# Patient Record
Sex: MALE | Race: WHITE | NOT HISPANIC OR LATINO | Employment: OTHER | URBAN - METROPOLITAN AREA
[De-identification: names, ages, dates, MRNs, and addresses within clinical notes are randomized per-mention and may not be internally consistent; named-entity substitution may affect disease eponyms.]

---

## 2018-05-07 ENCOUNTER — HOSPITAL ENCOUNTER (OUTPATIENT)
Facility: MEDICAL CENTER | Age: 73
End: 2018-05-09
Attending: EMERGENCY MEDICINE | Admitting: HOSPITALIST
Payer: MEDICARE

## 2018-05-07 ENCOUNTER — APPOINTMENT (OUTPATIENT)
Dept: RADIOLOGY | Facility: MEDICAL CENTER | Age: 73
End: 2018-05-07
Attending: HOSPITALIST
Payer: MEDICARE

## 2018-05-07 ENCOUNTER — APPOINTMENT (OUTPATIENT)
Dept: RADIOLOGY | Facility: MEDICAL CENTER | Age: 73
End: 2018-05-07
Attending: EMERGENCY MEDICINE
Payer: MEDICARE

## 2018-05-07 DIAGNOSIS — R27.0 ATAXIA: ICD-10-CM

## 2018-05-07 DIAGNOSIS — R09.02 HYPOXIA: ICD-10-CM

## 2018-05-07 PROBLEM — R42 VERTIGO: Status: ACTIVE | Noted: 2018-05-07

## 2018-05-07 PROBLEM — E03.9 HYPOTHYROIDISM: Status: ACTIVE | Noted: 2018-05-07

## 2018-05-07 PROBLEM — R11.2 NAUSEA & VOMITING: Status: ACTIVE | Noted: 2018-05-07

## 2018-05-07 LAB
ALBUMIN SERPL BCP-MCNC: 3.9 G/DL (ref 3.2–4.9)
ALBUMIN/GLOB SERPL: 1.3 G/DL
ALP SERPL-CCNC: 70 U/L (ref 30–99)
ALT SERPL-CCNC: 10 U/L (ref 2–50)
ANION GAP SERPL CALC-SCNC: 8 MMOL/L (ref 0–11.9)
AST SERPL-CCNC: 15 U/L (ref 12–45)
BASOPHILS # BLD AUTO: 1.1 % (ref 0–1.8)
BASOPHILS # BLD: 0.1 K/UL (ref 0–0.12)
BILIRUB SERPL-MCNC: 0.5 MG/DL (ref 0.1–1.5)
BUN SERPL-MCNC: 9 MG/DL (ref 8–22)
CALCIUM SERPL-MCNC: 8.8 MG/DL (ref 8.5–10.5)
CHLORIDE SERPL-SCNC: 105 MMOL/L (ref 96–112)
CO2 SERPL-SCNC: 27 MMOL/L (ref 20–33)
CREAT SERPL-MCNC: 1.08 MG/DL (ref 0.5–1.4)
EKG IMPRESSION: NORMAL
EOSINOPHIL # BLD AUTO: 0.37 K/UL (ref 0–0.51)
EOSINOPHIL NFR BLD: 3.9 % (ref 0–6.9)
ERYTHROCYTE [DISTWIDTH] IN BLOOD BY AUTOMATED COUNT: 53.1 FL (ref 35.9–50)
GLOBULIN SER CALC-MCNC: 3 G/DL (ref 1.9–3.5)
GLUCOSE SERPL-MCNC: 118 MG/DL (ref 65–99)
HCT VFR BLD AUTO: 52.4 % (ref 42–52)
HGB BLD-MCNC: 17.1 G/DL (ref 14–18)
IMM GRANULOCYTES # BLD AUTO: 0.06 K/UL (ref 0–0.11)
IMM GRANULOCYTES NFR BLD AUTO: 0.6 % (ref 0–0.9)
LYMPHOCYTES # BLD AUTO: 2.95 K/UL (ref 1–4.8)
LYMPHOCYTES NFR BLD: 31.3 % (ref 22–41)
MCH RBC QN AUTO: 31.5 PG (ref 27–33)
MCHC RBC AUTO-ENTMCNC: 32.6 G/DL (ref 33.7–35.3)
MCV RBC AUTO: 96.5 FL (ref 81.4–97.8)
MONOCYTES # BLD AUTO: 0.6 K/UL (ref 0–0.85)
MONOCYTES NFR BLD AUTO: 6.4 % (ref 0–13.4)
NEUTROPHILS # BLD AUTO: 5.36 K/UL (ref 1.82–7.42)
NEUTROPHILS NFR BLD: 56.7 % (ref 44–72)
NRBC # BLD AUTO: 0 K/UL
NRBC BLD-RTO: 0 /100 WBC
PLATELET # BLD AUTO: 236 K/UL (ref 164–446)
PMV BLD AUTO: 10.9 FL (ref 9–12.9)
POTASSIUM SERPL-SCNC: 4 MMOL/L (ref 3.6–5.5)
PROT SERPL-MCNC: 6.9 G/DL (ref 6–8.2)
RBC # BLD AUTO: 5.43 M/UL (ref 4.7–6.1)
SODIUM SERPL-SCNC: 140 MMOL/L (ref 135–145)
TROPONIN I SERPL-MCNC: <0.01 NG/ML (ref 0–0.04)
WBC # BLD AUTO: 9.4 K/UL (ref 4.8–10.8)

## 2018-05-07 PROCEDURE — 36415 COLL VENOUS BLD VENIPUNCTURE: CPT

## 2018-05-07 PROCEDURE — 99220 PR INITIAL OBSERVATION CARE,LEVL III: CPT | Performed by: HOSPITALIST

## 2018-05-07 PROCEDURE — 700102 HCHG RX REV CODE 250 W/ 637 OVERRIDE(OP): Performed by: HOSPITALIST

## 2018-05-07 PROCEDURE — G0378 HOSPITAL OBSERVATION PER HR: HCPCS

## 2018-05-07 PROCEDURE — 99285 EMERGENCY DEPT VISIT HI MDM: CPT

## 2018-05-07 PROCEDURE — 700111 HCHG RX REV CODE 636 W/ 250 OVERRIDE (IP): Performed by: HOSPITALIST

## 2018-05-07 PROCEDURE — 96372 THER/PROPH/DIAG INJ SC/IM: CPT | Mod: XU

## 2018-05-07 PROCEDURE — 80053 COMPREHEN METABOLIC PANEL: CPT

## 2018-05-07 PROCEDURE — 84484 ASSAY OF TROPONIN QUANT: CPT

## 2018-05-07 PROCEDURE — 70450 CT HEAD/BRAIN W/O DYE: CPT

## 2018-05-07 PROCEDURE — 70551 MRI BRAIN STEM W/O DYE: CPT

## 2018-05-07 PROCEDURE — 96374 THER/PROPH/DIAG INJ IV PUSH: CPT

## 2018-05-07 PROCEDURE — 700111 HCHG RX REV CODE 636 W/ 250 OVERRIDE (IP): Performed by: EMERGENCY MEDICINE

## 2018-05-07 PROCEDURE — 700102 HCHG RX REV CODE 250 W/ 637 OVERRIDE(OP): Performed by: EMERGENCY MEDICINE

## 2018-05-07 PROCEDURE — 700105 HCHG RX REV CODE 258: Performed by: EMERGENCY MEDICINE

## 2018-05-07 PROCEDURE — A9270 NON-COVERED ITEM OR SERVICE: HCPCS | Performed by: HOSPITALIST

## 2018-05-07 PROCEDURE — 85025 COMPLETE CBC W/AUTO DIFF WBC: CPT

## 2018-05-07 PROCEDURE — A9270 NON-COVERED ITEM OR SERVICE: HCPCS | Performed by: EMERGENCY MEDICINE

## 2018-05-07 PROCEDURE — 96375 TX/PRO/DX INJ NEW DRUG ADDON: CPT

## 2018-05-07 PROCEDURE — 93005 ELECTROCARDIOGRAM TRACING: CPT | Performed by: EMERGENCY MEDICINE

## 2018-05-07 RX ORDER — FLUTICASONE PROPIONATE 110 UG/1
2 AEROSOL, METERED RESPIRATORY (INHALATION) 2 TIMES DAILY
Status: DISCONTINUED | OUTPATIENT
Start: 2018-05-07 | End: 2018-05-09 | Stop reason: HOSPADM

## 2018-05-07 RX ORDER — CHLORAL HYDRATE 500 MG
1000 CAPSULE ORAL 2 TIMES DAILY
Status: ON HOLD | COMMUNITY
End: 2020-02-05

## 2018-05-07 RX ORDER — ONDANSETRON 2 MG/ML
4 INJECTION INTRAMUSCULAR; INTRAVENOUS EVERY 4 HOURS PRN
Status: DISCONTINUED | OUTPATIENT
Start: 2018-05-07 | End: 2018-05-09 | Stop reason: HOSPADM

## 2018-05-07 RX ORDER — BISACODYL 10 MG
10 SUPPOSITORY, RECTAL RECTAL
Status: DISCONTINUED | OUTPATIENT
Start: 2018-05-07 | End: 2018-05-09 | Stop reason: HOSPADM

## 2018-05-07 RX ORDER — ONDANSETRON 2 MG/ML
4 INJECTION INTRAMUSCULAR; INTRAVENOUS ONCE
Status: COMPLETED | OUTPATIENT
Start: 2018-05-07 | End: 2018-05-07

## 2018-05-07 RX ORDER — HEPARIN SODIUM 5000 [USP'U]/ML
5000 INJECTION, SOLUTION INTRAVENOUS; SUBCUTANEOUS EVERY 8 HOURS
Status: DISCONTINUED | OUTPATIENT
Start: 2018-05-07 | End: 2018-05-09 | Stop reason: HOSPADM

## 2018-05-07 RX ORDER — ALBUTEROL SULFATE 90 UG/1
2 AEROSOL, METERED RESPIRATORY (INHALATION)
Status: DISCONTINUED | OUTPATIENT
Start: 2018-05-07 | End: 2018-05-07

## 2018-05-07 RX ORDER — LEVOTHYROXINE SODIUM 88 UG/1
88 TABLET ORAL
Status: DISCONTINUED | OUTPATIENT
Start: 2018-05-07 | End: 2018-05-09 | Stop reason: HOSPADM

## 2018-05-07 RX ORDER — SIMVASTATIN 20 MG
40 TABLET ORAL NIGHTLY
Status: DISCONTINUED | OUTPATIENT
Start: 2018-05-07 | End: 2018-05-09 | Stop reason: HOSPADM

## 2018-05-07 RX ORDER — POLYETHYLENE GLYCOL 3350 17 G/17G
1 POWDER, FOR SOLUTION ORAL
Status: DISCONTINUED | OUTPATIENT
Start: 2018-05-07 | End: 2018-05-09 | Stop reason: HOSPADM

## 2018-05-07 RX ORDER — MECLIZINE HCL 12.5 MG/1
12.5 TABLET ORAL 3 TIMES DAILY PRN
Qty: 10 TAB | Refills: 0 | Status: SHIPPED | OUTPATIENT
Start: 2018-05-07 | End: 2018-05-09

## 2018-05-07 RX ORDER — MECLIZINE HYDROCHLORIDE 25 MG/1
25 TABLET ORAL ONCE
Status: COMPLETED | OUTPATIENT
Start: 2018-05-07 | End: 2018-05-07

## 2018-05-07 RX ORDER — AMOXICILLIN 250 MG
2 CAPSULE ORAL 2 TIMES DAILY
Status: DISCONTINUED | OUTPATIENT
Start: 2018-05-07 | End: 2018-05-09 | Stop reason: HOSPADM

## 2018-05-07 RX ORDER — MECLIZINE HYDROCHLORIDE 25 MG/1
25 TABLET ORAL 3 TIMES DAILY PRN
Status: DISCONTINUED | OUTPATIENT
Start: 2018-05-07 | End: 2018-05-09 | Stop reason: HOSPADM

## 2018-05-07 RX ORDER — ONDANSETRON 4 MG/1
4 TABLET, ORALLY DISINTEGRATING ORAL EVERY 6 HOURS PRN
Qty: 10 TAB | Refills: 0 | Status: ON HOLD | OUTPATIENT
Start: 2018-05-07 | End: 2020-02-05

## 2018-05-07 RX ORDER — METOPROLOL TARTRATE 50 MG/1
50 TABLET, FILM COATED ORAL 2 TIMES DAILY
Status: ON HOLD | COMMUNITY
End: 2020-02-05

## 2018-05-07 RX ORDER — ASPIRIN 325 MG
325 TABLET ORAL
Status: ON HOLD | COMMUNITY
End: 2018-05-08

## 2018-05-07 RX ORDER — TIOTROPIUM BROMIDE 18 UG/1
1 CAPSULE ORAL; RESPIRATORY (INHALATION) DAILY
Status: DISCONTINUED | OUTPATIENT
Start: 2018-05-07 | End: 2018-05-09 | Stop reason: HOSPADM

## 2018-05-07 RX ORDER — ONDANSETRON 4 MG/1
4 TABLET, ORALLY DISINTEGRATING ORAL EVERY 4 HOURS PRN
Status: DISCONTINUED | OUTPATIENT
Start: 2018-05-07 | End: 2018-05-09 | Stop reason: HOSPADM

## 2018-05-07 RX ORDER — SODIUM CHLORIDE 9 MG/ML
1000 INJECTION, SOLUTION INTRAVENOUS ONCE
Status: COMPLETED | OUTPATIENT
Start: 2018-05-07 | End: 2018-05-07

## 2018-05-07 RX ORDER — ALBUTEROL SULFATE 90 UG/1
2 AEROSOL, METERED RESPIRATORY (INHALATION) EVERY 4 HOURS PRN
Status: DISCONTINUED | OUTPATIENT
Start: 2018-05-07 | End: 2018-05-09 | Stop reason: HOSPADM

## 2018-05-07 RX ORDER — LORAZEPAM 2 MG/ML
1 INJECTION INTRAMUSCULAR ONCE
Status: COMPLETED | OUTPATIENT
Start: 2018-05-07 | End: 2018-05-07

## 2018-05-07 RX ADMIN — MECLIZINE HYDROCHLORIDE 25 MG: 25 TABLET ORAL at 21:18

## 2018-05-07 RX ADMIN — TIOTROPIUM BROMIDE 1 CAPSULE: 18 CAPSULE ORAL; RESPIRATORY (INHALATION) at 22:07

## 2018-05-07 RX ADMIN — HEPARIN SODIUM 5000 UNITS: 5000 INJECTION, SOLUTION INTRAVENOUS; SUBCUTANEOUS at 21:20

## 2018-05-07 RX ADMIN — LORAZEPAM 1 MG: 2 INJECTION INTRAMUSCULAR; INTRAVENOUS at 20:27

## 2018-05-07 RX ADMIN — MECLIZINE HYDROCHLORIDE 25 MG: 25 TABLET ORAL at 13:30

## 2018-05-07 RX ADMIN — ONDANSETRON 4 MG: 2 INJECTION INTRAMUSCULAR; INTRAVENOUS at 13:30

## 2018-05-07 RX ADMIN — ASPIRIN 81 MG: 81 TABLET, COATED ORAL at 21:18

## 2018-05-07 RX ADMIN — SODIUM CHLORIDE 1000 ML: 9 INJECTION, SOLUTION INTRAVENOUS at 13:30

## 2018-05-07 RX ADMIN — SIMVASTATIN 40 MG: 20 TABLET, FILM COATED ORAL at 21:18

## 2018-05-07 ASSESSMENT — COPD QUESTIONNAIRES
DO YOU EVER COUGH UP ANY MUCUS OR PHLEGM?: NO/ONLY WITH OCCASIONAL COLDS OR INFECTIONS
COPD SCREENING SCORE: 4
HAVE YOU SMOKED AT LEAST 100 CIGARETTES IN YOUR ENTIRE LIFE: YES
DURING THE PAST 4 WEEKS HOW MUCH DID YOU FEEL SHORT OF BREATH: NONE/LITTLE OF THE TIME

## 2018-05-07 ASSESSMENT — ENCOUNTER SYMPTOMS
DIZZINESS: 1
FEVER: 0
CHILLS: 0
CONSTIPATION: 0
NAUSEA: 1
VOMITING: 0
DIARRHEA: 0
HEADACHES: 0
COUGH: 0
SHORTNESS OF BREATH: 0
WHEEZING: 1

## 2018-05-07 ASSESSMENT — LIFESTYLE VARIABLES
HAVE YOU EVER FELT YOU SHOULD CUT DOWN ON YOUR DRINKING: NO
EVER_SMOKED: YES
TOTAL SCORE: 0
ALCOHOL_USE: YES
HOW MANY TIMES IN THE PAST YEAR HAVE YOU HAD 5 OR MORE DRINKS IN A DAY: 0
TOTAL SCORE: 0
CONSUMPTION TOTAL: NEGATIVE
EVER FELT BAD OR GUILTY ABOUT YOUR DRINKING: NO
EVER HAD A DRINK FIRST THING IN THE MORNING TO STEADY YOUR NERVES TO GET RID OF A HANGOVER: NO
AVERAGE NUMBER OF DAYS PER WEEK YOU HAVE A DRINK CONTAINING ALCOHOL: 1
TOTAL SCORE: 0
ON A TYPICAL DAY WHEN YOU DRINK ALCOHOL HOW MANY DRINKS DO YOU HAVE: 1
HAVE PEOPLE ANNOYED YOU BY CRITICIZING YOUR DRINKING: NO

## 2018-05-07 ASSESSMENT — PATIENT HEALTH QUESTIONNAIRE - PHQ9
SUM OF ALL RESPONSES TO PHQ9 QUESTIONS 1 AND 2: 0
1. LITTLE INTEREST OR PLEASURE IN DOING THINGS: NOT AT ALL
2. FEELING DOWN, DEPRESSED, IRRITABLE, OR HOPELESS: NOT AT ALL

## 2018-05-07 ASSESSMENT — PAIN SCALES - GENERAL
PAINLEVEL_OUTOF10: 0
PAINLEVEL_OUTOF10: 0

## 2018-05-07 NOTE — H&P
Hospital Medicine History and Physical      Date of Service  5/7/2018    Chief Complaint  Chief Complaint   Patient presents with   • Dizziness     dizziness n/v that started this morning.  denies pain.  able to ambulated. increased dizziness with position change       History of Presenting Illness  Kamron is a 72 y.o. male w/h/o asthma, childhood infections who presents with ataxia vertigo and nausea. The symptoms started this morning. Patient is not really sure what brought them on. He last had them a couple years ago but has not had them since. He denies being dehydrated recently. It does appear to be somewhat positional for the patient. Denies any tinnitus.     Primary Care Physician  Kvng Pickett M.D.    Code Status  Full code per patient    Review of Systems  Review of Systems   Constitutional: Negative for chills and fever.   HENT: Negative for tinnitus.    Respiratory: Positive for wheezing. Negative for cough and shortness of breath.    Cardiovascular: Negative for chest pain.   Gastrointestinal: Positive for nausea. Negative for constipation, diarrhea and vomiting.   Genitourinary: Negative for dysuria.   Neurological: Positive for dizziness. Negative for headaches.     Please see HPI, all other systems were reviewed and are negative (AMA/CMS criteria)   Past Medical History  Past Medical History:   Diagnosis Date   • Asthma    • Chickenpox    • [place] measles    • Influenza    • Mumps    • Tonsillitis        Surgical History  Past Surgical History:   Procedure Laterality Date   • HIP REPLACEMENT, TOTAL         Medications  No current facility-administered medications on file prior to encounter.      Current Outpatient Prescriptions on File Prior to Encounter   Medication Sig Dispense Refill   • levothyroxine (SYNTHROID) 88 MCG Tab Take 88 mcg by mouth Every morning on an empty stomach.     • simvastatin (ZOCOR) 40 MG Tab Take 40 mg by mouth every evening.     • albuterol (VENTOLIN OR PROVENTIL) 108 (90  "BASE) MCG/ACT Aero Soln inhalation aerosol Inhale 2 Puffs by mouth every 6 hours as needed for Shortness of Breath.       Family History  Family History   Problem Relation Age of Onset   • Cancer Mother    • Cancer Father      Social History  Social History   Substance Use Topics   • Smoking status: Former Smoker     Packs/day: 0.50     Years: 46.00     Types: Cigarettes     Quit date: 2015   • Smokeless tobacco: Never Used   • Alcohol use Yes      Comment: 2-3 drinks a week       Allergies  Allergies   Allergen Reactions   • Amoxicillin Hives     Pt states \"I get hives\".   • Ampicillin Hives     Pt states \"I get hives\".        Physical Exam  Laboratory   Hemodynamics  Temp (24hrs), Av.2 °C (97.2 °F), Min:36.2 °C (97.2 °F), Max:36.2 °C (97.2 °F)   Temperature: 36.2 °C (97.2 °F)  Pulse  Av.1  Min: 61  Max: 67 Heart Rate (Monitored): 67  Blood Pressure : 148/84, NIBP: 140/72      Respiratory      Respiration: (!) 23, Pulse Oximetry: 96 %             Physical Exam   Constitutional: He is oriented to person, place, and time. He appears well-developed and well-nourished.   HENT:   Head: Normocephalic.   Right Ear: External ear normal.   Left Ear: External ear normal.   Nose: Nose normal.   Eyes: Conjunctivae and EOM are normal. Pupils are equal, round, and reactive to light. Right eye exhibits no discharge. Left eye exhibits no discharge. No scleral icterus.   Neck: Neck supple. No tracheal deviation present.   Cardiovascular: Normal rate.  Exam reveals no gallop and no friction rub.    Pulmonary/Chest: No respiratory distress. He has wheezes. He has no rales.   Abdominal: Soft. He exhibits no distension. There is no tenderness. There is no rebound and no guarding.   Musculoskeletal: He exhibits no edema.   Neurological: He is alert and oriented to person, place, and time.   Negative head thrust test   Skin: Skin is warm and dry. No rash noted. No erythema.   Psychiatric: He has a normal mood and affect. "       Recent Labs      05/07/18   1220   WBC  9.4   RBC  5.43   HEMOGLOBIN  17.1   HEMATOCRIT  52.4*   MCV  96.5   MCH  31.5   MCHC  32.6*   RDW  53.1*   PLATELETCT  236   MPV  10.9     Recent Labs      05/07/18   1220   SODIUM  140   POTASSIUM  4.0   CHLORIDE  105   CO2  27   GLUCOSE  118*   BUN  9   CREATININE  1.08   CALCIUM  8.8     Recent Labs      05/07/18   1220   ALTSGPT  10   ASTSGOT  15   ALKPHOSPHAT  70   TBILIRUBIN  0.5   GLUCOSE  118*                 Lab Results   Component Value Date    TROPONINI <0.01 05/07/2018       Imaging  CT-HEAD W/O   Final Result      1.  Cerebral atrophy.      2.  White matter lucencies most consistent with small vessel ischemic change versus demyelination or gliosis.      3.  Otherwise, Head CT without contrast with no acute findings. No evidence of acute cerebral  hemorrhage or mass lesion.        EKG  per my independant read:  QTc: 431, HR: 61, Normal Sinus Rhythm, no ST/T changes     Assessment/Plan     I anticipate this patient is appropriate for observation status at this time.    Hypothyroidism- (present on admission)   Assessment & Plan    - Continuing levothyroxine  - a.m. cortisol as well as TSH and free T4 PENDING        Nausea & vomiting- (present on admission)   Assessment & Plan    -Meclizine and antiemetics as needed        Vertigo- (present on admission)   Assessment & Plan    Likely peripheral as patient had negative head thrust test  TIA / vertigo workup  Pt will be admitted for stroke workup. The pt had a negative brain CT. The pt will be monitored on telemetry with neuro checks.   Orthostatic vital signs are pending  MRI, carotid US and echocardiogram are PENDING. The pt will be seen by PT/OT.   Pt will be continued on aspirin and statin. A lipid panel and Hba1c will also be checked.               Prophylaxis:  sc heparin

## 2018-05-07 NOTE — ED NOTES
Med rec complete per pt and [facility] in [place]. Allergies verified and updated. No ABX taken within the last 30 days

## 2018-05-07 NOTE — ED PROVIDER NOTES
ED Provider Note    CHIEF COMPLAINT  Chief Complaint   Patient presents with   • Dizziness     dizziness n/v that started this morning.  denies pain.  able to ambulated. increased dizziness with position change       HPI  Familia Lundy is a 72 y.o. male who presents for evaluation of abrupt onset dizziness mild nausea and vomiting. The patient also has a sensation that the room is spinning. He does report that certain positions make it better or worse. He has had this before. No new or recent change in medications. He denies any focal speech abnormalities any numbness weakness or tingling to the arms or legs or face. Patient is otherwise quite healthy. He denies any minor head injury or any trauma in general. No pain radiating to the back. He denies any ringing in the ears or tinnitus. No aspirin use.    REVIEW OF SYSTEMS  See HPI for further details. No high fevers headache night sweats weight loss numbness weakness or tingling All other systems are negative.     PAST MEDICAL HISTORY  Past Medical History:   Diagnosis Date   • Asthma    • Chickenpox    • [place] measles    • Influenza    • Mumps    • Tonsillitis        FAMILY HISTORY  No history of bleeding disorder    SOCIAL HISTORY  Social History     Social History   • Marital status:      Spouse name: N/A   • Number of children: N/A   • Years of education: N/A     Social History Main Topics   • Smoking status: Former Smoker     Packs/day: 0.50     Years: 46.00     Types: Cigarettes     Quit date: 12/1/2015   • Smokeless tobacco: Never Used   • Alcohol use Yes      Comment: 2-3 drinks a week   • Drug use: No   • Sexual activity: Not on file     Other Topics Concern   • Not on file     Social History Narrative   • No narrative on file     Former smoker no IV drugs  SURGICAL HISTORY  Past Surgical History:   Procedure Laterality Date   • HIP REPLACEMENT, TOTAL         CURRENT MEDICATIONS  No current facility-administered medications for this encounter.  "    Current Outpatient Prescriptions:   •  ondansetron (ZOFRAN ODT) 4 MG TABLET DISPERSIBLE, Take 1 Tab by mouth every 6 hours as needed for Nausea., Disp: 10 Tab, Rfl: 0  •  meclizine (ANTIVERT) 12.5 MG Tab, Take 1 Tab by mouth 3 times a day as needed for Dizziness., Disp: 10 Tab, Rfl: 0  •  fluticasone (FLOVENT HFA) 110 MCG/ACT Aerosol, Inhale 2 Puffs by mouth 2 times a day., Disp: , Rfl:   •  albuterol (VENTOLIN OR PROVENTIL) 108 (90 BASE) MCG/ACT Aero Soln inhalation aerosol, Inhale 2 Puffs by mouth every 6 hours as needed for Shortness of Breath., Disp: , Rfl:   •  levothyroxine (SYNTHROID) 88 MCG Tab, Take 88 mcg by mouth Every morning on an empty stomach., Disp: , Rfl:   •  METOPROLOL TARTRATE PO, Take  by mouth., Disp: , Rfl:   •  naproxen (NAPROSYN) 500 MG Tab, Take 500 mg by mouth 2 times a day as needed., Disp: , Rfl:   •  simvastatin (ZOCOR) 40 MG Tab, Take 40 mg by mouth every evening., Disp: , Rfl:   •  albuterol (VENTOLIN OR PROVENTIL) 108 (90 BASE) MCG/ACT Aero Soln inhalation aerosol, Inhale 2 Puffs by mouth every 6 hours as needed for Shortness of Breath., Disp: , Rfl:   •  predniSONE (DELTASONE) 10 MG Tab, Take 40MG for 2 days Take 20MG for 4 days Take 10 MG for 4 days Take 5 MG for 2 days, Disp: 22 Tab, Rfl: 0  •  tiotropium (SPIRIVA) 18 MCG Cap, Inhale 1 Cap by mouth every day., Disp: 30 Cap, Rfl: 3  •  aspirin EC (ECOTRIN) 81 MG Tablet Delayed Response, Take 81 mg by mouth every bedtime., Disp: , Rfl:   •  Ascorbic Acid (VITAMIN C PO), Take 6 Tabs by mouth every day. Pt takes: 4 tablets AM 2 tablets PM, Disp: , Rfl:       ALLERGIES  Allergies   Allergen Reactions   • Amoxicillin Hives     Pt states \"I get hives\".   • Ampicillin Hives     Pt states \"I get hives\".       PHYSICAL EXAM  VITAL SIGNS: /84   Pulse 65   Temp 36.2 °C (97.2 °F)   Resp (!) 23   Ht 1.727 m (5' 8\")   Wt 108.9 kg (240 lb)   SpO2 96%   BMI 36.49 kg/m²  Room air O2: 90    Constitutional: Patient appears " uncomfortable  HENT: Normocephalic, Atraumatic, Bilateral external ears normal, Oropharynx moist, No oral exudates, Nose normal.   Eyes: PERRLA, EOMI, Conjunctiva normal, No discharge. Mild horizontal nystagmus no vertical nystagmus  Neck: Normal range of motion, No tenderness, Supple, No stridor.   Cardiovascular: Normal heart rate, Normal rhythm, No murmurs, No rubs, No gallops.   Thorax & Lungs: Normal breath sounds, No respiratory distress, No wheezing, No chest tenderness.   Abdomen: Bowel sounds normal, Soft, No tenderness, No masses, No pulsatile masses.   Skin: Warm, Dry, No erythema, No rash.   Back: No tenderness, No CVA tenderness.   Extremities: Intact distal pulses, No edema, No tenderness, No cyanosis, No clubbing.   Neurologic: Alert & oriented x 3, Normal motor function, Normal sensory function, No focal deficits noted. No seizure activity  Psychiatric: Anxious       EKG  Interpretation by me rate 60 sinus rhythm. Single PVC noted no acute ST segment elevation or depression or pathological T-wave inversion. Low voltage in frontal leads. No evidence of ischemia or arrhythmia    RADIOLOGY/PROCEDURES  Results for orders placed or performed during the hospital encounter of 05/07/18   CBC WITH DIFFERENTIAL   Result Value Ref Range    WBC 9.4 4.8 - 10.8 K/uL    RBC 5.43 4.70 - 6.10 M/uL    Hemoglobin 17.1 14.0 - 18.0 g/dL    Hematocrit 52.4 (H) 42.0 - 52.0 %    MCV 96.5 81.4 - 97.8 fL    MCH 31.5 27.0 - 33.0 pg    MCHC 32.6 (L) 33.7 - 35.3 g/dL    RDW 53.1 (H) 35.9 - 50.0 fL    Platelet Count 236 164 - 446 K/uL    MPV 10.9 9.0 - 12.9 fL    Neutrophils-Polys 56.70 44.00 - 72.00 %    Lymphocytes 31.30 22.00 - 41.00 %    Monocytes 6.40 0.00 - 13.40 %    Eosinophils 3.90 0.00 - 6.90 %    Basophils 1.10 0.00 - 1.80 %    Immature Granulocytes 0.60 0.00 - 0.90 %    Nucleated RBC 0.00 /100 WBC    Neutrophils (Absolute) 5.36 1.82 - 7.42 K/uL    Lymphs (Absolute) 2.95 1.00 - 4.80 K/uL    Monos (Absolute) 0.60 0.00 -  0.85 K/uL    Eos (Absolute) 0.37 0.00 - 0.51 K/uL    Baso (Absolute) 0.10 0.00 - 0.12 K/uL    Immature Granulocytes (abs) 0.06 0.00 - 0.11 K/uL    NRBC (Absolute) 0.00 K/uL   COMP METABOLIC PANEL   Result Value Ref Range    Sodium 140 135 - 145 mmol/L    Potassium 4.0 3.6 - 5.5 mmol/L    Chloride 105 96 - 112 mmol/L    Co2 27 20 - 33 mmol/L    Anion Gap 8.0 0.0 - 11.9    Glucose 118 (H) 65 - 99 mg/dL    Bun 9 8 - 22 mg/dL    Creatinine 1.08 0.50 - 1.40 mg/dL    Calcium 8.8 8.5 - 10.5 mg/dL    AST(SGOT) 15 12 - 45 U/L    ALT(SGPT) 10 2 - 50 U/L    Alkaline Phosphatase 70 30 - 99 U/L    Total Bilirubin 0.5 0.1 - 1.5 mg/dL    Albumin 3.9 3.2 - 4.9 g/dL    Total Protein 6.9 6.0 - 8.2 g/dL    Globulin 3.0 1.9 - 3.5 g/dL    A-G Ratio 1.3 g/dL   TROPONIN   Result Value Ref Range    Troponin I <0.01 0.00 - 0.04 ng/mL   ESTIMATED GFR   Result Value Ref Range    GFR If African American >60 >60 mL/min/1.73 m 2    GFR If Non African American >60 >60 mL/min/1.73 m 2   EKG (ER)   Result Value Ref Range    Report       [facility] Emergency Dept.    Test Date:  2018  Pt Name:    REBA RODRIGUEZ                  Department: ER  MRN:        [id number]                      Room:       RD 04  Gender:     Male                         Technician: [facility]  :        1945                   Requested By:NHAN PACHECO  Order #:    [id number]                    Reading MD:    Measurements  Intervals                                Axis  Rate:       60                           P:          10  WI:         144                          QRS:        -9  QRSD:       94                           T:          49  QT:         440  QTc:        440    Interpretive Statements  SINUS RHYTHM  VENTRICULAR PREMATURE COMPLEX  LOW VOLTAGE IN FRONTAL LEADS  BORDERLINE T WAVE ABNORMALITIES  Compared to ECG 2015 22:44:20  Ventricular premature complex(es) now present  T-wave abnormality still present        CT-HEAD W/O   Final Result       1.  Cerebral atrophy.      2.  White matter lucencies most consistent with small vessel ischemic change versus demyelination or gliosis.      3.  Otherwise, Head CT without contrast with no acute findings. No evidence of acute cerebral  hemorrhage or mass lesion.          COURSE & MEDICAL DECISION MAKING  Pertinent Labs & Imaging studies reviewed. (See chart for details)  Patient presented here with symptoms that were very suggestive of benign positional peripheral vertigo. CT scan of the head did not demonstrate any hemorrhage or space-occupying mass. All these blood tests are normal and reassuring EKG is normal. He was given Zofran and a small dose of meclizine and was still persistently ataxic in a folder and road test Fact that it was abrupt onset, positional with associated nausea and vomiting strongly suggests a peripheral vertiginous disorder rather than central. He did not have any horizontal nystagmus or consistent ataxia. With that being said the patient continues to have severe ataxia and vertiginous symptoms and will be admitted for observation    FINAL IMPRESSION  1. Ataxia, vertigo    Admission         Electronically signed by: Ludin Vera, 5/7/2018 12:38 PM

## 2018-05-07 NOTE — ASSESSMENT & PLAN NOTE
Likely peripheral as patient had negative head thrust test  CT head negative  Negative orthostatics  Echo WNL  Carotid duplex WNL  MRI shows small chronic right frontal infarcts  Cleared by PT/OT  ASA changed to plavix since the patient appears to have had strokes while on aspirin

## 2018-05-07 NOTE — ED NOTES
MD at bedside. Patient attempted to ambulate, became dizzy and unsteady on his feet and helped back into bed

## 2018-05-07 NOTE — ED NOTES
Patient becoming dizzy with change in positions, unable to get standing vitals with patient being unsteady.

## 2018-05-08 ENCOUNTER — APPOINTMENT (OUTPATIENT)
Dept: RADIOLOGY | Facility: MEDICAL CENTER | Age: 73
End: 2018-05-08
Attending: HOSPITALIST
Payer: MEDICARE

## 2018-05-08 ENCOUNTER — APPOINTMENT (OUTPATIENT)
Dept: RADIOLOGY | Facility: MEDICAL CENTER | Age: 73
End: 2018-05-08
Attending: NURSE PRACTITIONER
Payer: MEDICARE

## 2018-05-08 ENCOUNTER — PATIENT OUTREACH (OUTPATIENT)
Dept: HEALTH INFORMATION MANAGEMENT | Facility: OTHER | Age: 73
End: 2018-05-08

## 2018-05-08 LAB
ALBUMIN SERPL BCP-MCNC: 3.4 G/DL (ref 3.2–4.9)
BASOPHILS # BLD AUTO: 0.8 % (ref 0–1.8)
BASOPHILS # BLD: 0.07 K/UL (ref 0–0.12)
BUN SERPL-MCNC: 10 MG/DL (ref 8–22)
CALCIUM SERPL-MCNC: 8.4 MG/DL (ref 8.5–10.5)
CHLORIDE SERPL-SCNC: 108 MMOL/L (ref 96–112)
CHOLEST SERPL-MCNC: 146 MG/DL (ref 100–199)
CO2 SERPL-SCNC: 28 MMOL/L (ref 20–33)
CORTIS SERPL-MCNC: 6.3 UG/DL (ref 0–23)
CREAT SERPL-MCNC: 0.95 MG/DL (ref 0.5–1.4)
DEPRECATED D DIMER PPP IA-ACNC: 316 NG/ML(D-DU)
EOSINOPHIL # BLD AUTO: 0.3 K/UL (ref 0–0.51)
EOSINOPHIL NFR BLD: 3.5 % (ref 0–6.9)
ERYTHROCYTE [DISTWIDTH] IN BLOOD BY AUTOMATED COUNT: 53.5 FL (ref 35.9–50)
GLUCOSE SERPL-MCNC: 101 MG/DL (ref 65–99)
HCT VFR BLD AUTO: 48.3 % (ref 42–52)
HDLC SERPL-MCNC: 34 MG/DL
HGB BLD-MCNC: 16.1 G/DL (ref 14–18)
IMM GRANULOCYTES # BLD AUTO: 0.03 K/UL (ref 0–0.11)
IMM GRANULOCYTES NFR BLD AUTO: 0.4 % (ref 0–0.9)
LDLC SERPL CALC-MCNC: ABNORMAL MG/DL
LV EJECT FRACT  99904: 60
LV EJECT FRACT MOD 2C 99903: 43.96
LV EJECT FRACT MOD 4C 99902: 65.46
LV EJECT FRACT MOD BP 99901: 56.55
LYMPHOCYTES # BLD AUTO: 2.19 K/UL (ref 1–4.8)
LYMPHOCYTES NFR BLD: 25.8 % (ref 22–41)
MCH RBC QN AUTO: 32.3 PG (ref 27–33)
MCHC RBC AUTO-ENTMCNC: 33.3 G/DL (ref 33.7–35.3)
MCV RBC AUTO: 97 FL (ref 81.4–97.8)
MONOCYTES # BLD AUTO: 0.62 K/UL (ref 0–0.85)
MONOCYTES NFR BLD AUTO: 7.3 % (ref 0–13.4)
NEUTROPHILS # BLD AUTO: 5.27 K/UL (ref 1.82–7.42)
NEUTROPHILS NFR BLD: 62.2 % (ref 44–72)
NRBC # BLD AUTO: 0 K/UL
NRBC BLD-RTO: 0 /100 WBC
PHOSPHATE SERPL-MCNC: 3.2 MG/DL (ref 2.5–4.5)
PLATELET # BLD AUTO: 204 K/UL (ref 164–446)
PMV BLD AUTO: 11 FL (ref 9–12.9)
POTASSIUM SERPL-SCNC: 4.1 MMOL/L (ref 3.6–5.5)
RBC # BLD AUTO: 4.98 M/UL (ref 4.7–6.1)
SODIUM SERPL-SCNC: 142 MMOL/L (ref 135–145)
T4 FREE SERPL-MCNC: 0.92 NG/DL (ref 0.53–1.43)
TRIGL SERPL-MCNC: 448 MG/DL (ref 0–149)
TSH SERPL DL<=0.005 MIU/L-ACNC: 0.66 UIU/ML (ref 0.38–5.33)
WBC # BLD AUTO: 8.5 K/UL (ref 4.8–10.8)

## 2018-05-08 PROCEDURE — 700102 HCHG RX REV CODE 250 W/ 637 OVERRIDE(OP): Performed by: HOSPITALIST

## 2018-05-08 PROCEDURE — 700117 HCHG RX CONTRAST REV CODE 255: Performed by: HOSPITALIST

## 2018-05-08 PROCEDURE — G8989 SELF CARE D/C STATUS: HCPCS | Mod: CI

## 2018-05-08 PROCEDURE — G8978 MOBILITY CURRENT STATUS: HCPCS | Mod: CI

## 2018-05-08 PROCEDURE — G8988 SELF CARE GOAL STATUS: HCPCS | Mod: CI

## 2018-05-08 PROCEDURE — 85379 FIBRIN DEGRADATION QUANT: CPT

## 2018-05-08 PROCEDURE — 97165 OT EVAL LOW COMPLEX 30 MIN: CPT

## 2018-05-08 PROCEDURE — 82533 TOTAL CORTISOL: CPT

## 2018-05-08 PROCEDURE — A9270 NON-COVERED ITEM OR SERVICE: HCPCS | Performed by: HOSPITALIST

## 2018-05-08 PROCEDURE — G8979 MOBILITY GOAL STATUS: HCPCS | Mod: CI

## 2018-05-08 PROCEDURE — 84439 ASSAY OF FREE THYROXINE: CPT

## 2018-05-08 PROCEDURE — 85025 COMPLETE CBC W/AUTO DIFF WBC: CPT

## 2018-05-08 PROCEDURE — 83036 HEMOGLOBIN GLYCOSYLATED A1C: CPT

## 2018-05-08 PROCEDURE — 96372 THER/PROPH/DIAG INJ SC/IM: CPT

## 2018-05-08 PROCEDURE — 99226 PR SUBSEQUENT OBSERVATION CARE,LEVEL III: CPT | Performed by: HOSPITALIST

## 2018-05-08 PROCEDURE — 93880 EXTRACRANIAL BILAT STUDY: CPT

## 2018-05-08 PROCEDURE — 80069 RENAL FUNCTION PANEL: CPT

## 2018-05-08 PROCEDURE — 93304 ECHO TRANSTHORACIC: CPT

## 2018-05-08 PROCEDURE — 93308 TTE F-UP OR LMTD: CPT | Mod: 26 | Performed by: INTERNAL MEDICINE

## 2018-05-08 PROCEDURE — 93321 DOPPLER ECHO F-UP/LMTD STD: CPT

## 2018-05-08 PROCEDURE — 97161 PT EVAL LOW COMPLEX 20 MIN: CPT

## 2018-05-08 PROCEDURE — G0378 HOSPITAL OBSERVATION PER HR: HCPCS

## 2018-05-08 PROCEDURE — 700102 HCHG RX REV CODE 250 W/ 637 OVERRIDE(OP): Performed by: NURSE PRACTITIONER

## 2018-05-08 PROCEDURE — G8980 MOBILITY D/C STATUS: HCPCS | Mod: CI

## 2018-05-08 PROCEDURE — 71275 CT ANGIOGRAPHY CHEST: CPT

## 2018-05-08 PROCEDURE — 93325 DOPPLER ECHO COLOR FLOW MAPG: CPT

## 2018-05-08 PROCEDURE — 80061 LIPID PANEL: CPT

## 2018-05-08 PROCEDURE — 93321 DOPPLER ECHO F-UP/LMTD STD: CPT | Mod: 26 | Performed by: INTERNAL MEDICINE

## 2018-05-08 PROCEDURE — G8987 SELF CARE CURRENT STATUS: HCPCS | Mod: CI

## 2018-05-08 PROCEDURE — 700111 HCHG RX REV CODE 636 W/ 250 OVERRIDE (IP): Performed by: HOSPITALIST

## 2018-05-08 PROCEDURE — A9270 NON-COVERED ITEM OR SERVICE: HCPCS | Performed by: NURSE PRACTITIONER

## 2018-05-08 PROCEDURE — 84443 ASSAY THYROID STIM HORMONE: CPT

## 2018-05-08 PROCEDURE — 93325 DOPPLER ECHO COLOR FLOW MAPG: CPT | Mod: 26 | Performed by: INTERNAL MEDICINE

## 2018-05-08 RX ORDER — MECLIZINE HYDROCHLORIDE 25 MG/1
25 TABLET ORAL 3 TIMES DAILY PRN
Qty: 30 TAB | Refills: 0 | Status: ON HOLD
Start: 2018-05-08 | End: 2020-02-05

## 2018-05-08 RX ORDER — CLOPIDOGREL BISULFATE 75 MG/1
75 TABLET ORAL DAILY
Status: DISCONTINUED | OUTPATIENT
Start: 2018-05-08 | End: 2018-05-09 | Stop reason: HOSPADM

## 2018-05-08 RX ORDER — CLOPIDOGREL BISULFATE 75 MG/1
75 TABLET ORAL DAILY
Qty: 30 TAB | Refills: 0 | Status: SHIPPED | OUTPATIENT
Start: 2018-05-08 | End: 2018-06-07 | Stop reason: SDUPTHER

## 2018-05-08 RX ORDER — GEMFIBROZIL 600 MG/1
600 TABLET, FILM COATED ORAL 2 TIMES DAILY
Qty: 60 TAB | Refills: 0 | Status: ON HOLD
Start: 2018-05-08 | End: 2020-02-05

## 2018-05-08 RX ADMIN — FLUTICASONE PROPIONATE 220 MCG: 110 AEROSOL, METERED RESPIRATORY (INHALATION) at 09:56

## 2018-05-08 RX ADMIN — CLOPIDOGREL 75 MG: 75 TABLET, FILM COATED ORAL at 16:55

## 2018-05-08 RX ADMIN — TIOTROPIUM BROMIDE 1 CAPSULE: 18 CAPSULE ORAL; RESPIRATORY (INHALATION) at 09:56

## 2018-05-08 RX ADMIN — IOHEXOL 100 ML: 350 INJECTION, SOLUTION INTRAVENOUS at 19:22

## 2018-05-08 RX ADMIN — FLUTICASONE PROPIONATE 220 MCG: 110 AEROSOL, METERED RESPIRATORY (INHALATION) at 16:55

## 2018-05-08 RX ADMIN — HEPARIN SODIUM 5000 UNITS: 5000 INJECTION, SOLUTION INTRAVENOUS; SUBCUTANEOUS at 16:55

## 2018-05-08 RX ADMIN — SIMVASTATIN 40 MG: 20 TABLET, FILM COATED ORAL at 19:46

## 2018-05-08 RX ADMIN — LEVOTHYROXINE SODIUM 88 MCG: 88 TABLET ORAL at 06:24

## 2018-05-08 RX ADMIN — HEPARIN SODIUM 5000 UNITS: 5000 INJECTION, SOLUTION INTRAVENOUS; SUBCUTANEOUS at 23:05

## 2018-05-08 RX ADMIN — HEPARIN SODIUM 5000 UNITS: 5000 INJECTION, SOLUTION INTRAVENOUS; SUBCUTANEOUS at 06:25

## 2018-05-08 ASSESSMENT — COGNITIVE AND FUNCTIONAL STATUS - GENERAL
SUGGESTED CMS G CODE MODIFIER MOBILITY: CH
DAILY ACTIVITIY SCORE: 24
SUGGESTED CMS G CODE MODIFIER DAILY ACTIVITY: CH
MOBILITY SCORE: 24

## 2018-05-08 ASSESSMENT — GAIT ASSESSMENTS
DEVIATION: OTHER (COMMENT)
GAIT LEVEL OF ASSIST: SUPERVISED
DISTANCE (FEET): 150

## 2018-05-08 ASSESSMENT — PAIN SCALES - GENERAL
PAINLEVEL_OUTOF10: 0
PAINLEVEL_OUTOF10: 0

## 2018-05-08 NOTE — THERAPY
"Physical Therapy Evaluation completed.   Bed Mobility:  Supine to Sit: Supervised  Transfers: Sit to Stand: Supervised  Gait: Level Of Assist: Supervised with No Equipment Needed       Plan of Care: Patient with no further skilled PT needs in the acute care setting at this time  Discharge Recommendations: Equipment: No Equipment Needed. Post-acute therapy Currently anticipate no further skilled therapy needs once patient is discharged from the inpatient setting.    Pt is a 72 year old male admitted to the hospital for dizziness, nausea and vomiting. Pt reports that he is typically independent with all mobility and ADL's, started experiencing dizziness yesterday morning when eating breakfast. Pt denies positional change or head movement at onset of dizziness. At time of initial evaluation, completed preliminary screening for BPPV including positional changes from supine to sit, neck rotation in B direction, neck flexion/extension, visual tracking and Head thrust test. Pt had no subjective complaints of dizziness with head movements, positional changes or visual tracking. Head thrust test negative. Pt also had no complaints of dizziness with visual tracking and no nystagmus present. Pt performed all mobility at SPV level. He did have 2 minor LOB due to lateral trunk sway with ambulation but was able to self recover without assistance. Pt was able to rotate neck and visually scan environment while ambulating without LOB. Of note, pt was on 2L O2 while ambulating and desaturated to the mid 80's with activity. At this time, pt does not demonstrate need for skilled PT intervention while in the acute care setting. Pt equipment or post acute therapy needs identified at this time.     See \"Rehab Therapy-Acute\" Patient Summary Report for complete documentation.     "

## 2018-05-08 NOTE — PROGRESS NOTES
Seen pt, AOx 4. On cardiac monitor with SR, HR 65. Denies any pain, dizzy when getting up, but okay with rest. Plan of care discussed includes Safety, labs, MRI, Echo, car. Dup, PT/OT, and pt understands.

## 2018-05-08 NOTE — DISCHARGE PLANNING
Patient states he has no one to give him a ride home told him will have to call a cabCare Transition Team Assessment    Information Source  Orientation : Oriented x 4  Information Given By: Patient  Who is responsible for making decisions for patient? : Patient    Readmission Evaluation  Is this a readmission?: No    Elopement Risk  Legal Hold: No  Ambulatory or Self Mobile in Wheelchair: No-Not an Elopement Risk    Interdisciplinary Discharge Planning  Does Admitting Nurse Feel This Could be a Complex Discharge?: No  Primary Care Physician: vinod oglesby  Lives with - Patient's Self Care Capacity: Alone and Able to Care For Self  Patient or legal guardian wants to designate a caregiver (see row info): No  Support Systems: None  Housing / Facility: [address]  Do You Take your Prescribed Medications Regularly: Yes  Able to Return to Previous ADL's: Yes  Mobility Issues: No  Prior Services: None  Patient Expects to be Discharged to::  (home)  Durable Medical Equipment: Home Oxygen  DME Provider / Phone: doesnt know    Discharge Preparedness  What is your plan after discharge?: Uncertain - pending medical team collaboration  Difficulity with ADLs: None  Difficulity with IADLs: None              Vision / Hearing Impairment  Vision Impairment : No  Hearing Impairment : No    Values / Beliefs / Concerns  Values / Beliefs Concerns : No    Advance Directive  Advance Directive?: None  Advance Directive offered?: AD Booklet refused                   Anticipated Discharge Information  Anticipated discharge disposition: Home  Discharge Address: face sheet  Discharge Contact Phone Number: none

## 2018-05-08 NOTE — PROGRESS NOTES
Orthostatics completed. Slight dizzy but pt stated dizziness a lot better compared last night. Call light within reach. On Sinus rhythm, HR is 75. VS stable. No other needs at this time. Will continue to monitor

## 2018-05-08 NOTE — DISCHARGE SUMMARY
Hospital Medicine Discharge Note     Patient ID:  Familia Lundy  [id number]  72 y.o.male  1945    Admit Date:  5/7/2018       Discharge Date:  5/9/2018    Primary Care Provider: Kvng Pickett M.D.    Admitting Physician: Jossue Waterman M.D.  Discharging Physician: MAGDY Amin/Jasper Franklin M.D.    Chief Complaint: Positional vertigo; ataxia    Discharge Diagnoses:   Active Problems:    Hypoxia    Vertigo    Cerebral infarct (HCC)    Acute conjunctivitis of right eye    Hypothyroidism  hypertriglyceridemia    Chronic Medical Problems:  Past Medical History:   Diagnosis Date   • Asthma    • Chickenpox    • [place] measles    • Influenza    • Mumps    • Tonsillitis      Code Status: Full Code    Hospital Summary:       Please refer to H&P by Dr. Waterman on 5/7/18 for full details.      In brief, Familia Lundy is a 72 y.o. male from [place] who was admitted 5/7/2018 for vertigo & ataxia.  He had no focal deficits indicative of CVA, but horizontal nystagmus was noted on exam.  CT head was negative for mass or hemorrhage.  Thyroid studies were WNL.  Trops negative. Orthostatics negative.  Carotid duplex showed < 50% stenosis bilaterally.  Echocardiogram was unremarkable. MRI brain did show small chronic cortical infarcts in his right frontal lobe.  He denies any previous knowledge of this and doesn't recall ever having signs or symptoms of stroke. Of note, he has been on a full dose aspirin & simvastatin at home. Lipid panel showed elevated triglycerides & low HDL, so he will be sent home on gemfibrozil.  We recommend repeating his lipid panel in approximately 3-6 months but will refer to the judgment of his PCP. Given the likelihood that he had these strokes while taking aspirin, he was switched to plavix & advised to follow up with the stroke bridge clinic in the outpatient setting (appt made & discussed with the patient).  Prior to discharge he had no focal weakness or other evidence of CVA.  His dizziness  resolved with meclizine.  PT/OT did evaluate him & was cleared for discharge with no anticipated needs.  The patient states he feels good & is ready to go home.  He was made aware that he will need to return to [place] for his stroke clinic follow up, which he is willing to do.  He also agrees to follow up with his PCP in 1-2 weeks. The patient states he has 2-3 drinks per week, which could potentially account for some of his ataxia. We did discuss the benefits of complete alcohol cessation at length prior to his discharge.    Therefore, he is discharged in good and stable condition with close outpatient follow-up.    Consultants: None inpatient, but will follow up with outpatient neurology given his MRI findings    Studies:  Imaging/ Testing:      CT-CTA CHEST PULMONARY ARTERY W/ RECONS   Final Result      1.  There is no CT evidence of acute pulmonary embolism.   2.  There is no focal pneumonia.   3.  There is no evidence of edema or significant effusion.            ECHOCARDIOGRAM LTD W/ CONT   Final Result      MR-BRAIN-W/O   Final Result      1.  No acute abnormality.   2.  Small chronic cortical infarcts in the right frontal lobe.   3.  Mild age-appropriate cerebral volume loss.      Carotid Duplex ([facility] Only) - Do not order if CTA - Neck done in ER   Final Result      CT-HEAD W/O   Final Result      1.  Cerebral atrophy.      2.  White matter lucencies most consistent with small vessel ischemic change versus demyelination or gliosis.      3.  Otherwise, Head CT without contrast with no acute findings. No evidence of acute cerebral  hemorrhage or mass lesion.        Laboratory:   Recent Labs      05/07/18   1220  05/08/18   0337   WBC  9.4  8.5   RBC  5.43  4.98   HEMOGLOBIN  17.1  16.1   HEMATOCRIT  52.4*  48.3   MCV  96.5  97.0   MCH  31.5  32.3   MCHC  32.6*  33.3*   RDW  53.1*  53.5*   PLATELETCT  236  204   MPV  10.9  11.0     Recent Labs      05/07/18   1220  05/08/18   0337   SODIUM   140  142   POTASSIUM  4.0  4.1   CHLORIDE  105  108   CO2  27  28   GLUCOSE  118*  101*   BUN  9  10   CREATININE  1.08  0.95   CALCIUM  8.8  8.4*     Recent Labs      05/07/18   1220  05/08/18   0337   ALTSGPT  10   --    ASTSGOT  15   --    ALKPHOSPHAT  70   --    TBILIRUBIN  0.5   --    GLUCOSE  118*  101*      Recent Labs      05/08/18   0337   TRIGLYCERIDE  448*   HDL  34*   LDL  see below     Recent Labs      05/07/18   1220   TROPONINI  <0.01     Recent Labs      05/08/18   0337   TSHULTRASEN  0.660     Procedures/Surgeries:  None    Disposition:  Discharge home    Condition:  Stable    Instructions:   Activity: As tolerated. Exercise encouraged.  Diet: Regular  Followup: PCP in 1-2 weeks; Stroke bridge clinic at first available  Instructions:  -Given instructions to return to the ER if any new or worsening symptoms, worsening condition, or failure to improve  -Call PCP for followup  -No smoking, no alcohol, no caffeine  -Encourage risk factor reduction with tobacco and alcohol abstinence, diet changes, weight loss, and exercise.     Follow-Up  Kvng Pickett M.D.  [address] [facility] [address]  [phone]    Go on 5/22/2018  Please arrive at 2:30 pm for your appointment. IF you are unabel to make appointment please call to cancel or reschedule. Thank you     Future Appointments  Date Time Provider Department Center   6/7/2018 1:40 PM STROKE BRIDGE CLINIC [facility] None     Discharge Medications:        (Yes)  Medication Reconciliation Completed     Medication List      START taking these medications      Instructions   clopidogrel 75 MG Tabs  Commonly known as:  PLAVIX   Take 1 Tab by mouth every day.  Dose:  75 mg     gemfibrozil 600 MG Tabs  Commonly known as:  LOPID   Take 1 Tab by mouth 2 times a day.  Dose:  600 mg     meclizine 25 MG Tabs  Commonly known as:  ANTIVERT   Take 1 Tab by mouth 3 times a day as needed for Dizziness, Nausea/Vomiting or Vertigo.  Dose:  25 mg     moxifloxacin 0.5 %  Soln  Commonly known as:  VIGAMOX   Place 1 Drop in right eye 3 times a day.  Dose:  1 Drop     ondansetron 4 MG Tbdp  Commonly known as:  ZOFRAN ODT   Take 1 Tab by mouth every 6 hours as needed for Nausea.  Dose:  4 mg        CONTINUE taking these medications      Instructions   albuterol 108 (90 Base) MCG/ACT Aers inhalation aerosol   Inhale 2 Puffs by mouth every 6 hours as needed for Shortness of Breath.  Dose:  2 Puff     fish oil 1000 MG Caps capsule   Take 1,000 mg by mouth 2 Times a Day.  Dose:  1000 mg     levothyroxine 88 MCG Tabs  Commonly known as:  SYNTHROID   Take 88 mcg by mouth Every morning on an empty stomach.  Dose:  88 mcg     metoprolol 50 MG Tabs  Commonly known as:  LOPRESSOR   Take 50 mg by mouth 2 times a day.  Dose:  50 mg     simvastatin 40 MG Tabs  Commonly known as:  ZOCOR   Take 40 mg by mouth every evening.  Dose:  40 mg        STOP taking these medications    aspirin 325 MG Tabs  Commonly known as:  ASA          Opioid prescription history checked: N/A    Total time of the discharge process exceeds 45 minutes. This included face to face with the patient, medication reconciliation, care coordination with Dr. Franklin involved in patient care and discussion and coordination with case management.     Please CC the above physicians    REGINALD NxiRCorinaN.  5/8/2018  11:13 AM    Addendum to discharge summary (5/9/2018 oh 9:20 AM):  The patient was unable to wean off of oxygen and was kept overnight for monitoring. He remains stable on 3 L/m. No dyspnea subjectively nor objectively. A d-dimer was checked yesterday and was mildly elevated, so a CTA was subsequently completed and was negative for PE or pneumonia. Patient remains afebrile with a normal heart rate and blood pressure. No adventitious lung sounds were noted on exam. His follow-up appointments for his PCP in addition to the stroke bridge clinic are still in place and have been agreed upon by the patient. Education with the  patient was done regarding the importance of wearing his oxygen both during the day and at night. The risks of not doing so were reviewed. He verbalized understanding and all questions were answered. Recommend outpatient pulmonary follow-up, to be referred by PCP.    He is complaining of right eye redness/pain consistent with conjunctivitis. He was initiated on ophthalmic drops for this and was encouraged him to wash his hands frequently in order to contain any spread. Bedside nursing was updated.     I have discussed this case with Dr. Franklin while the patient's bedside. The patient is still on track to be discharged home today with supplemental oxygen. Case management is involved and will arrange a ride home for the patient.     MAGDY Nix  5/9/2018  09:32 AM        Pt seen and examined.agree with discharge plan

## 2018-05-08 NOTE — PROGRESS NOTES
Report received, assumed patient care.  Pt A&OX4.  Assessment completed.  Call light within reach, personal belongings available, bed in lowest position, pt calling for assistance.  Pt reports no pain.  Pt given IS and instructed of use.  Communication board updated, POC discussed.  Monitors reapplied, VSS.  No additional needs at this time.

## 2018-05-08 NOTE — THERAPY
"Occupational Therapy Evaluation completed.   Functional Status:  Supervised/Independent with ADLs and txfs  Plan of Care: Patient with no further skilled OT needs in the acute care setting at this time  Discharge Recommendations: Post-acute therapy Currently anticipate no further skilled therapy needs once patient is discharged from the inpatient setting.    See \"Rehab Therapy-Acute\" Patient Summary Report for complete documentation.    "

## 2018-05-08 NOTE — PROGRESS NOTES
Tech ambulated pt on RA.  Pt desatted to 80%.  Pt requires 3 L O2 while ambulating.  APRN updated and aware.

## 2018-05-08 NOTE — FACE TO FACE
Face to Face Note  -  Durable Medical Equipment    SUSAN Nix - NPI: [id number]  I certify that this patient is under my care and that they had a durable medical equipment(DME)face to face encounter by myself that meets the physician DME face-to-face encounter requirements with this patient on:    Date of encounter:   Patient:                    MRN:                       YOB: 2016  Familia Lundy  [id number]  1945     The encounter with the patient was in whole, or in part, for the following medical condition, which is the primary reason for durable medical equipment:  Asthma    I certify that, based on my findings, the following durable medical equipment is medically necessary:  Oxygen.    HOME O2 Saturation Measurements:   (Values must be present for Home Oxygen orders)  Room air sat at rest: 81  Room air sat with amb: 83  With liters of O2: 3, O2 sat at rest with O2: 91  With Liters of O2: 3, O2 sat with amb with O2 : 55  Is the patient mobile?: Yes    My Clinical findings support the need for the above equipment due to:  Hypoxia    Supporting Symptoms: Asthma combined with chronic obesity related hypoventilation syndrome

## 2018-05-08 NOTE — RESPIRATORY CARE
COPD EDUCATION by COPD CLINICAL EDUCATOR  5/8/2018 at 7:35 AM by Melisa Hummel     Patient reviewed by COPD education team. Patient does not qualify for COPD program.

## 2018-05-09 VITALS
OXYGEN SATURATION: 91 % | WEIGHT: 246.47 LBS | HEIGHT: 68 IN | TEMPERATURE: 97.7 F | HEART RATE: 72 BPM | RESPIRATION RATE: 16 BRPM | BODY MASS INDEX: 37.36 KG/M2 | DIASTOLIC BLOOD PRESSURE: 67 MMHG | SYSTOLIC BLOOD PRESSURE: 137 MMHG

## 2018-05-09 PROBLEM — H10.31 ACUTE CONJUNCTIVITIS OF RIGHT EYE: Status: ACTIVE | Noted: 2018-05-09

## 2018-05-09 PROBLEM — R11.2 NAUSEA & VOMITING: Status: RESOLVED | Noted: 2018-05-07 | Resolved: 2018-05-09

## 2018-05-09 PROBLEM — I63.9 CEREBRAL INFARCT (HCC): Status: ACTIVE | Noted: 2018-05-08

## 2018-05-09 LAB
EST. AVERAGE GLUCOSE BLD GHB EST-MCNC: 128 MG/DL
HBA1C MFR BLD: 6.1 % (ref 0–5.6)

## 2018-05-09 PROCEDURE — A9270 NON-COVERED ITEM OR SERVICE: HCPCS | Performed by: NURSE PRACTITIONER

## 2018-05-09 PROCEDURE — 700102 HCHG RX REV CODE 250 W/ 637 OVERRIDE(OP): Performed by: HOSPITALIST

## 2018-05-09 PROCEDURE — 700101 HCHG RX REV CODE 250: Performed by: NURSE PRACTITIONER

## 2018-05-09 PROCEDURE — 96372 THER/PROPH/DIAG INJ SC/IM: CPT

## 2018-05-09 PROCEDURE — G0378 HOSPITAL OBSERVATION PER HR: HCPCS

## 2018-05-09 PROCEDURE — 99217 PR OBSERVATION CARE DISCHARGE: CPT | Performed by: HOSPITALIST

## 2018-05-09 PROCEDURE — 700111 HCHG RX REV CODE 636 W/ 250 OVERRIDE (IP): Performed by: HOSPITALIST

## 2018-05-09 PROCEDURE — 700102 HCHG RX REV CODE 250 W/ 637 OVERRIDE(OP): Performed by: NURSE PRACTITIONER

## 2018-05-09 PROCEDURE — A9270 NON-COVERED ITEM OR SERVICE: HCPCS | Performed by: HOSPITALIST

## 2018-05-09 RX ORDER — MOXIFLOXACIN 5 MG/ML
1 SOLUTION/ DROPS OPHTHALMIC 3 TIMES DAILY
Qty: 1 BOTTLE | Refills: 0 | Status: ON HOLD
Start: 2018-05-09 | End: 2020-02-05

## 2018-05-09 RX ORDER — MOXIFLOXACIN 5 MG/ML
1 SOLUTION/ DROPS OPHTHALMIC 3 TIMES DAILY
Status: DISCONTINUED | OUTPATIENT
Start: 2018-05-09 | End: 2018-05-09 | Stop reason: HOSPADM

## 2018-05-09 RX ADMIN — LEVOTHYROXINE SODIUM 88 MCG: 88 TABLET ORAL at 06:36

## 2018-05-09 RX ADMIN — FLUTICASONE PROPIONATE 220 MCG: 110 AEROSOL, METERED RESPIRATORY (INHALATION) at 07:56

## 2018-05-09 RX ADMIN — MOXIFLOXACIN HYDROCHLORIDE 1 DROP: 5 SOLUTION/ DROPS OPHTHALMIC at 10:20

## 2018-05-09 RX ADMIN — TIOTROPIUM BROMIDE 1 CAPSULE: 18 CAPSULE ORAL; RESPIRATORY (INHALATION) at 07:57

## 2018-05-09 RX ADMIN — HEPARIN SODIUM 5000 UNITS: 5000 INJECTION, SOLUTION INTRAVENOUS; SUBCUTANEOUS at 06:36

## 2018-05-09 RX ADMIN — CLOPIDOGREL 75 MG: 75 TABLET, FILM COATED ORAL at 07:56

## 2018-05-09 ASSESSMENT — ENCOUNTER SYMPTOMS
SPUTUM PRODUCTION: 0
BLOOD IN STOOL: 0
SHORTNESS OF BREATH: 0
SINUS PAIN: 0
DIAPHORESIS: 0
NAUSEA: 0
PND: 0
CLAUDICATION: 0
TREMORS: 0
DIZZINESS: 0
INSOMNIA: 0
VOMITING: 0
SENSORY CHANGE: 0
HEMOPTYSIS: 0
WHEEZING: 0
WEAKNESS: 0
HEADACHES: 0
PALPITATIONS: 0
ABDOMINAL PAIN: 0
FOCAL WEAKNESS: 0
ORTHOPNEA: 0
SPEECH CHANGE: 0
CONSTIPATION: 0
FEVER: 0
TINGLING: 0
SORE THROAT: 0
CHILLS: 0
COUGH: 0
DEPRESSION: 0
NERVOUS/ANXIOUS: 0
BRUISES/BLEEDS EASILY: 0
DIARRHEA: 0

## 2018-05-09 ASSESSMENT — PAIN SCALES - GENERAL: PAINLEVEL_OUTOF10: 0

## 2018-05-09 NOTE — PROGRESS NOTES
Transport at bedside.   Pt states personal belongings are in possession.  Pt escorted off unit by transporter  without incident.

## 2018-05-09 NOTE — ASSESSMENT & PLAN NOTE
"Continue to try & wean O2. Baseline O2 level is 2 LPM. Currently on 3 LPM.  No dyspnea/SOB.  Home O2 eval was completed. Face to face & order placed for home O2.  The patient already has O2 at home but only wears it at night because it \"interferes with his gardening, lawn mowing, & bowling during the day\".  Case management involved.  D-dimer positive so CTA was done & was negative for PE & pneumonia.  Patient was long time smoker (1-1.5 packs q2-3 days x 45 years). Possible emphysematous changes noted on CTA.  Recommend seeing pulmonary MD after discharge.  Doing well on incentive spirometer.  "

## 2018-05-09 NOTE — PROGRESS NOTES
IV dc'd.  Discharge instructions given to patient; patient verbalizes understanding, all questions answered.  Copy of DC summary provided, signed copy in chart.  5 prescriptions electronically sent to pt's pharmacy.   Pt awaiting transport

## 2018-05-09 NOTE — PROGRESS NOTES
Seen pt, AOx 4. On cardiac monitor with SR w PVC. Denies any pain. Plan of care discussed includes Safety, labs, monitoring and pt understands.    Spoke to the patient regarding CTA chest results, and tests are negative. Explained if he want to get d/c tonight or okay if to stay overnight for monitoring and safe discharged in AM, pt agree and actually worried of transport back home.  Will discuss duran for SW/CM to assist for transport going home and O2?.

## 2018-05-09 NOTE — DISCHARGE PLANNING
[facility] to transport home at 1130.  Transport form completed and faxed.  Pt aware and agreeable.  Pt currently has [facility] 02.  No other needs verbalized/id'd by pt/staff/NP/MD.  Transport job number [id number]

## 2018-05-09 NOTE — PROGRESS NOTES
[facility] Hospitalist Progress Note    Date of Service: 2018    Chief Complaint  72 y.o. male admitted 2018 with vertigo, ataxia, & N/V.    Interval Problem Update  Vertigo resolved with meclizine. Evaluated & cleared by PT/OT.  Old right frontal infarcts noted on MRI. Denies ever having stroke symptoms.  Denies pain, SOB/dyspnea.  Unable to wean off O2. Currently on 3 LPM with sats 90%. Patient is noncompliant with daytime O2 at home.    Consultants/Specialty  None    Disposition  Discharge home once off oxygen.        Review of Systems   Constitutional: Negative for chills, diaphoresis, fever and malaise/fatigue.   HENT: Negative for congestion, nosebleeds, sinus pain and sore throat.    Respiratory: Negative for cough, hemoptysis, sputum production, shortness of breath and wheezing.    Cardiovascular: Negative for chest pain, palpitations, orthopnea, claudication, leg swelling and PND.   Gastrointestinal: Negative for abdominal pain, blood in stool, constipation, diarrhea, melena, nausea and vomiting.   Genitourinary: Negative for dysuria, frequency, hematuria and urgency.   Musculoskeletal:        Denies pain     Neurological: Negative for dizziness (resolved), tingling, tremors, sensory change, speech change, focal weakness, weakness and headaches.   Endo/Heme/Allergies: Does not bruise/bleed easily.   Psychiatric/Behavioral: Negative for depression. The patient is not nervous/anxious and does not have insomnia.    All other systems reviewed and are negative.     Physical Exam  Laboratory/Imaging   Hemodynamics  Temp (24hrs), Av.3 °C (97.3 °F), Min:35.8 °C (96.5 °F), Max:36.5 °C (97.7 °F)   Temperature: 36.5 °C (97.7 °F)  Pulse  Av.2  Min: 61  Max: 82    Blood Pressure : 137/67      Respiratory  Respiration: 16, Pulse Oximetry: 91 %  RUL Breath Sounds: Diminished, RML Breath Sounds: Diminished, RLL Breath Sounds: Diminished, HARMONY Breath Sounds: Diminished, LLL Breath Sounds:  Diminished    Fluids  No intake or output data in the 24 hours ending 05/09/18 0732    Nutrition  Orders Placed This Encounter   Procedures   • DIET ORDER     Standing Status:   Standing     Number of Occurrences:   1     Order Specific Question:   Diet:     Answer:   Regular [1]     Physical Exam   Constitutional: He is oriented to person, place, and time. He appears well-developed and well-nourished. No distress.   Obese   HENT:   Head: Normocephalic and atraumatic.   Eyes: Pupils are equal, round, and reactive to light. Right eye exhibits no discharge. Left eye exhibits no discharge. No scleral icterus. Right eye exhibits nystagmus. Left eye exhibits nystagmus.   Neck: Normal range of motion. Neck supple. No JVD present.   Cardiovascular: Normal rate, regular rhythm, normal heart sounds and intact distal pulses.  Exam reveals no gallop and no friction rub.    No murmur heard.  Pulmonary/Chest: Effort normal. No stridor. No respiratory distress. He has decreased breath sounds in the right lower field and the left lower field. He has no wheezes. He has no rhonchi. He has no rales.   Abdominal: Soft. Bowel sounds are normal. He exhibits no distension. There is no tenderness. There is no rebound and no guarding.   Musculoskeletal: Normal range of motion. He exhibits no edema.   Neurological: He is alert and oriented to person, place, and time.   Skin: Skin is warm and dry. No rash noted. He is not diaphoretic. No erythema. No pallor.   Psychiatric: He has a normal mood and affect. His behavior is normal. Judgment and thought content normal.   Nursing note and vitals reviewed.    Recent Labs      05/07/18   1220  05/08/18   0337   WBC  9.4  8.5   RBC  5.43  4.98   HEMOGLOBIN  17.1  16.1   HEMATOCRIT  52.4*  48.3   MCV  96.5  97.0   MCH  31.5  32.3   MCHC  32.6*  33.3*   RDW  53.1*  53.5*   PLATELETCT  236  204   MPV  10.9  11.0     Recent Labs      05/07/18   1220  05/08/18   0337   SODIUM  140  142   POTASSIUM  4.0   "4.1   CHLORIDE  105  108   CO2  27  28   GLUCOSE  118*  101*   BUN  9  10   CREATININE  1.08  0.95   CALCIUM  8.8  8.4*             Recent Labs      05/08/18   0337   TRIGLYCERIDE  448*   HDL  34*   LDL  see below          Assessment/Plan     Hypoxia- (present on admission)   Assessment & Plan    Continue to try & wean O2. Baseline O2 level is 2 LPM. Currently on 3 LPM.  No dyspnea/SOB.  Home O2 eval was completed. Face to face & order placed for home O2.  The patient already has O2 at home but only wears it at night because it \"interferes with his gardening, lawn mowing, & bowling during the day\".  Case management involved.  D-dimer positive so CTA was done & was negative for PE & pneumonia.  Patient was long time smoker (1-1.5 packs q2-3 days x 45 years). Possible emphysematous changes noted on CTA.  Recommend seeing pulmonary MD after discharge.  Doing well on incentive spirometer.        Vertigo- (present on admission)   Assessment & Plan    Likely peripheral as patient had negative head thrust test  CT head negative  Negative orthostatics  Echo WNL  Carotid duplex WNL  MRI shows small chronic right frontal infarcts  Cleared by PT/OT  ASA changed to plavix since the patient appears to have had strokes while on aspirin          Hypothyroidism- (present on admission)   Assessment & Plan    Continue levothyroxine  Thyroid panel WNL          Quality-Core Measures      "

## 2018-05-09 NOTE — PROGRESS NOTES
Initial assessment completed. Patient is A&Ox4, denies,nausea, dizziness, lightheadedness. Patient exhibits mild WOB on 3 L NC, O2 sat 92%, expiratory wheezes throughout. POC discussed with pt; no further questions at this time. Bed in the lowest position, call light instruction provided and within reach.

## 2018-05-09 NOTE — DISCHARGE INSTRUCTIONS
Bacterial Conjunctivitis  Introduction  Bacterial conjunctivitis is an infection of your conjunctiva. This is the clear membrane that covers the white part of your eye and the inner surface of your eyelid. This condition can make your eye:  · Red or pink.  · Itchy.  This condition is caused by bacteria. This condition spreads very easily from person to person (is contagious) and from one eye to the other eye.              Follow these instructions at home:  Medicines  · Take or apply your antibiotic medicine as told by your doctor. Do not stop taking or applying the antibiotic even if you start to feel better.  · Take or apply over-the-counter and prescription medicines only as told by your doctor.  · Do not touch your eyelid with the eye drop bottle or the ointment tube.  Managing discomfort  · Wipe any fluid from your eye with a warm, wet washcloth or a cotton ball.  · Place a cool, clean washcloth on your eye. Do this for 10-20 minutes, 3-4 times per day.  General instructions  · Do not wear contact lenses until the irritation is gone. Wear glasses until your doctor says it is okay to wear contacts.  · Do not wear eye makeup until your symptoms are gone. Throw away any old makeup.  · Change or wash your pillowcase every day.  · Do not share towels or washcloths with anyone.  · Wash your hands often with soap and water. Use paper towels to dry your hands.  · Do not touch or rub your eyes.  · Do not drive or use heavy machinery if your vision is blurry.  Contact a doctor if:  · You have a fever.  · Your symptoms do not get better after 10 days.  Get help right away if:  · You have a fever and your symptoms suddenly get worse.  · You have very bad pain when you move your eye.  · Your face:  ¨ Hurts.  ¨ Is red.  ¨ Is swollen.  · You have sudden loss of vision.  This information is not intended to replace advice given to you by your health care provider. Make sure you discuss any questions you have with your health  care provider.  Document Released: 09/26/2009 Document Revised: 05/25/2017 Document Reviewed: 09/29/2016  © 2017 Elsevier        Discharge Instructions    Discharged to home by [facility] with self. Discharged via wheelchair, hospital escort: Yes.  Special equipment needed: Not Applicable    Be sure to schedule a follow-up appointment with your primary care doctor or any specialists as instructed.     Discharge Plan:   Diet Plan: Discussed  Activity Level: Discussed  Smoking Cessation Offered: Patient Refused  Confirmed Follow up Appointment: Patient to Call and Schedule Appointment  Confirmed Symptoms Management: Discussed  Medication Reconciliation Updated: Yes  Pneumococcal Vaccine Administered/Refused: Not given - Patient refused pneumococcal vaccine  Influenza Vaccine Indication: Not indicated: Previously immunized this influenza season and > 8 years of age, Patient Refuses    I understand that a diet low in cholesterol, fat, and sodium is recommended for good health. Unless I have been given specific instructions below for another diet, I accept this instruction as my diet prescription.   Other diet: Heart healthy     Special Instructions: None    · Is patient discharged on Warfarin / Coumadin?   No     Depression / Suicide Risk    As you are discharged from this [facility] facility, it is important to learn how to keep safe from harming yourself.    Recognize the warning signs:  · Abrupt changes in personality, positive or negative- including increase in energy   · Giving away possessions  · Change in eating patterns- significant weight changes-  positive or negative  · Change in sleeping patterns- unable to sleep or sleeping all the time   · Unwillingness or inability to communicate  · Depression  · Unusual sadness, discouragement and loneliness  · Talk of wanting to die  · Neglect of personal appearance   · Rebelliousness- reckless behavior  · Withdrawal from people/activities they love  · Confusion-  inability to concentrate     If you or a loved one observes any of these behaviors or has concerns about self-harm, here's what you can do:  · Talk about it- your feelings and reasons for harming yourself  · Remove any means that you might use to hurt yourself (examples: pills, rope, extension cords, firearm)  · Get professional help from the community (Mental Health, Substance Abuse, psychological counseling)  · Do not be alone:Call your Safe Contact- someone whom you trust who will be there for you.  · Call your local CRISIS HOTLINE 644-2949 or 443-681-3601  · Call your local Children's Mobile Crisis Response Team [place] (345) 303-2238 or www.Vivorte  · Call the toll free National Suicide Prevention Hotlines   · National Suicide Prevention Lifeline 486-879-XENT (5911)  · National Hope Line Network 800-SUICIDE (830-9944)

## 2018-05-09 NOTE — PROGRESS NOTES
Pt is resting/sleeping. Call light within reach. On Sinus rhythm w PVC, HR is 70. VS stable. No other needs at this time. Will continue to monitor

## 2018-06-07 ENCOUNTER — OFFICE VISIT (OUTPATIENT)
Dept: NEUROLOGY | Facility: MEDICAL CENTER | Age: 73
End: 2018-06-07
Payer: MEDICARE

## 2018-06-07 VITALS
SYSTOLIC BLOOD PRESSURE: 130 MMHG | HEIGHT: 67 IN | DIASTOLIC BLOOD PRESSURE: 70 MMHG | WEIGHT: 260 LBS | BODY MASS INDEX: 40.81 KG/M2 | HEART RATE: 86 BPM

## 2018-06-07 DIAGNOSIS — I63.9 CEREBRAL INFARCTION, UNSPECIFIED MECHANISM (HCC): ICD-10-CM

## 2018-06-07 PROCEDURE — 99204 OFFICE O/P NEW MOD 45 MIN: CPT | Performed by: PHYSICIAN ASSISTANT

## 2018-06-07 RX ORDER — CLOPIDOGREL BISULFATE 75 MG/1
75 TABLET ORAL DAILY
Qty: 30 TAB | Refills: 11 | Status: ON HOLD | OUTPATIENT
Start: 2018-06-07 | End: 2020-02-05

## 2018-06-07 NOTE — PATIENT INSTRUCTIONS
"DX:  Stroke - sometime in past       Personal Risk factors associated with recurrent stroke:    Some risk factors for stroke are \"non-modifiable\" meaning we cannot change them.  Examples of these types of risk factors are:    *   Age - once we turn 55, stroke becomes more common.  It is associated with aging and in fact every decade over 55 our stroke risk doubles.  * Gender - Men have more strokes than women, although this gender difference is only slight  * Ethnicity and/or family history:  if your parents, grandparents, siblings or children have had stroke or if you have ancestors of , , or  descent, you may have inherited some genes pre-disposing your toward stroke    To reduce your risk of recurrent stroke, we want to focus on risk factors we can modify.  Any of the checked items below are your personal risk factors and you should work with your PCP (primary care provider) to get them to the goal (goals are in bold).   These risk factors are:     ___Diabetes - goal HA1c is <7.0%: Current:  6.1.  Begin to reduce your intake of desserts, sweets, sugary drinks including fruit juices with added sugar       _X__Hypertension - goal is <140  top number at all times .   Current: 130/70  Recommend taking bp once daily and taking log to PCP.    Continue current Blood pressure medication - avoid salt    _X__Hyperlipidemia - goal LDL is <70.  Current: triglycerides too high to calculate.  Have Dr. Pickett track.  Continue taking cholesterol medication as prescribed.  Modify diet to reduce cholesterol - less cheese, avoid egg yolks, reduce intake of pork, shrimp, and beef.    _X__Smoking history: Quit about 1.5 years ago.  Once you are 5 years out from when you quit smoking, the history of smoking does not appear to be a significant risk factor for recurrent stroke.  If you are quitting, implement stress reduction with exercise such as daily walking, yoga, or meditation.  If you are " unable to quit on your own, ask your PCP about chantix or referral for smoking cessation    _X__Overweight:  Current BMI - 40.  Try about 2 pounds weight loss a month.  Body Mass Index (BMI) is your height compared to your weight.  Goal BMI to reduce risk of recurrent stroke is <25   BMI 25-27 diet/exercise to get to goal BMI.    BMI >27 patient should begin with goal of 10% weight loss  Work on this risk factor aggressively with your PCP             _X__Physical Inactivity: Counseled on initiating 30 minutes of moderate exercise most days, but at least three times per week. Moderate exercise can be walking, swimming, cycling.  Work up to goal by setting realistic goal and then increasing it weekly or monthly.    ___Atrial Fibrillation: no History of afib     _X__Sleep Apnea: diagnosed with sleep apnea.  Use of your CPAP should reduce your risk of stroke from sleep apnea.    Untreated sleep apnea is associated with recurrent stroke    _X__Alcohol or Drug use: Current user of alcohol.  Counseled to avoid binge drinking and men can have a maximum of 2 drinks daily and women 1 Drink maximum per day - preferably red wine;  Recreational Drug use:  denies    ___Testosterone Use: Denies      _  Plan:    Ischemic stroke - Work with PCP on risk factors checked above to reduce risk of recurrent stroke.    Medication you are taking to thin your blood and reduce your risk of recurrent stroke is plavix    Counseled on when to call 911 and if desired additional information on stroke was provided    Return to our office: not required this is a one time visit

## 2018-06-07 NOTE — PROGRESS NOTES
"Subjective:      Familia Lundy is a 72 y.o. male who presents with No chief complaint on file.    Patient had a recent hospitalization for vertigo and ataxia in May.  His MRI brain also showed old stroke.        Patient was not seen in the hospital by neurology.     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  No acute abnormality.  2.  Small chronic cortical infarcts in the right frontal lobe.  3.  Mild age-appropriate cerebral volume loss.      Echo:  No prior study is available for comparison.   Technically difficult and incomplete study.   Normal left ventricular size and systolic function.  Left ventricular ejection fraction is visually estimated to be 60%.  Contrast was used to enhance visualization of the endocardial border.  Normal regional wall motion.  The valves were not well visualized.  Aortic sclerosis without stenosis.  Trace mitral regurgitation.  Normal pericardium without effusion.    CUS:  Mild bilateral internal carotid artery stenosis (<50%).    Right vertebral artery not visualized    TSH: WNL        DX:  Stroke - sometime in past       Personal Risk factors associated with recurrent stroke:    Some risk factors for stroke are \"non-modifiable\" meaning we cannot change them.  Examples of these types of risk factors are:    *   Age - once we turn 55, stroke becomes more common.  It is associated with aging and in fact every decade over 55 our stroke risk doubles.  * Gender - Men have more strokes than women, although this gender difference is only slight  * Ethnicity and/or family history:  if your parents, grandparents, siblings or children have had stroke or if you have " ancestors of , , or  descent, you may have inherited some genes pre-disposing your toward stroke    To reduce your risk of recurrent stroke, we want to focus on risk factors we can modify.  Any of the checked items below are your personal risk factors and you should work with your PCP (primary care provider) to get them to the goal (goals are in bold).   These risk factors are:     ___Diabetes - goal HA1c is <7.0%: Current:  6.1.  Begin to reduce your intake of desserts, sweets, sugary drinks including fruit juices with added sugar       _X__Hypertension - goal is <140  top number at all times .   Current: 130/70  Recommend taking bp once daily and taking log to PCP.    Continue current Blood pressure medication - avoid salt    _X__Hyperlipidemia - goal LDL is <70.  Current: triglycerides too high to calculate.  Have Dr. Pickett track.  Continue taking cholesterol medication as prescribed.  Modify diet to reduce cholesterol - less cheese, avoid egg yolks, reduce intake of pork, shrimp, and beef.    _X__Smoking history: Quit about 1.5 years ago.  Once you are 5 years out from when you quit smoking, the history of smoking does not appear to be a significant risk factor for recurrent stroke.  If you are quitting, implement stress reduction with exercise such as daily walking, yoga, or meditation.  If you are unable to quit on your own, ask your PCP about chantix or referral for smoking cessation    _X__Overweight:  Current BMI - 40.  Try about 2 pounds weight loss a month.  Body Mass Index (BMI) is your height compared to your weight.  Goal BMI to reduce risk of recurrent stroke is <25   BMI 25-27 diet/exercise to get to goal BMI.    BMI >27 patient should begin with goal of 10% weight loss  Work on this risk factor aggressively with your PCP             _X__Physical Inactivity: Counseled on initiating 30 minutes of moderate exercise most days, but at least three times per week.  Moderate exercise can be walking, swimming, cycling.  Work up to goal by setting realistic goal and then increasing it weekly or monthly.    ___Atrial Fibrillation: no History of afib     _X__Sleep Apnea: diagnosed with sleep apnea.  Use of your CPAP should reduce your risk of stroke from sleep apnea.    Untreated sleep apnea is associated with recurrent stroke    _X__Alcohol or Drug use: Current user of alcohol.  Counseled to avoid binge drinking and men can have a maximum of 2 drinks daily and women 1 Drink maximum per day - preferably red wine;  Recreational Drug use:  denies    ___Testosterone Use: Denies      _              HPI    ROS       Objective:     There were no vitals taken for this visit.     Physical Exam   Constitutional: He is oriented to person, place, and time. He appears well-developed and well-nourished.   obese   HENT:   Head: Normocephalic.   Eyes: Conjunctivae and EOM are normal.   Pulmonary/Chest: Effort normal.   Musculoskeletal: Normal range of motion. He exhibits no edema or deformity.   Neurological: He is alert and oriented to person, place, and time. No cranial nerve deficit or sensory deficit. Coordination normal.   Skin: Skin is warm and dry.   Psychiatric: He has a normal mood and affect. His behavior is normal. Judgment and thought content normal.   Vitals reviewed.                  Assessment/Plan:       Ischemic stroke - Work with PCP on risk factors checked above to reduce risk of recurrent stroke.    Medication you are taking to thin your blood and reduce your risk of recurrent stroke is plavix    Counseled on when to call 911 and if desired additional information on stroke was provided    Return to our office: not required this is a one time visit

## 2020-01-27 ENCOUNTER — APPOINTMENT (OUTPATIENT)
Dept: RADIOLOGY | Facility: MEDICAL CENTER | Age: 75
DRG: 061 | End: 2020-01-27
Attending: EMERGENCY MEDICINE
Payer: MEDICARE

## 2020-01-27 ENCOUNTER — HOSPITAL ENCOUNTER (INPATIENT)
Facility: MEDICAL CENTER | Age: 75
LOS: 8 days | DRG: 061 | End: 2020-02-05
Attending: EMERGENCY MEDICINE | Admitting: HOSPITALIST
Payer: MEDICARE

## 2020-01-27 DIAGNOSIS — I63.311 CEREBROVASCULAR ACCIDENT (CVA) DUE TO THROMBOSIS OF RIGHT MIDDLE CEREBRAL ARTERY (HCC): ICD-10-CM

## 2020-01-27 DIAGNOSIS — I63.9 ACUTE CVA (CEREBROVASCULAR ACCIDENT) (HCC): ICD-10-CM

## 2020-01-27 LAB
APTT PPP: 28.6 SEC (ref 24.7–36)
BASOPHILS # BLD AUTO: 0.9 % (ref 0–1.8)
BASOPHILS # BLD: 0.08 K/UL (ref 0–0.12)
EOSINOPHIL # BLD AUTO: 0.43 K/UL (ref 0–0.51)
EOSINOPHIL NFR BLD: 4.6 % (ref 0–6.9)
ERYTHROCYTE [DISTWIDTH] IN BLOOD BY AUTOMATED COUNT: 47.2 FL (ref 35.9–50)
HCT VFR BLD AUTO: 46.4 % (ref 42–52)
HGB BLD-MCNC: 15.9 G/DL (ref 14–18)
IMM GRANULOCYTES # BLD AUTO: 0.05 K/UL (ref 0–0.11)
IMM GRANULOCYTES NFR BLD AUTO: 0.5 % (ref 0–0.9)
INR PPP: 1.05 (ref 0.87–1.13)
LYMPHOCYTES # BLD AUTO: 2.68 K/UL (ref 1–4.8)
LYMPHOCYTES NFR BLD: 28.7 % (ref 22–41)
MCH RBC QN AUTO: 32.6 PG (ref 27–33)
MCHC RBC AUTO-ENTMCNC: 34.3 G/DL (ref 33.7–35.3)
MCV RBC AUTO: 95.1 FL (ref 81.4–97.8)
MONOCYTES # BLD AUTO: 0.76 K/UL (ref 0–0.85)
MONOCYTES NFR BLD AUTO: 8.1 % (ref 0–13.4)
NEUTROPHILS # BLD AUTO: 5.35 K/UL (ref 1.82–7.42)
NEUTROPHILS NFR BLD: 57.2 % (ref 44–72)
NRBC # BLD AUTO: 0 K/UL
NRBC BLD-RTO: 0 /100 WBC
PLATELET # BLD AUTO: 274 K/UL (ref 164–446)
PMV BLD AUTO: 11.5 FL (ref 9–12.9)
PROTHROMBIN TIME: 13.9 SEC (ref 12–14.6)
RBC # BLD AUTO: 4.88 M/UL (ref 4.7–6.1)
TROPONIN T SERPL-MCNC: <6 NG/L (ref 6–19)
WBC # BLD AUTO: 9.4 K/UL (ref 4.8–10.8)

## 2020-01-27 PROCEDURE — 85025 COMPLETE CBC W/AUTO DIFF WBC: CPT

## 2020-01-27 PROCEDURE — 700117 HCHG RX CONTRAST REV CODE 255: Performed by: EMERGENCY MEDICINE

## 2020-01-27 PROCEDURE — 3E03317 INTRODUCTION OF OTHER THROMBOLYTIC INTO PERIPHERAL VEIN, PERCUTANEOUS APPROACH: ICD-10-PCS | Performed by: PSYCHIATRY & NEUROLOGY

## 2020-01-27 PROCEDURE — 80053 COMPREHEN METABOLIC PANEL: CPT

## 2020-01-27 PROCEDURE — 99291 CRITICAL CARE FIRST HOUR: CPT | Performed by: PSYCHIATRY & NEUROLOGY

## 2020-01-27 PROCEDURE — 84484 ASSAY OF TROPONIN QUANT: CPT

## 2020-01-27 PROCEDURE — 86850 RBC ANTIBODY SCREEN: CPT

## 2020-01-27 PROCEDURE — 94760 N-INVAS EAR/PLS OXIMETRY 1: CPT

## 2020-01-27 PROCEDURE — 70496 CT ANGIOGRAPHY HEAD: CPT

## 2020-01-27 PROCEDURE — 70498 CT ANGIOGRAPHY NECK: CPT

## 2020-01-27 PROCEDURE — 86900 BLOOD TYPING SEROLOGIC ABO: CPT

## 2020-01-27 PROCEDURE — 85730 THROMBOPLASTIN TIME PARTIAL: CPT

## 2020-01-27 PROCEDURE — 70450 CT HEAD/BRAIN W/O DYE: CPT

## 2020-01-27 PROCEDURE — 36415 COLL VENOUS BLD VENIPUNCTURE: CPT

## 2020-01-27 PROCEDURE — 93005 ELECTROCARDIOGRAM TRACING: CPT | Performed by: EMERGENCY MEDICINE

## 2020-01-27 PROCEDURE — 99291 CRITICAL CARE FIRST HOUR: CPT

## 2020-01-27 PROCEDURE — 80061 LIPID PANEL: CPT

## 2020-01-27 PROCEDURE — 85610 PROTHROMBIN TIME: CPT

## 2020-01-27 PROCEDURE — 86901 BLOOD TYPING SEROLOGIC RH(D): CPT

## 2020-01-27 PROCEDURE — 71045 X-RAY EXAM CHEST 1 VIEW: CPT

## 2020-01-27 PROCEDURE — 700111 HCHG RX REV CODE 636 W/ 250 OVERRIDE (IP): Mod: JG | Performed by: PSYCHIATRY & NEUROLOGY

## 2020-01-27 PROCEDURE — 37195 THROMBOLYTIC THERAPY STROKE: CPT

## 2020-01-27 RX ORDER — SODIUM CHLORIDE 9 MG/ML
50 INJECTION, SOLUTION INTRAVENOUS ONCE
Status: COMPLETED | OUTPATIENT
Start: 2020-01-28 | End: 2020-01-28

## 2020-01-27 RX ADMIN — ALTEPLASE 81 MG: KIT at 23:17

## 2020-01-27 RX ADMIN — IOHEXOL 100 ML: 350 INJECTION, SOLUTION INTRAVENOUS at 23:16

## 2020-01-28 ENCOUNTER — APPOINTMENT (OUTPATIENT)
Dept: RADIOLOGY | Facility: MEDICAL CENTER | Age: 75
DRG: 061 | End: 2020-01-28
Attending: PSYCHIATRY & NEUROLOGY
Payer: MEDICARE

## 2020-01-28 ENCOUNTER — APPOINTMENT (OUTPATIENT)
Dept: RADIOLOGY | Facility: MEDICAL CENTER | Age: 75
DRG: 061 | End: 2020-01-28
Attending: HOSPITALIST
Payer: MEDICARE

## 2020-01-28 ENCOUNTER — APPOINTMENT (OUTPATIENT)
Dept: CARDIOLOGY | Facility: MEDICAL CENTER | Age: 75
DRG: 061 | End: 2020-01-28
Attending: PSYCHIATRY & NEUROLOGY
Payer: MEDICARE

## 2020-01-28 PROBLEM — J45.40 MODERATE PERSISTENT ASTHMA WITHOUT COMPLICATION: Status: ACTIVE | Noted: 2020-01-28

## 2020-01-28 PROBLEM — I10 ESSENTIAL HYPERTENSION: Status: ACTIVE | Noted: 2020-01-28

## 2020-01-28 PROBLEM — I63.311 CEREBROVASCULAR ACCIDENT (CVA) DUE TO THROMBOSIS OF RIGHT MIDDLE CEREBRAL ARTERY (HCC): Status: ACTIVE | Noted: 2020-01-28

## 2020-01-28 PROBLEM — E78.5 DYSLIPIDEMIA: Status: ACTIVE | Noted: 2020-01-28

## 2020-01-28 PROBLEM — E66.01 MORBID OBESITY WITH BMI OF 40.0-44.9, ADULT (HCC): Status: ACTIVE | Noted: 2020-01-28

## 2020-01-28 LAB
ABO + RH BLD: NORMAL
ABO GROUP BLD: NORMAL
ALBUMIN SERPL BCP-MCNC: 4.2 G/DL (ref 3.2–4.9)
ALBUMIN/GLOB SERPL: 1.2 G/DL
ALP SERPL-CCNC: 76 U/L (ref 30–99)
ALT SERPL-CCNC: 14 U/L (ref 2–50)
ANION GAP SERPL CALC-SCNC: 10 MMOL/L (ref 0–11.9)
AST SERPL-CCNC: 26 U/L (ref 12–45)
BARCODED ABORH UBTYP: 5100
BARCODED ABORH UBTYP: 5100
BARCODED ABORH UBTYP: 6200
BARCODED ABORH UBTYP: 6200
BARCODED PRD CODE UBPRD: NORMAL
BARCODED UNIT NUM UBUNT: NORMAL
BILIRUB SERPL-MCNC: 0.4 MG/DL (ref 0.1–1.5)
BLD GP AB SCN SERPL QL: NORMAL
BUN SERPL-MCNC: 10 MG/DL (ref 8–22)
CALCIUM SERPL-MCNC: 9.2 MG/DL (ref 8.5–10.5)
CFT BLD TEG: 10.1 MIN (ref 5–10)
CHLORIDE SERPL-SCNC: 104 MMOL/L (ref 96–112)
CHOLEST SERPL-MCNC: 142 MG/DL (ref 100–199)
CLOT ANGLE BLD TEG: 18.6 DEGREES (ref 53–72)
CLOT LYSIS 30M P MA LENFR BLD TEG: 0 % (ref 0–8)
CO2 SERPL-SCNC: 25 MMOL/L (ref 20–33)
COMPONENT CT 8504CT: NORMAL
CREAT SERPL-MCNC: 1.07 MG/DL (ref 0.5–1.4)
CT.EXTRINSIC BLD ROTEM: 13.6 MIN (ref 1–3)
EKG IMPRESSION: NORMAL
EST. AVERAGE GLUCOSE BLD GHB EST-MCNC: 160 MG/DL
FIBRINOGEN PPP-MCNC: 178 MG/DL (ref 215–460)
FIBRINOGEN PPP-MCNC: 97 MG/DL (ref 215–460)
FIBRINOGEN PPP-MCNC: <60 MG/DL (ref 215–460)
GLOBULIN SER CALC-MCNC: 3.4 G/DL (ref 1.9–3.5)
GLUCOSE SERPL-MCNC: 139 MG/DL (ref 65–99)
HBA1C MFR BLD: 7.2 % (ref 0–5.6)
HDLC SERPL-MCNC: 30 MG/DL
LDLC SERPL CALC-MCNC: 73 MG/DL
LV EJECT FRACT  99904: 55
LV EJECT FRACT MOD 2C 99903: 70.01
LV EJECT FRACT MOD 4C 99902: 49.89
LV EJECT FRACT MOD BP 99901: 61.77
MCF BLD TEG: 29.9 MM (ref 50–70)
POTASSIUM SERPL-SCNC: 4.4 MMOL/L (ref 3.6–5.5)
PRODUCT TYPE UPROD: NORMAL
PROT SERPL-MCNC: 7.6 G/DL (ref 6–8.2)
RH BLD: NORMAL
SODIUM SERPL-SCNC: 139 MMOL/L (ref 135–145)
TEG ALGORITHM TGALG: ABNORMAL
TRIGL SERPL-MCNC: 196 MG/DL (ref 0–149)
UNIT STATUS USTAT: NORMAL

## 2020-01-28 PROCEDURE — 70450 CT HEAD/BRAIN W/O DYE: CPT

## 2020-01-28 PROCEDURE — 99223 1ST HOSP IP/OBS HIGH 75: CPT | Performed by: HOSPITALIST

## 2020-01-28 PROCEDURE — 92610 EVALUATE SWALLOWING FUNCTION: CPT

## 2020-01-28 PROCEDURE — 99233 SBSQ HOSP IP/OBS HIGH 50: CPT | Performed by: NURSE PRACTITIONER

## 2020-01-28 PROCEDURE — 700105 HCHG RX REV CODE 258: Performed by: PSYCHIATRY & NEUROLOGY

## 2020-01-28 PROCEDURE — 94660 CPAP INITIATION&MGMT: CPT

## 2020-01-28 PROCEDURE — 93306 TTE W/DOPPLER COMPLETE: CPT | Mod: 26 | Performed by: INTERNAL MEDICINE

## 2020-01-28 PROCEDURE — 85384 FIBRINOGEN ACTIVITY: CPT | Mod: 91

## 2020-01-28 PROCEDURE — 94640 AIRWAY INHALATION TREATMENT: CPT

## 2020-01-28 PROCEDURE — 700111 HCHG RX REV CODE 636 W/ 250 OVERRIDE (IP): Performed by: PSYCHIATRY & NEUROLOGY

## 2020-01-28 PROCEDURE — 83036 HEMOGLOBIN GLYCOSYLATED A1C: CPT

## 2020-01-28 PROCEDURE — 99292 CRITICAL CARE ADDL 30 MIN: CPT | Performed by: PSYCHIATRY & NEUROLOGY

## 2020-01-28 PROCEDURE — P9012 CRYOPRECIPITATE EACH UNIT: HCPCS | Mod: 91

## 2020-01-28 PROCEDURE — 30233M1 TRANSFUSION OF NONAUTOLOGOUS PLASMA CRYOPRECIPITATE INTO PERIPHERAL VEIN, PERCUTANEOUS APPROACH: ICD-10-PCS | Performed by: FAMILY MEDICINE

## 2020-01-28 PROCEDURE — 700101 HCHG RX REV CODE 250: Performed by: PSYCHIATRY & NEUROLOGY

## 2020-01-28 PROCEDURE — 85347 COAGULATION TIME ACTIVATED: CPT

## 2020-01-28 PROCEDURE — 770022 HCHG ROOM/CARE - ICU (200)

## 2020-01-28 PROCEDURE — 700101 HCHG RX REV CODE 250: Performed by: HOSPITALIST

## 2020-01-28 PROCEDURE — 85576 BLOOD PLATELET AGGREGATION: CPT

## 2020-01-28 PROCEDURE — 99291 CRITICAL CARE FIRST HOUR: CPT | Performed by: INTERNAL MEDICINE

## 2020-01-28 PROCEDURE — 85384 FIBRINOGEN ACTIVITY: CPT

## 2020-01-28 PROCEDURE — 93306 TTE W/DOPPLER COMPLETE: CPT

## 2020-01-28 RX ORDER — LABETALOL HYDROCHLORIDE 5 MG/ML
10 INJECTION, SOLUTION INTRAVENOUS EVERY 4 HOURS PRN
Status: DISCONTINUED | OUTPATIENT
Start: 2020-01-28 | End: 2020-01-28

## 2020-01-28 RX ORDER — BISACODYL 10 MG
10 SUPPOSITORY, RECTAL RECTAL
Status: DISCONTINUED | OUTPATIENT
Start: 2020-01-28 | End: 2020-01-29

## 2020-01-28 RX ORDER — ASPIRIN 81 MG/1
324 TABLET, CHEWABLE ORAL DAILY
Status: DISCONTINUED | OUTPATIENT
Start: 2020-01-29 | End: 2020-01-28

## 2020-01-28 RX ORDER — ALBUTEROL SULFATE 90 UG/1
2 AEROSOL, METERED RESPIRATORY (INHALATION) EVERY 6 HOURS PRN
Status: DISCONTINUED | OUTPATIENT
Start: 2020-01-28 | End: 2020-02-05 | Stop reason: HOSPADM

## 2020-01-28 RX ORDER — ASPIRIN 325 MG
325 TABLET ORAL DAILY
Status: DISCONTINUED | OUTPATIENT
Start: 2020-01-29 | End: 2020-01-28

## 2020-01-28 RX ORDER — ACETAMINOPHEN 325 MG/1
650 TABLET ORAL EVERY 6 HOURS PRN
Status: DISCONTINUED | OUTPATIENT
Start: 2020-01-28 | End: 2020-01-29

## 2020-01-28 RX ORDER — ONDANSETRON 4 MG/1
4 TABLET, ORALLY DISINTEGRATING ORAL EVERY 4 HOURS PRN
Status: DISCONTINUED | OUTPATIENT
Start: 2020-01-28 | End: 2020-01-29

## 2020-01-28 RX ORDER — POLYETHYLENE GLYCOL 3350 17 G/17G
1 POWDER, FOR SOLUTION ORAL
Status: DISCONTINUED | OUTPATIENT
Start: 2020-01-28 | End: 2020-01-29

## 2020-01-28 RX ORDER — ONDANSETRON 2 MG/ML
4 INJECTION INTRAMUSCULAR; INTRAVENOUS EVERY 4 HOURS PRN
Status: DISCONTINUED | OUTPATIENT
Start: 2020-01-28 | End: 2020-02-05 | Stop reason: HOSPADM

## 2020-01-28 RX ORDER — SIMVASTATIN 40 MG
40 TABLET ORAL NIGHTLY
Status: DISCONTINUED | OUTPATIENT
Start: 2020-01-28 | End: 2020-01-28

## 2020-01-28 RX ORDER — LABETALOL HYDROCHLORIDE 5 MG/ML
10-20 INJECTION, SOLUTION INTRAVENOUS EVERY 4 HOURS PRN
Status: DISCONTINUED | OUTPATIENT
Start: 2020-01-28 | End: 2020-01-29

## 2020-01-28 RX ORDER — HYDRALAZINE HYDROCHLORIDE 20 MG/ML
10 INJECTION INTRAMUSCULAR; INTRAVENOUS
Status: DISCONTINUED | OUTPATIENT
Start: 2020-01-28 | End: 2020-01-28

## 2020-01-28 RX ORDER — GEMFIBROZIL 600 MG/1
600 TABLET, FILM COATED ORAL 2 TIMES DAILY
Status: DISCONTINUED | OUTPATIENT
Start: 2020-01-28 | End: 2020-01-29

## 2020-01-28 RX ORDER — HYDRALAZINE HYDROCHLORIDE 20 MG/ML
20 INJECTION INTRAMUSCULAR; INTRAVENOUS
Status: DISCONTINUED | OUTPATIENT
Start: 2020-01-28 | End: 2020-01-29

## 2020-01-28 RX ORDER — ASPIRIN 300 MG/1
300 SUPPOSITORY RECTAL DAILY
Status: DISCONTINUED | OUTPATIENT
Start: 2020-01-29 | End: 2020-01-28

## 2020-01-28 RX ORDER — ATORVASTATIN CALCIUM 40 MG/1
80 TABLET, FILM COATED ORAL EVERY EVENING
Status: DISCONTINUED | OUTPATIENT
Start: 2020-01-28 | End: 2020-01-29

## 2020-01-28 RX ORDER — CLOPIDOGREL BISULFATE 75 MG/1
75 TABLET ORAL DAILY
Status: DISCONTINUED | OUTPATIENT
Start: 2020-01-28 | End: 2020-01-28

## 2020-01-28 RX ORDER — LEVOTHYROXINE SODIUM 88 UG/1
88 TABLET ORAL
Status: DISCONTINUED | OUTPATIENT
Start: 2020-01-28 | End: 2020-01-29

## 2020-01-28 RX ORDER — AMOXICILLIN 250 MG
2 CAPSULE ORAL 2 TIMES DAILY
Status: DISCONTINUED | OUTPATIENT
Start: 2020-01-28 | End: 2020-01-29

## 2020-01-28 RX ORDER — IPRATROPIUM BROMIDE AND ALBUTEROL SULFATE 2.5; .5 MG/3ML; MG/3ML
3 SOLUTION RESPIRATORY (INHALATION)
Status: DISCONTINUED | OUTPATIENT
Start: 2020-01-28 | End: 2020-02-05 | Stop reason: HOSPADM

## 2020-01-28 RX ADMIN — SODIUM CHLORIDE 15 MG/HR: 9 INJECTION, SOLUTION INTRAVENOUS at 17:18

## 2020-01-28 RX ADMIN — SODIUM CHLORIDE 15 MG/HR: 9 INJECTION, SOLUTION INTRAVENOUS at 21:51

## 2020-01-28 RX ADMIN — HYDRALAZINE HYDROCHLORIDE 10 MG: 20 INJECTION INTRAMUSCULAR; INTRAVENOUS at 11:11

## 2020-01-28 RX ADMIN — SODIUM CHLORIDE 50 ML: 9 INJECTION, SOLUTION INTRAVENOUS at 00:15

## 2020-01-28 RX ADMIN — SODIUM CHLORIDE 5 MG/HR: 9 INJECTION, SOLUTION INTRAVENOUS at 14:00

## 2020-01-28 RX ADMIN — LABETALOL HYDROCHLORIDE 10 MG: 5 INJECTION, SOLUTION INTRAVENOUS at 12:50

## 2020-01-28 RX ADMIN — SODIUM CHLORIDE 15 MG/HR: 9 INJECTION, SOLUTION INTRAVENOUS at 20:39

## 2020-01-28 RX ADMIN — HYDRALAZINE HYDROCHLORIDE 10 MG: 20 INJECTION INTRAMUSCULAR; INTRAVENOUS at 20:06

## 2020-01-28 RX ADMIN — IPRATROPIUM BROMIDE AND ALBUTEROL SULFATE 3 ML: .5; 3 SOLUTION RESPIRATORY (INHALATION) at 02:58

## 2020-01-28 RX ADMIN — FENTANYL CITRATE 12.5 MCG: 0.05 INJECTION, SOLUTION INTRAMUSCULAR; INTRAVENOUS at 16:59

## 2020-01-28 RX ADMIN — HYDRALAZINE HYDROCHLORIDE 10 MG: 20 INJECTION INTRAMUSCULAR; INTRAVENOUS at 21:53

## 2020-01-28 ASSESSMENT — ENCOUNTER SYMPTOMS
PALPITATIONS: 0
FOCAL WEAKNESS: 0
NAUSEA: 0
SENSORY CHANGE: 1
CONSTITUTIONAL NEGATIVE: 1
HEADACHES: 0
GASTROINTESTINAL NEGATIVE: 1
DIZZINESS: 0
NECK PAIN: 0
EYES NEGATIVE: 1
WEAKNESS: 0
MUSCULOSKELETAL NEGATIVE: 1
STRIDOR: 0
CARDIOVASCULAR NEGATIVE: 1
SPUTUM PRODUCTION: 0
RESPIRATORY NEGATIVE: 1
PHOTOPHOBIA: 0
SHORTNESS OF BREATH: 0
CHILLS: 0
PSYCHIATRIC NEGATIVE: 1
DOUBLE VISION: 0
VOMITING: 0
DEPRESSION: 0
FEVER: 0
SHORTNESS OF BREATH: 1
SPEECH CHANGE: 0
FOCAL WEAKNESS: 1

## 2020-01-28 ASSESSMENT — LIFESTYLE VARIABLES
CONSUMPTION TOTAL: INCOMPLETE
TOTAL SCORE: 0
HAVE YOU EVER FELT YOU SHOULD CUT DOWN ON YOUR DRINKING: NO
TOTAL SCORE: 0
HAVE PEOPLE ANNOYED YOU BY CRITICIZING YOUR DRINKING: NO
ALCOHOL_USE: YES
EVER FELT BAD OR GUILTY ABOUT YOUR DRINKING: NO
EVER_SMOKED: YES
TOTAL SCORE: 0
DOES PATIENT WANT TO TALK TO SOMEONE ABOUT QUITTING: NO
DOES PATIENT WANT TO STOP DRINKING: NO
EVER HAD A DRINK FIRST THING IN THE MORNING TO STEADY YOUR NERVES TO GET RID OF A HANGOVER: NO

## 2020-01-28 ASSESSMENT — PATIENT HEALTH QUESTIONNAIRE - PHQ9
2. FEELING DOWN, DEPRESSED, IRRITABLE, OR HOPELESS: NOT AT ALL
1. LITTLE INTEREST OR PLEASURE IN DOING THINGS: NOT AT ALL
SUM OF ALL RESPONSES TO PHQ9 QUESTIONS 1 AND 2: 0

## 2020-01-28 ASSESSMENT — COGNITIVE AND FUNCTIONAL STATUS - GENERAL
WALKING IN HOSPITAL ROOM: A LOT
MOVING FROM LYING ON BACK TO SITTING ON SIDE OF FLAT BED: A LITTLE
STANDING UP FROM CHAIR USING ARMS: A LITTLE
CLIMB 3 TO 5 STEPS WITH RAILING: A LITTLE
PERSONAL GROOMING: A LITTLE
DRESSING REGULAR UPPER BODY CLOTHING: A LITTLE
MOBILITY SCORE: 18
MOVING TO AND FROM BED TO CHAIR: A LITTLE
TOILETING: A LITTLE
DRESSING REGULAR LOWER BODY CLOTHING: A LITTLE
SUGGESTED CMS G CODE MODIFIER MOBILITY: CK

## 2020-01-28 NOTE — THERAPY
PT consult received. Pt with active bedrest orders until 1/29 at 0036 s/p alteplase. Will complete eval when appropriate for EOB/OOB mobility.

## 2020-01-28 NOTE — CONSULTS
"Neurology STROKE CODE H&P  Neurohospitalist Service, [facility]    Referring Physician: Dr. Horn    STROKE CODE: L sided weakness    To obtain the most accurate data regarding the time called, and time patient seen, refer to the stroke run-sheet and chart.  For time of CT, refer to the radiology report. See A&P below for TPA Decision and door to needle time if and when applicable.    HPI: Familia Lundy is a 74 y.o. man with history of HTN and HLD presenting to the hospital for acute onset left sided weakness and consulted for stroke.  Patient was in usual state of health at home when he noticed that at 10pm 1/27/20 became acutely weak in L arm and unable to lift remote. 10pm is his LKN.  Called [facility] and brought to ED.  BP 140s systolic en route with fingerstick glucose 114.  Patient admits weakness and numbness of left face and arm, denies vertigo, diplopia, headache, or lightheadedness.  Has not had similar symptoms in the past.    Review of systems: In addition to what is detailed in the HPI above, (and scanned into the chart if and when applicable), all other systems reviewed and are negative.    Past Medical History:    has a past medical history of Asthma, Chickenpox, [place] measles, Influenza, Mumps, and Tonsillitis.    FHx:  family history includes Cancer in his father and mother.    SHx:   reports that he quit smoking about 4 years ago. His smoking use included cigarettes. He has a 23.00 pack-year smoking history. He has never used smokeless tobacco. He reports current alcohol use. He reports that he does not use drugs.    Allergies:  Allergies   Allergen Reactions   • Amoxicillin Hives     Pt states \"I get hives\".   • Ampicillin Hives     Pt states \"I get hives\".       Medications:  No current facility-administered medications for this encounter.     Current Outpatient Medications:   •  clopidogrel (PLAVIX) 75 MG Tab, Take 1 Tab by mouth every day., Disp: 30 Tab, Rfl: 11  •  " moxifloxacin (VIGAMOX) 0.5 % Solution, Place 1 Drop in right eye 3 times a day., Disp: 1 Bottle, Rfl: 0  •  meclizine (ANTIVERT) 25 MG Tab, Take 1 Tab by mouth 3 times a day as needed for Dizziness, Nausea/Vomiting or Vertigo., Disp: 30 Tab, Rfl: 0  •  gemfibrozil (LOPID) 600 MG Tab, Take 1 Tab by mouth 2 times a day., Disp: 60 Tab, Rfl: 0  •  ondansetron (ZOFRAN ODT) 4 MG TABLET DISPERSIBLE, Take 1 Tab by mouth every 6 hours as needed for Nausea., Disp: 10 Tab, Rfl: 0  •  Omega-3 Fatty Acids (FISH OIL) 1000 MG Cap capsule, Take 1,000 mg by mouth 2 Times a Day., Disp: , Rfl:   •  metoprolol (LOPRESSOR) 50 MG Tab, Take 50 mg by mouth 2 times a day., Disp: , Rfl:   •  levothyroxine (SYNTHROID) 88 MCG Tab, Take 88 mcg by mouth Every morning on an empty stomach., Disp: , Rfl:   •  simvastatin (ZOCOR) 40 MG Tab, Take 40 mg by mouth every evening., Disp: , Rfl:   •  albuterol (VENTOLIN OR PROVENTIL) 108 (90 BASE) MCG/ACT Aero Soln inhalation aerosol, Inhale 2 Puffs by mouth every 6 hours as needed for Shortness of Breath., Disp: , Rfl:     Physical Examination:    Vitals: /90    General: Patient is awake and in no acute distress  Eyes: examination of optic disks not indicated at this time  CV: RRR    NEUROLOGICAL EXAM:     Mental status: Awake, alert and fully oriented, follows commands  Speech and language: speech is moderately dysarthric. The patient is able to name and repeat; non aphasic  Cranial nerve exam: Pupils are equal, round and reactive to light bilaterally. Visual fields: left homonymous hemianopsia. Extraocular muscles are intact. Sensation in the face is diminished on left compared to right. Face is notable for severe L sided facial droop in UMN distribution. Hearing to finger rub equal. Palate elevates symmetrically. Shoulder shrug is full. Tongue is midline.  Motor exam: Strength is 5/5 in the right extremities both distally and proximally, with 4/5 left deltoid biceps and triceps; + pronator drift  and orbiting on the left. Tone is mildly decreased LUE compared to right. No abnormal movements were seen on exam.  Sensory exam: decreased sensation left compared to right  Deep tendon reflexes:  2+ and symmetric. Toes down-going bilaterally.  Coordination: no ataxia   Gait: deferred as pt. Being actively transported to CT scanner    NIH Stroke Scale:    1a. Level of Consciousness (Alert, drowsy, etc): 0= Alert    1b. LOC Questions (Month, age): 0= Answers both correctly    1c. LOC Commands (Open/close eyes make fist/let go): 0= Obeys both correctly    2.   Best Gaze (Eyes open - patient follows examiner's finger on face): 0= Normal    3.   Visual Fields (introduce visual stimulus/threat to patient's field quadrants): 2= Complete Hemianopia  4.   Facial Paresis (Show teeth, raise eyebrows and squeeze eyes shut): 3 = Complete     5a. Motor Arm - Left (Elevate arm to 90 degrees if patient is sitting, 45 degrees if  supine): 1= Drift    5b. Motor Arm - Right (Elevate arm to 90 degrees if patient is sitting, 45 degrees if supine): 0= No drift    6a. Motor Leg - Left (Elevate leg 30 degrees with patient supine): 1= Drift    6b. Motor Leg - Right  (Elevate leg 30 degrees with patient supine): 0= No drift    7.   Limb Ataxia (Finger-nose, heel down shin): 0= No ataxia    8.   Sensory (Pin prick to face, arm, trunk and leg - compare side to side): 1= Partial loss    9.  Best Language (Name item, describe a picture and read sentences): 0= No aphasia    10. Dysarthria (Evaluate speech clarity by patient repeating listed words): 1= Mild to moderate slurring    11. Extinction and Inattention (Use information from prior testing to identify neglect or  double simultaneous stimuli testing): 1= Partial neglect    Total NIH Score: 10    Objective Data:    Labs:  No results found for: PROTHROMBTM, INR   Lab Results   Component Value Date/Time    WBC 8.5 05/08/2018 03:37 AM    RBC 4.98 05/08/2018 03:37 AM    HEMOGLOBIN 16.1  05/08/2018 03:37 AM    HEMATOCRIT 48.3 05/08/2018 03:37 AM    MCV 97.0 05/08/2018 03:37 AM    MCH 32.3 05/08/2018 03:37 AM    MCHC 33.3 (L) 05/08/2018 03:37 AM    MPV 11.0 05/08/2018 03:37 AM    NEUTSPOLYS 62.20 05/08/2018 03:37 AM    LYMPHOCYTES 25.80 05/08/2018 03:37 AM    MONOCYTES 7.30 05/08/2018 03:37 AM    EOSINOPHILS 3.50 05/08/2018 03:37 AM    BASOPHILS 0.80 05/08/2018 03:37 AM      Lab Results   Component Value Date/Time    SODIUM 142 05/08/2018 03:37 AM    POTASSIUM 4.1 05/08/2018 03:37 AM    CHLORIDE 108 05/08/2018 03:37 AM    CO2 28 05/08/2018 03:37 AM    GLUCOSE 101 (H) 05/08/2018 03:37 AM    BUN 10 05/08/2018 03:37 AM    CREATININE 0.95 05/08/2018 03:37 AM      Lab Results   Component Value Date/Time    CHOLSTRLTOT 146 05/08/2018 03:37 AM    LDL see below 05/08/2018 03:37 AM    HDL 34 (A) 05/08/2018 03:37 AM    TRIGLYCERIDE 448 (H) 05/08/2018 03:37 AM       Lab Results   Component Value Date/Time    ALKPHOSPHAT 70 05/07/2018 12:20 PM    ASTSGOT 15 05/07/2018 12:20 PM    ALTSGPT 10 05/07/2018 12:20 PM    TBILIRUBIN 0.5 05/07/2018 12:20 PM        Imaging/Testing:    I interpreted and/or reviewed the patient's neuroimaging:    CT head showing chronic right frontal infarct, with no acute hemorrhage.  CTA showing diminutive R vert; no proximal intracranial LVO    DX-CHEST-PORTABLE (1 VIEW)    (Results Pending)   CT-HEAD W/O    (Results Pending)   CT-CTA HEAD WITH & W/O-POST PROCESS    (Results Pending)   CT-CTA NECK WITH & W/O-POST PROCESSING    (Results Pending)       Assessment and Plan:    Familia Lundy is a 74 y.o. man with relevant history of HTN and HLD on Plavix presenting for whom neurology was consulted to address acute onset left sided hemiparesis and hemianesthesia localized to the right MCA territory with cortical findings of a visual field cut. Patient is a candidate for thrombolytic therapy given within the window, NIHSS 10, and BP < 180. Decision to mix tPA 23:07 - pharmacy contacted via  telephone to confirm. Possibly underlying etiologies include cardioembolic vs. Large vessel disease (thromboembolic). Not a candidate for IA given no LVO on CTA.    Plan:    - ACUTE TREATMENT WITH TPA 23:15 as managed with pharmacy and coordinated care  - Admit to the intensive care unit  - Neurology checks and vital signs per protocol; perform swallow screen  - Maintain blood pressure <180/105 per thrombolytic therapy guidelines  - obtain normoglycemia and avoid hypo- or hyper -natremia; aim for normothermia  - Hold antiplatelets and any blood thinners for 24 hours status post tPA administration  - high intensity statin per SPARCL Trial if and when safe to swallow  - Obtain STAT head CT for acute neurologic deterioration, otherwise repeat non-contrast head CT 24 hours s/p tPA  - serum studies for stroke risk factors: lipid panel & hemoglobin A1C  - To identify stroke territory, obtain MRI brain  --> (If the MRI brain study is completed before the 24hr post head CT, then the head CT study can be discontinued)  - Obtain a 2D echocardiogram w/ bubble study  - evaluate and treat with PT/OT/ST  - stroke education  - smoking cessation    The evaluation of the patient, and recommended management, was discussed with Dr. Horn (ED), nursing, and pharmacy.  Discussed indication, risk and benefit of tPA with patient prior to bolus.    1737 addendum: the patient's line infiltrated.  Examination reveals a saline flush amount of fluid in the forearm.  Updated recs: we will NOT re-bolus as it is felt the bolus was delivered IV and the majority of infiltrate is saline; the gtt was stopped and the infusion will be administered via other access    Jeff Valencia MD  Director, Comprehensive Stroke Center, [facility]  Neurohospitalist, [facility]  Clinical  of Neurology, [employer]  t) [phone] (f) [phone]    CRITICAL CARE    Upon my evaluation, this patient had  a high probability of imminent or life-threatening deterioration due to cerebrovascular accident which required my direct attention, intervention, and personal management.  I personally provided 45 minutes of total critical care time outside of time spent on separately billable/documented procedures. Time includes: review of laboratory data, review of radiology studies, discussion with consultants, discussion with family/patient, monitoring for potential decompensation.  Interventions were performed as documented in the chart.

## 2020-01-28 NOTE — DIETARY
NUTRITION SERVICES: BMI - Pt with BMI >40 (=Body mass index is 41.4 kg/m².), morbid obesity. Weight loss counseling not appropriate in acute care setting. RECOMMEND - Referral to outpatient nutrition services for weight management after D/C.

## 2020-01-28 NOTE — PROGRESS NOTES
Pt unable to lift LLE against gravity.  Pt c/o L hip replacement pain that he has had for months before this hospitalization.  Pt able to move foot against resistance.  MD notified and at bedside.  STAT CT ordered.  No other neuro changes present.  Neuro NP notified also

## 2020-01-28 NOTE — ASSESSMENT & PLAN NOTE
MRI - R MCA infarct stable hemorrhagic conversion  Etiological workup ongoing  Neurochecks q4h  Small Hemorrhagic conversion small R frontal ICH reversed with 4 units cryo  BP parameters to maintain less than 150/105 given ICH  No antiplatelet or anticoagulant therapies at this time  High intensity statin  Continuous cardiac monitoring  PT/OT/speech evaluations  Failed swallow  Placed cortrak - start po antihypertensives    Can likely start ASA after PEG placed as hemorrhage is stable

## 2020-01-28 NOTE — PROGRESS NOTES
Chief Complaint   Patient presents with   • Possible Stroke       Problem List Items Addressed This Visit     None      Visit Diagnoses     Acute CVA (cerebrovascular accident) (HCC)        Relevant Medications    gemfibrozil (LOPID) tablet 600 mg    atorvastatin (LIPITOR) tablet 80 mg (Start on 1/28/2020  6:00 PM)    metoprolol (LOPRESSOR) tablet 50 mg (Start on 1/29/2020  6:00 AM)    labetalol (NORMODYNE/TRANDATE) injection 10 mg    hydrALAZINE (APRESOLINE) injection 10 mg    Other Relevant Orders    REFERRAL TO PHYSIATRY (PMR)      Neurology Stroke Progress Note    Brief History of present illness:  74-year old male with PMHx significant for hypertension, dyslipidemia, and hypothyroid who presented to [facility] on 1/27/210 for a chief complaint of Left sided weakness; onset 2200 on evening of presentation. SBP 160s at time of presentation; CT head unremarkable; CTA head/neck with Right MCA severe stenosis vs vasospasm; no thrombectomy. Patient determined to be a candidate for IV tPA, received IV tPA on 1/27/20 at 2315.     Interval, 1/28/20:   Patient sitting up in bed; awake and alert. Nursing reports worsening LLE weakness; otherwise no change in exam. Repeat CT head pending at this time. SBP 150s.     No changes to HPI as was previously documented.     Past medical history:   Past Medical History:   Diagnosis Date   • Asthma    • Chickenpox    • [place] measles    • Influenza    • Mumps    • Tonsillitis        Past surgical history:   Past Surgical History:   Procedure Laterality Date   • HIP REPLACEMENT, TOTAL         Family history:   Family History   Problem Relation Age of Onset   • Cancer Mother    • Cancer Father        Social history:   Social History     Socioeconomic History   • Marital status:      Spouse name: Not on file   • Number of children: Not on file   • Years of education: Not on file   • Highest education level: Not on file   Occupational History   • Not on file   Social  Needs   • Financial resource strain: Not on file   • Food insecurity:     Worry: Not on file     Inability: Not on file   • Transportation needs:     Medical: Not on file     Non-medical: Not on file   Tobacco Use   • Smoking status: Former Smoker     Packs/day: 0.50     Years: 46.00     Pack years: 23.00     Types: Cigarettes     Last attempt to quit: 2015     Years since quittin.1   • Smokeless tobacco: Never Used   Substance and Sexual Activity   • Alcohol use: Yes     Comment: 2-3 drinks a week   • Drug use: No   • Sexual activity: Not on file   Lifestyle   • Physical activity:     Days per week: Not on file     Minutes per session: Not on file   • Stress: Not on file   Relationships   • Social connections:     Talks on phone: Not on file     Gets together: Not on file     Attends [employer] service: Not on file     Active member of club or organization: Not on file     Attends meetings of clubs or organizations: Not on file     Relationship status: Not on file   • Intimate partner violence:     Fear of current or ex partner: Not on file     Emotionally abused: Not on file     Physically abused: Not on file     Forced sexual activity: Not on file   Other Topics Concern   • Not on file   Social History Narrative   • Not on file       Current medications:   Current Facility-Administered Medications   Medication Dose   • albuterol inhaler 2 Puff  2 Puff   • gemfibrozil (LOPID) tablet 600 mg  600 mg   • levothyroxine (SYNTHROID) tablet 88 mcg  88 mcg   • acetaminophen (TYLENOL) tablet 650 mg  650 mg   • ondansetron (ZOFRAN) syringe/vial injection 4 mg  4 mg   • ondansetron (ZOFRAN ODT) dispertab 4 mg  4 mg   • senna-docusate (PERICOLACE or SENOKOT S) 8.6-50 MG per tablet 2 Tab  2 Tab    And   • polyethylene glycol/lytes (MIRALAX) PACKET 1 Packet  1 Packet    And   • magnesium hydroxide (MILK OF MAGNESIA) suspension 30 mL  30 mL    And   • bisacodyl (DULCOLAX) suppository 10 mg  10 mg   • atorvastatin  "(LIPITOR) tablet 80 mg  80 mg   • ipratropium-albuterol (DUONEB) nebulizer solution  3 mL   • [START ON 1/29/2020] metoprolol (LOPRESSOR) tablet 50 mg  50 mg   • labetalol (NORMODYNE/TRANDATE) injection 10 mg  10 mg    Or   • hydrALAZINE (APRESOLINE) injection 10 mg  10 mg       Medication Allergy:  Allergies   Allergen Reactions   • Amoxicillin Hives     Pt states \"I get hives\".   • Ampicillin Hives     Pt states \"I get hives\".       Review of systems:   Constitutional: denies fever, night sweats, weight loss.   Eyes: denies acute vision change, eye pain or secretion.   Ears, Nose, Mouth, Throat: denies nasal secretion, nasal bleeding, difficulty swallowing, hearing loss, tinnitus, vertigo, ear pain, acute dental problems, oral ulcers or lesions.   Endocrine: denies recent weight changes, heat or cold intolerance, polyuria, polydypsia, polyphagia,abnormal hair growth.  Cardiovascular: denies new onset of chest pain, palpitations, syncope, or dyspnea of exertion.  Pulmonary: denies shortness of breath, new onset of cough, hemoptysis, wheezing, chest pain or flu-like symptoms.   GI: denies nausea, vomiting, diarrhea, GI bleeding, change in appetite, abdominal pain, and change in bowel habits.  : denies dysuria, urinary incontinence, hematuria.  Heme/oncology: denies history of easy bruising or bleeding. No history of cancer, DVTor PE.  Allergy/immunology: denies hives/urticaria, or itching.   Dermatologic: denies new rash, or new skin lesions.  Musculoskeletal:denies joint swelling or pain, muscle pain, neck and back pain. Neurologic: As noted above.   Psychiatric: denies symptoms of depression, anxiety, hallucinations, mood swings or changes, suicidal or homicidal thoughts.     Physical examination:   Vitals:    01/28/20 1030 01/28/20 1100 01/28/20 1130 01/28/20 1200   BP: 145/70 153/103 149/68 139/64   Pulse: 76 80 85 83   Resp: (!) 39 (!) 32 (!) 26 (!) 36   Temp:    37.1 °C (98.8 °F)   TempSrc:    Temporal "   SpO2: 92% 93% 95% 96%   Weight:       Height:         General: Patient in no acute distress, pleasant and cooperative.  HEENT: Normocephalic, no signs of acute trauma.   Neck: supple, no meningeal signs or carotid bruits. There is normal range of motion. No tenderness on exam.   Chest: clear to auscultation. No cough.   CV: RRR, no murmurs.   Skin: no signs of acute rashes or trauma.   Musculoskeletal: joints exhibit full range of motion, without any pain to palpation. There are no signs of joint or muscle swelling. There is no tenderness to deep palpation of muscles.   Psychiatric: No hallucinatory behavior. Denies symptoms of depression or suicidal ideation. Mood and affect appear normal on exam.     NEUROLOGICAL EXAM:   Mental status, orientation: Awake, alert and fully oriented.   Speech and language: speech is clear and fluent. The patient is able to name, repeat and comprehend.   Memory: There is intact recollection of recent and remote events.   Cranial nerve exam: Pupils are 3-4 mm bilaterally and equally reactive to light. Visual fields are intact by confrontation. There is no nystagmus on primary or secondary gaze. Intact full EOM in all directions of gaze. Left upper and lower facial droop. Sensation in the face is intact to light touch. Tongue is midline and without any signs of tongue biting or fasciculations. Sternocleidomastoid muscles exhibit is normal strength bilaterally. Shoulder shrug is intact bilaterally.   Motor exam: Strength is 5/5 in RUE/RLE. 3+/5 to LUE with drift; 2/5 to LLE not antigravity.Tone is normal. No abnormal movements were seen on exam.   Sensory exam Decreased sensation to light touch on the Left; Right reveals normal sense of light touch and pinprick in all extremities.   Deep tendon reflexes:  2+ throughout. Plantar responses are flexor on the Right, extensor on the left. There is no clonus.   Coordination: RUE shows a normal finger-nose-finger; LUE with dysmetria secondary  to weakness. Normal rapidly alternating movements.   Gait: Not assessed at this time as patient is a fall risk.     NIH Stroke Scale    1a Level of Consciousness   1b Orientation Questions   1c Response to Commands   2 Gaze   3 Visual Fields   4 Facial Movement 2  5 Motor Function (arm)   a Left 1  b Right   6 Motor Function (leg)   a Left 2  b Right    7 Limb Ataxia 1  8 Sensory 1  9 Language   10 Articulation   11 Extinction/Inattention 1    Score: 8      ANCILLARY DATA REVIEWED:     Lab Data Review:  Recent Results (from the past 24 hour(s))   CBC WITH DIFFERENTIAL    Collection Time: 01/27/20 11:00 PM   Result Value Ref Range    WBC 9.4 4.8 - 10.8 K/uL    RBC 4.88 4.70 - 6.10 M/uL    Hemoglobin 15.9 14.0 - 18.0 g/dL    Hematocrit 46.4 42.0 - 52.0 %    MCV 95.1 81.4 - 97.8 fL    MCH 32.6 27.0 - 33.0 pg    MCHC 34.3 33.7 - 35.3 g/dL    RDW 47.2 35.9 - 50.0 fL    Platelet Count 274 164 - 446 K/uL    MPV 11.5 9.0 - 12.9 fL    Neutrophils-Polys 57.20 44.00 - 72.00 %    Lymphocytes 28.70 22.00 - 41.00 %    Monocytes 8.10 0.00 - 13.40 %    Eosinophils 4.60 0.00 - 6.90 %    Basophils 0.90 0.00 - 1.80 %    Immature Granulocytes 0.50 0.00 - 0.90 %    Nucleated RBC 0.00 /100 WBC    Neutrophils (Absolute) 5.35 1.82 - 7.42 K/uL    Lymphs (Absolute) 2.68 1.00 - 4.80 K/uL    Monos (Absolute) 0.76 0.00 - 0.85 K/uL    Eos (Absolute) 0.43 0.00 - 0.51 K/uL    Baso (Absolute) 0.08 0.00 - 0.12 K/uL    Immature Granulocytes (abs) 0.05 0.00 - 0.11 K/uL    NRBC (Absolute) 0.00 K/uL   COMP METABOLIC PANEL    Collection Time: 01/27/20 11:00 PM   Result Value Ref Range    Sodium 139 135 - 145 mmol/L    Potassium 4.4 3.6 - 5.5 mmol/L    Chloride 104 96 - 112 mmol/L    Co2 25 20 - 33 mmol/L    Anion Gap 10.0 0.0 - 11.9    Glucose 139 (H) 65 - 99 mg/dL    Bun 10 8 - 22 mg/dL    Creatinine 1.07 0.50 - 1.40 mg/dL    Calcium 9.2 8.5 - 10.5 mg/dL    AST(SGOT) 26 12 - 45 U/L    ALT(SGPT) 14 2 - 50 U/L    Alkaline Phosphatase 76 30 - 99 U/L     Total Bilirubin 0.4 0.1 - 1.5 mg/dL    Albumin 4.2 3.2 - 4.9 g/dL    Total Protein 7.6 6.0 - 8.2 g/dL    Globulin 3.4 1.9 - 3.5 g/dL    A-G Ratio 1.2 g/dL   PROTHROMBIN TIME    Collection Time: 20 11:00 PM   Result Value Ref Range    PT 13.9 12.0 - 14.6 sec    INR 1.05 0.87 - 1.13   APTT    Collection Time: 20 11:00 PM   Result Value Ref Range    APTT 28.6 24.7 - 36.0 sec   COD (ADULT)    Collection Time: 20 11:00 PM   Result Value Ref Range    ABO Grouping Only A     Rh Grouping Only NEG     Antibody Screen-Cod NEG    TROPONIN    Collection Time: 20 11:00 PM   Result Value Ref Range    Troponin T <6 6 - 19 ng/L   ESTIMATED GFR    Collection Time: 20 11:00 PM   Result Value Ref Range    GFR If African American >60 >60 mL/min/1.73 m 2    GFR If Non African American >60 >60 mL/min/1.73 m 2   Lipid Panel    Collection Time: 20 11:00 PM   Result Value Ref Range    Cholesterol,Tot 142 100 - 199 mg/dL    Triglycerides 196 (H) 0 - 149 mg/dL    HDL 30 (A) >=40 mg/dL    LDL 73 <100 mg/dL   ABO Rh Confirm    Collection Time: 20 11:38 PM   Result Value Ref Range    ABO Rh Confirm A NEG    EKG (NOW)    Collection Time: 20 12:03 AM   Result Value Ref Range    Report       [facility]    Test Date:  2020  Pt Name:    REBA RODRIGUEZ                  Department: ER  MRN:        [id number]                      Room:       [facility]  Gender:     Male                         Technician: [id number]  :        [id number]                   Requested By:LONNIE CALIX  Order #:    [id number]                    Reading MD: LONNIE CALIX MD    Measurements  Intervals                                Axis  Rate:       76                           P:          0  CA:         160                          QRS:        -13  QRSD:       98                           T:  QT:         404  QTc:        455    Interpretive Statements  SINUS RHYTHM  LOW VOLTAGE IN FRONTAL  LEADS  BORDERLINE T WAVE ABNORMALITIES  Compared to ECG 05/07/2018 12:45:42  Ventricular premature complex(es) no longer present  T-wave abnormality still present  Electronically Signed On 1- 4:56:49 PST by LONNIE CALIX MD     Hemoglobin A1C    Collection Time: 01/28/20  1:28 AM   Result Value Ref Range    Glycohemoglobin 7.2 (H) 0.0 - 5.6 %    Est Avg Glucose 160 mg/dL   FIBRINOGEN    Collection Time: 01/28/20 11:00 AM   Result Value Ref Range    Fibrinogen <60 (L) 215 - 460 mg/dL   CRYOPRECIPITATE    Collection Time: 01/28/20 12:16 PM   Result Value Ref Range    Component Ct       CTP                 Cryoprecipitate     [id number]   issued       01/28/20   12:25      Product Type CTP     Dispense Status issued     Unit Number (Barcoded) [id number]     Product Code (Barcoded) D0318T64     Blood Type (Barcoded) 5100     Component Ct       CTP                 Cryoprecipitate     [id number]   issued       01/28/20   12:25      Product Type CTP     Dispense Status issued     Unit Number (Barcoded) [id number]     Product Code (Barcoded) Y5329L57     Blood Type (Barcoded) 5100        Labs reviewed by me.       Imaging reviewed by me:     CT-HEAD W/O   Final Result   Addendum 1 of 1   These findings were discussed with REMI SHAFFER on 1/28/2020 10:57 AM.      Final      DX-CHEST-PORTABLE (1 VIEW)   Final Result      Cardiomegaly with interstitial prominence.      CT-CTA NECK WITH & W/O-POST PROCESSING   Final Result      1.  Atherosclerotic plaque at the carotid bifurcations bilaterally which results in less than 50% diameter narrowing.      2.  Diminutive right vertebral artery.      3.  Tortuous left internal carotid artery which is retropharyngeal in location.      CT-CTA HEAD WITH & W/O-POST PROCESS   Final Result      1.  Some asymmetric attenuation of the right middle cerebral arterial peripheral vessels without evidence of acute occlusion possibly representing vasospasm or atherosclerotic  attenuation.      2.  Diminutive right vertebral artery.      CT-HEAD W/O   Final Result      1.  No evidence of acute intracranial process.      2.  Cerebral atrophy as well as periventricular chronic small vessel ischemic change.      MR-BRAIN-W/O    (Results Pending)   EC-ECHOCARDIOGRAM COMPLETE W/O CONT    (Results Pending)       Presumed mechanism by TOAST:  __Large Artery Atherosclerosis  __Small Vessel (Lacunar)  __Cardioembolic  __Other (Sickle Cell, Vasculitis, Hypercoagulable)  _X_Unknown      ASSESSMENT:  74-year old male with PMHx significant for hypertension, dyslipidemia, and hypothyroid who presented to [facility] on 1/27/210 for a chief complaint of Left sided weakness; onset 2200 on evening of presentation. CTA head/neck with Right MCA severe stenosis vs vasospasm; no thrombectomy. Patient determined to be a candidate for IV tPA, received IV tPA on 1/27/20 at 2315. Today, NIHSS 8; patient reportedly had acute worsening to LLE function (was previously antigravity, now is not); stat repeat CT head revealed small acute intracranial hemorrhage to Right parietal/temporal region.     Impression:   Evolving Right MCA territory ischemia with Right parietal/temporal hemorrhagic conversion.   S/p IV tPA on 1/27/20.   Hx of HTN.   Hx of Dyslipidemia.     Recommendations/Plan:     -Neuro assessment and VS per post tPA protocol. SBP < 140. Antihypertensives per ICU team.   -Obtain MRI Brain wo contrast- ordered and pending.   -No anticoagulation/anti thrombotics at this time.  -Telemetry; currently SR. Screen for Afib/arrhythmia. Obtain TTE with bubble study. If cardiac work up here is unrevealing, patient may require outpatient holter/loop/zio patch to formally rule out cardiac arrhythmia as source of stroke.   -Atorvastatin 80 mg PO q HS. Check lipid panel.   -Recommend aggressive BG management per primary team. Avoid IVF with Dextrose. BG goal 140-180. Check hemoglobin A1c.   -PT/OT/SLP eval and treat  when appropriate.   -Will follow up with results of the above and make additional recommendations accordingly. All other medical management per primary/ICU team.   -DVT PPX: SCDs.      The plan of care above has been discussed with Dr. Juan.     Anahi Luna MSN, BSN, AGNP-C  [facility]

## 2020-01-28 NOTE — PROGRESS NOTES
Critical Care Progress Note    Date of admission  1/27/2020    Chief Complaint  74 y.o. male admitted 1/27/2020 with probable R MCA AIS    Hospital Course    1/28 - admitted ICU s/p tpA      Interval Problem Update  Reviewed last 24 hour events:  This morning left leg pain but no longer antigravity sent for stat CT which revealed posterior right frontal ICH. GCS remains 15. Fibrinogen and TEG ordered. Will give cryo if fibrinogen less than 150  Changed SBP parameters to less than 140  Afebrile  SBPs 140-160s  HR 70-80s  CXR w/o effusion or consolidation  MRI pending  Echo pending    Review of Systems  Review of Systems   Constitutional: Negative for chills and fever.   Eyes: Negative for double vision.   Respiratory: Positive for shortness of breath. Negative for stridor.    Cardiovascular: Negative for chest pain.   Gastrointestinal: Negative for nausea and vomiting.   Genitourinary: Negative for urgency.   Musculoskeletal: Negative for neck pain.   Neurological: Positive for sensory change and focal weakness. Negative for headaches.   Psychiatric/Behavioral: Negative for depression.        Vital Signs for last 24 hours   Temp:  [36.6 °C (97.9 °F)-37.1 °C (98.8 °F)] 37.1 °C (98.8 °F)  Pulse:  [71-85] 85  Resp:  [18-76] 26  BP: (110-166)/() 149/68  SpO2:  [89 %-97 %] 95 %    Hemodynamic parameters for last 24 hours       Respiratory Information for the last 24 hours       Physical Exam   Physical Exam  Constitutional:       General: He is not in acute distress.     Appearance: He is obese. He is not toxic-appearing.   HENT:      Head: Normocephalic and atraumatic.      Right Ear: External ear normal.      Left Ear: External ear normal.      Nose: Nose normal.      Mouth/Throat:      Mouth: Mucous membranes are moist.   Eyes:      Extraocular Movements: Extraocular movements intact.      Pupils: Pupils are equal, round, and reactive to light.   Neck:      Musculoskeletal: Normal range of motion.    Cardiovascular:      Rate and Rhythm: Normal rate and regular rhythm.      Pulses: Normal pulses.   Pulmonary:      Effort: Pulmonary effort is normal.   Abdominal:      General: Abdomen is flat.   Musculoskeletal:      Right lower leg: Edema present.      Left lower leg: Edema present.   Skin:     General: Skin is warm and dry.      Capillary Refill: Capillary refill takes less than 2 seconds.   Neurological:      Mental Status: He is alert.      Comments: GCS 15. Left facial droop. Mild Left pronator drift. UMN pattern of weakness in the LLE, no longer antigravity   Psychiatric:         Mood and Affect: Mood normal.         Behavior: Behavior normal.         Medications  Current Facility-Administered Medications   Medication Dose Route Frequency Provider Last Rate Last Dose   • albuterol inhaler 2 Puff  2 Puff Inhalation Q6HRS PRN Nikhil Garcia M.D.       • gemfibrozil (LOPID) tablet 600 mg  600 mg Oral BID Nikhil Garcia M.D.   Stopped at 01/28/20 0600   • levothyroxine (SYNTHROID) tablet 88 mcg  88 mcg Oral AM ES Nikhil Garcia M.D.   Stopped at 01/28/20 0600   • acetaminophen (TYLENOL) tablet 650 mg  650 mg Oral Q6HRS PRN Nikhil Garcia M.D.       • ondansetron (ZOFRAN) syringe/vial injection 4 mg  4 mg Intravenous Q4HRS PRN Nikhil Garcia M.D.       • ondansetron (ZOFRAN ODT) dispertab 4 mg  4 mg Oral Q4HRS PRN Nikhil Garcia M.D.       • senna-docusate (PERICOLACE or SENOKOT S) 8.6-50 MG per tablet 2 Tab  2 Tab Oral BID Nikhil Garcia M.D.   Stopped at 01/28/20 0600    And   • polyethylene glycol/lytes (MIRALAX) PACKET 1 Packet  1 Packet Oral QDAY PRN Nikhil Garcia M.D.        And   • magnesium hydroxide (MILK OF MAGNESIA) suspension 30 mL  30 mL Oral QDAY PRN Nikhil Garcia M.D.        And   • bisacodyl (DULCOLAX) suppository 10 mg  10 mg Rectal QDAY PRN Nikhil Garcia M.D.       • atorvastatin (LIPITOR) tablet 80 mg  80 mg Oral Q EVENING Nikhil Garcia M.D.       • ipratropium-albuterol  (DUONEB) nebulizer solution  3 mL Nebulization Q4H PRN (RT) Nikhil Garcia M.D.   3 mL at 01/28/20 0258   • [START ON 1/29/2020] metoprolol (LOPRESSOR) tablet 50 mg  50 mg Oral BID Aquilino Thayer M.D.       • labetalol (NORMODYNE/TRANDATE) injection 10 mg  10 mg Intravenous Q4HRS PRN Aquilino Thayer M.D.        Or   • hydrALAZINE (APRESOLINE) injection 10 mg  10 mg Intravenous Q2HRS PRN Aquilino Thayer M.D.   10 mg at 01/28/20 1111       Fluids    Intake/Output Summary (Last 24 hours) at 1/28/2020 1148  Last data filed at 1/28/2020 0200  Gross per 24 hour   Intake 50 ml   Output 400 ml   Net -350 ml       Laboratory          Recent Labs     01/27/20  2300   SODIUM 139   POTASSIUM 4.4   CHLORIDE 104   CO2 25   BUN 10   CREATININE 1.07   CALCIUM 9.2     Recent Labs     01/27/20  2300   ALTSGPT 14   ASTSGOT 26   ALKPHOSPHAT 76   TBILIRUBIN 0.4   GLUCOSE 139*     Recent Labs     01/27/20  2300   WBC 9.4   NEUTSPOLYS 57.20   LYMPHOCYTES 28.70   MONOCYTES 8.10   EOSINOPHILS 4.60   BASOPHILS 0.90   ASTSGOT 26   ALTSGPT 14   ALKPHOSPHAT 76   TBILIRUBIN 0.4     Recent Labs     01/27/20  2300   RBC 4.88   HEMOGLOBIN 15.9   HEMATOCRIT 46.4   PLATELETCT 274   PROTHROMBTM 13.9   APTT 28.6   INR 1.05       Imaging  X-Ray:  I have personally reviewed the images and compared with prior images.  CT:    Reviewed CT head with small posterior right frontal ICH with minimal surrounding edema    Assessment/Plan  * Cerebrovascular accident (CVA) due to thrombosis of right middle cerebral artery (HCC)  Assessment & Plan  Status post TPA completing at 00: 17 on 1/28  Every hour neuro checks  Stat head CT with any deterioration in neurologic status  Strict BP parameters to maintain less than 180/105  No antiplatelet or anticoagulant therapies at this time  High intensity statin  Repeat head CT 24 hours post TPA if no evidence of bleed start antiplatelet therapy  Continuous cardiac monitoring  Echocardiogram  MRI brain  Check A1c, lipid  panel, and TSH  PT/OT/speech evaluations    Morbid obesity with BMI of 40.0-44.9, adult (HCC)  Assessment & Plan  Recommend weight loss when clinically appropriate    Moderate persistent asthma without complication  Assessment & Plan  RT/O2 protocols  PRN nebulizers    Dyslipidemia  Assessment & Plan  High intensity statin    Essential hypertension  Assessment & Plan  Strict BP parameters to maintain less than 180/105    Hypothyroidism- (present on admission)  Assessment & Plan  Check TSH and free T4  Resume replacement therapy    VERENA (obstructive sleep apnea)- (present on admission)  Assessment & Plan  Resume cpap with home settings       Gave 2 unit of cryo and fibrinogen level still less than 150 ordered 2 more units  Updated and discussed case with Neuro service.    VTE:  Contraindicated  Ulcer: Not Indicated  Lines: None    I have performed a physical exam and reviewed and updated ROS and Plan today (1/28/2020). In review of yesterday's note (1/27/2020), there are no changes except as documented above.     Discussed patient condition and risk of morbidity and/or mortality with RN, RT, Pharmacy, Code status disscussed, Charge nurse / hot rounds, Patient and neurology  The patient remains critically ill.  Critical care time = 58 minutes in directly providing and coordinating critical care and extensive data review.  No time overlap and excludes procedures.

## 2020-01-28 NOTE — THERAPY
"Speech Language Therapy Clinical Swallow Evaluation completed.  Functional Status: Pt was alert, cooperative. Oral mech exam was notable for L facial droop, impaired sensation on L labial/facial surface, lingual deviation to L/reduced lingual ROM to L. Pt is edentulous and he reports he does NOT typically wear his dentures at home but still eats foods like steak and sliced apples. Vocal quality was rough but clear. Pt was presented with ice chips (5), NTL via tsp/cup (1 oz), thins via tsp/cup (1 oz), purees (3 oz), soft solids (1 bite of peach). Trace-min L anterior bolus loss noted with thins to which pt was not sensate. Increased oral prep time and mastication noted with purees and peach. Pharyngeal swallow response was timely and hyolaryngeal excursion was palpated as weak. Delayed weak throat clearing, delayed weak cough, and increased wet audible breath sounds occurred intermittently across intake. Overt prolonged coughing occurred following intake of single diced peach. Pt will benefit from a FEES to objectively assess swallow function prior to initiating a PO diet. Recommend pt remain NPO with single ice chips only w/ RN pending FEES.     Recommendations - Diet:  NPO pending FEES                          Strategies: TBD                          Medication Administration: IV if possible  Plan of Care: Will benefit from Speech Therapy 5 times per week  Post-Acute Therapy: Recommend inpatient transitional care services for continued speech therapy services.      See \"Rehab Therapy-Acute\" Patient Summary Report for complete documentation.   "

## 2020-01-28 NOTE — ED NOTES
At 2322 the left AC IV were noted to be infiltrated, this was the IV that alteplase was running through. The line has been flushed by Dr. Valencia before and after alteplase bolus was administered. Dr. Valencia was notified at that time in person. IV was re-sited and alteplase infusion moved to that new IV site.

## 2020-01-28 NOTE — ED TRIAGE NOTES
75 y/o male presents to ED via EMS with complaints of left side deficits, left side vision changes, slurred speech. Last known well 2200. FSBS by . Denies use of blood thinners.Dr. Valencia at bedside when pt arrived to ED.

## 2020-01-28 NOTE — THERAPY
OT referral received. Pt on bedrest due to Alteplase protocol. Will complete when pt is cleared for OOB activity

## 2020-01-28 NOTE — CONSULTS
Critical Care Consultation    Date of consult: 1/28/2020    Referring Physician  Varinder Horn M.D.    Reason for Consultation  Acute CVA    History of Presenting Illness  74 y.o. male w hx of htn, hld, asthma, and hypothyroidism who presented 1/27/2020 with left sided weakness.  Acute onset around 10pm, pt brought in as Code stroke, decision made for TPA which completed at 00:17. He is s/p CTA head without obvious lesion amenable to thrombectomy.     Code Status  DNAR/DNI    Review of Systems  Review of Systems   Constitutional: Negative for chills and fever.   HENT: Negative for congestion.    Eyes: Negative for photophobia.   Respiratory: Negative for sputum production and shortness of breath.    Cardiovascular: Negative for chest pain and palpitations.   Gastrointestinal: Negative for nausea and vomiting.   Genitourinary: Negative for dysuria.   Neurological: Positive for sensory change and focal weakness.   Psychiatric/Behavioral: Negative for depression.   All other systems reviewed and are negative.      Past Medical History   has a past medical history of Asthma, Chickenpox, Turkish measles, Influenza, Mumps, and Tonsillitis.    Surgical History   has a past surgical history that includes hip replacement, total.    Family History  family history includes Cancer in his father and mother.    Social History   reports that he quit smoking about 4 years ago. His smoking use included cigarettes. He has a 23.00 pack-year smoking history. He has never used smokeless tobacco. He reports current alcohol use. He reports that he does not use drugs.    Medications  Home Medications     Reviewed by Carolann Rodriguez R.N. (Registered Nurse) on 01/27/20 at 2308  Med List Status: Partial   Medication Last Dose Status   albuterol (VENTOLIN OR PROVENTIL) 108 (90 BASE) MCG/ACT Aero Soln inhalation aerosol  Active   clopidogrel (PLAVIX) 75 MG Tab  Active   gemfibrozil (LOPID) 600 MG Tab  Active   levothyroxine (SYNTHROID) 88  MCG Tab  Active   meclizine (ANTIVERT) 25 MG Tab  Active   metoprolol (LOPRESSOR) 50 MG Tab  Active   moxifloxacin (VIGAMOX) 0.5 % Solution  Active   Omega-3 Fatty Acids (FISH OIL) 1000 MG Cap capsule  Active   ondansetron (ZOFRAN ODT) 4 MG TABLET DISPERSIBLE  Active   simvastatin (ZOCOR) 40 MG Tab  Active              Current Facility-Administered Medications   Medication Dose Route Frequency Provider Last Rate Last Dose   • albuterol inhaler 2 Puff  2 Puff Inhalation Q6HRS PRN Nikhil Garcia M.D.       • gemfibrozil (LOPID) tablet 600 mg  600 mg Oral BID Nikhil Garcia M.D.       • levothyroxine (SYNTHROID) tablet 88 mcg  88 mcg Oral AM ES Nikhil Garcia M.D.       • metoprolol (LOPRESSOR) tablet 50 mg  50 mg Oral BID Nikhil Garcia M.D.       • acetaminophen (TYLENOL) tablet 650 mg  650 mg Oral Q6HRS PRN Nikhil Garcia M.D.       • ondansetron (ZOFRAN) syringe/vial injection 4 mg  4 mg Intravenous Q4HRS PRN Nikhil Garcia M.D.       • ondansetron (ZOFRAN ODT) dispertab 4 mg  4 mg Oral Q4HRS PRN Nikhil Garcia M.D.       • senna-docusate (PERICOLACE or SENOKOT S) 8.6-50 MG per tablet 2 Tab  2 Tab Oral BID Nikhil Garcia M.D.        And   • polyethylene glycol/lytes (MIRALAX) PACKET 1 Packet  1 Packet Oral QDAY PRN Nikhil Garcia M.D.        And   • magnesium hydroxide (MILK OF MAGNESIA) suspension 30 mL  30 mL Oral QDAY PRN Nikhil Garcia M.D.        And   • bisacodyl (DULCOLAX) suppository 10 mg  10 mg Rectal QDAY PRN Nikhil Garcia M.D.       • labetalol (NORMODYNE/TRANDATE) injection 10 mg  10 mg Intravenous Q4HRS PRN Nikhil Garcia M.D.        Or   • hydrALAZINE (APRESOLINE) injection 10 mg  10 mg Intravenous Q2HRS PRN Nikhil Garcia M.D.       • [START ON 1/29/2020] aspirin (ASA) tablet 325 mg  325 mg Oral DAILY Nikhil Garcia M.D.        Or   • [START ON 1/29/2020] aspirin (ASA) chewable tab 324 mg  324 mg Oral DAILY Nikhil Garcia M.D.        Or   • [START ON 1/29/2020] aspirin (ASA)  "suppository 300 mg  300 mg Rectal DAILY Nikhil Garcia M.D.       • atorvastatin (LIPITOR) tablet 80 mg  80 mg Oral Q EVENING Nikhil Garcia M.D.       • ipratropium-albuterol (DUONEB) nebulizer solution  3 mL Nebulization Q4H PRN (RT) Nikhil Garcia M.D.         Current Outpatient Medications   Medication Sig Dispense Refill   • clopidogrel (PLAVIX) 75 MG Tab Take 1 Tab by mouth every day. 30 Tab 11   • moxifloxacin (VIGAMOX) 0.5 % Solution Place 1 Drop in right eye 3 times a day. 1 Bottle 0   • meclizine (ANTIVERT) 25 MG Tab Take 1 Tab by mouth 3 times a day as needed for Dizziness, Nausea/Vomiting or Vertigo. 30 Tab 0   • gemfibrozil (LOPID) 600 MG Tab Take 1 Tab by mouth 2 times a day. 60 Tab 0   • ondansetron (ZOFRAN ODT) 4 MG TABLET DISPERSIBLE Take 1 Tab by mouth every 6 hours as needed for Nausea. 10 Tab 0   • Omega-3 Fatty Acids (FISH OIL) 1000 MG Cap capsule Take 1,000 mg by mouth 2 Times a Day.     • metoprolol (LOPRESSOR) 50 MG Tab Take 50 mg by mouth 2 times a day.     • levothyroxine (SYNTHROID) 88 MCG Tab Take 88 mcg by mouth Every morning on an empty stomach.     • simvastatin (ZOCOR) 40 MG Tab Take 40 mg by mouth every evening.     • albuterol (VENTOLIN OR PROVENTIL) 108 (90 BASE) MCG/ACT Aero Soln inhalation aerosol Inhale 2 Puffs by mouth every 6 hours as needed for Shortness of Breath.         Allergies  Allergies   Allergen Reactions   • Amoxicillin Hives     Pt states \"I get hives\".   • Ampicillin Hives     Pt states \"I get hives\".       Vital Signs last 24 hours  Pulse:  [72-81] 81  Resp:  [18-33] 22  BP: (110-163)/(69-95) 154/74  SpO2:  [94 %-96 %] 95 %    Physical Exam  Physical Exam  Vitals signs and nursing note reviewed.   HENT:      Head: Normocephalic and atraumatic.      Mouth/Throat:      Pharynx: Oropharynx is clear.   Eyes:      Conjunctiva/sclera: Conjunctivae normal.      Pupils: Pupils are equal, round, and reactive to light.   Neck:      Musculoskeletal: Neck supple. "   Cardiovascular:      Rate and Rhythm: Normal rate.      Pulses: Normal pulses.   Pulmonary:      Breath sounds: No wheezing or rales.   Abdominal:      General: There is no distension.      Palpations: Abdomen is soft.      Tenderness: There is no tenderness.   Musculoskeletal:         General: No swelling.   Skin:     General: Skin is warm and dry.      Comments: Wound to dorsum of left foot   Neurological:      Mental Status: He is alert.      Cranial Nerves: Cranial nerve deficit present.      Sensory: Sensory deficit present.      Motor: Weakness present.   Psychiatric:         Mood and Affect: Mood normal.         Fluids  No intake or output data in the 24 hours ending 01/28/20 0049    Laboratory  Recent Results (from the past 48 hour(s))   CBC WITH DIFFERENTIAL    Collection Time: 01/27/20 11:00 PM   Result Value Ref Range    WBC 9.4 4.8 - 10.8 K/uL    RBC 4.88 4.70 - 6.10 M/uL    Hemoglobin 15.9 14.0 - 18.0 g/dL    Hematocrit 46.4 42.0 - 52.0 %    MCV 95.1 81.4 - 97.8 fL    MCH 32.6 27.0 - 33.0 pg    MCHC 34.3 33.7 - 35.3 g/dL    RDW 47.2 35.9 - 50.0 fL    Platelet Count 274 164 - 446 K/uL    MPV 11.5 9.0 - 12.9 fL    Neutrophils-Polys 57.20 44.00 - 72.00 %    Lymphocytes 28.70 22.00 - 41.00 %    Monocytes 8.10 0.00 - 13.40 %    Eosinophils 4.60 0.00 - 6.90 %    Basophils 0.90 0.00 - 1.80 %    Immature Granulocytes 0.50 0.00 - 0.90 %    Nucleated RBC 0.00 /100 WBC    Neutrophils (Absolute) 5.35 1.82 - 7.42 K/uL    Lymphs (Absolute) 2.68 1.00 - 4.80 K/uL    Monos (Absolute) 0.76 0.00 - 0.85 K/uL    Eos (Absolute) 0.43 0.00 - 0.51 K/uL    Baso (Absolute) 0.08 0.00 - 0.12 K/uL    Immature Granulocytes (abs) 0.05 0.00 - 0.11 K/uL    NRBC (Absolute) 0.00 K/uL   COMP METABOLIC PANEL    Collection Time: 01/27/20 11:00 PM   Result Value Ref Range    Sodium 139 135 - 145 mmol/L    Potassium 4.4 3.6 - 5.5 mmol/L    Chloride 104 96 - 112 mmol/L    Co2 25 20 - 33 mmol/L    Anion Gap 10.0 0.0 - 11.9    Glucose 139 (H)  65 - 99 mg/dL    Bun 10 8 - 22 mg/dL    Creatinine 1.07 0.50 - 1.40 mg/dL    Calcium 9.2 8.5 - 10.5 mg/dL    AST(SGOT) 26 12 - 45 U/L    ALT(SGPT) 14 2 - 50 U/L    Alkaline Phosphatase 76 30 - 99 U/L    Total Bilirubin 0.4 0.1 - 1.5 mg/dL    Albumin 4.2 3.2 - 4.9 g/dL    Total Protein 7.6 6.0 - 8.2 g/dL    Globulin 3.4 1.9 - 3.5 g/dL    A-G Ratio 1.2 g/dL   PROTHROMBIN TIME    Collection Time: 20 11:00 PM   Result Value Ref Range    PT 13.9 12.0 - 14.6 sec    INR 1.05 0.87 - 1.13   APTT    Collection Time: 20 11:00 PM   Result Value Ref Range    APTT 28.6 24.7 - 36.0 sec   COD (ADULT)    Collection Time: 20 11:00 PM   Result Value Ref Range    ABO Grouping Only A     Rh Grouping Only NEG     Antibody Screen-Cod NEG    TROPONIN    Collection Time: 20 11:00 PM   Result Value Ref Range    Troponin T <6 6 - 19 ng/L   ESTIMATED GFR    Collection Time: 20 11:00 PM   Result Value Ref Range    GFR If African American >60 >60 mL/min/1.73 m 2    GFR If Non African American >60 >60 mL/min/1.73 m 2   ABO Rh Confirm    Collection Time: 20 11:38 PM   Result Value Ref Range    ABO Rh Confirm A NEG    EKG (NOW)    Collection Time: 20 12:03 AM   Result Value Ref Range    Report       [facility] Emergency Dept.    Test Date:  2020  Pt Name:    REBA RODRIGUEZ                  Department: ER  MRN:        [id number]                      Room:       RD 04  Gender:     Male                         Technician: [id number]  :        [id number]                   Requested By:LONNIE CALIX  Order #:    [id number]                    Reading MD:    Measurements  Intervals                                Axis  Rate:       76                           P:          0  OK:         160                          QRS:        -13  QRSD:       98                           T:  QT:         404  QTc:        455    Interpretive Statements  SINUS RHYTHM  LOW VOLTAGE IN FRONTAL LEADS  BORDERLINE T WAVE  ABNORMALITIES  Compared to ECG 05/07/2018 12:45:42  Ventricular premature complex(es) no longer present  T-wave abnormality still present         Imaging  DX-CHEST-PORTABLE (1 VIEW)   Final Result      Cardiomegaly with interstitial prominence.      CT-CTA NECK WITH & W/O-POST PROCESSING   Final Result      1.  Atherosclerotic plaque at the carotid bifurcations bilaterally which results in less than 50% diameter narrowing.      2.  Diminutive right vertebral artery.      3.  Tortuous left internal carotid artery which is retropharyngeal in location.      CT-CTA HEAD WITH & W/O-POST PROCESS   Final Result      1.  Some asymmetric attenuation of the right middle cerebral arterial peripheral vessels without evidence of acute occlusion possibly representing vasospasm or atherosclerotic attenuation.      2.  Diminutive right vertebral artery.      CT-HEAD W/O   Final Result      1.  No evidence of acute intracranial process.      2.  Cerebral atrophy as well as periventricular chronic small vessel ischemic change.      MR-BRAIN-W/O    (Results Pending)       Assessment/Plan  * Cerebrovascular accident (CVA) due to thrombosis of right middle cerebral artery (HCC)  Assessment & Plan  Status post TPA completing at 00: 17 on 1/28  Every hour neuro checks  Stat head CT with any deterioration in neurologic status  Strict BP parameters to maintain less than 180/105  No antiplatelet or anticoagulant therapies at this time  High intensity statin  Repeat head CT 24 hours post TPA if no evidence of bleed start antiplatelet therapy  Continuous cardiac monitoring  Echocardiogram  MRI brain  Check A1c, lipid panel, and TSH  PT/OT/speech evaluations    Morbid obesity with BMI of 40.0-44.9, adult ([facility])  Assessment & Plan  Recommend weight loss when clinically appropriate    Moderate persistent asthma without complication  Assessment & Plan  RT/O2 protocols  PRN nebulizers    Dyslipidemia  Assessment & Plan  High intensity  statin    Essential hypertension  Assessment & Plan  Strict BP parameters to maintain less than 180/105    Hypothyroidism- (present on admission)  Assessment & Plan  Check TSH and free T4  Resume replacement therapy    VERENA (obstructive sleep apnea)- (present on admission)  Assessment & Plan  Resume cpap with home settings      Discussed patient condition and risk of morbidity and/or mortality with Hospitalist, RN, RT and Patient.    The patient remains critically ill.  Critical care time = 32 minutes in directly providing and coordinating critical care and extensive data review.  No time overlap and excludes procedures.

## 2020-01-28 NOTE — PROGRESS NOTES
2 RN skin check complete with Elsie PAYTON.   Devices in place ekg leads, NIBP, PIV x 2, SCDs, silicone O2 tubing with gray ear foam pads, pulse ox clips.  Skin assessed under devices.  Confirmed pressure ulcers found on N/A. Prior to arrival wound to Left top foot/ankle. Redness to panus/mid abdomen.  Pictures taken.    The following interventions in place: pt self-turns and turns encouraged, devices off the skin, pillows for floating heels/elbows

## 2020-01-28 NOTE — H&P
Hospital Medicine History & Physical Note    Date of Service  1/28/2020    Primary Care Physician  Kvng Pickett M.D.    Consultants  None    Code Status  DNAR/DNI    Chief Complaint  Left hand numbness    History of Presenting Illness  74 y.o. male with prior history of dyslipidemia on statin therapy, asthma which is not well controlled, with rescue inhaler being used 2-3 times a day, and essential hypertension with variable control today, was in his usual state of health until approximately 9:30 PM on the day prior to this admission.  He reports walking out of the shower, and sitting down to watch TV.  He picked up with a remote with no issues, however he felt that he could not feel the remote, and noted lack of feeling in his entire left hand and arm.  There seem to be no exacerbating or alleviating factors and this did not improve, prompting his visit to the emergency department.  After prompt evaluation, it was decided that he should receive tissue plasminogen activator which was given.  He currently denies headache or vision changes he has no chest pain or shortness of breath, no abdominal pain, nausea vomiting, diarrhea or constipation.  He feels no weakness in his upper extremity or face.  No other complaints.    Review of Systems  Review of Systems   Constitutional: Negative.    HENT: Negative.    Eyes: Negative.    Respiratory: Negative.    Cardiovascular: Negative.    Gastrointestinal: Negative.    Genitourinary: Negative.    Musculoskeletal: Negative.    Skin: Negative.    Neurological: Positive for sensory change. Negative for dizziness, speech change, focal weakness and weakness.   Endo/Heme/Allergies: Negative.    Psychiatric/Behavioral: Negative.        Past Medical History   has a past medical history of Asthma, Chickenpox, [place] measles, Influenza, Mumps, and Tonsillitis.    Surgical History   has a past surgical history that includes hip replacement, total.     Family History  family history  "includes Cancer in his father and mother.     Social History   reports that he quit smoking about 4 years ago. His smoking use included cigarettes. He has a 23.00 pack-year smoking history. He has never used smokeless tobacco. He reports current alcohol use. He reports that he does not use drugs.    Allergies  Allergies   Allergen Reactions   • Amoxicillin Hives     Pt states \"I get hives\".   • Ampicillin Hives     Pt states \"I get hives\".       Medications  Prior to Admission Medications   Prescriptions Last Dose Informant Patient Reported? Taking?   Omega-3 Fatty Acids (FISH OIL) 1000 MG Cap capsule   Yes No   Sig: Take 1,000 mg by mouth 2 Times a Day.   albuterol (VENTOLIN OR PROVENTIL) 108 (90 BASE) MCG/ACT Aero Soln inhalation aerosol  Patient's Home Pharmacy Yes No   Sig: Inhale 2 Puffs by mouth every 6 hours as needed for Shortness of Breath.   clopidogrel (PLAVIX) 75 MG Tab   No No   Sig: Take 1 Tab by mouth every day.   gemfibrozil (LOPID) 600 MG Tab   No No   Sig: Take 1 Tab by mouth 2 times a day.   levothyroxine (SYNTHROID) 88 MCG Tab   Yes No   Sig: Take 88 mcg by mouth Every morning on an empty stomach.   meclizine (ANTIVERT) 25 MG Tab   No No   Sig: Take 1 Tab by mouth 3 times a day as needed for Dizziness, Nausea/Vomiting or Vertigo.   metoprolol (LOPRESSOR) 50 MG Tab   Yes No   Sig: Take 50 mg by mouth 2 times a day.   moxifloxacin (VIGAMOX) 0.5 % Solution   No No   Sig: Place 1 Drop in right eye 3 times a day.   ondansetron (ZOFRAN ODT) 4 MG TABLET DISPERSIBLE   No No   Sig: Take 1 Tab by mouth every 6 hours as needed for Nausea.   simvastatin (ZOCOR) 40 MG Tab  Patient's Home Pharmacy Yes No   Sig: Take 40 mg by mouth every evening.      Facility-Administered Medications: None       Physical Exam  Pulse:  [72-81] 81  Resp:  [18-33] 19  BP: (110-163)/(69-95) 161/94  SpO2:  [94 %-96 %] 96 %    Physical Exam  Vitals signs and nursing note reviewed.   Constitutional:       General: He is not in acute " distress.     Appearance: Normal appearance. He is not ill-appearing.   HENT:      Head: Normocephalic and atraumatic.      Nose: Nose normal.      Mouth/Throat:      Mouth: Mucous membranes are moist.      Pharynx: Oropharynx is clear. No oropharyngeal exudate or posterior oropharyngeal erythema.   Eyes:      Extraocular Movements: Extraocular movements intact.      Conjunctiva/sclera: Conjunctivae normal.      Pupils: Pupils are equal, round, and reactive to light.   Neck:      Musculoskeletal: Normal range of motion and neck supple. No muscular tenderness.   Cardiovascular:      Rate and Rhythm: Normal rate and regular rhythm.      Pulses: Normal pulses.      Heart sounds: Normal heart sounds. No murmur. No friction rub. No gallop.    Pulmonary:      Effort: Pulmonary effort is normal. No respiratory distress.      Breath sounds: Normal breath sounds. No wheezing, rhonchi or rales.   Abdominal:      General: Abdomen is flat. Bowel sounds are normal. There is no distension.      Palpations: Abdomen is soft. There is no mass.      Tenderness: There is no tenderness.   Musculoskeletal: Normal range of motion.         General: No swelling or deformity.   Lymphadenopathy:      Cervical: No cervical adenopathy.   Skin:     General: Skin is warm and dry.   Neurological:      General: No focal deficit present.      Mental Status: He is alert and oriented to person, place, and time.      Cranial Nerves: No cranial nerve deficit.      Motor: No weakness.      Gait: Gait normal.      Comments: Appears to have a slight facial droop on the left side, however does not have any muscular weakness of the same.  Subjective numbness of the left hand noted   Psychiatric:         Mood and Affect: Mood normal.         Behavior: Behavior normal.         Laboratory:  Recent Labs     01/27/20  2300   WBC 9.4   RBC 4.88   HEMOGLOBIN 15.9   HEMATOCRIT 46.4   MCV 95.1   MCH 32.6   MCHC 34.3   RDW 47.2   PLATELETCT 274   MPV 11.5     Recent  Labs     01/27/20  2300   SODIUM 139   POTASSIUM 4.4   CHLORIDE 104   CO2 25   GLUCOSE 139*   BUN 10   CREATININE 1.07   CALCIUM 9.2     Recent Labs     01/27/20  2300   ALTSGPT 14   ASTSGOT 26   ALKPHOSPHAT 76   TBILIRUBIN 0.4   GLUCOSE 139*     Recent Labs     01/27/20 2300   APTT 28.6   INR 1.05     No results for input(s): NTPROBNP in the last 72 hours.      Recent Labs     01/27/20  2300   TROPONINT <6       Urinalysis:    No results found     Imaging:  DX-CHEST-PORTABLE (1 VIEW)   Final Result      Cardiomegaly with interstitial prominence.      CT-CTA NECK WITH & W/O-POST PROCESSING   Final Result      1.  Atherosclerotic plaque at the carotid bifurcations bilaterally which results in less than 50% diameter narrowing.      2.  Diminutive right vertebral artery.      3.  Tortuous left internal carotid artery which is retropharyngeal in location.      CT-CTA HEAD WITH & W/O-POST PROCESS   Final Result      1.  Some asymmetric attenuation of the right middle cerebral arterial peripheral vessels without evidence of acute occlusion possibly representing vasospasm or atherosclerotic attenuation.      2.  Diminutive right vertebral artery.      CT-HEAD W/O   Final Result      1.  No evidence of acute intracranial process.      2.  Cerebral atrophy as well as periventricular chronic small vessel ischemic change.      MR-BRAIN-W/O    (Results Pending)         Assessment/Plan:  I anticipate this patient will require at least two midnights for appropriate medical management, necessitating inpatient admission.    * Cerebrovascular accident (CVA) due to thrombosis of right middle cerebral artery (HCC)  Assessment & Plan  Suspected, now status post TPA therapy.  Plan for ICU admission for every hour neuro checks post TPA, imaging as needed and ordered, aspirin to start 24 hours after TPA.  Continue with statin therapy, changing to Lipitor 80 mg.  Monitor.    Morbid obesity with BMI of 40.0-44.9, adult (HCC)  Assessment &  Plan  Body mass index is 40.72 kg/m².      Moderate persistent asthma without complication  Assessment & Plan  Reports taking albuterol at least 2-3 times a day.  Likely will need a controller medication or inhaled corticosteroid.  This can be further fleshed out after TPA therapy.  Duo nebs have been ordered as needed.    Dyslipidemia  Assessment & Plan  On statin therapy and Lopid, both of which will be continued, but simvastatin will be switched to high-dose atorvastatin the setting of acute CVA.    Essential hypertension  Assessment & Plan  Currently variably controlled on current medication regimen.  As needed medications placed in the setting of post TPA.    Hypothyroidism- (present on admission)  Assessment & Plan  On replacement therapy which will be continued.    VERENA (obstructive sleep apnea)- (present on admission)  Assessment & Plan  On CPAP nightly with supplemental oxygen.  Respiratory therapy has been consulted for the same.        VTE prophylaxis: SCD

## 2020-01-28 NOTE — CARE PLAN
Problem: Communication  Goal: The ability to communicate needs accurately and effectively will improve  Outcome: PROGRESSING AS EXPECTED   Pt oriented to room/call light, mobility policy and need to call for help. Pt expressed understanding and uses call light appropriately.     Problem: Safety  Goal: Will remain free from injury  Outcome: PROGRESSING AS EXPECTED

## 2020-01-28 NOTE — DISCHARGE PLANNING
Medical Social Work    Referral: Stroke    Intervention: Pt is a 74 year old male brought in by [facility] for possible stroke.  Pt is Familia Lundy (: 1945).  Pt requested that his brother, Chato ([phone]) be updated and his daughter, Elizabeth ([phone]) be listed as emergency contact.  MSW contacted pt's brother, Chato to advise him of pt's hospitalization.  All questions answered at this time.    Plan: SW will continue to follow as needed.

## 2020-01-28 NOTE — DISCHARGE PLANNING
[facility] Transitional Care Coordination     Referral from:  Dr. Nikhil Garcia    Facesheet indicates: Medicare Insurance    Potential Rehab Diagnosis: CVA    Chart review indicates patient does have on going medical management.  It is too soon to determine needs to possibly meet inpatient rehab facility criteria with the goal of returning to community.    D/C support: Needs clarification     Evolving Right MCA territory ischemia with Right parietal/temporal hemorrhagic conversion. S/p IV tPA on 1/27/20. Will monitor for therapy evals - as clinically appropriate - to assist with determination for post acute care needs.     Physiatry consultation pended per protocol while waiting for additional information to determine appropriateness for inpatient rehab. TCC following.      Thank you for the referral.

## 2020-01-28 NOTE — PROGRESS NOTES
0145:  Neuro assessment completed with Toya at handoff, agreed LUE/LLE just slightly weaker than right-sided extremities. Pt retrieved from ED with ACLS RN x 2.

## 2020-01-29 ENCOUNTER — APPOINTMENT (OUTPATIENT)
Dept: RADIOLOGY | Facility: MEDICAL CENTER | Age: 75
DRG: 061 | End: 2020-01-29
Attending: HOSPITALIST
Payer: MEDICARE

## 2020-01-29 ENCOUNTER — APPOINTMENT (OUTPATIENT)
Dept: RADIOLOGY | Facility: MEDICAL CENTER | Age: 75
DRG: 061 | End: 2020-01-29
Attending: PSYCHIATRY & NEUROLOGY
Payer: MEDICARE

## 2020-01-29 LAB
ANION GAP SERPL CALC-SCNC: 8 MMOL/L (ref 0–11.9)
BASOPHILS # BLD AUTO: 0.5 % (ref 0–1.8)
BASOPHILS # BLD: 0.05 K/UL (ref 0–0.12)
BUN SERPL-MCNC: 15 MG/DL (ref 8–22)
CALCIUM SERPL-MCNC: 8.8 MG/DL (ref 8.5–10.5)
CHLORIDE SERPL-SCNC: 108 MMOL/L (ref 96–112)
CO2 SERPL-SCNC: 27 MMOL/L (ref 20–33)
CREAT SERPL-MCNC: 1.06 MG/DL (ref 0.5–1.4)
EOSINOPHIL # BLD AUTO: 0.09 K/UL (ref 0–0.51)
EOSINOPHIL NFR BLD: 0.8 % (ref 0–6.9)
ERYTHROCYTE [DISTWIDTH] IN BLOOD BY AUTOMATED COUNT: 49.8 FL (ref 35.9–50)
GLUCOSE SERPL-MCNC: 185 MG/DL (ref 65–99)
HCT VFR BLD AUTO: 43.1 % (ref 42–52)
HGB BLD-MCNC: 14.2 G/DL (ref 14–18)
IMM GRANULOCYTES # BLD AUTO: 0.04 K/UL (ref 0–0.11)
IMM GRANULOCYTES NFR BLD AUTO: 0.4 % (ref 0–0.9)
LYMPHOCYTES # BLD AUTO: 1.58 K/UL (ref 1–4.8)
LYMPHOCYTES NFR BLD: 14.4 % (ref 22–41)
MCH RBC QN AUTO: 32 PG (ref 27–33)
MCHC RBC AUTO-ENTMCNC: 32.9 G/DL (ref 33.7–35.3)
MCV RBC AUTO: 97.1 FL (ref 81.4–97.8)
MONOCYTES # BLD AUTO: 0.73 K/UL (ref 0–0.85)
MONOCYTES NFR BLD AUTO: 6.7 % (ref 0–13.4)
NEUTROPHILS # BLD AUTO: 8.48 K/UL (ref 1.82–7.42)
NEUTROPHILS NFR BLD: 77.2 % (ref 44–72)
NRBC # BLD AUTO: 0 K/UL
NRBC BLD-RTO: 0 /100 WBC
PLATELET # BLD AUTO: 242 K/UL (ref 164–446)
PMV BLD AUTO: 11.5 FL (ref 9–12.9)
POTASSIUM SERPL-SCNC: 3.5 MMOL/L (ref 3.6–5.5)
RBC # BLD AUTO: 4.44 M/UL (ref 4.7–6.1)
SODIUM SERPL-SCNC: 143 MMOL/L (ref 135–145)
WBC # BLD AUTO: 11 K/UL (ref 4.8–10.8)

## 2020-01-29 PROCEDURE — A9270 NON-COVERED ITEM OR SERVICE: HCPCS | Performed by: PSYCHIATRY & NEUROLOGY

## 2020-01-29 PROCEDURE — 92612 ENDOSCOPY SWALLOW (FEES) VID: CPT

## 2020-01-29 PROCEDURE — 700105 HCHG RX REV CODE 258: Performed by: PSYCHIATRY & NEUROLOGY

## 2020-01-29 PROCEDURE — 51798 US URINE CAPACITY MEASURE: CPT

## 2020-01-29 PROCEDURE — 94640 AIRWAY INHALATION TREATMENT: CPT

## 2020-01-29 PROCEDURE — 99291 CRITICAL CARE FIRST HOUR: CPT | Performed by: PSYCHIATRY & NEUROLOGY

## 2020-01-29 PROCEDURE — 700101 HCHG RX REV CODE 250: Performed by: PSYCHIATRY & NEUROLOGY

## 2020-01-29 PROCEDURE — 94660 CPAP INITIATION&MGMT: CPT

## 2020-01-29 PROCEDURE — 700102 HCHG RX REV CODE 250 W/ 637 OVERRIDE(OP): Performed by: PSYCHIATRY & NEUROLOGY

## 2020-01-29 PROCEDURE — 80048 BASIC METABOLIC PNL TOTAL CA: CPT

## 2020-01-29 PROCEDURE — 97166 OT EVAL MOD COMPLEX 45 MIN: CPT

## 2020-01-29 PROCEDURE — 85025 COMPLETE CBC W/AUTO DIFF WBC: CPT

## 2020-01-29 PROCEDURE — 770022 HCHG ROOM/CARE - ICU (200)

## 2020-01-29 PROCEDURE — 99222 1ST HOSP IP/OBS MODERATE 55: CPT | Performed by: PHYSICAL MEDICINE & REHABILITATION

## 2020-01-29 PROCEDURE — 97161 PT EVAL LOW COMPLEX 20 MIN: CPT

## 2020-01-29 PROCEDURE — 700111 HCHG RX REV CODE 636 W/ 250 OVERRIDE (IP): Performed by: PSYCHIATRY & NEUROLOGY

## 2020-01-29 PROCEDURE — 99232 SBSQ HOSP IP/OBS MODERATE 35: CPT | Performed by: NURSE PRACTITIONER

## 2020-01-29 PROCEDURE — 70551 MRI BRAIN STEM W/O DYE: CPT

## 2020-01-29 RX ORDER — LABETALOL HYDROCHLORIDE 5 MG/ML
10-20 INJECTION, SOLUTION INTRAVENOUS EVERY 4 HOURS PRN
Status: DISCONTINUED | OUTPATIENT
Start: 2020-01-29 | End: 2020-02-05 | Stop reason: HOSPADM

## 2020-01-29 RX ORDER — IPRATROPIUM BROMIDE AND ALBUTEROL SULFATE 2.5; .5 MG/3ML; MG/3ML
3 SOLUTION RESPIRATORY (INHALATION)
Status: DISCONTINUED | OUTPATIENT
Start: 2020-01-29 | End: 2020-01-31

## 2020-01-29 RX ORDER — BISACODYL 10 MG
10 SUPPOSITORY, RECTAL RECTAL
Status: DISCONTINUED | OUTPATIENT
Start: 2020-01-29 | End: 2020-02-05 | Stop reason: HOSPADM

## 2020-01-29 RX ORDER — HYDRALAZINE HYDROCHLORIDE 20 MG/ML
20 INJECTION INTRAMUSCULAR; INTRAVENOUS
Status: DISCONTINUED | OUTPATIENT
Start: 2020-01-29 | End: 2020-02-05 | Stop reason: HOSPADM

## 2020-01-29 RX ORDER — ONDANSETRON 4 MG/1
4 TABLET, ORALLY DISINTEGRATING ORAL EVERY 4 HOURS PRN
Status: DISCONTINUED | OUTPATIENT
Start: 2020-01-29 | End: 2020-02-05 | Stop reason: HOSPADM

## 2020-01-29 RX ORDER — POLYETHYLENE GLYCOL 3350 17 G/17G
1 POWDER, FOR SOLUTION ORAL
Status: DISCONTINUED | OUTPATIENT
Start: 2020-01-29 | End: 2020-02-05 | Stop reason: HOSPADM

## 2020-01-29 RX ORDER — LEVOTHYROXINE SODIUM 88 UG/1
88 TABLET ORAL
Status: DISCONTINUED | OUTPATIENT
Start: 2020-01-30 | End: 2020-02-05 | Stop reason: HOSPADM

## 2020-01-29 RX ORDER — AMOXICILLIN 250 MG
2 CAPSULE ORAL 2 TIMES DAILY
Status: DISCONTINUED | OUTPATIENT
Start: 2020-01-29 | End: 2020-02-05 | Stop reason: HOSPADM

## 2020-01-29 RX ORDER — ACETAMINOPHEN 325 MG/1
650 TABLET ORAL EVERY 6 HOURS PRN
Status: DISCONTINUED | OUTPATIENT
Start: 2020-01-29 | End: 2020-02-05 | Stop reason: HOSPADM

## 2020-01-29 RX ORDER — ATORVASTATIN CALCIUM 80 MG/1
80 TABLET, FILM COATED ORAL EVERY EVENING
Status: DISCONTINUED | OUTPATIENT
Start: 2020-01-29 | End: 2020-02-05 | Stop reason: HOSPADM

## 2020-01-29 RX ORDER — LORAZEPAM 2 MG/ML
1 INJECTION INTRAMUSCULAR ONCE
Status: COMPLETED | OUTPATIENT
Start: 2020-01-29 | End: 2020-01-29

## 2020-01-29 RX ORDER — POTASSIUM CHLORIDE 7.45 MG/ML
10 INJECTION INTRAVENOUS
Status: DISCONTINUED | OUTPATIENT
Start: 2020-01-29 | End: 2020-01-29

## 2020-01-29 RX ADMIN — IPRATROPIUM BROMIDE AND ALBUTEROL SULFATE 3 ML: .5; 3 SOLUTION RESPIRATORY (INHALATION) at 09:35

## 2020-01-29 RX ADMIN — SENNOSIDES AND DOCUSATE SODIUM 2 TABLET: 8.6; 5 TABLET ORAL at 17:12

## 2020-01-29 RX ADMIN — ATORVASTATIN CALCIUM 80 MG: 80 TABLET, FILM COATED ORAL at 17:12

## 2020-01-29 RX ADMIN — SODIUM CHLORIDE 15 MG/HR: 9 INJECTION, SOLUTION INTRAVENOUS at 05:58

## 2020-01-29 RX ADMIN — METOPROLOL TARTRATE 50 MG: 25 TABLET, FILM COATED ORAL at 17:12

## 2020-01-29 RX ADMIN — ACETAMINOPHEN 650 MG: 325 TABLET, FILM COATED ORAL at 14:38

## 2020-01-29 RX ADMIN — IPRATROPIUM BROMIDE AND ALBUTEROL SULFATE 3 ML: .5; 3 SOLUTION RESPIRATORY (INHALATION) at 21:52

## 2020-01-29 RX ADMIN — IPRATROPIUM BROMIDE AND ALBUTEROL SULFATE 3 ML: .5; 3 SOLUTION RESPIRATORY (INHALATION) at 18:31

## 2020-01-29 RX ADMIN — SODIUM CHLORIDE 15 MG/HR: 9 INJECTION, SOLUTION INTRAVENOUS at 02:10

## 2020-01-29 RX ADMIN — POTASSIUM BICARBONATE 50 MEQ: 978 TABLET, EFFERVESCENT ORAL at 14:38

## 2020-01-29 RX ADMIN — IPRATROPIUM BROMIDE AND ALBUTEROL SULFATE 3 ML: .5; 3 SOLUTION RESPIRATORY (INHALATION) at 14:08

## 2020-01-29 RX ADMIN — LORAZEPAM 1 MG: 2 INJECTION INTRAMUSCULAR; INTRAVENOUS at 11:00

## 2020-01-29 RX ADMIN — LABETALOL HYDROCHLORIDE 20 MG: 5 INJECTION, SOLUTION INTRAVENOUS at 00:18

## 2020-01-29 ASSESSMENT — COGNITIVE AND FUNCTIONAL STATUS - GENERAL
DAILY ACTIVITIY SCORE: 11
SUGGESTED CMS G CODE MODIFIER DAILY ACTIVITY: CL
CLIMB 3 TO 5 STEPS WITH RAILING: A LOT
STANDING UP FROM CHAIR USING ARMS: A LOT
MOVING TO AND FROM BED TO CHAIR: UNABLE
WALKING IN HOSPITAL ROOM: A LOT
TURNING FROM BACK TO SIDE WHILE IN FLAT BAD: A LOT
EATING MEALS: A LOT
PERSONAL GROOMING: A LOT
SUGGESTED CMS G CODE MODIFIER MOBILITY: CL
HELP NEEDED FOR BATHING: A LOT
DRESSING REGULAR LOWER BODY CLOTHING: A LOT
DRESSING REGULAR UPPER BODY CLOTHING: A LOT
TOILETING: TOTAL
MOBILITY SCORE: 10
MOVING FROM LYING ON BACK TO SITTING ON SIDE OF FLAT BED: UNABLE

## 2020-01-29 ASSESSMENT — ENCOUNTER SYMPTOMS
CHILLS: 0
SENSORY CHANGE: 1
NAUSEA: 0
VOMITING: 0
STRIDOR: 0
NECK PAIN: 0
WHEEZING: 1
FOCAL WEAKNESS: 1
FEVER: 0
DOUBLE VISION: 0
HEADACHES: 0
SHORTNESS OF BREATH: 0
DEPRESSION: 0

## 2020-01-29 ASSESSMENT — ACTIVITIES OF DAILY LIVING (ADL): TOILETING: INDEPENDENT

## 2020-01-29 ASSESSMENT — GAIT ASSESSMENTS: GAIT LEVEL OF ASSIST: UNABLE TO PARTICIPATE

## 2020-01-29 NOTE — THERAPY
"Speech Language Therapy FEES completed.  Functional Status: Pt seen for FEES and presents with yellow, thick secretions throughout the pharynx with sub-optimal full viewing of the pharynx due to secretions .  Large wad of secretion -appearing material superior to the UES; pt unable to clear with multiple swallow attempts.  Severe oral/pharyngeal and suspected esophageal stage dysphagia. Silent and lonnie aspiration of single ice chip post 16 second delay. Cannot swallow safely nor eat to meet nutritional needs.  Expiratory wheezing also present during evaluation while on 5L NC O2. Requested RT to room post FEES to suction patient.  RN aware of status.  Recommendations - 1) NPO, 2) non-oral method of nutrition needed, 3) Pt will need a repeat diagnostic in the future.                      Strategies: Head of Bed at 90 Degrees for secretion management, oral suction needed                          Medication Administration: Non-oral  Plan of Care: Will benefit from Speech Therapy 5 times per week  Post-Acute Therapy: Recommend inpatient transitional care services for continued speech therapy services.        See \"Rehab Therapy-Acute\" Patient Summary Report for complete documentation.   "

## 2020-01-29 NOTE — CARE PLAN
Problem: Communication  Goal: The ability to communicate needs accurately and effectively will improve  Outcome: PROGRESSING AS EXPECTED  Intervention: Educate patient and significant other/support system about the plan of care, procedures, treatments, medications and allow for questions  Note:   Pt educated regarding POC. Pt verbalized understanding of POC     Problem: Venous Thromboembolism (VTW)/Deep Vein Thrombosis (DVT) Prevention:  Goal: Patient will participate in Venous Thrombosis (VTE)/Deep Vein Thrombosis (DVT)Prevention Measures  Outcome: PROGRESSING AS EXPECTED  Intervention: Ensure patient wears graduated elastic stockings (REENA hose) and/or SCDs, if ordered, when in bed or chair (Remove at least once per shift for skin check)  Flowsheets (Taken 1/29/2020 0020)  SCDs, Sequential Compression Device: On  Note:   Pt educated regarding wearing SCDs to prevent DVT. Pt compliant with wearing SCDs

## 2020-01-29 NOTE — DIETARY
Nutrition Services: Nutrition Support Assessment  Day 1 of admit.  Familia Lundy is a 74 y.o. male with admitting DX of CVA (cerebral vascular accident)      Current problem list:  1. 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 Essential HTN  3. Dyslipidemia  4. Moderate persistent asthma without complication  5. VERENA  6. Hypothyroidism     Assessment:  Estimated Nutritional Needs: based on: Ht: 170.2 cm, Wt : 119.9 kg via bed scale, IBW: 67.1 kg, BMI: 41.4 - Morbid Obesity     Calculation/Equation: REE per MSJ x 1 = 1910 kcal/day  Total Calories / day: 1900 - 2200  (Calories / k - 18)  Total Grams Protein / day: 120 - 144  (Grams Protein / k - 1.2)  Total Fluids ml / day: 1921.8 ml    Evaluation:   1. Consult received for TF assessment. Pt failed FEES today  2. Enteral access: Cortrak placed - waiting to be verified  3. Pt appears adequately nourished.    4. Labs: K 3.5, Glucose 185  5. Meds: synthroid, klyte, pericolace  6. Replete with Fiber appropriate to meet pt's estimated protein needs.       Malnutrition Risk: No criteria noted at this time.      Recommendations/Plan:  Start Replete with Fiber @ 25 ml/hr and advance per protocol to 80 ml/hr (goal rate) to provide 1920 kcal (16 kcal/kg), 123 grams protein (1 gm/kg), and 1594 ml free water per day.   Fluids per MD.   Diet upgrades per SLP.        RD following

## 2020-01-29 NOTE — PROGRESS NOTES
Critical Care Progress Note    Date of admission  1/27/2020    Chief Complaint  74 y.o. male admitted 1/27/2020 with probable R MCA AIS    Hospital Course    1/28 - admitted ICU s/p tpA      Interval Problem Update  Reviewed last 24 hour events:  This morning left leg pain but no longer antigravity sent for stat CT which revealed posterior right frontal ICH. GCS remains 15. Fibrinogen and TEG ordered. Will give cryo if fibrinogen less than 150  Changed SBP parameters to less than 140  Tm 37.4  HR 90-100s  -140s  GCS 15 LLE UMN pattern of weakness  Current titration of cardene gtt  LDL-C 73  Finbrinogen 178  CXR w/o effusion or consolidation  MRI R MCA infarct stable hemorrhagic transformation  Echo 55%  EKG NSR    Review of Systems  Review of Systems   Constitutional: Negative for chills and fever.   Eyes: Negative for double vision.   Respiratory: Positive for wheezing. Negative for shortness of breath and stridor.    Cardiovascular: Negative for chest pain.   Gastrointestinal: Negative for nausea and vomiting.   Genitourinary: Negative for urgency.   Musculoskeletal: Negative for neck pain.   Neurological: Positive for sensory change and focal weakness. Negative for headaches.   Psychiatric/Behavioral: Negative for depression.        Vital Signs for last 24 hours   Temp:  [37 °C (98.6 °F)-37.4 °C (99.4 °F)] 37.4 °C (99.3 °F)  Pulse:  [] 93  Resp:  [16-63] 22  BP: (124-177)/() 130/61  SpO2:  [90 %-96 %] 93 %    Hemodynamic parameters for last 24 hours       Respiratory Information for the last 24 hours       Physical Exam   Physical Exam  Constitutional:       General: He is not in acute distress.     Appearance: He is obese. He is not toxic-appearing.   HENT:      Head: Normocephalic and atraumatic.      Right Ear: External ear normal.      Left Ear: External ear normal.      Nose: Nose normal.      Mouth/Throat:      Mouth: Mucous membranes are moist.   Eyes:      Extraocular Movements:  Extraocular movements intact.      Pupils: Pupils are equal, round, and reactive to light.   Neck:      Musculoskeletal: Normal range of motion.   Cardiovascular:      Rate and Rhythm: Normal rate and regular rhythm.      Pulses: Normal pulses.   Pulmonary:      Effort: Pulmonary effort is normal.   Abdominal:      General: Abdomen is flat.   Musculoskeletal:      Right lower leg: Edema present.      Left lower leg: Edema present.   Skin:     General: Skin is warm and dry.      Capillary Refill: Capillary refill takes less than 2 seconds.   Neurological:      Mental Status: He is alert.      Comments: GCS 15. Left facial droop. Mild Left pronator drift. UMN pattern of weakness in the the LUE and LLE. LLE with drift   Psychiatric:         Mood and Affect: Mood normal.         Behavior: Behavior normal.         Medications  Current Facility-Administered Medications   Medication Dose Route Frequency Provider Last Rate Last Dose   • albuterol inhaler 2 Puff  2 Puff Inhalation Q6HRS PRN Nikhil Garcia M.D.       • gemfibrozil (LOPID) tablet 600 mg  600 mg Oral BID Nikhil Garcia M.D.   Stopped at 01/28/20 0600   • levothyroxine (SYNTHROID) tablet 88 mcg  88 mcg Oral AM ES Nikhil Garcia M.D.   Stopped at 01/28/20 0600   • acetaminophen (TYLENOL) tablet 650 mg  650 mg Oral Q6HRS PRN Nikhil Garcia M.D.       • ondansetron (ZOFRAN) syringe/vial injection 4 mg  4 mg Intravenous Q4HRS PRN Nikhil Garcia M.D.       • ondansetron (ZOFRAN ODT) dispertab 4 mg  4 mg Oral Q4HRS PRN Nikhil Garcia M.D.       • senna-docusate (PERICOLACE or SENOKOT S) 8.6-50 MG per tablet 2 Tab  2 Tab Oral BID Nikhil Garcia M.D.   Stopped at 01/28/20 0600    And   • polyethylene glycol/lytes (MIRALAX) PACKET 1 Packet  1 Packet Oral QDAY PRN Nikhil Garcia M.D.        And   • magnesium hydroxide (MILK OF MAGNESIA) suspension 30 mL  30 mL Oral QDAY PRN Nikhil A. [place], M.D.        And   • bisacodyl (DULCOLAX) suppository 10 mg  10 mg Rectal  QDAY PRN Nikhil Garcia M.D.       • atorvastatin (LIPITOR) tablet 80 mg  80 mg Oral Q EVENING Nikhil Garcia M.D.   Stopped at 01/28/20 1800   • ipratropium-albuterol (DUONEB) nebulizer solution  3 mL Nebulization Q4H PRN (RT) Nikhil Garcia M.D.   3 mL at 01/28/20 0258   • metoprolol (LOPRESSOR) tablet 50 mg  50 mg Oral BID Aquilino Thayer M.D.       • fentaNYL (SUBLIMAZE) injection 12.5 mcg  12.5 mcg Intravenous Q2HRS PRN Aquilino Thayer M.D.   12.5 mcg at 01/28/20 1659   • niCARdipine (CARDENE) 50 mg in  mL Infusion  0-15 mg/hr Intravenous Continuous Aquilino Thayer M.D. 150 mL/hr at 01/29/20 0558 15 mg/hr at 01/29/20 0558   • labetalol (NORMODYNE/TRANDATE) injection 10-20 mg  10-20 mg Intravenous Q4HRS PRN Travis Avalos M.D.   20 mg at 01/29/20 0018    Or   • hydrALAZINE (APRESOLINE) injection 20 mg  20 mg Intravenous Q2HRS PRN Travis Avalos M.D.           Fluids    Intake/Output Summary (Last 24 hours) at 1/29/2020 0613  Last data filed at 1/29/2020 0400  Gross per 24 hour   Intake 2199.17 ml   Output 1075 ml   Net 1124.17 ml       Laboratory          Recent Labs     01/27/20  2300   SODIUM 139   POTASSIUM 4.4   CHLORIDE 104   CO2 25   BUN 10   CREATININE 1.07   CALCIUM 9.2     Recent Labs     01/27/20  2300   ALTSGPT 14   ASTSGOT 26   ALKPHOSPHAT 76   TBILIRUBIN 0.4   GLUCOSE 139*     Recent Labs     01/27/20  2300   WBC 9.4   NEUTSPOLYS 57.20   LYMPHOCYTES 28.70   MONOCYTES 8.10   EOSINOPHILS 4.60   BASOPHILS 0.90   ASTSGOT 26   ALTSGPT 14   ALKPHOSPHAT 76   TBILIRUBIN 0.4     Recent Labs     01/27/20  2300   RBC 4.88   HEMOGLOBIN 15.9   HEMATOCRIT 46.4   PLATELETCT 274   PROTHROMBTM 13.9   APTT 28.6   INR 1.05       Imaging  X-Ray:  I have personally reviewed the images and compared with prior images.  CT:    Reviewed CT head with small posterior right frontal ICH with minimal surrounding edema    Assessment/Plan  * Cerebrovascular accident (CVA) due to thrombosis of right middle cerebral  artery ([facility])  Assessment & Plan  MRI - R MCA infarct stable hemorrhagic conversion  Etiological workup ongoing  Neurochecks and VS per protocol  Small Hemorrhagic conversion small R frontal ICH reversed with 4 units cryo  BP parameters to maintain less than 150/105 given ICH  No antiplatelet or anticoagulant therapies at this time  High intensity statin  Continuous cardiac monitoring  PT/OT/speech evaluations  Failed swallow  Place cortrak - start po antihypertensives    Morbid obesity with BMI of 40.0-44.9, adult ([facility])  Assessment & Plan  Recommend weight loss when clinically appropriate    Moderate persistent asthma without complication  Assessment & Plan  RT/O2 protocols  PRN nebulizers    Dyslipidemia  Assessment & Plan  High intensity statin    Essential hypertension  Assessment & Plan  SBP goal < 150  I am currently titrating a nicardipine gtt  Cortrak and start enteral antihypertensives to help decrease iv meds    Hypothyroidism- (present on admission)  Assessment & Plan  Cortrak - Resume replacement therapy    VERENA (obstructive sleep apnea)- (present on admission)  Assessment & Plan  Resume cpap with home settings       VTE:  Contraindicated  Ulcer: Not Indicated  Lines: None    I have performed a physical exam and reviewed and updated ROS and Plan today (1/29/2020). In review of yesterday's note (1/28/2020), there are no changes except as documented above.     Discussed patient condition and risk of morbidity and/or mortality with RN, RT, Pharmacy, Code status disscussed, Charge nurse / hot rounds, Patient and neurology     The patient remains critically ill.  Critical care time = 35 minutes in directly providing and coordinating critical care and extensive data review.  No time overlap and excludes procedures.

## 2020-01-29 NOTE — THERAPY
"Physical Therapy Evaluation completed.   Bed Mobility:  Supine to Sit: Maximal Assist  Transfers: Sit to Stand: Moderate Assist  Gait: Level Of Assist: Side steps along EOB, mod A for balance and VCs.   Plan of Care: Will benefit from Physical Therapy 5 times per week  Discharge Recommendations: Equipment: Will Continue to Assess for Equipment Needs. Recommend post-acute placement for continued physical therapy services prior to discharge home. Patient can tolerate post-acute therapies at a 5x/week frequency.       See \"Rehab Therapy-Acute\" Patient Summary Report for complete documentation.     Pt is a 75yo male admitted with R posterior frontal ICH. Pt seen for PT evaluation. Presents with decreased strength and absent light touch sensation LLE, and poor motor planning. Required max A for supine > sit, improves with direct cues for initiation and sequencing. Performed STS with mod A. Had difficulty weight shifting and side stepping but with VCs was able to take a few lateral steps with HHA and mod A for balance. Pt will benefit from continued acute PT and postacute intensive therapy prior to return home. Will follow.   "

## 2020-01-29 NOTE — DISCHARGE PLANNING
Care Transition Team Assessment  In the case of an emergency, pt's legal NOK is daughter Elizabeth Lundy     RNCM met with pt at bedside and obtained the information used in this assessment. Pt verified accuracy of facesheet. Pt lives in a singlw story home alone.  Pt uses [facility] pharmacy. Prior to current hospitalization, pt was completely independent in ADLS/IADLS. Pt drives and is able to attend necessary MD appointments.  Pt has a good support system. Pt denies any hx of substance use and denies any dx of mh.Pt has been evaluated for Acute Rehab. Physiatrist recommends SNF as pt lives alone and will probably need assist at home. Discussed SNFs, pt has no preference. Referred to all local SNFs and facility in [place].    Information Source:pt  Orientation : Oriented x 4  Information Given By: Patient  Informant's Name: (Familia)  Who is responsible for making decisions for patient? : Patient         Elopement Risk  Legal Hold: No  Ambulatory or Self Mobile in Wheelchair: No-Not an Elopement Risk  Disoriented: No  Psychiatric Symptoms: None  History of Wandering: No  Elopement this Admit: No  Vocalizing Wanting to Leave: No  Displays Behaviors, Body Language Wanting to Leave: No-Not at Risk for Elopement  Elopement Risk: Not at Risk for Elopement    Interdisciplinary Discharge Planning  Does Admitting Nurse Feel This Could be a Complex Discharge?: No  Primary Care Physician: (Dr. Pickett)  Lives with - Patient's Self Care Capacity: Alone and Able to Care For Self  Patient or legal guardian wants to designate a caregiver (see row info): No  Support Systems: Family Member(s)  Housing / Facility: 1 Story House  Do You Take your Prescribed Medications Regularly: Yes  Mobility Issues: No  Prior Services: None  Durable Medical Equipment: Home Oxygen  DME Provider / Phone: ([facility])    Discharge Preparedness  What are your discharge supports?: Child  Prior Functional Level: Ambulatory, Drives Self, Independent with  Activities of Daily Living, Independent with Medication Management  Difficulity with ADLs: None  Difficulity with IADLs: None    Functional Assesment  Prior Functional Level: Ambulatory, Drives Self, Independent with Activities of Daily Living, Independent with Medication Management    Finances  Financial Barriers to Discharge: No  Prescription Coverage: Yes    Vision / Hearing Impairment  Vision Impairment : Yes  Right Eye Vision: Wears Glasses  Left Eye Vision: Wears Glasses  Hearing Impairment : No              Domestic Abuse  Have you ever been the victim of abuse or violence?: No  Physical Abuse or Sexual Abuse: No  Verbal Abuse or Emotional Abuse: No  Possible Abuse Reported to:: Not Applicable    Psychological Assessment  History of Substance Abuse: None  History of Psychiatric Problems: No         Anticipated Discharge Information  Anticipated discharge disposition: SNF

## 2020-01-29 NOTE — PROGRESS NOTES
Chief Complaint   Patient presents with   • Possible Stroke       Problem List Items Addressed This Visit     None      Visit Diagnoses     Acute CVA (cerebrovascular accident) (HCC)        Relevant Medications    gemfibrozil (LOPID) tablet 600 mg    atorvastatin (LIPITOR) tablet 80 mg    metoprolol (LOPRESSOR) tablet 50 mg    niCARdipine (CARDENE) 50 mg in  mL Infusion    labetalol (NORMODYNE/TRANDATE) injection 10-20 mg    hydrALAZINE (APRESOLINE) injection 20 mg    Other Relevant Orders    REFERRAL TO PHYSIATRY (PMR)      Neurology Stroke Progress Note    Brief History of present illness:  74-year old male with PMHx significant for hypertension, dyslipidemia, and hypothyroid who presented to [facility] on 1/27/210 for a chief complaint of Left sided weakness; onset 2200 on evening of presentation. SBP 160s at time of presentation; CT head unremarkable; CTA head/neck with Right MCA severe stenosis vs vasospasm; no thrombectomy. Patient determined to be a candidate for IV tPA, received IV tPA on 1/27/20 at 2315.     Interval, 1/29/20:   Patient sitting up in bed; arousable to voice; has just returned from MRI, received Ativan. Still with LUE/LLE weakness, somewhat improved. No events overnight. SBP 120s-140s overnight.    No changes to HPI as was previously documented.     Past medical history:   Past Medical History:   Diagnosis Date   • Asthma    • Chickenpox    • Irish measles    • Influenza    • Mumps    • Tonsillitis        Past surgical history:   Past Surgical History:   Procedure Laterality Date   • HIP REPLACEMENT, TOTAL         Family history:   Family History   Problem Relation Age of Onset   • Cancer Mother    • Cancer Father        Social history:   Social History     Socioeconomic History   • Marital status:      Spouse name: Not on file   • Number of children: Not on file   • Years of education: Not on file   • Highest education level: Not on file   Occupational History   • Not  on file   Social Needs   • Financial resource strain: Not on file   • Food insecurity:     Worry: Not on file     Inability: Not on file   • Transportation needs:     Medical: Not on file     Non-medical: Not on file   Tobacco Use   • Smoking status: Former Smoker     Packs/day: 0.50     Years: 46.00     Pack years: 23.00     Types: Cigarettes     Last attempt to quit: 2015     Years since quittin.1   • Smokeless tobacco: Never Used   Substance and Sexual Activity   • Alcohol use: Yes     Comment: 2-3 drinks a week   • Drug use: No   • Sexual activity: Not on file   Lifestyle   • Physical activity:     Days per week: Not on file     Minutes per session: Not on file   • Stress: Not on file   Relationships   • Social connections:     Talks on phone: Not on file     Gets together: Not on file     Attends Worship service: Not on file     Active member of club or organization: Not on file     Attends meetings of clubs or organizations: Not on file     Relationship status: Not on file   • Intimate partner violence:     Fear of current or ex partner: Not on file     Emotionally abused: Not on file     Physically abused: Not on file     Forced sexual activity: Not on file   Other Topics Concern   • Not on file   Social History Narrative   • Not on file       Current medications:   Current Facility-Administered Medications   Medication Dose   • labetalol (NORMODYNE/TRANDATE) injection 10-20 mg  10-20 mg    Or   • hydrALAZINE (APRESOLINE) injection 20 mg  20 mg   • LORazepam (ATIVAN) injection 1 mg  1 mg   • ipratropium-albuterol (DUONEB) nebulizer solution  3 mL   • albuterol inhaler 2 Puff  2 Puff   • gemfibrozil (LOPID) tablet 600 mg  600 mg   • levothyroxine (SYNTHROID) tablet 88 mcg  88 mcg   • acetaminophen (TYLENOL) tablet 650 mg  650 mg   • ondansetron (ZOFRAN) syringe/vial injection 4 mg  4 mg   • ondansetron (ZOFRAN ODT) dispertab 4 mg  4 mg   • senna-docusate (PERICOLACE or SENOKOT S) 8.6-50 MG per  "tablet 2 Tab  2 Tab    And   • polyethylene glycol/lytes (MIRALAX) PACKET 1 Packet  1 Packet    And   • magnesium hydroxide (MILK OF MAGNESIA) suspension 30 mL  30 mL    And   • bisacodyl (DULCOLAX) suppository 10 mg  10 mg   • atorvastatin (LIPITOR) tablet 80 mg  80 mg   • ipratropium-albuterol (DUONEB) nebulizer solution  3 mL   • metoprolol (LOPRESSOR) tablet 50 mg  50 mg   • fentaNYL (SUBLIMAZE) injection 12.5 mcg  12.5 mcg   • niCARdipine (CARDENE) 50 mg in  mL Infusion  0-15 mg/hr       Medication Allergy:  Allergies   Allergen Reactions   • Amoxicillin Hives     Pt states \"I get hives\".   • Ampicillin Hives     Pt states \"I get hives\".       Review of systems:   Constitutional: denies fever, night sweats, weight loss.   Eyes: denies acute vision change, eye pain or secretion.   Ears, Nose, Mouth, Throat: denies nasal secretion, nasal bleeding, difficulty swallowing, hearing loss, tinnitus, vertigo, ear pain, acute dental problems, oral ulcers or lesions.   Endocrine: denies recent weight changes, heat or cold intolerance, polyuria, polydypsia, polyphagia,abnormal hair growth.  Cardiovascular: denies new onset of chest pain, palpitations, syncope, or dyspnea of exertion.  Pulmonary: denies shortness of breath, new onset of cough, hemoptysis, wheezing, chest pain or flu-like symptoms.   GI: denies nausea, vomiting, diarrhea, GI bleeding, change in appetite, abdominal pain, and change in bowel habits.  : denies dysuria, urinary incontinence, hematuria.  Heme/oncology: denies history of easy bruising or bleeding. No history of cancer, DVTor PE.  Allergy/immunology: denies hives/urticaria, or itching.   Dermatologic: denies new rash, or new skin lesions.  Musculoskeletal:denies joint swelling or pain, muscle pain, neck and back pain. Neurologic: As noted above.   Psychiatric: denies symptoms of depression, anxiety, hallucinations, mood swings or changes, suicidal or homicidal thoughts.     Physical " examination:   Vitals:    01/29/20 0730 01/29/20 0745 01/29/20 0800 01/29/20 0936   BP: 120/58 126/58 129/59    Pulse: 91 94 95 94   Resp: (!) 29 (!) 22 (!) 24 (!) 26   Temp:       TempSrc:       SpO2: 94% 95% 95% 95%   Weight:       Height:         General: Patient in no acute distress, pleasant and cooperative.  HEENT: Normocephalic, no signs of acute trauma.   Neck: supple, no meningeal signs or carotid bruits. There is normal range of motion. No tenderness on exam.   Chest: clear to auscultation. No cough.   CV: RRR, no murmurs.   Skin: no signs of acute rashes or trauma.   Musculoskeletal: joints exhibit full range of motion, without any pain to palpation. There are no signs of joint or muscle swelling. There is no tenderness to deep palpation of muscles.   Psychiatric: No hallucinatory behavior. Denies symptoms of depression or suicidal ideation. Mood and affect appear normal on exam.     NEUROLOGICAL EXAM:   Mental status, orientation: Drowsy; oriented.   Speech and language: speech is fluent, mildly dysarthric The patient is able to name, repeat and comprehend.   Memory: There is intact recollection of recent and remote events.   Cranial nerve exam: Pupils are 3-4 mm bilaterally and equally reactive to light. Visual fields are intact by confrontation. There is no nystagmus on primary or secondary gaze. Intact full EOM in all directions of gaze. Left upper and lower facial droop. Sensation in the face is intact to light touch. Tongue is midline and without any signs of tongue biting or fasciculations. Shoulder shrug is intact bilaterally.   Motor exam: Strength is 5/5 in RUE/RLE. 4/5 to LUE with drift; 3+/5 to LLE with antigravity, some drift.Tone is normal. No abnormal movements were seen on exam.   Sensory exam Decreased sensation to light touch on the Left; Right reveals normal sense of light touch and pinprick in all extremities.   Deep tendon reflexes:  2+ throughout. Plantar responses are flexor on the  Right, extensor on the left. There is no clonus.   Coordination: RUE shows a normal finger-nose-finger; LUE with dysmetria secondary to weakness. Normal rapidly alternating movements.   Gait: Not assessed at this time as patient is a fall risk.       Except as noted above, no significant changes to exam as was documented on 1/28/20.       NIH Stroke Scale    1a Level of Consciousness   1b Orientation Questions   1c Response to Commands   2 Gaze   3 Visual Fields   4 Facial Movement 2  5 Motor Function (arm)   a Left 1  b Right   6 Motor Function (leg)   a Left 1  b Right    7 Limb Ataxia 1  8 Sensory 1  9 Language   10 Articulation 1  11 Extinction/Inattention 1    Score: 8      ANCILLARY DATA REVIEWED:     Lab Data Review:  Recent Results (from the past 24 hour(s))   FIBRINOGEN    Collection Time: 01/28/20 11:00 AM   Result Value Ref Range    Fibrinogen <60 (L) 215 - 460 mg/dL   BASIC TEG    Collection Time: 01/28/20 11:00 AM   Result Value Ref Range    Reaction Time Initial-R 10.1 (H) 5.0 - 10.0 min    Clot Kinetics-K 13.6 (H) 1.0 - 3.0 min    Clot Angle-Angle 18.6 (L) 53.0 - 72.0 degrees    Maximum Clot Strength-MA 29.9 (L) 50.0 - 70.0 mm    Lysis 30 minutes-LY30 0.0 0.0 - 8.0 %    TEG Algorithm Link Algorithm    CRYOPRECIPITATE    Collection Time: 01/28/20 12:16 PM   Result Value Ref Range    Component Ct       CTP                 Cryoprecipitate     [id number]   transfused   01/28/20   12:25      Product Type CTP     Dispense Status transfused     Unit Number (Barcoded) [id number]     Product Code (Barcoded) B8971K97     Blood Type (Barcoded) 5100     Component Ct       CTP                 Cryoprecipitate     [id number]   transfused   01/28/20   12:25      Product Type CTP     Dispense Status transfused     Unit Number (Barcoded) [id number]     Product Code (Barcoded) Z4802R51     Blood Type (Barcoded) 5100     Component Ct       CTP                 Cryoprecipitate     [id number]   transfused    01/28/20   16:31      Product Type CTP     Dispense Status transfused     Unit Number (Barcoded) [id number]     Product Code (Barcoded) S9450A93     Blood Type (Barcoded) 6200     Component Ct       CTP                 Cryoprecipitate     [id number]   transfused   01/28/20   16:31      Product Type CTP     Dispense Status transfused     Unit Number (Barcoded) [id number]     Product Code (Barcoded) L7448I12     Blood Type (Barcoded) 6200    EC-ECHOCARDIOGRAM COMPLETE W/O CONT    Collection Time: 01/28/20  1:28 PM   Result Value Ref Range    Eject.Frac. MOD BP 61.77     Eject.Frac. MOD 4C 49.89     Eject.Frac. MOD 2C 70.01     Left Ventrical Ejection Fraction 55    FIBRINOGEN    Collection Time: 01/28/20  3:00 PM   Result Value Ref Range    Fibrinogen 97 (L) 215 - 460 mg/dL   FIBRINOGEN    Collection Time: 01/28/20  6:45 PM   Result Value Ref Range    Fibrinogen 178 (L) 215 - 460 mg/dL   Basic Metabolic Panel (BMP)    Collection Time: 01/29/20  6:00 AM   Result Value Ref Range    Sodium 143 135 - 145 mmol/L    Potassium 3.5 (L) 3.6 - 5.5 mmol/L    Chloride 108 96 - 112 mmol/L    Co2 27 20 - 33 mmol/L    Glucose 185 (H) 65 - 99 mg/dL    Bun 15 8 - 22 mg/dL    Creatinine 1.06 0.50 - 1.40 mg/dL    Calcium 8.8 8.5 - 10.5 mg/dL    Anion Gap 8.0 0.0 - 11.9   ESTIMATED GFR    Collection Time: 01/29/20  6:00 AM   Result Value Ref Range    GFR If African American >60 >60 mL/min/1.73 m 2    GFR If Non African American >60 >60 mL/min/1.73 m 2       Labs reviewed by me.       Imaging reviewed by me:     EC-ECHOCARDIOGRAM COMPLETE W/O CONT   Final Result      CT-HEAD W/O   Final Result   Addendum 1 of 1   These findings were discussed with REMI SHAFFER on 1/28/2020 10:57 AM.      Final      DX-CHEST-PORTABLE (1 VIEW)   Final Result      Cardiomegaly with interstitial prominence.      CT-CTA NECK WITH & W/O-POST PROCESSING   Final Result      1.  Atherosclerotic plaque at the carotid bifurcations bilaterally which  results in less than 50% diameter narrowing.      2.  Diminutive right vertebral artery.      3.  Tortuous left internal carotid artery which is retropharyngeal in location.      CT-CTA HEAD WITH & W/O-POST PROCESS   Final Result      1.  Some asymmetric attenuation of the right middle cerebral arterial peripheral vessels without evidence of acute occlusion possibly representing vasospasm or atherosclerotic attenuation.      2.  Diminutive right vertebral artery.      CT-HEAD W/O   Final Result      1.  No evidence of acute intracranial process.      2.  Cerebral atrophy as well as periventricular chronic small vessel ischemic change.      MR-BRAIN-W/O    (Results Pending)       Presumed mechanism by TOAST:  __Large Artery Atherosclerosis  __Small Vessel (Lacunar)  __Cardioembolic  __Other (Sickle Cell, Vasculitis, Hypercoagulable)  _X_Unknown      ASSESSMENT:  74-year old male with PMHx significant for hypertension, dyslipidemia, and hypothyroid who presented to [facility] on 1/27/210 for a chief complaint of Left sided weakness; onset 2200 on evening of presentation. CTA head/neck with Right MCA severe stenosis vs vasospasm; no thrombectomy. Patient determined to be a candidate for IV tPA, received IV tPA on 1/27/20 at 2315. Patient had acute worsening to LLE function (was previously antigravity, now is not); stat repeat CT head on 1/28 revealed small acute intracranial hemorrhage to Right parietal/temporal region. Patient now s/p MRI Brain wo contrast; revealed evolving Right MCA territory ischemia with associated hemorrhagic conversion. NIHSS remains at 8 today.     Impression:   Evolving Right MCA territory ischemia with Right parietal/temporal hemorrhagic conversion.   S/p IV tPA on 1/27/20.   Hx of HTN.   Hx of Dyslipidemia.   Newly discovered Diabetes mellitus with A1c 7.2.     Recommendations/Plan:     -Neuro assessment and VS per post tPA protocol. SBP < 140. Antihypertensives per ICU team.   -No  anticoagulation/anti thrombotics at this time; may tentatively plan to initiate ASA one week from ictus, on or after 2/3/20.   -Telemetry; currently SR. Screen for Afib/arrhythmia. Obtain TTE with bubble study. If cardiac work up here is unrevealing, patient may require outpatient holter/loop/zio patch to formally rule out cardiac arrhythmia as source of stroke.   -Atorvastatin 80 mg PO q HS. Note LDL is 73, goal < 70.  -Recommend aggressive BG management per primary team. Avoid IVF with Dextrose. BG goal 140-180. Hemoglobin A1c is 7.2; further diabetes management per primary team.   -PT/OT/SLP eval and treat. Patient reportedly failed FEES study today per SLP; Cortrak to be placed; Physiatry consult is pending.    -All other medical management per primary/ICU team.   -DVT PPX: SCDs.      The plan of care above has been discussed with Dr. Juan.     Anahi Luna MSN, BSN, AGNP-C  [facility]

## 2020-01-29 NOTE — DISCHARGE PLANNING
[facility]itional [facility] Coordination     Physiatry Dr. Ingram to consult per protocol. TCC following.

## 2020-01-30 LAB
ANION GAP SERPL CALC-SCNC: 9 MMOL/L (ref 0–11.9)
BUN SERPL-MCNC: 16 MG/DL (ref 8–22)
CALCIUM SERPL-MCNC: 9.1 MG/DL (ref 8.5–10.5)
CHLORIDE SERPL-SCNC: 107 MMOL/L (ref 96–112)
CO2 SERPL-SCNC: 27 MMOL/L (ref 20–33)
CREAT SERPL-MCNC: 0.94 MG/DL (ref 0.5–1.4)
CRP SERPL HS-MCNC: 5.62 MG/DL (ref 0–0.75)
ERYTHROCYTE [DISTWIDTH] IN BLOOD BY AUTOMATED COUNT: 51.5 FL (ref 35.9–50)
GLUCOSE SERPL-MCNC: 148 MG/DL (ref 65–99)
HCT VFR BLD AUTO: 43.1 % (ref 42–52)
HGB BLD-MCNC: 13.8 G/DL (ref 14–18)
MCH RBC QN AUTO: 31.4 PG (ref 27–33)
MCHC RBC AUTO-ENTMCNC: 32 G/DL (ref 33.7–35.3)
MCV RBC AUTO: 98.2 FL (ref 81.4–97.8)
PLATELET # BLD AUTO: 219 K/UL (ref 164–446)
PMV BLD AUTO: 11 FL (ref 9–12.9)
POTASSIUM SERPL-SCNC: 3.7 MMOL/L (ref 3.6–5.5)
PREALB SERPL-MCNC: 11 MG/DL (ref 18–38)
RBC # BLD AUTO: 4.39 M/UL (ref 4.7–6.1)
SODIUM SERPL-SCNC: 143 MMOL/L (ref 135–145)
WBC # BLD AUTO: 10.2 K/UL (ref 4.8–10.8)

## 2020-01-30 PROCEDURE — 94640 AIRWAY INHALATION TREATMENT: CPT

## 2020-01-30 PROCEDURE — 700111 HCHG RX REV CODE 636 W/ 250 OVERRIDE (IP): Performed by: PSYCHIATRY & NEUROLOGY

## 2020-01-30 PROCEDURE — 700101 HCHG RX REV CODE 250: Performed by: PSYCHIATRY & NEUROLOGY

## 2020-01-30 PROCEDURE — 99232 SBSQ HOSP IP/OBS MODERATE 35: CPT | Performed by: NURSE PRACTITIONER

## 2020-01-30 PROCEDURE — 700102 HCHG RX REV CODE 250 W/ 637 OVERRIDE(OP): Performed by: PSYCHIATRY & NEUROLOGY

## 2020-01-30 PROCEDURE — A9270 NON-COVERED ITEM OR SERVICE: HCPCS | Performed by: PSYCHIATRY & NEUROLOGY

## 2020-01-30 PROCEDURE — 86140 C-REACTIVE PROTEIN: CPT

## 2020-01-30 PROCEDURE — 99233 SBSQ HOSP IP/OBS HIGH 50: CPT | Performed by: PSYCHIATRY & NEUROLOGY

## 2020-01-30 PROCEDURE — 90471 IMMUNIZATION ADMIN: CPT

## 2020-01-30 PROCEDURE — 90662 IIV NO PRSV INCREASED AG IM: CPT | Performed by: PSYCHIATRY & NEUROLOGY

## 2020-01-30 PROCEDURE — 770001 HCHG ROOM/CARE - MED/SURG/GYN PRIV*

## 2020-01-30 PROCEDURE — 92526 ORAL FUNCTION THERAPY: CPT

## 2020-01-30 PROCEDURE — 94660 CPAP INITIATION&MGMT: CPT

## 2020-01-30 PROCEDURE — 99233 SBSQ HOSP IP/OBS HIGH 50: CPT | Performed by: HOSPITALIST

## 2020-01-30 PROCEDURE — 80048 BASIC METABOLIC PNL TOTAL CA: CPT

## 2020-01-30 PROCEDURE — 84134 ASSAY OF PREALBUMIN: CPT

## 2020-01-30 PROCEDURE — 85027 COMPLETE CBC AUTOMATED: CPT

## 2020-01-30 RX ADMIN — METOPROLOL TARTRATE 50 MG: 25 TABLET, FILM COATED ORAL at 05:18

## 2020-01-30 RX ADMIN — LABETALOL HYDROCHLORIDE 10 MG: 5 INJECTION, SOLUTION INTRAVENOUS at 11:41

## 2020-01-30 RX ADMIN — IPRATROPIUM BROMIDE AND ALBUTEROL SULFATE 3 ML: .5; 3 SOLUTION RESPIRATORY (INHALATION) at 21:52

## 2020-01-30 RX ADMIN — POTASSIUM BICARBONATE 25 MEQ: 978 TABLET, EFFERVESCENT ORAL at 11:38

## 2020-01-30 RX ADMIN — IPRATROPIUM BROMIDE AND ALBUTEROL SULFATE 3 ML: .5; 3 SOLUTION RESPIRATORY (INHALATION) at 12:15

## 2020-01-30 RX ADMIN — SENNOSIDES AND DOCUSATE SODIUM 2 TABLET: 8.6; 5 TABLET ORAL at 05:18

## 2020-01-30 RX ADMIN — ACETAMINOPHEN 650 MG: 325 TABLET, FILM COATED ORAL at 17:57

## 2020-01-30 RX ADMIN — METOPROLOL TARTRATE 50 MG: 25 TABLET, FILM COATED ORAL at 17:57

## 2020-01-30 RX ADMIN — LEVOTHYROXINE SODIUM 88 MCG: 88 TABLET ORAL at 05:19

## 2020-01-30 RX ADMIN — IPRATROPIUM BROMIDE AND ALBUTEROL SULFATE 3 ML: .5; 3 SOLUTION RESPIRATORY (INHALATION) at 15:50

## 2020-01-30 RX ADMIN — IPRATROPIUM BROMIDE AND ALBUTEROL SULFATE 3 ML: .5; 3 SOLUTION RESPIRATORY (INHALATION) at 07:28

## 2020-01-30 RX ADMIN — ATORVASTATIN CALCIUM 80 MG: 80 TABLET, FILM COATED ORAL at 17:58

## 2020-01-30 RX ADMIN — INFLUENZA A VIRUS A/MICHIGAN/45/2015 X-275 (H1N1) ANTIGEN (FORMALDEHYDE INACTIVATED), INFLUENZA A VIRUS A/SINGAPORE/INFIMH-16-0019/2016 IVR-186 (H3N2) ANTIGEN (FORMALDEHYDE INACTIVATED), AND INFLUENZA B VIRUS B/MARYLAND/15/2016 BX-69A (A B/COLORADO/6/2017-LIKE VIRUS) ANTIGEN (FORMALDEHYDE INACTIVATED) 0.5 ML: 60; 60; 60 INJECTION, SUSPENSION INTRAMUSCULAR at 18:20

## 2020-01-30 ASSESSMENT — LIFESTYLE VARIABLES
CONSUMPTION TOTAL: POSITIVE
EVER HAD A DRINK FIRST THING IN THE MORNING TO STEADY YOUR NERVES TO GET RID OF A HANGOVER: NO
EVER FELT BAD OR GUILTY ABOUT YOUR DRINKING: NO
DOES PATIENT WANT TO STOP DRINKING: NO
ALCOHOL_USE: YES
TOTAL SCORE: 0
HAVE PEOPLE ANNOYED YOU BY CRITICIZING YOUR DRINKING: NO
TOTAL SCORE: 0
HAVE YOU EVER FELT YOU SHOULD CUT DOWN ON YOUR DRINKING: NO
AVERAGE NUMBER OF DAYS PER WEEK YOU HAVE A DRINK CONTAINING ALCOHOL: 4
TOTAL SCORE: 0
DOES PATIENT WANT TO TALK TO SOMEONE ABOUT QUITTING: NO
HOW MANY TIMES IN THE PAST YEAR HAVE YOU HAD 5 OR MORE DRINKS IN A DAY: 2
ON A TYPICAL DAY WHEN YOU DRINK ALCOHOL HOW MANY DRINKS DO YOU HAVE: 2

## 2020-01-30 ASSESSMENT — ENCOUNTER SYMPTOMS
STRIDOR: 0
DEPRESSION: 0
DOUBLE VISION: 0
NECK PAIN: 0
SENSORY CHANGE: 1
VOMITING: 0
SHORTNESS OF BREATH: 1
CHILLS: 0
WHEEZING: 1
SHORTNESS OF BREATH: 0
FEVER: 0
FOCAL WEAKNESS: 1
NAUSEA: 0
HEADACHES: 0

## 2020-01-30 NOTE — PROGRESS NOTES
Multidisciplinary rounds completed, notified re BP, HR, U/O, Temp, pain, neuro and resp status. Reviewed responses to medications and treatments.    Plan to tx to neurology floor, start anticoags if stable scan tomorrow.

## 2020-01-30 NOTE — PROGRESS NOTES
Chief Complaint   Patient presents with   • Possible Stroke       Problem List Items Addressed This Visit     None      Visit Diagnoses     Acute CVA (cerebrovascular accident) (HCC)        Relevant Medications    labetalol (NORMODYNE/TRANDATE) injection 10-20 mg    hydrALAZINE (APRESOLINE) injection 20 mg    atorvastatin (LIPITOR) tablet 80 mg    metoprolol (LOPRESSOR) tablet 50 mg    Other Relevant Orders    REFERRAL TO PHYSIATRY (PMR)      Neurology Stroke Progress Note    Brief History of present illness:  74-year old male with PMHx significant for hypertension, dyslipidemia, and hypothyroid who presented to [facility] on 1/27/210 for a chief complaint of Left sided weakness; onset 2200 on evening of presentation. SBP 160s at time of presentation; CT head unremarkable; CTA head/neck with Right MCA severe stenosis vs vasospasm; no thrombectomy. Patient determined to be a candidate for IV tPA, received IV tPA on 1/27/20 at 2315.     Interval, 1/30/20:   Patient sitting up chair at bedside; appears well. Still with LUE/LLE weakness, somewhat improved. No events overnight. SBP 120s-140s overnight.    No changes to HPI as was previously documented.     Past medical history:   Past Medical History:   Diagnosis Date   • Asthma    • Chickenpox    • Vietnamese measles    • Influenza    • Mumps    • Tonsillitis        Past surgical history:   Past Surgical History:   Procedure Laterality Date   • HIP REPLACEMENT, TOTAL         Family history:   Family History   Problem Relation Age of Onset   • Cancer Mother    • Cancer Father        Social history:   Social History     Socioeconomic History   • Marital status:      Spouse name: Not on file   • Number of children: Not on file   • Years of education: Not on file   • Highest education level: Not on file   Occupational History   • Not on file   Social Needs   • Financial resource strain: Not on file   • Food insecurity:     Worry: Not on file     Inability: Not on  file   • Transportation needs:     Medical: Not on file     Non-medical: Not on file   Tobacco Use   • Smoking status: Former Smoker     Packs/day: 0.50     Years: 46.00     Pack years: 23.00     Types: Cigarettes     Last attempt to quit: 2015     Years since quittin.1   • Smokeless tobacco: Never Used   Substance and Sexual Activity   • Alcohol use: Yes     Comment: 2-3 drinks a week   • Drug use: No   • Sexual activity: Not on file   Lifestyle   • Physical activity:     Days per week: Not on file     Minutes per session: Not on file   • Stress: Not on file   Relationships   • Social connections:     Talks on phone: Not on file     Gets together: Not on file     Attends Bahai service: Not on file     Active member of club or organization: Not on file     Attends meetings of clubs or organizations: Not on file     Relationship status: Not on file   • Intimate partner violence:     Fear of current or ex partner: Not on file     Emotionally abused: Not on file     Physically abused: Not on file     Forced sexual activity: Not on file   Other Topics Concern   • Not on file   Social History Narrative   • Not on file       Current medications:   Current Facility-Administered Medications   Medication Dose   • Influenza Vaccine High-Dose pf injection 0.5 mL  0.5 mL   • labetalol (NORMODYNE/TRANDATE) injection 10-20 mg  10-20 mg    Or   • hydrALAZINE (APRESOLINE) injection 20 mg  20 mg   • ipratropium-albuterol (DUONEB) nebulizer solution  3 mL   • acetaminophen (TYLENOL) tablet 650 mg  650 mg   • senna-docusate (PERICOLACE or SENOKOT S) 8.6-50 MG per tablet 2 Tab  2 Tab    And   • polyethylene glycol/lytes (MIRALAX) PACKET 1 Packet  1 Packet    And   • magnesium hydroxide (MILK OF MAGNESIA) suspension 30 mL  30 mL    And   • bisacodyl (DULCOLAX) suppository 10 mg  10 mg   • levothyroxine (SYNTHROID) tablet 88 mcg  88 mcg   • atorvastatin (LIPITOR) tablet 80 mg  80 mg   • metoprolol (LOPRESSOR) tablet 50 mg   "50 mg   • ondansetron (ZOFRAN ODT) dispertab 4 mg  4 mg   • Pharmacy Consult: Enteral tube insertion - review meds/change route/product selection  1 Each   • albuterol inhaler 2 Puff  2 Puff   • ondansetron (ZOFRAN) syringe/vial injection 4 mg  4 mg   • ipratropium-albuterol (DUONEB) nebulizer solution  3 mL   • fentaNYL (SUBLIMAZE) injection 12.5 mcg  12.5 mcg       Medication Allergy:  Allergies   Allergen Reactions   • Amoxicillin Hives     Pt states \"I get hives\".   • Ampicillin Hives     Pt states \"I get hives\".       Review of systems:   Constitutional: denies fever, night sweats, weight loss.   Eyes: denies acute vision change, eye pain or secretion.   Ears, Nose, Mouth, Throat: denies nasal secretion, nasal bleeding, difficulty swallowing, hearing loss, tinnitus, vertigo, ear pain, acute dental problems, oral ulcers or lesions.   Endocrine: denies recent weight changes, heat or cold intolerance, polyuria, polydypsia, polyphagia,abnormal hair growth.  Cardiovascular: denies new onset of chest pain, palpitations, syncope, or dyspnea of exertion.  Pulmonary: denies shortness of breath, new onset of cough, hemoptysis, wheezing, chest pain or flu-like symptoms.   GI: denies nausea, vomiting, diarrhea, GI bleeding, change in appetite, abdominal pain, and change in bowel habits.  : denies dysuria, urinary incontinence, hematuria.  Heme/oncology: denies history of easy bruising or bleeding. No history of cancer, DVTor PE.  Allergy/immunology: denies hives/urticaria, or itching.   Dermatologic: denies new rash, or new skin lesions.  Musculoskeletal:denies joint swelling or pain, muscle pain, neck and back pain. Neurologic: As noted above.   Psychiatric: denies symptoms of depression, anxiety, hallucinations, mood swings or changes, suicidal or homicidal thoughts.     Physical examination:   Vitals:    01/30/20 1100 01/30/20 1200 01/30/20 1217 01/30/20 1300   BP: 154/76 137/70  140/67   Pulse: 89 75 80 81   Resp: " (!) 31 (!) 27 (!) 21 (!) 21   Temp:  37.1 °C (98.7 °F)     TempSrc:  Temporal     SpO2: 91% 92% 95% 92%   Weight:       Height:         General: Patient in no acute distress, pleasant and cooperative.  HEENT: Normocephalic, no signs of acute trauma.   Neck: supple, no meningeal signs or carotid bruits. There is normal range of motion. No tenderness on exam.   Chest: clear to auscultation. No cough.   CV: RRR, no murmurs.   Skin: no signs of acute rashes or trauma.   Musculoskeletal: joints exhibit full range of motion, without any pain to palpation. There are no signs of joint or muscle swelling. There is no tenderness to deep palpation of muscles.   Psychiatric: No hallucinatory behavior. Denies symptoms of depression or suicidal ideation. Mood and affect appear normal on exam.     NEUROLOGICAL EXAM:   Mental status, orientation: Drowsy; oriented.   Speech and language: speech is fluent, mildly dysarthric The patient is able to name, repeat and comprehend.   Memory: There is intact recollection of recent and remote events.   Cranial nerve exam: Pupils are 3-4 mm bilaterally and equally reactive to light. Visual fields are intact by confrontation. There is no nystagmus on primary or secondary gaze. Intact full EOM in all directions of gaze. Left upper and lower facial droop. Sensation in the face is intact to light touch. Tongue is midline and without any signs of tongue biting or fasciculations. Shoulder shrug is intact bilaterally.   Motor exam: Strength is 5/5 in RUE/RLE. 4/5 to LUE with drift; 3+/5 to LLE with antigravity, some drift.Tone is normal. No abnormal movements were seen on exam.   Sensory exam Decreased sensation to light touch on the Left; Right reveals normal sense of light touch and pinprick in all extremities.   Deep tendon reflexes:  2+ throughout. Plantar responses are flexor on the Right, extensor on the left. There is no clonus.   Coordination: RUE shows a normal finger-nose-finger; LUE with  dysmetria secondary to weakness. Normal rapidly alternating movements.   Gait: Not assessed at this time as patient is a fall risk.       No significant changes to exam as was documented on 1/29/20.       NIH Stroke Scale    1a Level of Consciousness   1b Orientation Questions   1c Response to Commands   2 Gaze   3 Visual Fields   4 Facial Movement 2  5 Motor Function (arm)   a Left 1  b Right   6 Motor Function (leg)   a Left 1  b Right    7 Limb Ataxia   8 Sensory 1  9 Language   10 Articulation 1  11 Extinction/Inattention 1    Score: 7        ANCILLARY DATA REVIEWED:     Lab Data Review:  Recent Results (from the past 24 hour(s))   CRP Quantitive (Non-Cardiac)    Collection Time: 01/30/20  3:08 AM   Result Value Ref Range    Stat C-Reactive Protein 5.62 (H) 0.00 - 0.75 mg/dL   Prealbumin    Collection Time: 01/30/20  3:08 AM   Result Value Ref Range    Pre-Albumin 11.0 (L) 18.0 - 38.0 mg/dL   Basic Metabolic Panel    Collection Time: 01/30/20  3:08 AM   Result Value Ref Range    Sodium 143 135 - 145 mmol/L    Potassium 3.7 3.6 - 5.5 mmol/L    Chloride 107 96 - 112 mmol/L    Co2 27 20 - 33 mmol/L    Glucose 148 (H) 65 - 99 mg/dL    Bun 16 8 - 22 mg/dL    Creatinine 0.94 0.50 - 1.40 mg/dL    Calcium 9.1 8.5 - 10.5 mg/dL    Anion Gap 9.0 0.0 - 11.9   CBC WITHOUT DIFFERENTIAL    Collection Time: 01/30/20  3:08 AM   Result Value Ref Range    WBC 10.2 4.8 - 10.8 K/uL    RBC 4.39 (L) 4.70 - 6.10 M/uL    Hemoglobin 13.8 (L) 14.0 - 18.0 g/dL    Hematocrit 43.1 42.0 - 52.0 %    MCV 98.2 (H) 81.4 - 97.8 fL    MCH 31.4 27.0 - 33.0 pg    MCHC 32.0 (L) 33.7 - 35.3 g/dL    RDW 51.5 (H) 35.9 - 50.0 fL    Platelet Count 219 164 - 446 K/uL    MPV 11.0 9.0 - 12.9 fL   ESTIMATED GFR    Collection Time: 01/30/20  3:08 AM   Result Value Ref Range    GFR If African American >60 >60 mL/min/1.73 m 2    GFR If Non African American >60 >60 mL/min/1.73 m 2       Labs reviewed by me.       Imaging reviewed by me:     DX-ABDOMEN FOR TUBE  PLACEMENT   Final Result      Cortrak nasogastric tube position consistent with intragastric placement, probably near the pylorus.      MR-BRAIN-W/O   Final Result      1.  Moderate acute/subacute right MCA infarct with hemorrhagic transformation.   2.  Right frontal lobe encephalomalacia.   3.  Mild to moderate cerebral atrophy.      EC-ECHOCARDIOGRAM COMPLETE W/O CONT   Final Result      CT-HEAD W/O   Final Result   Addendum 1 of 1   These findings were discussed with REMI SHAFFER on 1/28/2020 10:57 AM.      Final      DX-CHEST-PORTABLE (1 VIEW)   Final Result      Cardiomegaly with interstitial prominence.      CT-CTA NECK WITH & W/O-POST PROCESSING   Final Result      1.  Atherosclerotic plaque at the carotid bifurcations bilaterally which results in less than 50% diameter narrowing.      2.  Diminutive right vertebral artery.      3.  Tortuous left internal carotid artery which is retropharyngeal in location.      CT-CTA HEAD WITH & W/O-POST PROCESS   Final Result      1.  Some asymmetric attenuation of the right middle cerebral arterial peripheral vessels without evidence of acute occlusion possibly representing vasospasm or atherosclerotic attenuation.      2.  Diminutive right vertebral artery.      CT-HEAD W/O   Final Result      1.  No evidence of acute intracranial process.      2.  Cerebral atrophy as well as periventricular chronic small vessel ischemic change.          Presumed mechanism by TOAST:  __Large Artery Atherosclerosis  __Small Vessel (Lacunar)  __Cardioembolic  __Other (Sickle Cell, Vasculitis, Hypercoagulable)  _X_Unknown      ASSESSMENT:  74-year old male with PMHx significant for hypertension, dyslipidemia, and hypothyroid who presented to [facility] on 1/27/210 for a chief complaint of Left sided weakness; onset 2200 on evening of presentation. CTA head/neck with Right MCA severe stenosis vs vasospasm; no thrombectomy. Patient determined to be a candidate for IV tPA, received  IV tPA on 1/27/20 at 2315. Patient had acute worsening to LLE function (was previously antigravity, now is not); stat repeat CT head on 1/28 revealed small acute intracranial hemorrhage to Right parietal/temporal region. Patient now s/p MRI Brain wo contrast; revealed evolving Right MCA territory ischemia with associated hemorrhagic conversion. NIHSS remains at 7-8 today; patient doing well; to be transferred to [facility] for further care.     Impression:   Evolving Right MCA territory ischemia with Right parietal/temporal hemorrhagic conversion.   S/p IV tPA on 1/27/20.   Hx of HTN.   Hx of Dyslipidemia.   Newly discovered Diabetes mellitus with A1c 7.2.     Recommendations/Plan:     -Neuro assessment and VS per post tPA protocol. SBP < 140. Antihypertensives per ICU team.   -No anticoagulation/anti thrombotics at this time; may tentatively plan to initiate ASA on or after 2/4/20 if repeat CT head is stable.   -Telemetry; currently SR. Screen for Afib/arrhythmia. Obtain TTE with bubble study. If cardiac work up here is unrevealing, patient may require outpatient holter/loop/zio patch to formally rule out cardiac arrhythmia as source of stroke.   -Atorvastatin 80 mg PO q HS. Note LDL is 73, goal < 70.  -Recommend aggressive BG management per primary team. Avoid IVF with Dextrose. BG goal 140-180. Hemoglobin A1c is 7.2; further diabetes management per primary team.   -PT/OT/SLP eval and treat. Patient reportedly failed FEES study today per SLP; Cortra;  placed; Physiatry consult is pending.    -All other medical management per primary/ICU team.   -DVT PPX: SCDs.      The plan of care above has been discussed with Dr. Juan. Other than the above, no further recommendations from a neurological perspective. Please call with questions.     Anahi Luna MSN, BSN, AGNP-C  [employer] [facility]

## 2020-01-30 NOTE — DISCHARGE PLANNING
[facility] Transitional [facility] Coordination     Physiatry consult complete. Dr. Varghese recommending skilled nursing for post acute care due to limited discharge support.

## 2020-01-30 NOTE — PROGRESS NOTES
Pt is scheduled for procedure in IR in the morning. Pt does not have an NPO after midnight order or any specifications regarding medications while NPO. Dr. Avalos notified, order received for NPO after midnight and ok for sips with meds.

## 2020-01-30 NOTE — PROGRESS NOTES
Critical Care Progress Note    Date of admission  1/27/2020    Chief Complaint  74 y.o. male admitted 1/27/2020 with probable R MCA AIS    Hospital Course    1/28 - admitted ICU s/p tpA  1/29 - MRI w/ RMCA infarct with small hemorrhagic transformation. Clinically stable. Failed swallow. Cortrak placed.      Interval Problem Update  Reviewed last 24 hour events:  Tm 37.3  HR 60-90s  -140s  GCS 15 LLE UMN pattern of weakness  LDL-C 73  Finbrinogen 178  CXR w/o effusion or consolidation  MRI R MCA infarct stable hemorrhagic transformation  WBC 10.2  Glucose 148  Repleted Potassium  NSR on tele    Review of Systems  Review of Systems   Constitutional: Negative for chills and fever.   Eyes: Negative for double vision.   Respiratory: Positive for wheezing. Negative for shortness of breath and stridor.    Cardiovascular: Negative for chest pain.   Gastrointestinal: Negative for nausea and vomiting.   Genitourinary: Negative for urgency.   Musculoskeletal: Negative for neck pain.   Neurological: Positive for sensory change and focal weakness. Negative for headaches.   Psychiatric/Behavioral: Negative for depression.        Vital Signs for last 24 hours   Temp:  [36.7 °C (98 °F)-37.3 °C (99.2 °F)] 37 °C (98.6 °F)  Pulse:  [] 69  Resp:  [16-36] 20  BP: (106-149)/(56-67) 142/65  SpO2:  [89 %-95 %] 93 %    Hemodynamic parameters for last 24 hours       Respiratory Information for the last 24 hours       Physical Exam   Physical Exam  Constitutional:       General: He is not in acute distress.     Appearance: He is obese. He is not toxic-appearing.   HENT:      Head: Normocephalic and atraumatic.      Right Ear: External ear normal.      Left Ear: External ear normal.      Nose: Nose normal.      Mouth/Throat:      Mouth: Mucous membranes are moist.   Eyes:      Extraocular Movements: Extraocular movements intact.      Pupils: Pupils are equal, round, and reactive to light.   Neck:      Musculoskeletal: Normal range  of motion.   Cardiovascular:      Rate and Rhythm: Normal rate and regular rhythm.      Pulses: Normal pulses.   Pulmonary:      Effort: Pulmonary effort is normal.   Abdominal:      General: Abdomen is flat.   Musculoskeletal:      Right lower leg: Edema present.      Left lower leg: Edema present.   Skin:     General: Skin is warm and dry.      Capillary Refill: Capillary refill takes less than 2 seconds.   Neurological:      Mental Status: He is alert.      Comments: GCS 15. Left facial droop. No aphasia.  Mild Left pronator drift. UMN pattern of weakness in the the LUE and LLE. LLE with drift   Psychiatric:         Mood and Affect: Mood normal.         Behavior: Behavior normal.         Medications  Current Facility-Administered Medications   Medication Dose Route Frequency Provider Last Rate Last Dose   • labetalol (NORMODYNE/TRANDATE) injection 10-20 mg  10-20 mg Intravenous Q4HRS PRN Aquilino Thayer M.D.        Or   • hydrALAZINE (APRESOLINE) injection 20 mg  20 mg Intravenous Q2HRS PRN Aquilino Thayer M.D.       • ipratropium-albuterol (DUONEB) nebulizer solution  3 mL Nebulization Q4HRS (RT) Aquilino Thayer M.D.   3 mL at 01/29/20 2152   • acetaminophen (TYLENOL) tablet 650 mg  650 mg Enteral Tube Q6HRS PRN Aquilino Thayer M.D.   650 mg at 01/29/20 1438   • senna-docusate (PERICOLACE or SENOKOT S) 8.6-50 MG per tablet 2 Tab  2 Tab Enteral Tube BID Aquilino Thayer M.D.   2 Tab at 01/30/20 0518    And   • polyethylene glycol/lytes (MIRALAX) PACKET 1 Packet  1 Packet Enteral Tube QDAY PRN Aquilino Thayer M.D.        And   • magnesium hydroxide (MILK OF MAGNESIA) suspension 30 mL  30 mL Enteral Tube QDAY PRN Aquilino Thayer M.D.        And   • bisacodyl (DULCOLAX) suppository 10 mg  10 mg Rectal QDAY PRN Aquilino Thayer M.D.       • levothyroxine (SYNTHROID) tablet 88 mcg  88 mcg Enteral Tube AM ES Aquilino Thayer M.D.   88 mcg at 01/30/20 0519   • atorvastatin (LIPITOR) tablet 80 mg  80 mg Enteral Tube Q  EVENING Aquilino Thayer M.D.   80 mg at 01/29/20 1712   • metoprolol (LOPRESSOR) tablet 50 mg  50 mg Enteral Tube BID Aquilino Thayer M.D.   50 mg at 01/30/20 0518   • ondansetron (ZOFRAN ODT) dispertab 4 mg  4 mg Enteral Tube Q4HRS PRN Aquilino Thayer M.D.       • Pharmacy Consult: Enteral tube insertion - review meds/change route/product selection  1 Each Other PHARMACY TO DOSE Aquilino Thayer M.D.       • albuterol inhaler 2 Puff  2 Puff Inhalation Q6HRS PRN Nikhil Garcia M.D.       • ondansetron (ZOFRAN) syringe/vial injection 4 mg  4 mg Intravenous Q4HRS PRN Nikhil Garcia M.D.       • ipratropium-albuterol (DUONEB) nebulizer solution  3 mL Nebulization Q4H PRN (RT) Nikhil Garcia M.D.   3 mL at 01/28/20 0258   • fentaNYL (SUBLIMAZE) injection 12.5 mcg  12.5 mcg Intravenous Q2HRS PRN Aquilino Thayer M.D.   12.5 mcg at 01/28/20 1659   • niCARdipine (CARDENE) 50 mg in  mL Infusion  0-15 mg/hr Intravenous Continuous Aquilino Thayer M.D.   Stopped at 01/29/20 1310       Fluids    Intake/Output Summary (Last 24 hours) at 1/30/2020 0609  Last data filed at 1/30/2020 0600  Gross per 24 hour   Intake 1525 ml   Output 750 ml   Net 775 ml       Laboratory          Recent Labs     01/27/20  2300 01/29/20  0600 01/30/20  0308   SODIUM 139 143 143   POTASSIUM 4.4 3.5* 3.7   CHLORIDE 104 108 107   CO2 25 27 27   BUN 10 15 16   CREATININE 1.07 1.06 0.94   CALCIUM 9.2 8.8 9.1     Recent Labs     01/27/20  2300 01/29/20  0600 01/30/20  0308   ALTSGPT 14  --   --    ASTSGOT 26  --   --    ALKPHOSPHAT 76  --   --    TBILIRUBIN 0.4  --   --    PREALBUMIN  --   --  11.0*   GLUCOSE 139* 185* 148*     Recent Labs     01/27/20  2300 01/29/20  0600 01/30/20  0308   WBC 9.4 11.0* 10.2   NEUTSPOLYS 57.20 77.20*  --    LYMPHOCYTES 28.70 14.40*  --    MONOCYTES 8.10 6.70  --    EOSINOPHILS 4.60 0.80  --    BASOPHILS 0.90 0.50  --    ASTSGOT 26  --   --    ALTSGPT 14  --   --    ALKPHOSPHAT 76  --   --    TBILIRUBIN 0.4  --   --       Recent Labs     01/27/20  2300 01/29/20  0600 01/30/20  0308   RBC 4.88 4.44* 4.39*   HEMOGLOBIN 15.9 14.2 13.8*   HEMATOCRIT 46.4 43.1 43.1   PLATELETCT 274 242 219   PROTHROMBTM 13.9  --   --    APTT 28.6  --   --    INR 1.05  --   --        Imaging  X-Ray:  I have personally reviewed the images and compared with prior images.  CT:    Reviewed CT head with small posterior right frontal ICH with minimal surrounding edema  MRI my interpretation is  R MCA infarct involving the posterior frontal lobe with small hemorrhagic conversion.    Assessment/Plan  * Cerebrovascular accident (CVA) due to thrombosis of right middle cerebral artery (HCC)  Assessment & Plan  MRI - R MCA infarct stable hemorrhagic conversion  Etiological workup ongoing  Neurochecks q4h  Small Hemorrhagic conversion small R frontal ICH reversed with 4 units cryo  BP parameters to maintain less than 150/105 given ICH  No antiplatelet or anticoagulant therapies at this time  High intensity statin  Continuous cardiac monitoring  PT/OT/speech evaluations  Failed swallow  Placed cortrak - start po antihypertensives    Morbid obesity with BMI of 40.0-44.9, adult ([facility])  Assessment & Plan  Recommend weight loss     Moderate persistent asthma without complication  Assessment & Plan  RT/O2 protocols  PRN nebulizers    Dyslipidemia  Assessment & Plan  High intensity statin    Essential hypertension  Assessment & Plan  SBP goal < 150  Off nicardipine gtt  Cortrak and started enteral antihypertensives     Hypothyroidism- (present on admission)  Assessment & Plan  Cortrak - Resume replacement therapy    VERENA (obstructive sleep apnea)- (present on admission)  Assessment & Plan  Resume cpap with home settings       VTE:  Contraindicated  Ulcer: Not Indicated  Lines: None    I have performed a physical exam and reviewed and updated ROS and Plan today (1/30/2020). In review of yesterday's note (1/29/2020), there are no changes except as documented above.      Discussed patient condition and risk of morbidity and/or mortality with RN, RT, Pharmacy, Code status disscussed, Charge nurse / hot rounds, Patient and neurology

## 2020-01-30 NOTE — CONSULTS
Physical Medicine and Rehabilitation Consultation         Initial Consult      Date of Consultation: 1/29/2020  Consulting provider: Dr. Nikhil Garcia  Reason for consultation: assess for acute inpatient rehab appropriateness  LOS: 1 Day(s)      Chief complaint: Left-sided weakness    HPI: The patient is a 74 y.o. right hand dominant male with a past medical history of asthma, hypertension, dyslipidemia, newly discovered diabetes;  who presented on 1/27/2020 10:57 PM with new left-sided weakness, including both the left arm and left leg.    The patient currently reports weakness of his left upper extremity and lower extremity.  Reports difficulty walking. He reports that prior to this, he was fully independent and able to take care of himself.  He is currently retired.  Used to enjoy fishing.     Pain: Some discomfort in the right gluteus region, able to use his right leg to push himself from the edge of the bed and readjust  Bowel: None documented in the last 2 days, to start a senna S, he states that he has not eaten much  Bladder: Denies any issues  Breathing: He reports chronic history of asthma, reports frequent phlegm/sputum      ROS:  Pertinent positives are listed in HPI, all other systems reviewed and are negative      Social Hx:  Pre-morbidly, this patient lived in:   1 story home, 2 steps to enter, living independently, does not have anyone that can help as a caregiver at this time  Employment: Retired        Current level of function: The patient was evaluated by:  Acute care physical therapy. Currently requiring maximum assist for mobility/transfers.  Acute care occupational therapy. Currently requiring maximum assist assistance for ADLs.  Acute care speech therapy for ongoing swallowing deficits, requiring n.p.o.        PMH: asthma, hypertension, dyslipidemia, newly discovered diabetes  Okay Dayami it is okay    PSH:  Past Surgical History:   Procedure Laterality Date   • HIP REPLACEMENT, TOTAL    "      FHX:  Non-pertinent to today's issues  Family History   Problem Relation Age of Onset   • Cancer Mother    • Cancer Father        Medications:  Current Facility-Administered Medications   Medication Dose   • labetalol (NORMODYNE/TRANDATE) injection 10-20 mg  10-20 mg    Or   • hydrALAZINE (APRESOLINE) injection 20 mg  20 mg   • ipratropium-albuterol (DUONEB) nebulizer solution  3 mL   • acetaminophen (TYLENOL) tablet 650 mg  650 mg   • senna-docusate (PERICOLACE or SENOKOT S) 8.6-50 MG per tablet 2 Tab  2 Tab    And   • polyethylene glycol/lytes (MIRALAX) PACKET 1 Packet  1 Packet    And   • magnesium hydroxide (MILK OF MAGNESIA) suspension 30 mL  30 mL    And   • bisacodyl (DULCOLAX) suppository 10 mg  10 mg   • [START ON 1/30/2020] levothyroxine (SYNTHROID) tablet 88 mcg  88 mcg   • atorvastatin (LIPITOR) tablet 80 mg  80 mg   • metoprolol (LOPRESSOR) tablet 50 mg  50 mg   • ondansetron (ZOFRAN ODT) dispertab 4 mg  4 mg   • Pharmacy Consult: Enteral tube insertion - review meds/change route/product selection  1 Each   • albuterol inhaler 2 Puff  2 Puff   • ondansetron (ZOFRAN) syringe/vial injection 4 mg  4 mg   • ipratropium-albuterol (DUONEB) nebulizer solution  3 mL   • fentaNYL (SUBLIMAZE) injection 12.5 mcg  12.5 mcg   • niCARdipine (CARDENE) 50 mg in  mL Infusion  0-15 mg/hr       Allergies:  Allergies   Allergen Reactions   • Amoxicillin Hives     Pt states \"I get hives\".   • Ampicillin Hives     Pt states \"I get hives\".       Physical Exam:  Vitals: /65   Pulse 91   Temp 37.2 °C (98.9 °F) (Temporal)   Resp (!) 25   Ht 1.702 m (5' 7\")   Wt 119.9 kg (264 lb 5.3 oz)   SpO2 93%   Gen: Very pleasant, calm, large body habitus  Head: Facial asymmetry/left facial droop, NCAT  Eyes/ Nose/ Mouth: moist mucous membranes, NGT in place  Cardio: RRR  Pulm: Upper airway coarse breath sounds   Abd: Soft NTND  Ext: No peripheral edema, peripheral IV    Mental status: answers questions appropriately " follows commands  Speech: fluent, no aphasia or dysarthria    CRANIAL NERVES:  2,3: visual acuity grossly intact  3,4,6: EOMI bilaterally, no nystagmus or diplopia  7: Left facial droop  8: hearing grossly intact  11: SCM/Trapezius strength 5/5 bilaterally      Motor:  -Right upper extremity and right lower extremity with 5/5, normal strength and coordination  -Left upper extremity: 4/5 with arm abduction, elbow flexion, elbow extension, wrist extension, wrist flexion  -Left lower extremity: 3/5 with hip flexion and knee extension, 4/5 with ankle dorsiflexion, ankle plantarflexion  -No increased tone in left upper extremity or left lower extremity      Neglect screen:  -Grossly functional midline test    Sensory:   -Denies numbness to light touch over left upper extremity and left lower extremity    Speech:  -Mild dysarthria, complicated by NG tube    Reflexes:  -Negative clonus bilateral ankles    Coordination:  -Normal finger-to-nose      Labs: Reviewed and significant for borderline hypo-kalemia and elevated glucose levels  Recent Labs     01/27/20 2300 01/29/20  0600   RBC 4.88 4.44*   HEMOGLOBIN 15.9 14.2   HEMATOCRIT 46.4 43.1   PLATELETCT 274 242   PROTHROMBTM 13.9  --    APTT 28.6  --    INR 1.05  --      Recent Labs     01/27/20 2300 01/29/20  0600   SODIUM 139 143   POTASSIUM 4.4 3.5*   CHLORIDE 104 108   CO2 25 27   GLUCOSE 139* 185*   BUN 10 15   CREATININE 1.07 1.06   CALCIUM 9.2 8.8     Recent Results (from the past 24 hour(s))   FIBRINOGEN    Collection Time: 01/28/20  6:45 PM   Result Value Ref Range    Fibrinogen 178 (L) 215 - 460 mg/dL   CBC with Differential    Collection Time: 01/29/20  6:00 AM   Result Value Ref Range    WBC 11.0 (H) 4.8 - 10.8 K/uL    RBC 4.44 (L) 4.70 - 6.10 M/uL    Hemoglobin 14.2 14.0 - 18.0 g/dL    Hematocrit 43.1 42.0 - 52.0 %    MCV 97.1 81.4 - 97.8 fL    MCH 32.0 27.0 - 33.0 pg    MCHC 32.9 (L) 33.7 - 35.3 g/dL    RDW 49.8 35.9 - 50.0 fL    Platelet Count 242 164 - 446  K/uL    MPV 11.5 9.0 - 12.9 fL    Neutrophils-Polys 77.20 (H) 44.00 - 72.00 %    Lymphocytes 14.40 (L) 22.00 - 41.00 %    Monocytes 6.70 0.00 - 13.40 %    Eosinophils 0.80 0.00 - 6.90 %    Basophils 0.50 0.00 - 1.80 %    Immature Granulocytes 0.40 0.00 - 0.90 %    Nucleated RBC 0.00 /100 WBC    Neutrophils (Absolute) 8.48 (H) 1.82 - 7.42 K/uL    Lymphs (Absolute) 1.58 1.00 - 4.80 K/uL    Monos (Absolute) 0.73 0.00 - 0.85 K/uL    Eos (Absolute) 0.09 0.00 - 0.51 K/uL    Baso (Absolute) 0.05 0.00 - 0.12 K/uL    Immature Granulocytes (abs) 0.04 0.00 - 0.11 K/uL    NRBC (Absolute) 0.00 K/uL   Basic Metabolic Panel (BMP)    Collection Time: 01/29/20  6:00 AM   Result Value Ref Range    Sodium 143 135 - 145 mmol/L    Potassium 3.5 (L) 3.6 - 5.5 mmol/L    Chloride 108 96 - 112 mmol/L    Co2 27 20 - 33 mmol/L    Glucose 185 (H) 65 - 99 mg/dL    Bun 15 8 - 22 mg/dL    Creatinine 1.06 0.50 - 1.40 mg/dL    Calcium 8.8 8.5 - 10.5 mg/dL    Anion Gap 8.0 0.0 - 11.9   ESTIMATED GFR    Collection Time: 01/29/20  6:00 AM   Result Value Ref Range    GFR If African American >60 >60 mL/min/1.73 m 2    GFR If Non African American >60 >60 mL/min/1.73 m 2       Imaging:  Ct-cta Head With & W/o-post Process, Result Date: 1/27/2020 1/27/2020 11:04 PM HISTORY/REASON FOR EXAM:  Left-sided weakness and facial droop. TECHNIQUE/EXAM DESCRIPTION: CT angiogram of the [place] without and with contrast.  Initial precontrast images were obtained of the head from the skull base through the vertex.  Postcontrast images were obtained of the [place] following the power injection of nonionic contrast at 5.0 mL/sec. Thin-section helical images were obtained with overlapping reconstruction interval. Coronal and sagittal multiplanar volume reformats were generated.  3D angiographic images were reviewed on PACS.  Maximum intensity projection (MIP) images were generated and reviewed. 100 mL of Omnipaque 350 nonionic contrast was injected  intravenously. Low dose optimization technique was utilized for this CT exam including automated exposure control and adjustment of the mA and/or kV according to patient size. COMPARISON:  None. FINDINGS: The right vertebral artery is diminutive but patent. The basilar artery is patent. The left vertebral artery is patent. There is some attenuation of the peripheral M2 and beyond middle cerebral vessels on the right compared to the left however there is no evidence of acute cut off of the vessel to suggest presence of acute occlusion. Anterior cerebral arteries are patent. No evidence of intracranial hemorrhage. 3D angiographic/MIP images of the vasculature confirm the vascular findings as described above.     1.  Some asymmetric attenuation of the right middle cerebral arterial peripheral vessels without evidence of acute occlusion possibly representing vasospasm or atherosclerotic attenuation. 2.  Diminutive right vertebral artery.      Ct-cta Neck With & W/o-post Processing, Result Date: 1/27/2020 1/27/2020 11:04 PM HISTORY/REASON FOR EXAM:  Left-sided weakness and facial droop with slurred speech. TECHNIQUE/EXAM DESCRIPTION: CT angiogram of the neck with contrast. Postcontrast images were obtained of the neck from the great vessels through the skull base following the power injection of nonionic contrast at 5.0 mL/sec. Thin-section helical images were obtained with overlapping reconstruction interval. Coronal and oblique multiplanar volume reformats were generated. Cervical internal carotid artery percent stenosis is calculated using the standard method according to the NASCET criteria wherein a segment of uniform caliber mid or distal cervical internal carotid is used as the reference denominator. 3D angiographic images were reviewed on PACS.  Maximum intensity projection (MIP) images were generated and reviewed 100 mL of Omnipaque 350 nonionic contrast was injected intravenously. Low dose optimization  technique was utilized for this CT exam including automated exposure control and adjustment of the mA and/or kV according to patient size. COMPARISON:  None. FINDINGS: There is emphysematous and bullous change of the lungs. There is atherosclerotic change of the aortic arch and originating vessels. The common carotid arteries are patent bilaterally. There is atherosclerotic plaque at the bifurcations bilaterally which results in less than 50% diameter narrowing. The left internal carotid artery is very tortuous and retropharyngeal in location. The right vertebral arteries to beginning of the lung its course. No evidence of occlusion. Left vertebral artery is patent. There is kyphotic deformity. 3D angiographic/MIP images of the vasculature confirm the vascular findings as described above.     1.  Atherosclerotic plaque at the carotid bifurcations bilaterally which results in less than 50% diameter narrowing. 2.  Diminutive right vertebral artery. 3.  Tortuous left internal carotid artery which is retropharyngeal in location.    Ct-head W/o,   Result Date: 1/28/2020 1/28/2020 10:23 AM HISTORY/REASON FOR EXAM:  Stroke, follow up. LEFT lower extremity weakness. TECHNIQUE/EXAM DESCRIPTION AND NUMBER OF VIEWS: CT of the head without contrast. The study was performed on a helical multidetector CT scanner. Contiguous 2.5 mm axial sections were obtained from the skull base through the vertex. Up to date radiation dose reduction adjustments have been utilized to meet ALARA standards for radiation dose reduction. COMPARISON:  CT head 1/27/2020 FINDINGS: Lateral ventricles are moderately enlarged but symmetric Cortical sulci are enlarged. Focal RIGHT frontal encephalomalacia. No significant mass effect or midline shift. Basal cisterns are patent. Hyperdense focus in the RIGHT posterior frontal region measuring 10 x 16 x 11 mm, new from prior exam.  Surrounding low attenuation noted consistent with associated edema. Calvaria  are intact. Visualized orbits are unremarkable. Visualized mastoid air cells are clear. Mild inferior frontal sinus mucosal thickening.     1.  New RIGHT posterior frontal intraparenchymal hemorrhage with mild surrounding edema. 2.  Chronic RIGHT frontal infarct. 3.  Diffuse atrophy.      Ct-head W/o,   Result Date: 1/27/2020 1/27/2020 11:04 PM HISTORY/REASON FOR EXAM: Left-sided weakness. TECHNIQUE/EXAM DESCRIPTION AND NUMBER OF VIEWS: CT of the head without contrast. Up to date radiation dose reduction adjustments have been utilized to meet ALARA standards for radiation dose reduction. COMPARISON: 5/7/2018 FINDINGS: There is no evidence of mass or mass effect. CSF spaces are prominent. There is mild periventricular chronic small vessel ischemic change present. There is no intracranial hemorrhage seen. The calvarium is intact. There is no scalp injury. There is no evidence of sinus disease.     1.  No evidence of acute intracranial process. 2.  Cerebral atrophy as well as periventricular chronic small vessel ischemic change.      Mr-brain-w/o, Result Date: 1/29/2020 1/29/2020 10:39 AM HISTORY/REASON FOR EXAM:  Stroke, follow up. TECHNIQUE/EXAM DESCRIPTION: MRI of the brain without contrast. T1 sagittal, T2 fast spin-echo axial, T1 coronal, FLAIR coronal, diffusion-weighted and apparent diffusion coefficient (ADC map) axial images were obtained of the whole brain. The study was performed on a [employer] 1.5 Delia MRI scanner. COMPARISON:  Brain MRI 5/7/2018. Head CT 1/28/2020 FINDINGS: Study is degraded by patient motion. Moderate acute right MCA infarct involving the right frontoparietal lobes, posterior insular ribbon and parietal operculum . There is susceptibility artifact on GRE within this region measuring about 2.1 cm consistent with hemorrhagic transformation. Mild to moderate generalized cerebral loss. There is encephalomalacia in the right frontal lobe which could be related to old infarct and/or prior  trauma. No midline shift or hydrocephalus. Proximal vascular flow voids are patent. Bone marrow signal is normal. There is possible paranasal sinus mucosal thickening. Mastoids are clear. Orbital contents are symmetric.     1.  Moderate acute/subacute right MCA infarct with hemorrhagic transformation. 2.  Right frontal lobe encephalomalacia. 3.  Mild to moderate cerebral atrophy.      Ec-echocardiogram Complete W/o Cont, Result Date: 1/28/2020  Transthoracic Echo Report Echocardiography Laboratory CONCLUSIONS Compared to the images of the prior study done on 5/08/2018 there has been no significant change. Grossly normal left ventricular systolic function. Unable to estimate pulmonary artery pressure due to an inadequate tricuspid regurgitant jet. JENNIFER RE[facility]     FINDINGS Left Ventricle Normal left ventricular chamber size. Mild concentric left ventricular hypertrophy. Grossly normal left ventricular systolic function. Left ventricular ejection fraction is visually estimated to be 55%. Wall motion is difficult to assess with the limitations of image quality. Normal diastolic function. Right Ventricle Normal right ventricular size. Normal right ventricular systolic function. Right Atrium Normal right atrial size. Normal inferior vena cava size and inspiratory collapse. Left Atrium Normal left atrial size. Left atrial volume index is  30  mL/sq m. Mitral Valve Mitral annular calcification. No mitral stenosis. Trace mitral regurgitation. Aortic Valve Calcified aortic valve leaflets. No aortic stenosis. No aortic insufficiency. Tricuspid Valve The tricuspid valve is not well visualized. No tricuspid stenosis. Trace tricuspid regurgitation. Unable to estimate pulmonary artery pressure due to an inadequate tricuspid regurgitant jet. Pulmonic Valve The pulmonic valve is not well visualized. No pulmonic stenosis. No pulmonic insufficiency. Pericardium Normal pericardium without effusion. Aorta Ascending aorta diameter is  3.7  cm. Jordan Garcia MD (Electronically Signed) Final Date:     28 January 2020                 16:47          ASSESSMENT:  Patient is a 74 y.o. right-hand-dominant male admitted with left-sided weakness, diagnosed with moderate acute/subacute right MCA infarct with a small focal hemorrhagic transformation.  Received TPA.  Prior medical history of asthma, hypertension, dyslipidemia, newly discovered diabetes.  Also with new development of sleep apnea.      # Rehabilitation: Patient clearly has significant amount of rehabilitation needs, given PT and OT with report maximum assist for transfers/mobility and ADLs.  Patient also has swallowing difficulties and currently is n.p.o. with NG tube in place.   - Unfortunately, admission to acute rehab facility requires a safe discharge plan back home.  At this time, patient does not have any caregivers that can help him when he gets home.  He will not be able to return to his previous residence without assistance, even if he were to go through acute rehabilitation.  - At this time, recommending discharge to SNF.    - Will continue to follow  - Avoid SSRI, given hemorrhagic transformation       # Bowel: None documented over 2 days, just added senna S, monitor    # Bladder: Denies issues    # DVT prophylaxis: Given hemorrhagic transformation, no blood thinners  - Continue SCDs      Discussed with pt, summarized hospitalization and care, options for next step of care  Labs reviewed  Imaging personally reviewed. Repeat CT showing small right focal hemorrhagic transformation.    Discussed with rehab team about recommendations.      Thank you for allowing us to participate in the care of this patient.         Kit Varghese MD  Physical Medicine and Rehabilitation   1/29/2020

## 2020-01-30 NOTE — PROGRESS NOTES
[facility] Medicine Daily Progress Note    Date of Service  1/30/2020    Chief Complaint  74 y.o. male admitted 1/27/2020 with left hand numbness.    Hospital Course    Mr. Lundy has a past medical history of dyslipidemia and asthma that developed acute onset of left arm numbness and weakness.  He presented to the emergency room and was administered alteplase with subsequent ICU admission.  MRI confirmed a hemorrhagic transformation therefore he was given cryoprecipitate.      Interval Problem Update  1/30/2020: Patient seen and evaluated in the intensive care unit. He remains requiring cortrak feeding. We discussed the possibility of a PEG as he will be moving to a SNF otherwise with a cortrak. Dr. Thayer agrees that he is unlikely to swallow safely for quite some time. MR. Lundy is amenable to the procedure. His brother is coming in tomorrow.    Consultants/Specialty  Critical care, I discussed with Dr. Thayer  Neurology, I discussed with Dr. Liset Pearson  Rehab   GI consulted for possible PEG  Code Status  DNR/DNI    Disposition  neuro    Review of Systems  Review of Systems   Constitutional: Negative for chills and fever.   Respiratory: Positive for shortness of breath and wheezing.    Neurological:        Left sided weakness   All other systems reviewed and are negative.       Physical Exam  Temp:  [36.7 °C (98 °F)-37.3 °C (99.2 °F)] 37 °C (98.6 °F)  Pulse:  [] 69  Resp:  [16-36] 20  BP: (106-149)/(57-67) 142/65  SpO2:  [89 %-95 %] 93 %    Physical Exam  Vitals signs and nursing note reviewed.   Constitutional:       Appearance: Normal appearance. He is obese.   HENT:      Head:      Comments: Left facial droop     Nose:      Comments: cortrak nares     Mouth/Throat:      Mouth: Mucous membranes are dry.      Pharynx: Oropharynx is clear.   Cardiovascular:      Pulses: Normal pulses.      Heart sounds: Normal heart sounds.   Pulmonary:      Breath sounds: Wheezing and rales present.   Abdominal:      General:  There is distension.      Tenderness: There is no tenderness.   Neurological:      Mental Status: He is alert.      Comments: 4/5 strength left arm with decreased dexterity left hand  Left leg strength limited by pain in the right groin   Psychiatric:         Mood and Affect: Mood normal.         Behavior: Behavior normal.      Comments: Speech is reasonable          Fluids    Intake/Output Summary (Last 24 hours) at 1/30/2020 0659  Last data filed at 1/30/2020 0600  Gross per 24 hour   Intake 1555 ml   Output 750 ml   Net 805 ml       Laboratory  Recent Labs     01/27/20  2300 01/29/20  0600 01/30/20  0308   WBC 9.4 11.0* 10.2   RBC 4.88 4.44* 4.39*   HEMOGLOBIN 15.9 14.2 13.8*   HEMATOCRIT 46.4 43.1 43.1   MCV 95.1 97.1 98.2*   MCH 32.6 32.0 31.4   MCHC 34.3 32.9* 32.0*   RDW 47.2 49.8 51.5*   PLATELETCT 274 242 219   MPV 11.5 11.5 11.0     Recent Labs     01/27/20  2300 01/29/20  0600 01/30/20  0308   SODIUM 139 143 143   POTASSIUM 4.4 3.5* 3.7   CHLORIDE 104 108 107   CO2 25 27 27   GLUCOSE 139* 185* 148*   BUN 10 15 16   CREATININE 1.07 1.06 0.94   CALCIUM 9.2 8.8 9.1     Recent Labs     01/27/20  2300   APTT 28.6   INR 1.05         Recent Labs     01/27/20  2300   TRIGLYCERIDE 196*   HDL 30*   LDL 73       Imaging  DX-ABDOMEN FOR TUBE PLACEMENT   Final Result      Cortrak nasogastric tube position consistent with intragastric placement, probably near the pylorus.      MR-BRAIN-W/O   Final Result      1.  Moderate acute/subacute right MCA infarct with hemorrhagic transformation.   2.  Right frontal lobe encephalomalacia.   3.  Mild to moderate cerebral atrophy.      EC-ECHOCARDIOGRAM COMPLETE W/O CONT   Final Result      CT-HEAD W/O   Final Result   Addendum 1 of 1   These findings were discussed with REMI SHAFFER on 1/28/2020 10:57 AM.      Final      DX-CHEST-PORTABLE (1 VIEW)   Final Result      Cardiomegaly with interstitial prominence.      CT-CTA NECK WITH & W/O-POST PROCESSING   Final Result      1.   Atherosclerotic plaque at the carotid bifurcations bilaterally which results in less than 50% diameter narrowing.      2.  Diminutive right vertebral artery.      3.  Tortuous left internal carotid artery which is retropharyngeal in location.      CT-CTA HEAD WITH & W/O-POST PROCESS   Final Result      1.  Some asymmetric attenuation of the right middle cerebral arterial peripheral vessels without evidence of acute occlusion possibly representing vasospasm or atherosclerotic attenuation.      2.  Diminutive right vertebral artery.      CT-HEAD W/O   Final Result      1.  No evidence of acute intracranial process.      2.  Cerebral atrophy as well as periventricular chronic small vessel ischemic change.           Assessment/Plan  * Cerebrovascular accident (CVA) due to thrombosis of right middle cerebral artery (HCC)  Assessment & Plan  Acute ischemic stroke status post alteplase and ICU admission  He developed a hemorrhagic transformation and he was given cryoprecipitate   Neurology consulted  High intensity statin  PT/OT/ST  cortrak feeding due to dysphagia: GI Consultation for a possible PEG  Rehab consult    Morbid obesity with BMI of 40.0-44.9, adult ([facility])  Assessment & Plan  Body mass index is 40.72 kg/m².      Moderate persistent asthma without complication  Assessment & Plan  Reports taking albuterol at least 2-3 times a day.  Likely will need a controller medication or inhaled corticosteroid.  This can be further fleshed out after TPA therapy.  Duo nebs have been ordered as needed.    Dyslipidemia  Assessment & Plan  High intensity statin    Essential hypertension  Assessment & Plan  Is post permissive hypertension  He was on a nicardipine drip has now been turned off  He has been started back on his home metoprolol dose has PRN IV hydralazine and labetalol systolic blood pressure over 150    Hypothyroidism- (present on admission)  Assessment & Plan  On replacement therapy which will be continued.    VERENA  (obstructive sleep apnea)- (present on admission)  Assessment & Plan  On CPAP nightly with supplemental oxygen.  Respiratory therapy has been consulted for the same.       VTE prophylaxis: SCDs

## 2020-01-30 NOTE — THERAPY
"Speech Language Therapy dysphagia treatment completed.   Functional Status:  Pt seen for dysphagia tx focused on swallow exercise training, PO trials of ice chips (6) and NTL water via 1/2 tsp (5), and compensatory strategy training. Three second prep and double swallow swallow trained with PO trials to reduce premature spillage and risk of aspiration before the swallow (see FEES results from 1/29). Pt did have overt reflexive coughing before triggering a swallow with NTL x1, concerning for aspiration before the swallow. Increased wet/audible breath sounds noted with tx trials of ice chips. Pt did trigger a delayed productive cough with ice chips x2 and was able to suction yellowish thin secretions from oral cavity via [place]. Taught pt oral motor exercises targeting lingual/labiofacial strength/ROM/coorindation x10 each, falsetto x10, CTAR x3 (30s each) and x30 (1s each), lingual presses x8, effortful swallow x5 with mod verbal/visual/written cues. Continue NPO/TF; pt will need repeat dx swallow assessment after trial of swallow exercises before initiating a PO diet, as deemed appropriate by primary SLP.    Recommendations: Continue NPO/TF; pt will need repeat dx swallow assessment after trial of swallow exercises before initiating a PO diet, as deemed appropriate by primary SLP.  Plan of Care: Will benefit from Speech Therapy 5 times per week  Post-Acute Therapy: Recommend inpatient transitional care services for continued speech therapy services.        See \"Rehab Therapy-Acute\" Patient Summary Report for complete documentation.     "

## 2020-01-30 NOTE — CARE PLAN
Problem: Nutritional:  Goal: Nutrition support tolerated and meeting greater than 85% of estimated needs  Outcome: MET   Replete with Fiber @ 80 mL/hr (final goal rate).

## 2020-01-31 ENCOUNTER — APPOINTMENT (OUTPATIENT)
Dept: RADIOLOGY | Facility: MEDICAL CENTER | Age: 75
DRG: 061 | End: 2020-01-31
Attending: PSYCHIATRY & NEUROLOGY
Payer: MEDICARE

## 2020-01-31 ENCOUNTER — APPOINTMENT (OUTPATIENT)
Dept: RADIOLOGY | Facility: MEDICAL CENTER | Age: 75
DRG: 061 | End: 2020-01-31
Attending: HOSPITALIST
Payer: MEDICARE

## 2020-01-31 LAB
ANION GAP SERPL CALC-SCNC: 8 MMOL/L (ref 0–11.9)
BUN SERPL-MCNC: 19 MG/DL (ref 8–22)
CALCIUM SERPL-MCNC: 9.2 MG/DL (ref 8.5–10.5)
CHLORIDE SERPL-SCNC: 104 MMOL/L (ref 96–112)
CO2 SERPL-SCNC: 28 MMOL/L (ref 20–33)
CREAT SERPL-MCNC: 0.87 MG/DL (ref 0.5–1.4)
ERYTHROCYTE [DISTWIDTH] IN BLOOD BY AUTOMATED COUNT: 48.5 FL (ref 35.9–50)
GLUCOSE SERPL-MCNC: 128 MG/DL (ref 65–99)
HCT VFR BLD AUTO: 42.6 % (ref 42–52)
HGB BLD-MCNC: 13.9 G/DL (ref 14–18)
MCH RBC QN AUTO: 31.6 PG (ref 27–33)
MCHC RBC AUTO-ENTMCNC: 32.6 G/DL (ref 33.7–35.3)
MCV RBC AUTO: 96.8 FL (ref 81.4–97.8)
PLATELET # BLD AUTO: 214 K/UL (ref 164–446)
PMV BLD AUTO: 10.9 FL (ref 9–12.9)
POTASSIUM SERPL-SCNC: 3.7 MMOL/L (ref 3.6–5.5)
RBC # BLD AUTO: 4.4 M/UL (ref 4.7–6.1)
SODIUM SERPL-SCNC: 140 MMOL/L (ref 135–145)
WBC # BLD AUTO: 9.1 K/UL (ref 4.8–10.8)

## 2020-01-31 PROCEDURE — 770020 HCHG ROOM/CARE - TELE (206)

## 2020-01-31 PROCEDURE — 700111 HCHG RX REV CODE 636 W/ 250 OVERRIDE (IP): Performed by: PSYCHIATRY & NEUROLOGY

## 2020-01-31 PROCEDURE — 71045 X-RAY EXAM CHEST 1 VIEW: CPT

## 2020-01-31 PROCEDURE — 97110 THERAPEUTIC EXERCISES: CPT

## 2020-01-31 PROCEDURE — 700101 HCHG RX REV CODE 250: Performed by: PSYCHIATRY & NEUROLOGY

## 2020-01-31 PROCEDURE — 99232 SBSQ HOSP IP/OBS MODERATE 35: CPT | Performed by: HOSPITALIST

## 2020-01-31 PROCEDURE — 700102 HCHG RX REV CODE 250 W/ 637 OVERRIDE(OP): Performed by: PSYCHIATRY & NEUROLOGY

## 2020-01-31 PROCEDURE — 94660 CPAP INITIATION&MGMT: CPT

## 2020-01-31 PROCEDURE — 94640 AIRWAY INHALATION TREATMENT: CPT

## 2020-01-31 PROCEDURE — 99233 SBSQ HOSP IP/OBS HIGH 50: CPT | Performed by: PSYCHIATRY & NEUROLOGY

## 2020-01-31 PROCEDURE — A9270 NON-COVERED ITEM OR SERVICE: HCPCS | Performed by: PSYCHIATRY & NEUROLOGY

## 2020-01-31 PROCEDURE — 85027 COMPLETE CBC AUTOMATED: CPT

## 2020-01-31 PROCEDURE — 700111 HCHG RX REV CODE 636 W/ 250 OVERRIDE (IP): Performed by: INTERNAL MEDICINE

## 2020-01-31 PROCEDURE — 80048 BASIC METABOLIC PNL TOTAL CA: CPT

## 2020-01-31 PROCEDURE — 70450 CT HEAD/BRAIN W/O DYE: CPT

## 2020-01-31 PROCEDURE — 97116 GAIT TRAINING THERAPY: CPT

## 2020-01-31 RX ORDER — CEFAZOLIN SODIUM 2 G/100ML
2 INJECTION, SOLUTION INTRAVENOUS
Status: COMPLETED | OUTPATIENT
Start: 2020-02-01 | End: 2020-02-01

## 2020-01-31 RX ORDER — FUROSEMIDE 10 MG/ML
20 INJECTION INTRAMUSCULAR; INTRAVENOUS ONCE
Status: COMPLETED | OUTPATIENT
Start: 2020-01-31 | End: 2020-01-31

## 2020-01-31 RX ORDER — AMLODIPINE BESYLATE 5 MG/1
5 TABLET ORAL
Status: DISCONTINUED | OUTPATIENT
Start: 2020-02-01 | End: 2020-02-05 | Stop reason: HOSPADM

## 2020-01-31 RX ADMIN — LABETALOL HYDROCHLORIDE 10 MG: 5 INJECTION, SOLUTION INTRAVENOUS at 14:04

## 2020-01-31 RX ADMIN — IPRATROPIUM BROMIDE AND ALBUTEROL SULFATE 3 ML: .5; 3 SOLUTION RESPIRATORY (INHALATION) at 07:33

## 2020-01-31 RX ADMIN — METOPROLOL TARTRATE 50 MG: 25 TABLET, FILM COATED ORAL at 18:07

## 2020-01-31 RX ADMIN — LEVOTHYROXINE SODIUM 88 MCG: 88 TABLET ORAL at 05:14

## 2020-01-31 RX ADMIN — ATORVASTATIN CALCIUM 80 MG: 80 TABLET, FILM COATED ORAL at 18:07

## 2020-01-31 RX ADMIN — FUROSEMIDE 20 MG: 10 INJECTION, SOLUTION INTRAVENOUS at 10:23

## 2020-01-31 RX ADMIN — METOPROLOL TARTRATE 50 MG: 25 TABLET, FILM COATED ORAL at 05:14

## 2020-01-31 RX ADMIN — POTASSIUM BICARBONATE 50 MEQ: 978 TABLET, EFFERVESCENT ORAL at 10:35

## 2020-01-31 ASSESSMENT — ENCOUNTER SYMPTOMS
CHILLS: 0
BLOOD IN STOOL: 0
FOCAL WEAKNESS: 1
DIARRHEA: 0
CONSTIPATION: 0
WHEEZING: 1
SHORTNESS OF BREATH: 1
VOMITING: 0
ABDOMINAL PAIN: 0
FEVER: 0
SHORTNESS OF BREATH: 0
NAUSEA: 0
STRIDOR: 0
PALPITATIONS: 0
NECK PAIN: 0
HEARTBURN: 0
SENSORY CHANGE: 1
HEADACHES: 0
DOUBLE VISION: 0
WHEEZING: 0
DEPRESSION: 0

## 2020-01-31 ASSESSMENT — COGNITIVE AND FUNCTIONAL STATUS - GENERAL
MOVING TO AND FROM BED TO CHAIR: UNABLE
WALKING IN HOSPITAL ROOM: A LITTLE
TURNING FROM BACK TO SIDE WHILE IN FLAT BAD: UNABLE
MOBILITY SCORE: 11
CLIMB 3 TO 5 STEPS WITH RAILING: A LOT
STANDING UP FROM CHAIR USING ARMS: A LITTLE
SUGGESTED CMS G CODE MODIFIER MOBILITY: CL
MOVING FROM LYING ON BACK TO SITTING ON SIDE OF FLAT BED: UNABLE

## 2020-01-31 ASSESSMENT — GAIT ASSESSMENTS
DEVIATION: BRADYKINETIC;INCREASED BASE OF SUPPORT
GAIT LEVEL OF ASSIST: MINIMAL ASSIST
ASSISTIVE DEVICE: FRONT WHEEL WALKER
DISTANCE (FEET): 40

## 2020-01-31 NOTE — PROGRESS NOTES
[facility] Medicine Daily Progress Note    Date of Service  1/31/2020    Chief Complaint  74 y.o. male admitted 1/27/2020 with left hand numbness.    Hospital Course    Mr. Lundy has a past medical history of dyslipidemia and asthma that developed acute onset of left arm numbness and weakness.  He presented to the emergency room and was administered alteplase with subsequent ICU admission.  MRI confirmed a hemorrhagic transformation therefore he was given cryoprecipitate.      Interval Problem Update  1/30/2020: Patient seen and evaluated in the intensive care unit. He remains requiring cortrak feeding. We discussed the possibility of a PEG as he will be moving to a SNF otherwise with a cortrak. Dr. Thayer agrees that he is unlikely to swallow safely for quite some time. MR. Lundy is amenable to the procedure. His brother is coming in tomorrow.  1/31: patient seen and evaluated in the ICU. I met with his two daughters at bedside. We discussed post-acute options such as SNF vs rehab. We also discussed that he will likely need to PEG. His oxygen requirements went up to 10 liters from 4 liters thus PEG was put off until tomorrow. 32 minutes spent that of which over 50% of the time was spent in counseling at bedside about his stroke and post-acute options and needs.   Consultants/Specialty  Critical care, I discussed with Dr. Thayer  Neurology, I discussed with Dr. Liset Pearson  Rehab   GI consulted for possible PEG  Code Status  DNR/DNI    Disposition  neuro    Review of Systems  Review of Systems   Constitutional: Negative for chills and fever.   Respiratory: Negative for shortness of breath and wheezing.    Cardiovascular: Negative for chest pain and palpitations.   Gastrointestinal:        Tolerating cortrak feeding   Neurological:        Left sided weakness   All other systems reviewed and are negative.       Physical Exam  Temp:  [36.5 °C (97.7 °F)-37.2 °C (98.9 °F)] 36.5 °C (97.7 °F)  Pulse:  [41-93] 75  Resp:  [16-45]  20  BP: (131-154)/(63-95) 146/80  SpO2:  [89 %-98 %] 94 %    Physical Exam  Vitals signs and nursing note reviewed.   Constitutional:       Appearance: Normal appearance. He is obese.   HENT:      Head:      Comments: Left facial droop     Nose:      Comments: cortrak nares     Mouth/Throat:      Mouth: Mucous membranes are dry.      Pharynx: Oropharynx is clear.   Neck:      Musculoskeletal: Normal range of motion and neck supple.   Cardiovascular:      Rate and Rhythm: Normal rate and regular rhythm.      Pulses: Normal pulses.      Heart sounds: Normal heart sounds.   Pulmonary:      Breath sounds: Rales present. No wheezing.   Abdominal:      General: There is distension.      Tenderness: There is no tenderness.   Musculoskeletal:      Right lower leg: No edema.      Left lower leg: No edema.      Comments: scds bilat   Skin:     General: Skin is warm and dry.   Neurological:      Mental Status: He is alert.      Comments: 4+/5 strength left arm with decreased dexterity left hand  Left leg strength limited by pain in the right groin pain   Psychiatric:         Mood and Affect: Mood normal.         Behavior: Behavior normal.      Comments: Speech is reasonable          Fluids    Intake/Output Summary (Last 24 hours) at 1/31/2020 0651  Last data filed at 1/31/2020 0600  Gross per 24 hour   Intake 1650 ml   Output 1620 ml   Net 30 ml       Laboratory  Recent Labs     01/29/20  0600 01/30/20  0308 01/31/20  0420   WBC 11.0* 10.2 9.1   RBC 4.44* 4.39* 4.40*   HEMOGLOBIN 14.2 13.8* 13.9*   HEMATOCRIT 43.1 43.1 42.6   MCV 97.1 98.2* 96.8   MCH 32.0 31.4 31.6   MCHC 32.9* 32.0* 32.6*   RDW 49.8 51.5* 48.5   PLATELETCT 242 219 214   MPV 11.5 11.0 10.9     Recent Labs     01/29/20  0600 01/30/20  0308 01/31/20  0420   SODIUM 143 143 140   POTASSIUM 3.5* 3.7 3.7   CHLORIDE 108 107 104   CO2 27 27 28   GLUCOSE 185* 148* 128*   BUN 15 16 19   CREATININE 1.06 0.94 0.87   CALCIUM 8.8 9.1 9.2                    Imaging  DX-ABDOMEN FOR TUBE PLACEMENT   Final Result      Cortrak nasogastric tube position consistent with intragastric placement, probably near the pylorus.      MR-BRAIN-W/O   Final Result      1.  Moderate acute/subacute right MCA infarct with hemorrhagic transformation.   2.  Right frontal lobe encephalomalacia.   3.  Mild to moderate cerebral atrophy.      EC-ECHOCARDIOGRAM COMPLETE W/O CONT   Final Result      CT-HEAD W/O   Final Result   Addendum 1 of 1   These findings were discussed with REMI SHAFFER on 1/28/2020 10:57 AM.      Final      DX-CHEST-PORTABLE (1 VIEW)   Final Result      Cardiomegaly with interstitial prominence.      CT-CTA NECK WITH & W/O-POST PROCESSING   Final Result      1.  Atherosclerotic plaque at the carotid bifurcations bilaterally which results in less than 50% diameter narrowing.      2.  Diminutive right vertebral artery.      3.  Tortuous left internal carotid artery which is retropharyngeal in location.      CT-CTA HEAD WITH & W/O-POST PROCESS   Final Result      1.  Some asymmetric attenuation of the right middle cerebral arterial peripheral vessels without evidence of acute occlusion possibly representing vasospasm or atherosclerotic attenuation.      2.  Diminutive right vertebral artery.      CT-HEAD W/O   Final Result      1.  No evidence of acute intracranial process.      2.  Cerebral atrophy as well as periventricular chronic small vessel ischemic change.           Assessment/Plan  * Cerebrovascular accident (CVA) due to thrombosis of right middle cerebral artery (HCC)  Assessment & Plan  Acute ischemic stroke status post alteplase and ICU admission  He developed a hemorrhagic transformation and he was given cryoprecipitate   Neurology consulted  High intensity statin  PT/OT/ST  cortrak feeding due to dysphagia: GI Consultation for a possible PEG  Rehab consult rehab vs SNF    Morbid obesity with BMI of 40.0-44.9, adult ([facility])  Assessment & Plan  Body mass index  is 40.72 kg/m².      Moderate persistent asthma without complication  Assessment & Plan  Reports taking albuterol at least 2-3 times a day.  Likely will need a controller medication or inhaled corticosteroid.  This can be further fleshed out after TPA therapy.  Duo nebs have been ordered as needed.    Dyslipidemia  Assessment & Plan  High intensity statin    Essential hypertension  Assessment & Plan  Is post permissive hypertension  He was on a nicardipine drip has now been turned off  He has been started back on his home metoprolol dose   Add Norvasc 5 mg daily  has PRN IV hydralazine and labetalol systolic blood pressure over 150    Hypothyroidism- (present on admission)  Assessment & Plan  On replacement therapy which will be continued.    VERENA (obstructive sleep apnea)- (present on admission)  Assessment & Plan  On CPAP nightly with supplemental oxygen.  Respiratory therapy has been consulted for the same.       VTE prophylaxis: SCDs

## 2020-01-31 NOTE — PROGRESS NOTES
Critical Care Progress Note    Date of admission  1/27/2020    Chief Complaint  74 y.o. male admitted 1/27/2020 with probable R MCA AIS    Hospital Course    1/28 - admitted ICU s/p tpA  1/29 - MRI w/ RMCA infarct with small hemorrhagic transformation. Clinically stable. Failed swallow. Cortrak placed.  1/30 - some increased O2 overnight. GI consulted for PEG      Interval Problem Update  Reviewed last 24 hour events:  Upper airway rhonchi with and crackles this am w/ mild increase O2need - gave 20 lasix, pulm toilet  Tm 37.2  HR 70-80s  -140s  GCS 15 LLE UMN pattern of weakness  MRI R MCA infarct stable hemorrhagic transformation  WBC 9.1  Glucose 128  Repleted Potassium  NSR on tele  1.6L urine output over 24 hours  GI to hold on PEG until more stable    Review of Systems  Review of Systems   Constitutional: Negative for chills and fever.   Eyes: Negative for double vision.   Respiratory: Positive for wheezing. Negative for shortness of breath and stridor.    Cardiovascular: Negative for chest pain.   Gastrointestinal: Negative for nausea and vomiting.   Genitourinary: Negative for urgency.   Musculoskeletal: Negative for neck pain.   Neurological: Positive for sensory change and focal weakness. Negative for headaches.   Psychiatric/Behavioral: Negative for depression.        Vital Signs for last 24 hours   Temp:  [36.5 °C (97.7 °F)-37.2 °C (98.9 °F)] 36.5 °C (97.7 °F)  Pulse:  [41-93] 82  Resp:  [15-45] 15  BP: (131-154)/(63-95) 146/80  SpO2:  [88 %-98 %] 88 %    Hemodynamic parameters for last 24 hours       Respiratory Information for the last 24 hours       Physical Exam   Physical Exam  Constitutional:       General: He is not in acute distress.     Appearance: He is obese. He is not toxic-appearing.   HENT:      Head: Normocephalic and atraumatic.      Right Ear: External ear normal.      Left Ear: External ear normal.      Nose: Nose normal.      Mouth/Throat:      Mouth: Mucous membranes are moist.       Pharynx: No oropharyngeal exudate or posterior oropharyngeal erythema.   Eyes:      Extraocular Movements: Extraocular movements intact.      Pupils: Pupils are equal, round, and reactive to light.   Neck:      Musculoskeletal: Normal range of motion.   Cardiovascular:      Rate and Rhythm: Normal rate and regular rhythm.      Pulses: Normal pulses.   Pulmonary:      Effort: Pulmonary effort is normal.   Abdominal:      General: Abdomen is flat. Bowel sounds are normal.   Skin:     General: Skin is warm and dry.      Capillary Refill: Capillary refill takes less than 2 seconds.   Neurological:      Mental Status: He is alert.      Comments: GCS 15. Left facial droop. No aphasia.  Mild Left pronator drift. UMN pattern of weakness in the the LUE and LLE. LLE with drift   Psychiatric:         Mood and Affect: Mood normal.         Behavior: Behavior normal.         Medications  Current Facility-Administered Medications   Medication Dose Route Frequency Provider Last Rate Last Dose   • labetalol (NORMODYNE/TRANDATE) injection 10-20 mg  10-20 mg Intravenous Q4HRS PRN Aquilino Thayer M.D.   10 mg at 01/30/20 1141    Or   • hydrALAZINE (APRESOLINE) injection 20 mg  20 mg Intravenous Q2HRS PRN Aquilino Thayer M.D.       • ipratropium-albuterol (DUONEB) nebulizer solution  3 mL Nebulization Q4HRS (RT) Aquilino Thayer M.D.   3 mL at 01/31/20 0733   • acetaminophen (TYLENOL) tablet 650 mg  650 mg Enteral Tube Q6HRS PRN Aquilino Thayer M.D.   650 mg at 01/30/20 1757   • senna-docusate (PERICOLACE or SENOKOT S) 8.6-50 MG per tablet 2 Tab  2 Tab Enteral Tube BID Aquilino Thayer M.D.   2 Tab at 01/30/20 0518    And   • polyethylene glycol/lytes (MIRALAX) PACKET 1 Packet  1 Packet Enteral Tube QDAY PRN Aquilino Thayer M.D.        And   • magnesium hydroxide (MILK OF MAGNESIA) suspension 30 mL  30 mL Enteral Tube QDAY PRN Aquilino Thayer M.D.        And   • bisacodyl (DULCOLAX) suppository 10 mg  10 mg Rectal QDAY PRMJ GARCIA  HENRIQUE Thayer       • levothyroxine (SYNTHROID) tablet 88 mcg  88 mcg Enteral Tube AM ES Aquilino Thayer M.D.   88 mcg at 01/31/20 0514   • atorvastatin (LIPITOR) tablet 80 mg  80 mg Enteral Tube Q EVENING Aquilino Thayer M.D.   80 mg at 01/30/20 1758   • metoprolol (LOPRESSOR) tablet 50 mg  50 mg Enteral Tube BID Aquilino Thayer M.D.   50 mg at 01/31/20 0514   • ondansetron (ZOFRAN ODT) dispertab 4 mg  4 mg Enteral Tube Q4HRS PRN Aquilino Thayer M.D.       • Pharmacy Consult: Enteral tube insertion - review meds/change route/product selection  1 Each Other PHARMACY TO DOSE Aquilino Thayer M.D.       • albuterol inhaler 2 Puff  2 Puff Inhalation Q6HRS PRN Nikhil Garcia M.D.       • ondansetron (ZOFRAN) syringe/vial injection 4 mg  4 mg Intravenous Q4HRS PRN Nikhil Garcia M.D.       • ipratropium-albuterol (DUONEB) nebulizer solution  3 mL Nebulization Q4H PRN (RT) Nikhil Garcia M.D.   Stopped at 01/31/20 0728   • fentaNYL (SUBLIMAZE) injection 12.5 mcg  12.5 mcg Intravenous Q2HRS PRN Aquilino Thayer M.D.   12.5 mcg at 01/28/20 1659       Fluids    Intake/Output Summary (Last 24 hours) at 1/31/2020 0750  Last data filed at 1/31/2020 0600  Gross per 24 hour   Intake 1650 ml   Output 1620 ml   Net 30 ml       Laboratory          Recent Labs     01/29/20  0600 01/30/20  0308 01/31/20  0420   SODIUM 143 143 140   POTASSIUM 3.5* 3.7 3.7   CHLORIDE 108 107 104   CO2 27 27 28   BUN 15 16 19   CREATININE 1.06 0.94 0.87   CALCIUM 8.8 9.1 9.2     Recent Labs     01/29/20  0600 01/30/20  0308 01/31/20  0420   PREALBUMIN  --  11.0*  --    GLUCOSE 185* 148* 128*     Recent Labs     01/29/20  0600 01/30/20 0308 01/31/20 0420   WBC 11.0* 10.2 9.1   NEUTSPOLYS 77.20*  --   --    LYMPHOCYTES 14.40*  --   --    MONOCYTES 6.70  --   --    EOSINOPHILS 0.80  --   --    BASOPHILS 0.50  --   --      Recent Labs     01/29/20  0600 01/30/20 0308 01/31/20  0420   RBC 4.44* 4.39* 4.40*   HEMOGLOBIN 14.2 13.8* 13.9*   HEMATOCRIT 43.1 43.1  42.6   PLATELETCT 242 219 214       Imaging  X-Ray:  I have personally reviewed the images and compared with prior images. and My impression is: Increased interstitial prominence suggestive of mild edema  CT:    Reviewed CT head with small posterior right frontal ICH with minimal surrounding edema  MRI my interpretation is  R MCA infarct involving the posterior frontal lobe with small hemorrhagic conversion.    Assessment/Plan  * Cerebrovascular accident (CVA) due to thrombosis of right middle cerebral artery (HCC)  Assessment & Plan  MRI - R MCA infarct stable hemorrhagic conversion  Etiological workup ongoing  Neurochecks q4h  Small Hemorrhagic conversion small R frontal ICH reversed with 4 units cryo  BP parameters to maintain less than 150/105 given ICH  No antiplatelet or anticoagulant therapies at this time  High intensity statin  Continuous cardiac monitoring  PT/OT/speech evaluations  Failed swallow  Placed cortrak - start po antihypertensives    Can likely start ASA after PEG placed as hemorrhage is stable    Morbid obesity with BMI of 40.0-44.9, adult ([facility])  Assessment & Plan  Recommend weight loss     Moderate persistent asthma without complication  Assessment & Plan  RT/O2 protocols  PRN nebulizers    Dyslipidemia  Assessment & Plan  High intensity statin    Essential hypertension  Assessment & Plan  SBP goal < 150  Cortrak and started enteral antihypertensives     Hypothyroidism- (present on admission)  Assessment & Plan  Cortrak - Resume replacement therapy    VERENA (obstructive sleep apnea)- (present on admission)  Assessment & Plan  Resume cpap with home settings     CXR suggestive of mild volume overload this am, lasix given      VTE:  Contraindicated Will check repeat head CT if stable can start lovenox  Ulcer: Not Indicated  Lines: None    I have performed a physical exam and reviewed and updated ROS and Plan today (1/31/2020). In review of yesterday's note (1/30/2020), there are no changes except  as documented above.     Discussed patient condition and risk of morbidity and/or mortality with Hospitalist, RN, RT, Pharmacy, Code status disscussed, Charge nurse / hot rounds, Patient and neurology

## 2020-01-31 NOTE — THERAPY
"Physical Therapy Treatment completed.   Bed Mobility:  Supine to Sit: (NT, sitting in chair pre/post)  Transfers: Sit to Stand: Minimal Assist(with FWW from recliner; RN reports more difficult from bed)  Gait: Level Of Assist: Minimal Assist with FWW x 40ft        Plan of Care: Will benefit from Physical Therapy 5 times per week  Discharge Recommendations: Equipment: Will Continue to Assess for Equipment Needs.     Pt with improving upright mobility, initiating distance gait; left LE sensation improving, now able to localize 3/4 trials, deficit remains in 5th digit and lateral foot; greatest continued deficit continues to be impaired left UE awareness during mobility; continue to recommend placement at this time, anticipate quick steady progress.     See \"Rehab Therapy-Acute\" Patient Summary Report for complete documentation.       "

## 2020-01-31 NOTE — DISCHARGE PLANNING
Agency/Facility Name: [facility]  Plan or Request: patient accepted    Agency/Facility Name: [facility]  Outcome: patient accepted    Agency/Facility Name: [facility]  Spoke To: Ame  Outcome: patient pending d/c plan    Agency/Facility Name: [facility]  Outcome: patient declined / d/c plan

## 2020-01-31 NOTE — CARE PLAN
Problem: Safety  Goal: Will remain free from injury  Intervention: Provide assistance with mobility  Note:   Bed alarm in use, call light within reach. Tread socks in use.      Problem: Skin Integrity  Goal: Risk for impaired skin integrity will decrease  Intervention: Assess risk factors for impaired skin integrity and/or pressure ulcers  Note:   Encourage every 2 hour turns, up to chair as tolerates.

## 2020-01-31 NOTE — CONSULTS
DATE OF SERVICE:  01/30/2020    REASON FOR CONSULTATION:  PEG tube.    HISTORY OF PRESENT ILLNESS:  The patient is a pleasant 74-year-old male who   unfortunately had a CVA several days ago.  Patient was brought in for   evaluation and underwent TPA.  Patient converted to hemorrhagic CVA and has   been in the ICU for monitoring closely afterwards.  Patient now has orders to   return to the floor.  Patient has had persistent dysphagia and has failed a   swallow evaluation, and per consulting team, they believe patient will have   difficulty swallowing for at least several more weeks.  Patient denies any   nausea, vomiting, diarrhea, fever, chills, chest pain, shortness of breath   currently.  Patient denies being on any blood thinners.  Patient would like to   talk to his daughter to discuss the EGD with possible PEG tube prior to   scheduling.    PAST MEDICAL HISTORY:  CVA, asthma, chickenpox.    PAST SURGICAL HISTORY:  Hip replacement.    FAMILY HISTORY:  Noncontributory.    SOCIAL HISTORY:  The patient quit tobacco use 4 years ago.  Patient does use   alcohol and denies illicit drug use.    ALLERGIES:  AMOXICILLIN AND AMPICILLIN.    MEDICATIONS:  See med rec list.    REVIEW OF SYSTEMS:  A 14-point review of systems was obtained and found to be   negative except for those above in the HPI.    PHYSICAL EXAMINATION:  GENERAL:  No acute distress.  Alert, awake, oriented x3.  VITAL SIGNS:  Stable.  HEENT:  PERRLA.  Extraocular movements intact.  Sclerae are anicteric.  HEART:  Regular rate and rhythm.  LUNGS:  Mild coarse breath sounds bilaterally.  ABDOMEN:  Soft, nontender.  No guarding or rebound.  EXTREMITIES:  No edema noted, bilateral lower extremities.  NEUROLOGIC:  Grossly intact.  PSYCHIATRIC:  Affect is normal.  SKIN:  No jaundice or pallor.  No surgical wounds on the abdomen.    LABORATORY RESULTS:  White count 10.2, hemoglobin 13.8, hematocrit 43.1,   platelets 219, BUN 16, creatinine 0.94.    ASSESSMENT  AND PLAN:  1.  Dysphagia, likely secondary to cerebrovascular accident and neurologic   insult.  Discussed the pros and cons of an EGD with PEG tube placement with   patient.  Discussed all the potential complications as well as the potential   problems of keeping ____ for several more weeks.  Patient would like to   discuss this with his daughter in the next day and decide whether he would   like to proceed with PEG tube placement.  Patient is not on any blood   thinners.  If patient is going to decide potentially for tomorrow, recommend   keeping tube feeds stopped after midnight in anticipation.  Hold any blood   thinners.  Call with any questions or concerns.  2.  Cerebrovascular accident, being managed by primary team.  Patient being   transferred to the floor.  3.  BMI greater than 40.  Encourage weight loss.  4.  We will follow along for now.  Please call with any questions or concerns.       ____________________________________     DO PARKER Burciaga / FABIOLA    DD:  01/30/2020 16:59:39  DT:  01/30/2020 20:02:06    D#:  [id number]  Job#:  [id number]

## 2020-01-31 NOTE — RESPIRATORY CARE
COPD EDUCATION by COPD CLINICAL EDUCATOR  1/31/2020 at 7:59 AM by Leonora Cortez, RRT     Patient reviewed by COPD education team. Patient does not have a history or diagnosis of COPD and quit smoking 2015, therefore does not qualify for the COPD program.

## 2020-01-31 NOTE — PROGRESS NOTES
Gastroenterology Progress Note     Author: SUSAN Aragon   Date & Time Created: 1/31/2020 10:30 AM    Chief Complaint:  Dysphagia, CVA    Interval History:  Patient had increased oxygen requirements overnight, up to 8L.  Now down to 6.  Some increased work of breathing still.  Denies any abdominal pain, nausea, vomiting.  Tube feeds off in anticipation of possible PEG.     Review of Systems:  Review of Systems   Respiratory: Positive for shortness of breath.    Gastrointestinal: Negative for abdominal pain, blood in stool, constipation, diarrhea, heartburn, melena, nausea and vomiting.       Physical Exam:  Physical Exam  Constitutional:       Appearance: He is obese. He is not ill-appearing.   Eyes:      Pupils: Pupils are equal, round, and reactive to light.   Cardiovascular:      Rate and Rhythm: Normal rate and regular rhythm.   Pulmonary:      Breath sounds: Wheezing present.      Comments: Increased WOB, mild.  Diminished breath sounds  Abdominal:      General: Abdomen is flat. Bowel sounds are normal.      Palpations: Abdomen is soft.   Skin:     General: Skin is warm and dry.   Neurological:      Mental Status: He is alert.   Psychiatric:         Mood and Affect: Mood normal.         Behavior: Behavior normal.         Thought Content: Thought content normal.         Judgment: Judgment normal.         Labs:          Recent Labs     01/29/20 0600 01/30/20  0308 01/31/20  0420   SODIUM 143 143 140   POTASSIUM 3.5* 3.7 3.7   CHLORIDE 108 107 104   CO2 27 27 28   BUN 15 16 19   CREATININE 1.06 0.94 0.87   CALCIUM 8.8 9.1 9.2     Recent Labs     01/29/20 0600 01/30/20  0308 01/31/20  0420   PREALBUMIN  --  11.0*  --    GLUCOSE 185* 148* 128*     Recent Labs     01/29/20 0600 01/30/20  0308 01/31/20  0420   RBC 4.44* 4.39* 4.40*   HEMOGLOBIN 14.2 13.8* 13.9*   HEMATOCRIT 43.1 43.1 42.6   PLATELETCT 242 219 214     Recent Labs     01/29/20 0600 01/30/20  0308 01/31/20  0420   WBC 11.0*  10.2 9.1   NEUTSPOLYS 77.20*  --   --    LYMPHOCYTES 14.40*  --   --    MONOCYTES 6.70  --   --    EOSINOPHILS 0.80  --   --    BASOPHILS 0.50  --   --      Hemodynamics:  Temp (24hrs), Av.8 °C (98.2 °F), Min:36.5 °C (97.7 °F), Max:37.1 °C (98.7 °F)  Temperature: 36.9 °C (98.4 °F)  Pulse  Av.4  Min: 41  Max: 117   Blood Pressure : 147/72     Respiratory:    Respiration: (!) 23, Pulse Oximetry: 97 %, O2 Daily Delivery Respiratory : Silicone Nasal Cannula     Given By:: Mouthpiece, Work Of Breathing / Effort: Shallow;Mild  RUL Breath Sounds: Crackles, RML Breath Sounds: Crackles, RLL Breath Sounds: Crackles, HARMONY Breath Sounds: Diminished, LLL Breath Sounds: Diminished  Fluids:    Intake/Output Summary (Last 24 hours) at 2020 1030  Last data filed at 2020 0800  Gross per 24 hour   Intake 1300 ml   Output 1620 ml   Net -320 ml     Weight: 120.6 kg (265 lb 14 oz)  GI/Nutrition:  Orders Placed This Encounter   Procedures   • Diet NPO     Standing Status:   Standing     Number of Occurrences:   1     Order Specific Question:   Restrict to:     Answer:   Strict [1]     Medical Decision Making, by Problem:  Active Hospital Problems    Diagnosis   • *Cerebrovascular accident (CVA) due to thrombosis of right middle cerebral artery (HCC) [I63.311]   • Hypothyroidism [E03.9]   • VERENA (obstructive sleep apnea) [G47.33]   • Essential hypertension [I10]   • Dyslipidemia [E78.5]   • Moderate persistent asthma without complication [J45.40]   • Morbid obesity with BMI of 40.0-44.9, adult (HCC) [E66.01, Z68.41]       Plan:  RN reports daughter would like to discuss PEG placement.  Will call daughter.  We will not be placing PEG until respiratory status stabilized, can resume tube feeds.      Quality-Core Measures

## 2020-02-01 ENCOUNTER — ANESTHESIA EVENT (OUTPATIENT)
Dept: SURGERY | Facility: MEDICAL CENTER | Age: 75
DRG: 061 | End: 2020-02-01
Payer: MEDICARE

## 2020-02-01 ENCOUNTER — ANESTHESIA (OUTPATIENT)
Dept: SURGERY | Facility: MEDICAL CENTER | Age: 75
DRG: 061 | End: 2020-02-01
Payer: MEDICARE

## 2020-02-01 LAB
BASOPHILS # BLD AUTO: 0.6 % (ref 0–1.8)
BASOPHILS # BLD: 0.07 K/UL (ref 0–0.12)
EOSINOPHIL # BLD AUTO: 0.26 K/UL (ref 0–0.51)
EOSINOPHIL NFR BLD: 2.2 % (ref 0–6.9)
ERYTHROCYTE [DISTWIDTH] IN BLOOD BY AUTOMATED COUNT: 46.5 FL (ref 35.9–50)
HCT VFR BLD AUTO: 39.4 % (ref 42–52)
HGB BLD-MCNC: 12.9 G/DL (ref 14–18)
IMM GRANULOCYTES # BLD AUTO: 0.06 K/UL (ref 0–0.11)
IMM GRANULOCYTES NFR BLD AUTO: 0.5 % (ref 0–0.9)
LYMPHOCYTES # BLD AUTO: 2.08 K/UL (ref 1–4.8)
LYMPHOCYTES NFR BLD: 17.4 % (ref 22–41)
MCH RBC QN AUTO: 31.5 PG (ref 27–33)
MCHC RBC AUTO-ENTMCNC: 32.7 G/DL (ref 33.7–35.3)
MCV RBC AUTO: 96.1 FL (ref 81.4–97.8)
MONOCYTES # BLD AUTO: 0.93 K/UL (ref 0–0.85)
MONOCYTES NFR BLD AUTO: 7.8 % (ref 0–13.4)
NEUTROPHILS # BLD AUTO: 8.52 K/UL (ref 1.82–7.42)
NEUTROPHILS NFR BLD: 71.5 % (ref 44–72)
NRBC # BLD AUTO: 0 K/UL
NRBC BLD-RTO: 0 /100 WBC
PLATELET # BLD AUTO: 260 K/UL (ref 164–446)
PMV BLD AUTO: 10.9 FL (ref 9–12.9)
RBC # BLD AUTO: 4.1 M/UL (ref 4.7–6.1)
WBC # BLD AUTO: 11.9 K/UL (ref 4.8–10.8)

## 2020-02-01 PROCEDURE — 700101 HCHG RX REV CODE 250: Performed by: PSYCHIATRY & NEUROLOGY

## 2020-02-01 PROCEDURE — 110372 HCHG SHELL REV 278: Performed by: INTERNAL MEDICINE

## 2020-02-01 PROCEDURE — 700105 HCHG RX REV CODE 258: Performed by: ANESTHESIOLOGY

## 2020-02-01 PROCEDURE — 160202 HCHG ENDO MINUTES - 1ST 30 MINS LEVEL 3: Performed by: INTERNAL MEDICINE

## 2020-02-01 PROCEDURE — 160002 HCHG RECOVERY MINUTES (STAT): Performed by: INTERNAL MEDICINE

## 2020-02-01 PROCEDURE — 36415 COLL VENOUS BLD VENIPUNCTURE: CPT

## 2020-02-01 PROCEDURE — 500066 HCHG BITE BLOCK, ECT: Performed by: INTERNAL MEDICINE

## 2020-02-01 PROCEDURE — 700111 HCHG RX REV CODE 636 W/ 250 OVERRIDE (IP): Performed by: ANESTHESIOLOGY

## 2020-02-01 PROCEDURE — 700101 HCHG RX REV CODE 250

## 2020-02-01 PROCEDURE — 700101 HCHG RX REV CODE 250: Performed by: ANESTHESIOLOGY

## 2020-02-01 PROCEDURE — 700102 HCHG RX REV CODE 250 W/ 637 OVERRIDE(OP): Performed by: HOSPITALIST

## 2020-02-01 PROCEDURE — 160048 HCHG OR STATISTICAL LEVEL 1-5: Performed by: INTERNAL MEDICINE

## 2020-02-01 PROCEDURE — A9270 NON-COVERED ITEM OR SERVICE: HCPCS | Performed by: PSYCHIATRY & NEUROLOGY

## 2020-02-01 PROCEDURE — 160009 HCHG ANES TIME/MIN: Performed by: INTERNAL MEDICINE

## 2020-02-01 PROCEDURE — A9270 NON-COVERED ITEM OR SERVICE: HCPCS | Performed by: HOSPITALIST

## 2020-02-01 PROCEDURE — 770020 HCHG ROOM/CARE - TELE (206)

## 2020-02-01 PROCEDURE — 700102 HCHG RX REV CODE 250 W/ 637 OVERRIDE(OP): Performed by: PSYCHIATRY & NEUROLOGY

## 2020-02-01 PROCEDURE — 99232 SBSQ HOSP IP/OBS MODERATE 35: CPT | Performed by: HOSPITALIST

## 2020-02-01 PROCEDURE — 0DH63UZ INSERTION OF FEEDING DEVICE INTO STOMACH, PERCUTANEOUS APPROACH: ICD-10-PCS | Performed by: INTERNAL MEDICINE

## 2020-02-01 PROCEDURE — 160035 HCHG PACU - 1ST 60 MINS PHASE I: Performed by: INTERNAL MEDICINE

## 2020-02-01 PROCEDURE — 85025 COMPLETE CBC W/AUTO DIFF WBC: CPT

## 2020-02-01 PROCEDURE — 94660 CPAP INITIATION&MGMT: CPT

## 2020-02-01 PROCEDURE — 160207 HCHG ENDO MINUTES - EA ADDL 1 MIN LEVEL 3: Performed by: INTERNAL MEDICINE

## 2020-02-01 DEVICE — KIT 20 FRENCH PULL SAFETY PEG: Type: IMPLANTABLE DEVICE | Status: FUNCTIONAL

## 2020-02-01 RX ORDER — ONDANSETRON 2 MG/ML
4 INJECTION INTRAMUSCULAR; INTRAVENOUS
Status: DISCONTINUED | OUTPATIENT
Start: 2020-02-01 | End: 2020-02-01 | Stop reason: HOSPADM

## 2020-02-01 RX ORDER — MEPERIDINE HYDROCHLORIDE 25 MG/ML
6.25 INJECTION INTRAMUSCULAR; INTRAVENOUS; SUBCUTANEOUS
Status: DISCONTINUED | OUTPATIENT
Start: 2020-02-01 | End: 2020-02-01 | Stop reason: HOSPADM

## 2020-02-01 RX ORDER — DIPHENHYDRAMINE HYDROCHLORIDE 50 MG/ML
12.5 INJECTION INTRAMUSCULAR; INTRAVENOUS
Status: DISCONTINUED | OUTPATIENT
Start: 2020-02-01 | End: 2020-02-01 | Stop reason: HOSPADM

## 2020-02-01 RX ORDER — HALOPERIDOL 5 MG/ML
1 INJECTION INTRAMUSCULAR
Status: DISCONTINUED | OUTPATIENT
Start: 2020-02-01 | End: 2020-02-01 | Stop reason: HOSPADM

## 2020-02-01 RX ORDER — TRAZODONE HYDROCHLORIDE 100 MG/1
50 TABLET ORAL
Status: DISCONTINUED | OUTPATIENT
Start: 2020-02-01 | End: 2020-02-02

## 2020-02-01 RX ORDER — SODIUM CHLORIDE, SODIUM LACTATE, POTASSIUM CHLORIDE, CALCIUM CHLORIDE 600; 310; 30; 20 MG/100ML; MG/100ML; MG/100ML; MG/100ML
INJECTION, SOLUTION INTRAVENOUS CONTINUOUS
Status: DISCONTINUED | OUTPATIENT
Start: 2020-02-01 | End: 2020-02-01 | Stop reason: HOSPADM

## 2020-02-01 RX ADMIN — ATORVASTATIN CALCIUM 80 MG: 80 TABLET, FILM COATED ORAL at 18:08

## 2020-02-01 RX ADMIN — TRAZODONE HYDROCHLORIDE 50 MG: 100 TABLET ORAL at 20:48

## 2020-02-01 RX ADMIN — SODIUM CHLORIDE, POTASSIUM CHLORIDE, SODIUM LACTATE AND CALCIUM CHLORIDE: 600; 310; 30; 20 INJECTION, SOLUTION INTRAVENOUS at 09:26

## 2020-02-01 RX ADMIN — Medication 100 MG: at 08:49

## 2020-02-01 RX ADMIN — ACETAMINOPHEN 650 MG: 325 TABLET, FILM COATED ORAL at 13:03

## 2020-02-01 RX ADMIN — Medication 2.5 MG: at 09:41

## 2020-02-01 RX ADMIN — PROPOFOL 200 MG: 10 INJECTION, EMULSION INTRAVENOUS at 08:49

## 2020-02-01 RX ADMIN — LABETALOL HYDROCHLORIDE 10 MG: 5 INJECTION, SOLUTION INTRAVENOUS at 00:11

## 2020-02-01 RX ADMIN — METOPROLOL TARTRATE 50 MG: 25 TABLET, FILM COATED ORAL at 18:08

## 2020-02-01 RX ADMIN — LABETALOL HYDROCHLORIDE 10 MG: 5 INJECTION, SOLUTION INTRAVENOUS at 12:02

## 2020-02-01 RX ADMIN — LIDOCAINE HYDROCHLORIDE 100 MG: 20 INJECTION, SOLUTION INTRAVENOUS at 08:49

## 2020-02-01 RX ADMIN — ALBUTEROL SULFATE 2.5 MG: 2.5 SOLUTION RESPIRATORY (INHALATION) at 09:41

## 2020-02-01 RX ADMIN — CEFAZOLIN SODIUM 2 G: 2 INJECTION, SOLUTION INTRAVENOUS at 08:45

## 2020-02-01 ASSESSMENT — ENCOUNTER SYMPTOMS
PALPITATIONS: 0
CHILLS: 0
WHEEZING: 1
SPUTUM PRODUCTION: 0
SHORTNESS OF BREATH: 1
COUGH: 1
FEVER: 0

## 2020-02-01 ASSESSMENT — PATIENT HEALTH QUESTIONNAIRE - PHQ9
SUM OF ALL RESPONSES TO PHQ9 QUESTIONS 1 AND 2: 0
2. FEELING DOWN, DEPRESSED, IRRITABLE, OR HOPELESS: NOT AT ALL
1. LITTLE INTEREST OR PLEASURE IN DOING THINGS: NOT AT ALL

## 2020-02-01 NOTE — PROGRESS NOTES
Pt leaves floor for procedure at 0730.  Chart sent with patient, report given to preop nurse.  Ok to come off tele for transport reviewed with MD.

## 2020-02-01 NOTE — PROGRESS NOTES
Pt arrived on unit with CCRN.  Pt has had no neuro changes according to her and NIH done at bedside.  Tele monitor switched to floor monitor, VSS.

## 2020-02-01 NOTE — PROGRESS NOTES
History of Present Illness





- Reason for Consult


Consult date: 19


leukocytosis, recent C section


Requesting physician: DEZ CASILLAS





- History of Present Illness


38 y/o female with history of preeclampsia admitted on 2019 with 38 weeks 

gestation with uterine contractions. Patient underwent  on 19 due 

to failure to dilate, pulmonary edema and gestational hypertension;  after C-

section she developed acute respiratory failure/ARDS and hypotension, currently 

intubated, unable to provide a history. History taken from review of medical 

records. Of note, she had a Group B Strep test positive and history of herpes 

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 





On admission, vitals signs normal except for /85. WBC 12.3. Hg 13.2. Plat 

215. Creat 0.5. UA neg and repeat on  shows 37 wbc and trace LE. Blood 

cultures 2019 no growth. Urine culture 2019 no growth.  Chest x-ray 

showed mild cardiomegaly and pulmonary edema. ID consulted due to worsening 

leukocytosis.


 


Review of Systems: unable to obtain 


























Past History


Past Medical History: other (herpes simplex 2, gestational diabetes, depression,

general anxiety).  denies: CAD, COPD, hypertension


Past Surgical History:  (x1 )


Social history: lives with family


Family history: no significant family history





Medications and Allergies


                                    Allergies











Allergy/AdvReac Type Severity Reaction Status Date / Time


 


No Known Allergies Allergy   Verified 19 19:31











                                Home Medications











 Medication  Instructions  Recorded  Confirmed  Last Taken  Type


 


No Known Home Medications [No  19 Unknown History





Reported Home Medications]     











Active Meds: 


Active Medications





Al Hydrox/Mg Hydrox/Simethicone (Alum-Mag Hydrox-Simeth [id number]/5ml)  30 ml

PO Q4H PRN


   PRN Reason: Indigestion


Albuterol (Proventil)  2.5 mg IH Q8HRT [facility]


   Last Admin: 19 12:22 Dose:  2.5 mg


   Documented by: 


Bisacodyl (Dulcolax)  10 mg MD QDAY PRN


   PRN Reason: constipation unrelieved by MOM


Famotidine (Pepcid)  20 mg IV BID [facility]


   Last Admin: 19 09:54 Dose:  20 mg


   Documented by: 


Fentanyl (Sublimaze)  50 mcg IV Q10MIN PRN


   PRN Reason: ANALGESIA


Hydrophilic Ointment (Vaseline Lip Therapy)  1 applic TP Q2HR PRN


   PRN Reason: Dry Lips


Oxytocin/Sodium Chloride (Pitocin/Ns 20 Unit/1000ml Drip)  20 units in 1,000 mls

@ 250 mls/hr IV AS DIRECT AYAKA


Fentanyl Citrate (Fentanyl Drip Premix)  2,000 mcg in 100 mls @ 3.856 mls/hr IV 

TITR AYAKA; Protocol


   Last Admin: 19 12:44 Dose:  4 mcg/kg/hr, 15.422 mls/hr


   Documented by: 


Midazolam HCl 100 mg/ Sodium (Chloride)  100 mls @ 2 mls/hr IV TITR AYAKA; 

Protocol


   Last Admin: 19 11:23 Dose:  2 mg/hr, 2 mls/hr


   Documented by: 


Piperacillin Sod/Tazobactam Sod (Zosyn/Ns 2.25 Gm/50ml)  2.25 gm in 50 mls @ 100

mls/hr IV Q6H AYAKA


   Last Admin: 19 09:53 Dose:  100 mls/hr


   Documented by: 


Magnesium Hydroxide (Milk Of Magnesia)  30 ml PO Q4H PRN


   PRN Reason: Constipation


Methylprednisolone Sodium Succinate (Solu-Medrol)  80 mg IV Q8H [facility]


   Stop: 19 10:00


   Last Admin: 19 09:53 Dose:  80 mg


   Documented by: 


Metoclopramide HCl (Reglan)  10 mg IV Q6H PRN


   PRN Reason: Nausea And Vomiting


Midazolam HCl (Versed)  2 mg IV Q10MIN PRN


   PRN Reason: Sedation


Morphine Sulfate (Morphine)  2 mg IV Q4H PRN


   PRN Reason: Pain, Moderate (4-6)


Multi-Ingred Cream/Lotion/Oil/Oint (Artificial Tears Ophth Oint)  1 applic OU 

Q4HR PRN


   PRN Reason: Dry Eye(s)


Multi-Ingredient Ointment (Lansinoh)  1 applic TP PRN PRN


   PRN Reason: dryness/cracking


Naloxone HCl (Narcan 0.4 Mg/1 Ml)  0.1 mg IV Q2MIN PRN


   PRN Reason: Res Rate </= 8 or 02 SAT < 92%


Sodium Chloride (Sodium Chloride Flush Syringe 10 Ml)  10 ml IV BID [facility]


   Last Admin: 19 09:54 Dose:  10 ml


   Documented by: 


Sodium Chloride (Sodium Chloride Flush Syringe 10 Ml)  10 ml IV PRN PRN


   PRN Reason: LINE FLUSH


Witch Hazel/Glycerin (Tucks Pad)  1 each TP PRN PRN


   PRN Reason: Hemorrhoids/cleansing/soothing











Physical Examination





- Physical Exam


Narrative exam: 





General appearance: sedated intubated in NAD fiO2 40%, p10 


Eyes: anicteric sclerae, moist conjunctivae; no lid-lag; PERRLA 


HENT: Atraumatic; oropharynx +ETT


Neck: Trachea midline; supple, no thyromegaly or lymphadenopathy 


Lungs: magda coarse BS 


CV: RRR  


Abdomen: Soft, non-tender;  wound covered with surgical dressings clean


Extremities: no edema, cyanosis


Skin: Normal temperature, turgor and texture; no rash, ulcers or subcutaneous n

odules 


Psych: sedated. Neuro: sedated








- Constitutional


Vitals: 


                                   Vital Signs











Temp Pulse Resp BP Pulse Ox


 


 98.4 F   98 H  25 H  117/78   98 


 


 19 03:30  19 12:23  19 12:23  19 12:19  19 12:19








                           Temperature -Last 24 Hours











Temperature                    98.4 F


 


Temperature                    97.9 F


 


Temperature                    98.4 F


 


Temperature                    98.4 F


 


Temperature                    98.3 F

















Results





- Labs


CBC & Chem 7: 


                                 19 12:43





                                19 Unknown


Labs: 


                              Abnormal lab results











  19 Range/Units





  22:35 22:35 22:35 


 


WBC  20.8 H    (4.5-11.0)  K/mm3


 


RBC     (3.65-5.03)  M/mm3


 


MCHC     (30-34)  %


 


RDW  17.2 H    (13.2-15.2)  %


 


Plt Count     (140-440)  K/mm3


 


Seg Neuts % (Manual)  79.0 H    (40.0-70.0)  %


 


Lymphocytes % (Manual)  12.0 L    (13.4-35.0)  %


 


Seg Neutrophils # Man  16.4 H    (1.8-7.7)  K/mm3


 


APTT   20.7 L   (24.2-36.6)  Sec.


 


POC ABG pH     (7.35-7.45)  


 


POC ABG pCO2     (35-45)  


 


POC ABG pO2     ()  


 


Sodium     (137-145)  mmol/L


 


Chloride     ()  mmol/L


 


Carbon Dioxide     (22-30)  mmol/L


 


Creatinine     (0.7-1.2)  mg/dL


 


Glucose     ()  mg/dL


 


POC Glucose     ()  


 


Calcium     (8.4-10.2)  mg/dL


 


Total Creatine Kinase    267 H  ()  units/L


 


CK-MB (CK-2)    9.7 H  (0.0-4.0)  ng/mL


 


Troponin T    0.313 H* D  (0.00-0.029)  ng/mL














  19 Range/Units





  22:44 00:03 01:24 


 


WBC     (4.5-11.0)  K/mm3


 


RBC     (3.65-5.03)  M/mm3


 


MCHC     (30-34)  %


 


RDW     (13.2-15.2)  %


 


Plt Count     (140-440)  K/mm3


 


Seg Neuts % (Manual)     (40.0-70.0)  %


 


Lymphocytes % (Manual)     (13.4-35.0)  %


 


Seg Neutrophils # Man     (1.8-7.7)  K/mm3


 


APTT     (24.2-36.6)  Sec.


 


POC ABG pH  7.046 L   7.246 L  (7.35-7.45)  


 


POC ABG pCO2    47.0 H  (35-45)  


 


POC ABG pO2  62 L   72 L  ()  


 


Sodium     (137-145)  mmol/L


 


Chloride     ()  mmol/L


 


Carbon Dioxide     (22-30)  mmol/L


 


Creatinine     (0.7-1.2)  mg/dL


 


Glucose     ()  mg/dL


 


POC Glucose   152 H   ()  


 


Calcium     (8.4-10.2)  mg/dL


 


Total Creatine Kinase     ()  units/L


 


CK-MB (CK-2)     (0.0-4.0)  ng/mL


 


Troponin T     (0.00-0.029)  ng/mL














  19 Range/Units





  04:28 05:33 05:53 


 


WBC     (4.5-11.0)  K/mm3


 


RBC     (3.65-5.03)  M/mm3


 


MCHC     (30-34)  %


 


RDW     (13.2-15.2)  %


 


Plt Count     (140-440)  K/mm3


 


Seg Neuts % (Manual)     (40.0-70.0)  %


 


Lymphocytes % (Manual)     (13.4-35.0)  %


 


Seg Neutrophils # Man     (1.8-7.7)  K/mm3


 


APTT     (24.2-36.6)  Sec.


 


POC ABG pH    7.328 L  (7.35-7.45)  


 


POC ABG pCO2     (35-45)  


 


POC ABG pO2    256 H  ()  


 


Sodium     (137-145)  mmol/L


 


Chloride     ()  mmol/L


 


Carbon Dioxide     (22-30)  mmol/L


 


Creatinine     (0.7-1.2)  mg/dL


 


Glucose     ()  mg/dL


 


POC Glucose   153 H   ()  


 


Calcium     (8.4-10.2)  mg/dL


 


Total Creatine Kinase     ()  units/L


 


CK-MB (CK-2)     (0.0-4.0)  ng/mL


 


Troponin T  0.740 H* D    (0.00-0.029)  ng/mL














  19 Range/Units





  10:38 10:38 12:43 


 


WBC    16.1 H  (4.5-11.0)  K/mm3


 


RBC    3.47 L  (3.65-5.03)  M/mm3


 


MCHC    35 H  (30-34)  %


 


RDW    17.1 H  (13.2-15.2)  %


 


Plt Count    129 L  (140-440)  K/mm3


 


Seg Neuts % (Manual)     (40.0-70.0)  %


 


Lymphocytes % (Manual)     (13.4-35.0)  %


 


Seg Neutrophils # Man     (1.8-7.7)  K/mm3


 


APTT     (24.2-36.6)  Sec.


 


POC ABG pH     (7.35-7.45)  


 


POC ABG pCO2     (35-45)  


 


POC ABG pO2     ()  


 


Sodium     (137-145)  mmol/L


 


Chloride     ()  mmol/L


 


Carbon Dioxide     (22-30)  mmol/L


 


Creatinine     (0.7-1.2)  mg/dL


 


Glucose     ()  mg/dL


 


POC Glucose     ()  


 


Calcium     (8.4-10.2)  mg/dL


 


Total Creatine Kinase   431 H   ()  units/L


 


CK-MB (CK-2)     (0.0-4.0)  ng/mL


 


Troponin T  0.473 H* D    (0.00-0.029)  ng/mL














  19 Range/Units





  Unknown 


 


WBC   (4.5-11.0)  K/mm3


 


RBC   (3.65-5.03)  M/mm3


 


MCHC   (30-34)  %


 


RDW   (13.2-15.2)  %


 


Plt Count   (140-440)  K/mm3


 


Seg Neuts % (Manual)   (40.0-70.0)  %


 


Lymphocytes % (Manual)   (13.4-35.0)  %


 


Seg Neutrophils # Man   (1.8-7.7)  K/mm3


 


APTT   (24.2-36.6)  Sec.


 


POC ABG pH   (7.35-7.45)  


 


POC ABG pCO2   (35-45)  


 


POC ABG pO2   ()  


 


Sodium  134 L D  (137-145)  mmol/L


 


Chloride  97.9 L  ()  mmol/L


 


Carbon Dioxide  18 L  (22-30)  mmol/L


 


Creatinine  2.3 H D  (0.7-1.2)  mg/dL


 


Glucose  225 H  ()  mg/dL


 


POC Glucose   ()  


 


Calcium  7.4 L  (8.4-10.2)  mg/dL


 


Total Creatine Kinase   ()  units/L


 


CK-MB (CK-2)   (0.0-4.0)  ng/mL


 


Troponin T   (0.00-0.029)  ng/mL














Assessment and Plan


Cultures:


Blood cultures 2019 no growth. 


Urine culture 2019 no growth.  





Assessment: 


38 y/o female with history of preeclampsia admitted on 2019 with 38 weeks 

gestation with uterine contractions. Patient underwent  on 19 due 

to failure to dilate, pulmonary edema and gestational hypertension;  after C-

section she developed acute respiratory failure/ARDS and hypotension, now 

intubated: 





1) Leukocytosis: Present on admission without fever, unclear etiology, likely 

reactive from labor/ and neutrophils demargination from IV steroids. I 

doubt bacterial infection at this time. Initial UA was negative for UTI, repeat 

with mild pyuria. I doubt this is the reason. CXR Chest x-ray showed mild 

cardiomegaly and pulmonary edema. No consolidations. 





2) Acute respiratory failure: CXR with pulmonary edema likely from pre-

emclapsia. Doubt pneumonia. No recent vomiting, doubt aspiration. Should r/o 

heart failure. HIV status is unknown however doubt PJP. 





3) NAZARIO: renal on board, renally adjust all antibiotics.








Recommendations:


- follow-up blood cultures


- check rapid HIV


- stop zosyn 


- start cefepime renally adjusted for now, will probably stop soon


- check procal/CRP 


- Transthoracic echo


 


Will follow. Dr Burr covering tomorrow





Wendi Manzo MD


Infectious Diseases Consultant 


[facility] ([facility])


M [phone]


O [phone] Monitor summary: SR 75-80, WV 0.18, QRS 0.08, QT 0.48, with rare PVCs and rare PACs per strip from monitor room.'

## 2020-02-01 NOTE — PROGRESS NOTES
Patient transfer to CT and S193- bed 2 on monitor. Report given to Carlene and bedside [facility] was performed.

## 2020-02-01 NOTE — CARE PLAN
Problem: Communication  Goal: The ability to communicate needs accurately and effectively will improve  Outcome: PROGRESSING AS EXPECTED     Problem: Safety  Goal: Will remain free from injury  Outcome: PROGRESSING AS EXPECTED  Goal: Will remain free from falls  Outcome: PROGRESSING AS EXPECTED     Problem: Infection  Goal: Will remain free from infection  Outcome: PROGRESSING AS EXPECTED     Problem: Venous Thromboembolism (VTW)/Deep Vein Thrombosis (DVT) Prevention:  Goal: Patient will participate in Venous Thrombosis (VTE)/Deep Vein Thrombosis (DVT)Prevention Measures  Outcome: PROGRESSING AS EXPECTED     Problem: Bowel/Gastric:  Goal: Normal bowel function is maintained or improved  Outcome: PROGRESSING AS EXPECTED  Goal: Will not experience complications related to bowel motility  Outcome: PROGRESSING AS EXPECTED     Problem: Safety:  Goal: Will remain free from injury  Outcome: PROGRESSING AS EXPECTED     Problem: Infection:  Goal: Will remain free from infection  Outcome: PROGRESSING AS EXPECTED

## 2020-02-01 NOTE — OR SURGEON
Immediate Post OP Note    PreOp Diagnosis: feeding difficulty    PostOp Diagnosis: 20F traction removable PEG placed.  cortrak removed.  O/w normal EGD.    Procedure(s):  GASTROSCOPY - Wound Class: Clean Contaminated  INSERTION, PEG TUBE - Wound Class: Clean    Surgeon(s):  Compa Justice M.D.    Anesthesiologist/Type of Anesthesia:  Anesthesiologist: Favian Martinez M.D./General    Surgical Staff:  Circulator: Shayla Mike R.N.  Endoscopy Technician: Ramona Archer    Specimens removed if any:  * No specimens in log *    Estimated Blood Loss: none    Findings: as above    Complications: none    Plan: meds and tube feeds as per orders.        2/1/2020 9:22 AM Compa Justice M.D.

## 2020-02-01 NOTE — ANESTHESIA POSTPROCEDURE EVALUATION
Patient: Familia Lundy    Procedure Summary     Date:  02/01/20 Room / Location:  [facility] 09 / SURGERY [facility]    Anesthesia Start:  0845 Anesthesia Stop:  0918    Procedures:       GASTROSCOPY (N/A Mouth)      INSERTION, PEG TUBE (Left Abdomen) Diagnosis:  (PEG tube insertion)    Surgeon:  Compa Justice M.D. Responsible Provider:  Favian Martinez M.D.    Anesthesia Type:  general ASA Status:  3 - Emergent          Final Anesthesia Type: general  Last vitals  BP   Blood Pressure : (!) 163/78    Temp   36.1 °C (96.9 °F)    Pulse   Pulse: 84   Resp   (!) 29    SpO2   91 %      Anesthesia Post Evaluation    Patient location during evaluation: PACU  Patient participation: complete - patient participated  Level of consciousness: awake and alert    Airway patency: patent  Anesthetic complications: no  Cardiovascular status: hemodynamically stable  Respiratory status: acceptable  Hydration status: euvolemic    PONV: none           Nurse Pain Score: 0 (NPRS)

## 2020-02-01 NOTE — ANESTHESIA PREPROCEDURE EVALUATION
Relevant Problems   ANESTHESIA   (+) VERENA (obstructive sleep apnea)      PULMONARY   (+) Moderate persistent asthma without complication      NEURO   (+) Cerebrovascular accident (CVA) due to thrombosis of right middle cerebral artery (HCC)      CARDIAC   (+) Cerebrovascular accident (CVA) due to thrombosis of right middle cerebral artery (HCC)   (+) Essential hypertension      ENDO   (+) Hypothyroidism       Physical Exam    Airway   Mallampati: II  TM distance: >3 FB  Neck ROM: full       Cardiovascular - normal exam  Rhythm: regular  Rate: normal  (-) murmur     Dental - normal exam         Pulmonary - normal exam  Breath sounds clear to auscultation     Abdominal    Neurological - normal exam                 Anesthesia Plan    ASA 3- EMERGENT   ASA physical status 3 criteria: morbid obesity - BMI greater than or equal to 40ASA physical status emergent criteria: compromised vital organ, limb or tissue    Plan - general       Airway plan will be natural airway        Induction: intravenous    Postoperative Plan: Postoperative administration of opioids is intended.    Pertinent diagnostic labs and testing reviewed    Informed Consent:    Anesthetic plan and risks discussed with patient.    Use of blood products discussed with: patient whom consented to blood products.

## 2020-02-01 NOTE — OR NURSING
Report called to Carlene PAYTON. All pertinent events, medical information and care plan details reported to receiving RN. Reviewed lines and drains including newly placed PEG tube to RUQ. Reviewed hemodynamics, allergies, code status, applicable medications including breathing trmt given, and pertinent assessment findings including focused abdominal, respiratory & surgical site assessment. [facility] reviewed with receiving RN. Surgical site reviewed with receiving RN at bedside. Surgical site CDI. All questions and concerns addressed. Patient remains HDS on 4L Oxymask, AOx4 at this time. Patient educated on next phase of care.

## 2020-02-01 NOTE — PROCEDURES
DATE OF SERVICE:  02/01/2020    INDICATION FOR PROCEDURE:  Feeding difficulty, with anticipated need for   long-term alternate feeding.    CONSENT:  Informed consent was obtained from the patient after benefits, risks   and possible alternatives were discussed.    MEDICATIONS:  The patient received general anesthetic from Dr. Martinez from   anesthesia, please see his notes for full details.    PROCEDURE DESCRIPTION:  The patient was placed in the supine position and was   given oxygen via endotracheal tube.  After the patient had been endotracheally   intubated and sedated, the upper endoscope was placed in the patient's mouth   and advanced easily and carefully to esophagus and subsequently to the stomach   and duodenum.    FINDINGS:  The esophagus appeared normal including views of the GE junction.    The stomach was empty, and otherwise normal, including retroflexed views of   the cardia and angulus.  The duodenum appeared normal to the second portion.    The patient's Cortrak was removed.  The overlying skin was blotted, and an   area of excellent 1:1 impression was found in the mid to distal stomach.  The   site was prepped and draped in the usual manner.  The site was then   anesthetized using 1% Xylocaine solution.  A small vertical incision was made,   and through this incision, a trocar was placed with 1 pass into the gastric   lumen easily.  The trocar was then removed leaving the plastic sheath in   place.  A wire was then placed through the plastic sheath and grasped by the   scope using standard snare technique.  The wire was then pulled through the   patient's mouth in standard fashion.  The PEG tube was then affixed to the   wire and the entire apparatus was pulled into good position across the   abdominal and gastric walls.  The final resting position at the level of the   skin was 7.5 cm.  The scope was then reinserted into the patient's mouth and   the internal bumper was noted to be in good position  adjacent to the anterior   gastric wall.  The scope was then withdrawn, and the PEG tube was dressed and   prepared in the usual manner.    COMPLICATIONS:  No complications during or in the immediate postoperative   period.    IMPRESSION:  1.  Successful placement of 20-Indonesian tractional removable feeding gastrostomy   tube.  2.  Removal of Cortrak tube.  3.  Otherwise, normal upper endoscopy.    RECOMMENDATION:  As per orders regarding medications and tube feeds.       ____________________________________     NABEEL CLARK MD    WP / NTS    DD:  02/01/2020 09:27:27  DT:  02/01/2020 09:56:39    D#:  [id number]  Job#:  [id number]

## 2020-02-01 NOTE — ANESTHESIA PROCEDURE NOTES
Airway  Performed by: Favian Martinez M.D.  Authorized by: Favian Martinez M.D.     Location:  OR  Urgency:  Elective  Indications for Airway Management:  Anesthesia  Spontaneous Ventilation: absent    Sedation Level:  Deep  Preoxygenated: Yes    Patient Position:  Sniffing  Final Airway Type:  Endotracheal airway  Final Endotracheal Airway:  ETT  Cuffed: Yes    Technique Used for Successful ETT Placement:  Direct laryngoscopy  Insertion Site:  Oral  Blade Type:  Saida  Laryngoscope Blade/Videolaryngoscope Blade Size:  4  ETT Size (mm):  8.0  Measured from:  Teeth  ETT to Teeth (cm):  24  Placement Verified by: auscultation and capnometry    Cormack-Lehane Classification:  Grade I - full view of glottis  Number of Attempts at Approach:  1

## 2020-02-01 NOTE — ANESTHESIA TIME REPORT
Anesthesia Start and Stop Event Times     Date Time Event    2/1/2020 0845 Anesthesia Start     0935 Anesthesia Stop        Responsible Staff  02/01/20    Name Role Begin End    Favian Martinez M.D. Anesth 0845 0935        Preop Diagnosis (Free Text):  Pre-op Diagnosis     Dysphagia, CVA        Preop Diagnosis (Codes):    Post op Diagnosis  Dysphagia      Premium Reason  E. Weekend    Comments:

## 2020-02-01 NOTE — PROGRESS NOTES
[facility] Medicine Daily Progress Note    Date of Service  2/1/2020    Chief Complaint  74 y.o. male admitted 1/27/2020 with left hand numbness.    Hospital Course    Mr. Lundy has a past medical history of dyslipidemia and asthma that developed acute onset of left arm numbness and weakness.  He presented to the emergency room and was administered alteplase with subsequent ICU admission.  MRI confirmed a hemorrhagic transformation therefore he was given cryoprecipitate.      Interval Problem Update  1/30/2020: Patient seen and evaluated in the intensive care unit. He remains requiring cortrak feeding. We discussed the possibility of a PEG as he will be moving to a SNF otherwise with a cortrak. Dr. Thayer agrees that he is unlikely to swallow safely for quite some time. MR. Lundy is amenable to the procedure. His brother is coming in tomorrow.  1/31: patient seen and evaluated in the ICU. I met with his two daughters at bedside. We discussed post-acute options such as SNF vs rehab. We also discussed that he will likely need to PEG. His oxygen requirements went up to 10 liters from 4 liters thus PEG was put off until tomorrow.   2/1: Mr. Lundy was evaluated and examined on the neuro floor.  He tolerated his PEG tube today well daughters at bedside we discussed his condition.  When I came in the room he was laying relatively flat and was wheezing with increased shortness of breath though this improved once we sat him up at a 45 degree angle.  We discussed that he should not be laying flat and nothing less than 30 degrees.  His brother is coming in tomorrow we discussed this may change if he is a candidate for rehab or skilled nursing facility if his brother can stay with him for a while after rehab stay.  Consultants/Specialty  Critical care  Neurology, I discussed with Dr. Liset Pearson  Rehab   GI consulted for PEG  Code Status  DNR/DNI    Disposition  neuro    Review of Systems  Review of Systems   Constitutional: Negative  for chills and fever.   Respiratory: Positive for cough, shortness of breath and wheezing. Negative for sputum production.    Cardiovascular: Negative for chest pain and palpitations.   Neurological:        Left sided weakness   All other systems reviewed and are negative.       Physical Exam  Temp:  [36.6 °C (97.9 °F)-37.3 °C (99.2 °F)] 36.9 °C (98.5 °F)  Pulse:  [71-84] 79  Resp:  [15-29] 19  BP: (141-159)/(60-83) 144/76  SpO2:  [88 %-97 %] 97 %    Physical Exam  Vitals signs and nursing note reviewed.   Constitutional:       Appearance: Normal appearance. He is obese.   HENT:      Head:      Comments: Left facial droop     Mouth/Throat:      Mouth: Mucous membranes are dry.      Pharynx: Oropharynx is clear.   Neck:      Musculoskeletal: Normal range of motion and neck supple.   Cardiovascular:      Rate and Rhythm: Normal rate and regular rhythm.      Pulses: Normal pulses.      Heart sounds: Normal heart sounds.   Pulmonary:      Breath sounds: Wheezing and rales present.   Abdominal:      General: There is distension.      Tenderness: There is no tenderness.      Comments: PEG tube   Musculoskeletal:      Right lower leg: Edema present.      Left lower leg: Edema present.      Comments: scds bilat   Skin:     General: Skin is warm and dry.   Neurological:      Mental Status: He is alert.      Comments: 4+/5 strength left arm with decreased dexterity left hand     Psychiatric:         Mood and Affect: Mood normal.         Behavior: Behavior normal.      Comments: Speech is coherent          Fluids    Intake/Output Summary (Last 24 hours) at 2/1/2020 0728  Last data filed at 1/31/2020 1300  Gross per 24 hour   Intake 440 ml   Output 700 ml   Net -260 ml       Laboratory  Recent Labs     01/30/20  0308 01/31/20  0420   WBC 10.2 9.1   RBC 4.39* 4.40*   HEMOGLOBIN 13.8* 13.9*   HEMATOCRIT 43.1 42.6   MCV 98.2* 96.8   MCH 31.4 31.6   MCHC 32.0* 32.6*   RDW 51.5* 48.5   PLATELETCT 219 214   MPV 11.0 10.9     Recent  Labs     01/30/20  0308 01/31/20  0420   SODIUM 143 140   POTASSIUM 3.7 3.7   CHLORIDE 107 104   CO2 27 28   GLUCOSE 148* 128*   BUN 16 19   CREATININE 0.94 0.87   CALCIUM 9.1 9.2                   Imaging  CT-HEAD W/O   Final Result      1.  Stable RIGHT frontoparietal intraparenchymal hemorrhage with surrounding edema compared to MRI brain dated 1/29/2020.  Hemorrhage and edema both increased from prior CT dated 1/28/2020.   2.  Acute RIGHT frontal infarct as seen on prior MRI.   3.  No midline shift.         DX-CHEST-LIMITED (1 VIEW)   Final Result         Enlarged cardiac silhouette with interstitial prominence suggesting mild edema.      DX-ABDOMEN FOR TUBE PLACEMENT   Final Result      Cortrak nasogastric tube position consistent with intragastric placement, probably near the pylorus.      MR-BRAIN-W/O   Final Result      1.  Moderate acute/subacute right MCA infarct with hemorrhagic transformation.   2.  Right frontal lobe encephalomalacia.   3.  Mild to moderate cerebral atrophy.      EC-ECHOCARDIOGRAM COMPLETE W/O CONT   Final Result      CT-HEAD W/O   Final Result   Addendum 1 of 1   These findings were discussed with REMI SHAFFER on 1/28/2020 10:57 AM.      Final      DX-CHEST-PORTABLE (1 VIEW)   Final Result      Cardiomegaly with interstitial prominence.      CT-CTA NECK WITH & W/O-POST PROCESSING   Final Result      1.  Atherosclerotic plaque at the carotid bifurcations bilaterally which results in less than 50% diameter narrowing.      2.  Diminutive right vertebral artery.      3.  Tortuous left internal carotid artery which is retropharyngeal in location.      CT-CTA HEAD WITH & W/O-POST PROCESS   Final Result      1.  Some asymmetric attenuation of the right middle cerebral arterial peripheral vessels without evidence of acute occlusion possibly representing vasospasm or atherosclerotic attenuation.      2.  Diminutive right vertebral artery.      CT-HEAD W/O   Final Result      1.  No evidence  of acute intracranial process.      2.  Cerebral atrophy as well as periventricular chronic small vessel ischemic change.           Assessment/Plan  * Cerebrovascular accident (CVA) due to thrombosis of right middle cerebral artery (HCC)  Assessment & Plan  Acute ischemic stroke status post alteplase and ICU admission  He developed a hemorrhagic transformation and he was given cryoprecipitate   Neurology consulted  High intensity statin  PT/OT/ST  GI consulted for a PEG which was placed on 2/1/2020  Rehab consulted rehab vs SNF: He is an excellent candidate for rehab but does not have a disposition afterwards therefore the recommendation from physiatry was to go to skilled nursing facility.  His brother is coming down and may be able to stay with him and therefore this may change    Hypoxia- (present on admission)  Assessment & Plan  Continues to require supplemental oxygen  He has ongoing coarse tracheal breath sounds concerning for ongoing aspiration though he has been n.p.o.    Morbid obesity with BMI of 40.0-44.9, adult ([facility])  Assessment & Plan  Body mass index is 40.72 kg/m².      Moderate persistent asthma without complication  Assessment & Plan  History of    Dyslipidemia  Assessment & Plan  High intensity statin    Essential hypertension  Assessment & Plan  Is post permissive hypertension  He was on a nicardipine drip has now been turned off  He has been started back on his home metoprolol dose   Added Norvasc 5 mg daily  has PRN IV hydralazine and labetalol systolic blood pressure over 150    Hypothyroidism- (present on admission)  Assessment & Plan  On replacement therapy which will be continued.    VERENA (obstructive sleep apnea)- (present on admission)  Assessment & Plan  On CPAP nightly with supplemental oxygen.  Respiratory therapy has been consulted for the same.       VTE prophylaxis: SCDs

## 2020-02-01 NOTE — PROGRESS NOTES
Monitor Summary: Pt is in NSR with no observed ectopy.  HR is 80, WI 0.12, QRS 0.08, QT 0.38 per strip from monitor room.

## 2020-02-01 NOTE — CARE PLAN
Problem: Respiratory:  Goal: Respiratory status will improve  Outcome: PROGRESSING AS EXPECTED   Pt frequently self sectioning, spitting out excess secretions, and keeping the HOB/ sitting up in the chair appropriately.     Problem: Urinary:  Goal: Ability to maintain continence will improve  Outcome: PROGRESSING AS EXPECTED  Goal: Ability to reestablish a normal urinary elimination pattern will improve  Outcome: PROGRESSING AS EXPECTED   Pt mobilizing up to bathroom to toilet.     Problem: Mobility:  Goal: Mobility will improve  Outcome: PROGRESSING AS EXPECTED  Pt ambulating as a 1 person assist up to the bathroom. Tires quickly but self-motivated to mobilize frequently. Pt expressing excitement to go to rehab.

## 2020-02-02 LAB
BASOPHILS # BLD AUTO: 0.7 % (ref 0–1.8)
BASOPHILS # BLD AUTO: 0.8 % (ref 0–1.8)
BASOPHILS # BLD AUTO: 0.8 % (ref 0–1.8)
BASOPHILS # BLD: 0.08 K/UL (ref 0–0.12)
BASOPHILS # BLD: 0.08 K/UL (ref 0–0.12)
BASOPHILS # BLD: 0.09 K/UL (ref 0–0.12)
COMMENT 1642: NORMAL
EOSINOPHIL # BLD AUTO: 0.27 K/UL (ref 0–0.51)
EOSINOPHIL # BLD AUTO: 0.32 K/UL (ref 0–0.51)
EOSINOPHIL # BLD AUTO: 0.33 K/UL (ref 0–0.51)
EOSINOPHIL NFR BLD: 2.6 % (ref 0–6.9)
EOSINOPHIL NFR BLD: 2.8 % (ref 0–6.9)
EOSINOPHIL NFR BLD: 3.1 % (ref 0–6.9)
ERYTHROCYTE [DISTWIDTH] IN BLOOD BY AUTOMATED COUNT: 46.2 FL (ref 35.9–50)
ERYTHROCYTE [DISTWIDTH] IN BLOOD BY AUTOMATED COUNT: 47.3 FL (ref 35.9–50)
ERYTHROCYTE [DISTWIDTH] IN BLOOD BY AUTOMATED COUNT: 48.1 FL (ref 35.9–50)
HCT VFR BLD AUTO: 39.1 % (ref 42–52)
HCT VFR BLD AUTO: 40 % (ref 42–52)
HCT VFR BLD AUTO: 40.1 % (ref 42–52)
HGB BLD-MCNC: 12.7 G/DL (ref 14–18)
HGB BLD-MCNC: 12.8 G/DL (ref 14–18)
HGB BLD-MCNC: 13.3 G/DL (ref 14–18)
IMM GRANULOCYTES # BLD AUTO: 0.05 K/UL (ref 0–0.11)
IMM GRANULOCYTES # BLD AUTO: 0.06 K/UL (ref 0–0.11)
IMM GRANULOCYTES # BLD AUTO: 0.06 K/UL (ref 0–0.11)
IMM GRANULOCYTES NFR BLD AUTO: 0.5 % (ref 0–0.9)
IMM GRANULOCYTES NFR BLD AUTO: 0.5 % (ref 0–0.9)
IMM GRANULOCYTES NFR BLD AUTO: 0.6 % (ref 0–0.9)
LYMPHOCYTES # BLD AUTO: 2.12 K/UL (ref 1–4.8)
LYMPHOCYTES # BLD AUTO: 2.34 K/UL (ref 1–4.8)
LYMPHOCYTES # BLD AUTO: 2.41 K/UL (ref 1–4.8)
LYMPHOCYTES NFR BLD: 19.7 % (ref 22–41)
LYMPHOCYTES NFR BLD: 21.3 % (ref 22–41)
LYMPHOCYTES NFR BLD: 22.6 % (ref 22–41)
MCH RBC QN AUTO: 31.7 PG (ref 27–33)
MCH RBC QN AUTO: 31.8 PG (ref 27–33)
MCH RBC QN AUTO: 32.7 PG (ref 27–33)
MCHC RBC AUTO-ENTMCNC: 32 G/DL (ref 33.7–35.3)
MCHC RBC AUTO-ENTMCNC: 32.5 G/DL (ref 33.7–35.3)
MCHC RBC AUTO-ENTMCNC: 33.2 G/DL (ref 33.7–35.3)
MCV RBC AUTO: 97.8 FL (ref 81.4–97.8)
MCV RBC AUTO: 98.5 FL (ref 81.4–97.8)
MCV RBC AUTO: 99 FL (ref 81.4–97.8)
MONOCYTES # BLD AUTO: 0.87 K/UL (ref 0–0.85)
MONOCYTES # BLD AUTO: 0.9 K/UL (ref 0–0.85)
MONOCYTES # BLD AUTO: 0.9 K/UL (ref 0–0.85)
MONOCYTES NFR BLD AUTO: 8 % (ref 0–13.4)
MONOCYTES NFR BLD AUTO: 8.4 % (ref 0–13.4)
MONOCYTES NFR BLD AUTO: 8.4 % (ref 0–13.4)
MORPHOLOGY BLD-IMP: NORMAL
NEUTROPHILS # BLD AUTO: 6.73 K/UL (ref 1.82–7.42)
NEUTROPHILS # BLD AUTO: 7.25 K/UL (ref 1.82–7.42)
NEUTROPHILS # BLD AUTO: 7.55 K/UL (ref 1.82–7.42)
NEUTROPHILS NFR BLD: 65 % (ref 44–72)
NEUTROPHILS NFR BLD: 66.7 % (ref 44–72)
NEUTROPHILS NFR BLD: 67.5 % (ref 44–72)
NRBC # BLD AUTO: 0 K/UL
NRBC # BLD AUTO: 0 K/UL
NRBC # BLD AUTO: 0.02 K/UL
NRBC BLD-RTO: 0 /100 WBC
NRBC BLD-RTO: 0 /100 WBC
NRBC BLD-RTO: 0.2 /100 WBC
PLATELET # BLD AUTO: 264 K/UL (ref 164–446)
PLATELET # BLD AUTO: 282 K/UL (ref 164–446)
PLATELET # BLD AUTO: 294 K/UL (ref 164–446)
PMV BLD AUTO: 10.9 FL (ref 9–12.9)
PMV BLD AUTO: 11.3 FL (ref 9–12.9)
PMV BLD AUTO: 11.6 FL (ref 9–12.9)
RBC # BLD AUTO: 4 M/UL (ref 4.7–6.1)
RBC # BLD AUTO: 4.04 M/UL (ref 4.7–6.1)
RBC # BLD AUTO: 4.07 M/UL (ref 4.7–6.1)
WBC # BLD AUTO: 10.4 K/UL (ref 4.8–10.8)
WBC # BLD AUTO: 10.7 K/UL (ref 4.8–10.8)
WBC # BLD AUTO: 11.3 K/UL (ref 4.8–10.8)

## 2020-02-02 PROCEDURE — 85025 COMPLETE CBC W/AUTO DIFF WBC: CPT

## 2020-02-02 PROCEDURE — A9270 NON-COVERED ITEM OR SERVICE: HCPCS | Performed by: PSYCHIATRY & NEUROLOGY

## 2020-02-02 PROCEDURE — 99232 SBSQ HOSP IP/OBS MODERATE 35: CPT | Performed by: HOSPITALIST

## 2020-02-02 PROCEDURE — A9270 NON-COVERED ITEM OR SERVICE: HCPCS | Performed by: HOSPITALIST

## 2020-02-02 PROCEDURE — 700102 HCHG RX REV CODE 250 W/ 637 OVERRIDE(OP): Performed by: PSYCHIATRY & NEUROLOGY

## 2020-02-02 PROCEDURE — 36415 COLL VENOUS BLD VENIPUNCTURE: CPT

## 2020-02-02 PROCEDURE — 770020 HCHG ROOM/CARE - TELE (206)

## 2020-02-02 PROCEDURE — 700102 HCHG RX REV CODE 250 W/ 637 OVERRIDE(OP): Performed by: HOSPITALIST

## 2020-02-02 RX ORDER — TRAZODONE HYDROCHLORIDE 100 MG/1
50 TABLET ORAL
Status: DISCONTINUED | OUTPATIENT
Start: 2020-02-02 | End: 2020-02-05 | Stop reason: HOSPADM

## 2020-02-02 RX ORDER — UREA 10 %
LOTION (ML) TOPICAL
Status: DISCONTINUED | OUTPATIENT
Start: 2020-02-02 | End: 2020-02-05 | Stop reason: HOSPADM

## 2020-02-02 RX ADMIN — Medication 3 MG: at 22:05

## 2020-02-02 RX ADMIN — LEVOTHYROXINE SODIUM 88 MCG: 88 TABLET ORAL at 04:32

## 2020-02-02 RX ADMIN — LABETALOL HYDROCHLORIDE 10 MG: 5 INJECTION, SOLUTION INTRAVENOUS at 13:16

## 2020-02-02 RX ADMIN — METOPROLOL TARTRATE 50 MG: 25 TABLET, FILM COATED ORAL at 04:32

## 2020-02-02 RX ADMIN — TRAZODONE HYDROCHLORIDE 50 MG: 100 TABLET ORAL at 22:05

## 2020-02-02 RX ADMIN — ATORVASTATIN CALCIUM 80 MG: 80 TABLET, FILM COATED ORAL at 18:06

## 2020-02-02 RX ADMIN — METOPROLOL TARTRATE 50 MG: 25 TABLET, FILM COATED ORAL at 18:06

## 2020-02-02 RX ADMIN — AMLODIPINE BESYLATE 5 MG: 5 TABLET ORAL at 04:32

## 2020-02-02 ASSESSMENT — ENCOUNTER SYMPTOMS
CARDIOVASCULAR NEGATIVE: 1
RESPIRATORY NEGATIVE: 1
SHORTNESS OF BREATH: 0
WHEEZING: 1
CHILLS: 0
SPUTUM PRODUCTION: 0
FEVER: 0
COUGH: 0
PALPITATIONS: 0
GASTROINTESTINAL NEGATIVE: 1

## 2020-02-02 NOTE — CARE PLAN
Problem: Communication  Goal: The ability to communicate needs accurately and effectively will improve  Outcome: PROGRESSING AS EXPECTED     Problem: Safety  Goal: Will remain free from injury  Outcome: PROGRESSING AS EXPECTED  Goal: Will remain free from falls  Outcome: PROGRESSING AS EXPECTED     Problem: Infection  Goal: Will remain free from infection  Outcome: PROGRESSING AS EXPECTED     Problem: Venous Thromboembolism (VTW)/Deep Vein Thrombosis (DVT) Prevention:  Goal: Patient will participate in Venous Thrombosis (VTE)/Deep Vein Thrombosis (DVT)Prevention Measures  Outcome: PROGRESSING AS EXPECTED     Problem: Bowel/Gastric:  Goal: Normal bowel function is maintained or improved  Outcome: PROGRESSING AS EXPECTED  Goal: Will not experience complications related to bowel motility  Outcome: PROGRESSING AS EXPECTED     Problem: Knowledge Deficit  Goal: Knowledge of disease process/condition, treatment plan, diagnostic tests, and medications will improve  Outcome: PROGRESSING AS EXPECTED  Goal: Knowledge of the prescribed therapeutic regimen will improve  Outcome: PROGRESSING AS EXPECTED     Problem: Discharge Barriers/Planning  Goal: Patient's continuum of care needs will be met  Outcome: PROGRESSING AS EXPECTED     Problem: Fluid Volume:  Goal: Will maintain balanced intake and output  Outcome: PROGRESSING AS EXPECTED     Problem: Respiratory:  Goal: Respiratory status will improve  Outcome: PROGRESSING AS EXPECTED     Problem: Skin Integrity  Goal: Risk for impaired skin integrity will decrease  Outcome: PROGRESSING AS EXPECTED     Problem: Pain Management  Goal: Pain level will decrease to patient's comfort goal  Outcome: PROGRESSING AS EXPECTED     Problem: Acute Care of the Stroke Patient  Goal: Optimal Outcome for the Stroke patient  Outcome: PROGRESSING AS EXPECTED     Problem: Communication:  Goal: The ability to communicate needs accurately and effectively will improve  Outcome: PROGRESSING AS  EXPECTED     Problem: Safety:  Goal: Will remain free from injury  Outcome: PROGRESSING AS EXPECTED  Goal: Risk of aspiration will decrease  Outcome: PROGRESSING AS EXPECTED     Problem: Infection:  Goal: Will remain free from infection  Outcome: PROGRESSING AS EXPECTED     Problem: Psychosocial Needs:  Goal: Ability to identify and develop effective coping behavior will improve  Outcome: PROGRESSING AS EXPECTED  Goal: Ability to identify appropriate support needs will improve  Outcome: PROGRESSING AS EXPECTED  Goal: Ability to verbalize feelings about condition will improve  Outcome: PROGRESSING AS EXPECTED  Goal: Communication of feelings of control over situation will improve  Outcome: PROGRESSING AS EXPECTED  Goal: Ability to verbalize positive feelings about self will improve  Outcome: PROGRESSING AS EXPECTED  Goal: Ability to reevaluate and adapt role responsibilities will improve  Outcome: PROGRESSING AS EXPECTED     Problem: Peripheral Vascular Perfusion:  Goal: Signs of adequate cerebral perfusion will increase  Outcome: PROGRESSING AS EXPECTED  Goal: Neurologic status will improve  Outcome: PROGRESSING AS EXPECTED  Goal: Will show no signs and symptoms of excessive bleeding  Outcome: PROGRESSING AS EXPECTED     Problem: Respiratory:  Goal: Ability to maintain a clear airway will improve  Outcome: PROGRESSING AS EXPECTED  Goal: Ability to maintain adequate ventilation will improve  Outcome: PROGRESSING AS EXPECTED     Problem: Nutritional:  Goal: Dysphagia will improve  Outcome: PROGRESSING AS EXPECTED  Goal: Maintenance of adequate nutrition will improve  Outcome: PROGRESSING AS EXPECTED     Problem: Urinary:  Goal: Ability to maintain continence will improve  Outcome: PROGRESSING AS EXPECTED  Goal: Ability to reestablish a normal urinary elimination pattern will improve  Outcome: PROGRESSING AS EXPECTED     Problem: Mobility:  Goal: Capacity to carry out activities will improve  Outcome: PROGRESSING AS  EXPECTED  Goal: Mobility will improve  Outcome: PROGRESSING AS EXPECTED  Goal: Range of joint motion will improve  Outcome: PROGRESSING AS EXPECTED     Problem: Self-Care:  Goal: Ability to participate in self-care as condition permits will improve  Outcome: PROGRESSING AS EXPECTED  Goal: Verbalization of feelings and concerns over difficulty with self-care will improve  Outcome: PROGRESSING AS EXPECTED     Problem: Physical Regulation:  Goal: Ability to maintain clinical measurements within normal limits will improve  Outcome: PROGRESSING AS EXPECTED  Goal: Complications related to the disease process, condition or treatment will be avoided or minimized  Outcome: PROGRESSING AS EXPECTED     Problem: Knowledge Deficit:  Goal: Knowledge of disease process/condition, treatment plan, diagnostic tests, and medications will improve  Outcome: PROGRESSING AS EXPECTED  Goal: Ability to state lifestyle or environmental changes necessary to maintain safety will improve  Outcome: PROGRESSING AS EXPECTED     Problem: Health Behavior:  Goal: Ability to manage health-related needs will improve  Outcome: PROGRESSING AS EXPECTED     Problem: Psychosocial Needs:  Goal: Level of anxiety will decrease  Outcome: PROGRESSING AS EXPECTED

## 2020-02-02 NOTE — PROGRESS NOTES
Monitor summary: SR-ST , NH .16, QRS .10, QT .40 with rare PACs, rare PVCs per strip from monitor room.

## 2020-02-02 NOTE — PROGRESS NOTES
Gastroenterology Progress Note     Author: Compa Justice M.D.   Date & Time Created: 2020 8:10 AM    Chief Complaint:  Feeding difficulty    Interval History:  PEG placed yesterday  TF at 30 cc currently  Patient denies current complaints.    Review of Systems:  Review of Systems   Respiratory: Negative.    Cardiovascular: Negative.    Gastrointestinal: Negative.        Physical Exam:  Physical Exam  Abdominal:      Comments: PEG tube site cleaned, external bumper adjusted.  Bandages removed.           Labs:          Recent Labs     20  042   SODIUM 140   POTASSIUM 3.7   CHLORIDE 104   CO2 28   BUN 19   CREATININE 0.87   CALCIUM 9.2     Recent Labs     20   GLUCOSE 128*     Recent Labs     20  0527   RBC 4.40* 4.10* 4.07*   HEMOGLOBIN 13.9* 12.9* 13.3*   HEMATOCRIT 42.6 39.4* 40.1*   PLATELETCT 214 260 264     Recent Labs     20  0527   WBC 9.1 11.9* 10.7   NEUTSPOLYS  --  71.50 67.50   LYMPHOCYTES  --  17.40* 19.70*   MONOCYTES  --  7.80 8.40   EOSINOPHILS  --  2.20 3.10   BASOPHILS  --  0.60 0.80     Hemodynamics:  Temp (24hrs), Av.6 °C (97.9 °F), Min:36.1 °C (96.9 °F), Max:36.9 °C (98.5 °F)  Temperature: 36.9 °C (98.5 °F)  Pulse  Av.8  Min: 41  Max: 117   Blood Pressure : 139/81     Respiratory:    Respiration: (!) 22, Pulse Oximetry: 93 %     Work Of Breathing / Effort: Moderate;Increased Work of Breathing;Tachypnea  RUL Breath Sounds: Rhonchi, RML Breath Sounds: Rhonchi, RLL Breath Sounds: Diminished, HARMONY Breath Sounds: Rhonchi, LLL Breath Sounds: Diminished  Fluids:    Intake/Output Summary (Last 24 hours) at 2020 0810  Last data filed at 2020  Gross per 24 hour   Intake 30 ml   Output --   Net 30 ml        GI/Nutrition:  Orders Placed This Encounter   Procedures   • Diet NPO     Standing Status:   Standing     Number of Occurrences:   8     Order Specific Question:   Restrict to:     Answer:    Strict [1]     Medical Decision Making, by Problem:  Active Hospital Problems    Diagnosis   • *Cerebrovascular accident (CVA) due to thrombosis of right middle cerebral artery (HCC) [I63.311]   • Hypoxia [R09.02]   • Hypothyroidism [E03.9]   • VERENA (obstructive sleep apnea) [G47.33]   • Essential hypertension [I10]   • Dyslipidemia [E78.5]   • Moderate persistent asthma without complication [J45.40]   • Morbid obesity with BMI of 40.0-44.9, adult ([facility]) [E66.01, Z68.41]       Plan:  Advance tube feeds to goal  Abdominal binder  Will s/o - please call if needed.    Quality-Core Measures

## 2020-02-02 NOTE — CARE PLAN
Problem: Respiratory:  Goal: Respiratory status will improve  Outcome: PROGRESSING AS EXPECTED   Pt frequently self sectioning, spitting out excess secretions, and keeping the HOB/ sitting up in the chair appropriately. Pt currently on 4L via NC.     Problem: Urinary:  Goal: Ability to maintain continence will improve  Outcome: PROGRESSING AS EXPECTED  Goal: Ability to reestablish a normal urinary elimination pattern will improve  Outcome: PROGRESSING AS EXPECTED   Pt mobilizing up to bathroom to toilet. No episodes of incontinence.     Problem: Mobility:  Goal: Mobility will improve  Outcome: PROGRESSING AS EXPECTED  Pt ambulating as a 1 person assist up to the bathroom. Tires quickly but self-motivated to mobilize frequently.

## 2020-02-02 NOTE — PROGRESS NOTES
Pt has tarry, dark stool with a small amount of lonnie red blood.  MD notified.  H/H ordered, will monitor for now.

## 2020-02-02 NOTE — PROGRESS NOTES
Monitor Summary: Pt is in NSR with rare PVC.  HR 61-95 NV 0.14, QRS 0.10, QT 0.38 per strip from monitor room.

## 2020-02-02 NOTE — PROGRESS NOTES
[facility] Medicine Daily Progress Note    Date of Service  2/2/2020    Chief Complaint  74 y.o. male admitted 1/27/2020 with left hand numbness.    Hospital Course    Mr. Lundy has a past medical history of dyslipidemia and asthma that developed acute onset of left arm numbness and weakness.  He presented to the emergency room and was administered alteplase with subsequent ICU admission.  MRI confirmed a hemorrhagic transformation therefore he was given cryoprecipitate.      Interval Problem Update  1/30/2020: Patient seen and evaluated in the intensive care unit. He remains requiring cortrak feeding. We discussed the possibility of a PEG as he will be moving to a SNF otherwise with a cortrak. Dr. Thayer agrees that he is unlikely to swallow safely for quite some time. MR. Lundy is amenable to the procedure. His brother is coming in tomorrow.  1/31: patient seen and evaluated in the ICU. I met with his two daughters at bedside. We discussed post-acute options such as SNF vs rehab. We also discussed that he will likely need to PEG. His oxygen requirements went up to 10 liters from 4 liters thus PEG was put off until tomorrow.   2/1: Mr. Lundy was evaluated and examined on the neuro floor.  He tolerated his PEG tube today well daughters at bedside we discussed his condition.  When I came in the room he was laying relatively flat and was wheezing with increased shortness of breath though this improved once we sat him up at a 45 degree angle.  We discussed that he should not be laying flat and nothing less than 30 degrees.  His brother is coming in tomorrow we discussed this may change if he is a candidate for rehab or skilled nursing facility if his brother can stay with him for a while after rehab stay.  2/2/2020: Patient seen and evaluated on the neurology floor.  He tolerated his PEG tube yesterday well.  Last night he did have some black stools afterwards.  Today he continues to wheeze and is on submental oxygen his  brother will be here today and we discussed that perhaps if his brother is willing to stay with him after rehab, then he may be candidate for rehab versus skilled nursing facility  Consultants/Specialty  Critical care  Neurology  Rehab   GI consulted for PEG  Code Status  DNR/DNI    Disposition  neuro    Review of Systems  Review of Systems   Constitutional: Negative for chills and fever.   Respiratory: Positive for wheezing. Negative for cough, sputum production and shortness of breath.    Cardiovascular: Negative for chest pain and palpitations.   Gastrointestinal:        Tolerating PEG feeding   Neurological:        Left sided weakness   All other systems reviewed and are negative.       Physical Exam  Temp:  [36.1 °C (96.9 °F)-36.9 °C (98.5 °F)] 36.9 °C (98.5 °F)  Pulse:  [78-97] 78  Resp:  [18-32] 22  BP: (139-163)/(68-87) 139/81  SpO2:  [91 %-96 %] 93 %    Physical Exam  Vitals signs and nursing note reviewed.   Constitutional:       Appearance: Normal appearance. He is obese.   HENT:      Head:      Comments: Left facial droop     Mouth/Throat:      Mouth: Mucous membranes are dry.      Pharynx: Oropharynx is clear.   Neck:      Musculoskeletal: Normal range of motion and neck supple.   Cardiovascular:      Rate and Rhythm: Normal rate and regular rhythm.      Pulses: Normal pulses.      Heart sounds: Normal heart sounds.   Pulmonary:      Breath sounds: Wheezing present. No rales.   Abdominal:      General: There is no distension.      Tenderness: There is no tenderness.      Comments: PEG tube   Musculoskeletal:      Right lower leg: Edema present.      Left lower leg: Edema present.      Comments: scds bilat   Skin:     General: Skin is warm and dry.   Neurological:      Mental Status: He is alert.      Comments: Slight decrease in dexterity left hand otherwise strength is about equal     Psychiatric:         Mood and Affect: Mood normal.         Behavior: Behavior normal.      Comments: Speech is coherent           Fluids    Intake/Output Summary (Last 24 hours) at 2/2/2020 0820  Last data filed at 2/1/2020 2000  Gross per 24 hour   Intake 30 ml   Output --   Net 30 ml       Laboratory  Recent Labs     01/31/20  0420 02/01/20  2156 02/02/20  0527   WBC 9.1 11.9* 10.7   RBC 4.40* 4.10* 4.07*   HEMOGLOBIN 13.9* 12.9* 13.3*   HEMATOCRIT 42.6 39.4* 40.1*   MCV 96.8 96.1 98.5*   MCH 31.6 31.5 32.7   MCHC 32.6* 32.7* 33.2*   RDW 48.5 46.5 48.1   PLATELETCT 214 260 264   MPV 10.9 10.9 11.6     Recent Labs     01/31/20  0420   SODIUM 140   POTASSIUM 3.7   CHLORIDE 104   CO2 28   GLUCOSE 128*   BUN 19   CREATININE 0.87   CALCIUM 9.2                   Imaging  CT-HEAD W/O   Final Result      1.  Stable RIGHT frontoparietal intraparenchymal hemorrhage with surrounding edema compared to MRI brain dated 1/29/2020.  Hemorrhage and edema both increased from prior CT dated 1/28/2020.   2.  Acute RIGHT frontal infarct as seen on prior MRI.   3.  No midline shift.         DX-CHEST-LIMITED (1 VIEW)   Final Result         Enlarged cardiac silhouette with interstitial prominence suggesting mild edema.      DX-ABDOMEN FOR TUBE PLACEMENT   Final Result      Cortrak nasogastric tube position consistent with intragastric placement, probably near the pylorus.      MR-BRAIN-W/O   Final Result      1.  Moderate acute/subacute right MCA infarct with hemorrhagic transformation.   2.  Right frontal lobe encephalomalacia.   3.  Mild to moderate cerebral atrophy.      EC-ECHOCARDIOGRAM COMPLETE W/O CONT   Final Result      CT-HEAD W/O   Final Result   Addendum 1 of 1   These findings were discussed with REMI SHAFFER on 1/28/2020 10:57 AM.      Final      DX-CHEST-PORTABLE (1 VIEW)   Final Result      Cardiomegaly with interstitial prominence.      CT-CTA NECK WITH & W/O-POST PROCESSING   Final Result      1.  Atherosclerotic plaque at the carotid bifurcations bilaterally which results in less than 50% diameter narrowing.      2.  Diminutive right  vertebral artery.      3.  Tortuous left internal carotid artery which is retropharyngeal in location.      CT-CTA HEAD WITH & W/O-POST PROCESS   Final Result      1.  Some asymmetric attenuation of the right middle cerebral arterial peripheral vessels without evidence of acute occlusion possibly representing vasospasm or atherosclerotic attenuation.      2.  Diminutive right vertebral artery.      CT-HEAD W/O   Final Result      1.  No evidence of acute intracranial process.      2.  Cerebral atrophy as well as periventricular chronic small vessel ischemic change.           Assessment/Plan  * Cerebrovascular accident (CVA) due to thrombosis of right middle cerebral artery (HCC)  Assessment & Plan  Acute ischemic stroke status post alteplase and ICU admission  He developed a hemorrhagic transformation and he was given cryoprecipitate   Neurology consulted  High intensity statin  PT/OT/ST  GI consulted for a PEG which was placed on 2/1/2020  Rehab consulted rehab vs SNF: He is an excellent candidate for rehab but does not have a disposition afterwards therefore the recommendation from physiatry was to go to skilled nursing facility.  His brother is coming down one 2/2 and may be able to stay with him and therefore this may change    Hypoxia- (present on admission)  Assessment & Plan  Continues to require supplemental oxygen  He has ongoing coarse tracheal breath sounds concerning for ongoing aspiration though he has been n.p.o.  Continues have wheezing but does not have a history of reactive airway disease    Morbid obesity with BMI of 40.0-44.9, adult ([facility])  Assessment & Plan  Body mass index is 40.72 kg/m².      Moderate persistent asthma without complication  Assessment & Plan  History of    Dyslipidemia  Assessment & Plan  High intensity statin    Essential hypertension  Assessment & Plan  Is post permissive hypertension  He was on a nicardipine drip has now been turned off  He has been started back on his home  metoprolol dose   He has been started on Norvasc 5 mg daily  has PRN IV hydralazine and labetalol systolic blood pressure over 150    Hypothyroidism- (present on admission)  Assessment & Plan  On replacement therapy which will be continued.    VERENA (obstructive sleep apnea)- (present on admission)  Assessment & Plan  On CPAP nightly with supplemental oxygen.  Respiratory therapy has been consulted for the same.       VTE prophylaxis: SCDs

## 2020-02-03 LAB
BASOPHILS # BLD AUTO: 0.7 % (ref 0–1.8)
BASOPHILS # BLD AUTO: 0.8 % (ref 0–1.8)
BASOPHILS # BLD AUTO: 0.9 % (ref 0–1.8)
BASOPHILS # BLD: 0.07 K/UL (ref 0–0.12)
BASOPHILS # BLD: 0.09 K/UL (ref 0–0.12)
BASOPHILS # BLD: 0.09 K/UL (ref 0–0.12)
CRP SERPL HS-MCNC: 5.46 MG/DL (ref 0–0.75)
EOSINOPHIL # BLD AUTO: 0.33 K/UL (ref 0–0.51)
EOSINOPHIL # BLD AUTO: 0.34 K/UL (ref 0–0.51)
EOSINOPHIL # BLD AUTO: 0.44 K/UL (ref 0–0.51)
EOSINOPHIL NFR BLD: 3.1 % (ref 0–6.9)
EOSINOPHIL NFR BLD: 3.4 % (ref 0–6.9)
EOSINOPHIL NFR BLD: 3.8 % (ref 0–6.9)
ERYTHROCYTE [DISTWIDTH] IN BLOOD BY AUTOMATED COUNT: 47 FL (ref 35.9–50)
ERYTHROCYTE [DISTWIDTH] IN BLOOD BY AUTOMATED COUNT: 47.4 FL (ref 35.9–50)
ERYTHROCYTE [DISTWIDTH] IN BLOOD BY AUTOMATED COUNT: 47.5 FL (ref 35.9–50)
HCT VFR BLD AUTO: 37.9 % (ref 42–52)
HCT VFR BLD AUTO: 38.3 % (ref 42–52)
HCT VFR BLD AUTO: 38.6 % (ref 42–52)
HGB BLD-MCNC: 12.3 G/DL (ref 14–18)
HGB BLD-MCNC: 12.3 G/DL (ref 14–18)
HGB BLD-MCNC: 12.5 G/DL (ref 14–18)
IMM GRANULOCYTES # BLD AUTO: 0.07 K/UL (ref 0–0.11)
IMM GRANULOCYTES # BLD AUTO: 0.08 K/UL (ref 0–0.11)
IMM GRANULOCYTES # BLD AUTO: 0.11 K/UL (ref 0–0.11)
IMM GRANULOCYTES NFR BLD AUTO: 0.7 % (ref 0–0.9)
IMM GRANULOCYTES NFR BLD AUTO: 0.8 % (ref 0–0.9)
IMM GRANULOCYTES NFR BLD AUTO: 1 % (ref 0–0.9)
LYMPHOCYTES # BLD AUTO: 1.68 K/UL (ref 1–4.8)
LYMPHOCYTES # BLD AUTO: 1.96 K/UL (ref 1–4.8)
LYMPHOCYTES # BLD AUTO: 2.71 K/UL (ref 1–4.8)
LYMPHOCYTES NFR BLD: 16.7 % (ref 22–41)
LYMPHOCYTES NFR BLD: 18.4 % (ref 22–41)
LYMPHOCYTES NFR BLD: 23.5 % (ref 22–41)
MCH RBC QN AUTO: 31.5 PG (ref 27–33)
MCH RBC QN AUTO: 31.9 PG (ref 27–33)
MCH RBC QN AUTO: 32.1 PG (ref 27–33)
MCHC RBC AUTO-ENTMCNC: 31.9 G/DL (ref 33.7–35.3)
MCHC RBC AUTO-ENTMCNC: 32.5 G/DL (ref 33.7–35.3)
MCHC RBC AUTO-ENTMCNC: 32.6 G/DL (ref 33.7–35.3)
MCV RBC AUTO: 98.4 FL (ref 81.4–97.8)
MCV RBC AUTO: 98.5 FL (ref 81.4–97.8)
MCV RBC AUTO: 99 FL (ref 81.4–97.8)
MONOCYTES # BLD AUTO: 0.79 K/UL (ref 0–0.85)
MONOCYTES # BLD AUTO: 0.86 K/UL (ref 0–0.85)
MONOCYTES # BLD AUTO: 0.88 K/UL (ref 0–0.85)
MONOCYTES NFR BLD AUTO: 7.4 % (ref 0–13.4)
MONOCYTES NFR BLD AUTO: 7.6 % (ref 0–13.4)
MONOCYTES NFR BLD AUTO: 8.6 % (ref 0–13.4)
NEUTROPHILS # BLD AUTO: 6.99 K/UL (ref 1.82–7.42)
NEUTROPHILS # BLD AUTO: 7.28 K/UL (ref 1.82–7.42)
NEUTROPHILS # BLD AUTO: 7.43 K/UL (ref 1.82–7.42)
NEUTROPHILS NFR BLD: 63.3 % (ref 44–72)
NEUTROPHILS NFR BLD: 69.6 % (ref 44–72)
NEUTROPHILS NFR BLD: 69.7 % (ref 44–72)
NRBC # BLD AUTO: 0 K/UL
NRBC BLD-RTO: 0 /100 WBC
PLATELET # BLD AUTO: 251 K/UL (ref 164–446)
PLATELET # BLD AUTO: 285 K/UL (ref 164–446)
PLATELET # BLD AUTO: 337 K/UL (ref 164–446)
PMV BLD AUTO: 11.1 FL (ref 9–12.9)
PMV BLD AUTO: 11.2 FL (ref 9–12.9)
PMV BLD AUTO: 11.5 FL (ref 9–12.9)
PREALB SERPL-MCNC: 13 MG/DL (ref 18–38)
RBC # BLD AUTO: 3.85 M/UL (ref 4.7–6.1)
RBC # BLD AUTO: 3.89 M/UL (ref 4.7–6.1)
RBC # BLD AUTO: 3.9 M/UL (ref 4.7–6.1)
WBC # BLD AUTO: 10 K/UL (ref 4.8–10.8)
WBC # BLD AUTO: 10.7 K/UL (ref 4.8–10.8)
WBC # BLD AUTO: 11.5 K/UL (ref 4.8–10.8)

## 2020-02-03 PROCEDURE — A9270 NON-COVERED ITEM OR SERVICE: HCPCS | Performed by: HOSPITALIST

## 2020-02-03 PROCEDURE — 700102 HCHG RX REV CODE 250 W/ 637 OVERRIDE(OP): Performed by: PSYCHIATRY & NEUROLOGY

## 2020-02-03 PROCEDURE — 770020 HCHG ROOM/CARE - TELE (206)

## 2020-02-03 PROCEDURE — 97535 SELF CARE MNGMENT TRAINING: CPT

## 2020-02-03 PROCEDURE — 84134 ASSAY OF PREALBUMIN: CPT

## 2020-02-03 PROCEDURE — 97116 GAIT TRAINING THERAPY: CPT

## 2020-02-03 PROCEDURE — 700102 HCHG RX REV CODE 250 W/ 637 OVERRIDE(OP): Performed by: HOSPITALIST

## 2020-02-03 PROCEDURE — A9270 NON-COVERED ITEM OR SERVICE: HCPCS | Performed by: PSYCHIATRY & NEUROLOGY

## 2020-02-03 PROCEDURE — 97530 THERAPEUTIC ACTIVITIES: CPT

## 2020-02-03 PROCEDURE — 85025 COMPLETE CBC W/AUTO DIFF WBC: CPT | Mod: 91

## 2020-02-03 PROCEDURE — 86140 C-REACTIVE PROTEIN: CPT

## 2020-02-03 PROCEDURE — 99232 SBSQ HOSP IP/OBS MODERATE 35: CPT | Performed by: HOSPITALIST

## 2020-02-03 PROCEDURE — 36415 COLL VENOUS BLD VENIPUNCTURE: CPT

## 2020-02-03 RX ADMIN — TRAZODONE HYDROCHLORIDE 50 MG: 100 TABLET ORAL at 21:42

## 2020-02-03 RX ADMIN — AMLODIPINE BESYLATE 5 MG: 5 TABLET ORAL at 04:45

## 2020-02-03 RX ADMIN — Medication 1 MG: at 21:42

## 2020-02-03 RX ADMIN — SENNOSIDES AND DOCUSATE SODIUM 2 TABLET: 8.6; 5 TABLET ORAL at 04:44

## 2020-02-03 RX ADMIN — ATORVASTATIN CALCIUM 80 MG: 80 TABLET, FILM COATED ORAL at 16:51

## 2020-02-03 RX ADMIN — LEVOTHYROXINE SODIUM 88 MCG: 88 TABLET ORAL at 04:44

## 2020-02-03 RX ADMIN — ALBUTEROL SULFATE 2 PUFF: 90 AEROSOL, METERED RESPIRATORY (INHALATION) at 14:53

## 2020-02-03 RX ADMIN — METOPROLOL TARTRATE 50 MG: 25 TABLET, FILM COATED ORAL at 04:44

## 2020-02-03 RX ADMIN — METOPROLOL TARTRATE 50 MG: 25 TABLET, FILM COATED ORAL at 16:51

## 2020-02-03 RX ADMIN — SENNOSIDES AND DOCUSATE SODIUM 2 TABLET: 8.6; 5 TABLET ORAL at 16:51

## 2020-02-03 ASSESSMENT — ENCOUNTER SYMPTOMS
FEVER: 0
SHORTNESS OF BREATH: 0
COUGH: 1
SPUTUM PRODUCTION: 0
PALPITATIONS: 0
CHILLS: 0
WHEEZING: 1

## 2020-02-03 ASSESSMENT — COGNITIVE AND FUNCTIONAL STATUS - GENERAL
DRESSING REGULAR UPPER BODY CLOTHING: A LITTLE
DRESSING REGULAR LOWER BODY CLOTHING: A LITTLE
MOVING TO AND FROM BED TO CHAIR: A LITTLE
CLIMB 3 TO 5 STEPS WITH RAILING: A LITTLE
EATING MEALS: TOTAL
MOVING FROM LYING ON BACK TO SITTING ON SIDE OF FLAT BED: A LITTLE
TURNING FROM BACK TO SIDE WHILE IN FLAT BAD: A LITTLE
SUGGESTED CMS G CODE MODIFIER MOBILITY: CK
SUGGESTED CMS G CODE MODIFIER DAILY ACTIVITY: CK
MOBILITY SCORE: 18
DAILY ACTIVITIY SCORE: 16
WALKING IN HOSPITAL ROOM: A LITTLE
PERSONAL GROOMING: A LITTLE
HELP NEEDED FOR BATHING: A LITTLE
STANDING UP FROM CHAIR USING ARMS: A LITTLE
TOILETING: A LITTLE

## 2020-02-03 ASSESSMENT — GAIT ASSESSMENTS
DISTANCE (FEET): 100
GAIT LEVEL OF ASSIST: MINIMAL ASSIST
DEVIATION: DECREASED BASE OF SUPPORT
ASSISTIVE DEVICE: FRONT WHEEL WALKER

## 2020-02-03 NOTE — THERAPY
"Physical Therapy Treatment completed.   Bed Mobility:  Supine to Sit: (up in chair)  Transfers: Sit to Stand: Minimal Assist  Gait: Level Of Assist: Minimal Assist with Front-Wheel Walker       Plan of Care: Will benefit from Physical Therapy 5 times per week  Discharge Recommendations: Equipment: Will Continue to Assess for Equipment Needs. Post-acute therapy Discharge to a transitional care facility for continued skilled therapy services.    Today pt shows improved tolerance for ambulation, but still lacking coodination on L and hip flexion strength on L. Pt was able to ambulate x 100 feet  using FWW with min A and up/down 2 stairs with min A. Pt with wound on L LE that pt reports has been there quite some time. Newly diagnosed diabetic. PT to continue to follow.      See \"Rehab Therapy-Acute\" Patient Summary Report for complete documentation.       "

## 2020-02-03 NOTE — PROGRESS NOTES
"Pt coughs up a brown, thick, scabby, dry piece of mucous similar in description to the \"brown hunk\" reported by PACU nurse to have been removed during intubation during yesterday's procedure.  Pt is not having any respiratory difficulty, but secretions are quite dry.  Urine output more than adequate.   "

## 2020-02-03 NOTE — THERAPY
"Occupational Therapy Treatment completed with focus on ADLs, ADL transfers, patient education and upper extremity function.  Functional Status:  Pt sitting up in recliner chair on arrival.  Pt on 4L O2 N/C.  Pt able to don right sock with set up but required Mod A to don left sock.  Pt stood with Min A & was able to amb with FWW & CGA.  Pt does have fine motor & co-ord impairments in his left hand.  Pt did become SOB with activity but had good activity tolerance & was motivated.  Pt's brother present during tx & was supportive.  Brother stated he will be available to assist upon D/C as needed.  Pt returned to recliner chair after tx.  Plan of Care: Will benefit from Occupational Therapy 4 times per week  Discharge Recommendations:  Equipment Will Continue to Assess for Equipment Needs. Post-acute therapy Recommend post-acute placement for additional occupational therapy services prior to discharge home.      See \"Rehab Therapy-Acute\" Patient Summary Report for complete documentation.   "

## 2020-02-03 NOTE — PROGRESS NOTES
Monitor summary: NSR with rare PVC rate 77-86. VT=0.16 QRS=0.08 and QT=0.4 per strip from monitor room.

## 2020-02-04 ENCOUNTER — APPOINTMENT (OUTPATIENT)
Dept: RADIOLOGY | Facility: MEDICAL CENTER | Age: 75
DRG: 061 | End: 2020-02-04
Attending: FAMILY MEDICINE
Payer: MEDICARE

## 2020-02-04 PROBLEM — R13.10 DYSPHAGIA: Status: ACTIVE | Noted: 2020-02-04

## 2020-02-04 PROBLEM — E11.9 TYPE 2 DIABETES MELLITUS WITHOUT COMPLICATION, WITHOUT LONG-TERM CURRENT USE OF INSULIN (HCC): Status: ACTIVE | Noted: 2020-02-04

## 2020-02-04 PROBLEM — E53.8 VITAMIN B12 DEFICIENCY: Status: ACTIVE | Noted: 2020-02-04

## 2020-02-04 LAB
ANION GAP SERPL CALC-SCNC: 10 MMOL/L (ref 0–11.9)
BASOPHILS # BLD AUTO: 1.1 % (ref 0–1.8)
BASOPHILS # BLD AUTO: 1.2 % (ref 0–1.8)
BASOPHILS # BLD: 0.12 K/UL (ref 0–0.12)
BASOPHILS # BLD: 0.12 K/UL (ref 0–0.12)
BUN SERPL-MCNC: 29 MG/DL (ref 8–22)
CALCIUM SERPL-MCNC: 9.7 MG/DL (ref 8.5–10.5)
CHLORIDE SERPL-SCNC: 100 MMOL/L (ref 96–112)
CO2 SERPL-SCNC: 30 MMOL/L (ref 20–33)
CREAT SERPL-MCNC: 0.92 MG/DL (ref 0.5–1.4)
EOSINOPHIL # BLD AUTO: 0.28 K/UL (ref 0–0.51)
EOSINOPHIL # BLD AUTO: 0.49 K/UL (ref 0–0.51)
EOSINOPHIL NFR BLD: 2.7 % (ref 0–6.9)
EOSINOPHIL NFR BLD: 4.4 % (ref 0–6.9)
ERYTHROCYTE [DISTWIDTH] IN BLOOD BY AUTOMATED COUNT: 45.6 FL (ref 35.9–50)
ERYTHROCYTE [DISTWIDTH] IN BLOOD BY AUTOMATED COUNT: 45.9 FL (ref 35.9–50)
GLUCOSE BLD-MCNC: 103 MG/DL (ref 65–99)
GLUCOSE BLD-MCNC: 132 MG/DL (ref 65–99)
GLUCOSE BLD-MCNC: 173 MG/DL (ref 65–99)
GLUCOSE SERPL-MCNC: 122 MG/DL (ref 65–99)
HCT VFR BLD AUTO: 37.9 % (ref 42–52)
HCT VFR BLD AUTO: 38.9 % (ref 42–52)
HGB BLD-MCNC: 12.2 G/DL (ref 14–18)
HGB BLD-MCNC: 12.4 G/DL (ref 14–18)
IMM GRANULOCYTES # BLD AUTO: 0.08 K/UL (ref 0–0.11)
IMM GRANULOCYTES # BLD AUTO: 0.1 K/UL (ref 0–0.11)
IMM GRANULOCYTES NFR BLD AUTO: 0.8 % (ref 0–0.9)
IMM GRANULOCYTES NFR BLD AUTO: 0.9 % (ref 0–0.9)
LYMPHOCYTES # BLD AUTO: 1.89 K/UL (ref 1–4.8)
LYMPHOCYTES # BLD AUTO: 2.55 K/UL (ref 1–4.8)
LYMPHOCYTES NFR BLD: 18.2 % (ref 22–41)
LYMPHOCYTES NFR BLD: 22.8 % (ref 22–41)
MCH RBC QN AUTO: 31.2 PG (ref 27–33)
MCH RBC QN AUTO: 31.6 PG (ref 27–33)
MCHC RBC AUTO-ENTMCNC: 31.9 G/DL (ref 33.7–35.3)
MCHC RBC AUTO-ENTMCNC: 32.2 G/DL (ref 33.7–35.3)
MCV RBC AUTO: 98 FL (ref 81.4–97.8)
MCV RBC AUTO: 98.2 FL (ref 81.4–97.8)
MONOCYTES # BLD AUTO: 0.61 K/UL (ref 0–0.85)
MONOCYTES # BLD AUTO: 1 K/UL (ref 0–0.85)
MONOCYTES NFR BLD AUTO: 5.9 % (ref 0–13.4)
MONOCYTES NFR BLD AUTO: 8.9 % (ref 0–13.4)
NEUTROPHILS # BLD AUTO: 6.92 K/UL (ref 1.82–7.42)
NEUTROPHILS # BLD AUTO: 7.38 K/UL (ref 1.82–7.42)
NEUTROPHILS NFR BLD: 61.9 % (ref 44–72)
NEUTROPHILS NFR BLD: 71.2 % (ref 44–72)
NRBC # BLD AUTO: 0 K/UL
NRBC # BLD AUTO: 0 K/UL
NRBC BLD-RTO: 0 /100 WBC
NRBC BLD-RTO: 0 /100 WBC
PLATELET # BLD AUTO: 326 K/UL (ref 164–446)
PLATELET # BLD AUTO: 340 K/UL (ref 164–446)
PMV BLD AUTO: 11.4 FL (ref 9–12.9)
PMV BLD AUTO: 11.6 FL (ref 9–12.9)
POTASSIUM SERPL-SCNC: 4.3 MMOL/L (ref 3.6–5.5)
RBC # BLD AUTO: 3.86 M/UL (ref 4.7–6.1)
RBC # BLD AUTO: 3.97 M/UL (ref 4.7–6.1)
SODIUM SERPL-SCNC: 140 MMOL/L (ref 135–145)
TSH SERPL DL<=0.005 MIU/L-ACNC: 5.63 UIU/ML (ref 0.38–5.33)
VIT B12 SERPL-MCNC: 255 PG/ML (ref 211–911)
WBC # BLD AUTO: 10.4 K/UL (ref 4.8–10.8)
WBC # BLD AUTO: 11.2 K/UL (ref 4.8–10.8)

## 2020-02-04 PROCEDURE — 85025 COMPLETE CBC W/AUTO DIFF WBC: CPT

## 2020-02-04 PROCEDURE — 770020 HCHG ROOM/CARE - TELE (206)

## 2020-02-04 PROCEDURE — A9270 NON-COVERED ITEM OR SERVICE: HCPCS | Performed by: PSYCHIATRY & NEUROLOGY

## 2020-02-04 PROCEDURE — 82607 VITAMIN B-12: CPT

## 2020-02-04 PROCEDURE — 700102 HCHG RX REV CODE 250 W/ 637 OVERRIDE(OP): Performed by: PSYCHIATRY & NEUROLOGY

## 2020-02-04 PROCEDURE — 99233 SBSQ HOSP IP/OBS HIGH 50: CPT | Performed by: FAMILY MEDICINE

## 2020-02-04 PROCEDURE — 700111 HCHG RX REV CODE 636 W/ 250 OVERRIDE (IP): Performed by: FAMILY MEDICINE

## 2020-02-04 PROCEDURE — 700102 HCHG RX REV CODE 250 W/ 637 OVERRIDE(OP): Performed by: FAMILY MEDICINE

## 2020-02-04 PROCEDURE — 84443 ASSAY THYROID STIM HORMONE: CPT

## 2020-02-04 PROCEDURE — 70450 CT HEAD/BRAIN W/O DYE: CPT

## 2020-02-04 PROCEDURE — 700102 HCHG RX REV CODE 250 W/ 637 OVERRIDE(OP): Performed by: HOSPITALIST

## 2020-02-04 PROCEDURE — 36415 COLL VENOUS BLD VENIPUNCTURE: CPT

## 2020-02-04 PROCEDURE — 82962 GLUCOSE BLOOD TEST: CPT

## 2020-02-04 PROCEDURE — 71045 X-RAY EXAM CHEST 1 VIEW: CPT

## 2020-02-04 PROCEDURE — A9270 NON-COVERED ITEM OR SERVICE: HCPCS | Performed by: HOSPITALIST

## 2020-02-04 PROCEDURE — 80048 BASIC METABOLIC PNL TOTAL CA: CPT

## 2020-02-04 RX ORDER — CYANOCOBALAMIN 1000 UG/ML
1000 INJECTION, SOLUTION INTRAMUSCULAR; SUBCUTANEOUS ONCE
Status: COMPLETED | OUTPATIENT
Start: 2020-02-04 | End: 2020-02-04

## 2020-02-04 RX ADMIN — METOPROLOL TARTRATE 50 MG: 25 TABLET, FILM COATED ORAL at 17:46

## 2020-02-04 RX ADMIN — CYANOCOBALAMIN 1000 MCG: 1000 INJECTION, SOLUTION INTRAMUSCULAR; SUBCUTANEOUS at 17:46

## 2020-02-04 RX ADMIN — SENNOSIDES AND DOCUSATE SODIUM 2 TABLET: 8.6; 5 TABLET ORAL at 05:03

## 2020-02-04 RX ADMIN — SENNOSIDES AND DOCUSATE SODIUM 2 TABLET: 8.6; 5 TABLET ORAL at 17:45

## 2020-02-04 RX ADMIN — LEVOTHYROXINE SODIUM 88 MCG: 88 TABLET ORAL at 05:03

## 2020-02-04 RX ADMIN — AMLODIPINE BESYLATE 5 MG: 5 TABLET ORAL at 05:03

## 2020-02-04 RX ADMIN — INSULIN HUMAN 1 UNITS: 100 INJECTION, SOLUTION PARENTERAL at 10:45

## 2020-02-04 RX ADMIN — ATORVASTATIN CALCIUM 80 MG: 80 TABLET, FILM COATED ORAL at 17:45

## 2020-02-04 RX ADMIN — Medication 3 MG: at 21:20

## 2020-02-04 RX ADMIN — TRAZODONE HYDROCHLORIDE 50 MG: 100 TABLET ORAL at 21:20

## 2020-02-04 RX ADMIN — METOPROLOL TARTRATE 50 MG: 25 TABLET, FILM COATED ORAL at 05:03

## 2020-02-04 ASSESSMENT — ENCOUNTER SYMPTOMS
DIAPHORESIS: 0
PALPITATIONS: 0
WEAKNESS: 1
BLURRED VISION: 0
SENSORY CHANGE: 0
HEADACHES: 0
CHILLS: 0
SORE THROAT: 0
TREMORS: 0
FLANK PAIN: 0
DIZZINESS: 0
SHORTNESS OF BREATH: 0
NERVOUS/ANXIOUS: 0
COUGH: 1
FOCAL WEAKNESS: 1
WHEEZING: 1
ABDOMINAL PAIN: 0
BACK PAIN: 0
SPEECH CHANGE: 0
HEARTBURN: 0
DIARRHEA: 0
FEVER: 0
NECK PAIN: 0
NAUSEA: 0
VOMITING: 0

## 2020-02-04 NOTE — PROGRESS NOTES
Monitor summary: SR 70-80, MI .18, QRS .10, QT .38, with rare PACs and rare PVCs per strip from monitor room.

## 2020-02-04 NOTE — DISCHARGE PLANNING
Dr. Ingram is agreeable with the D/C plan.  Anticipate an admission pending medical clearance/bed availability.  Aneta HARLEY) is aware.

## 2020-02-04 NOTE — PROGRESS NOTES
Monitor summary: NSR with rare PVC rate 77-93. FL=0.16, QRS=0.12 and QT=0.32 per strip from monitor room.

## 2020-02-04 NOTE — CARE PLAN
Problem: Safety  Goal: Will remain free from injury  Note:   Pt at risk for fall due to stroke. Bed locked and in lowest position. Call light and personal belongings in reach. Bed alarm in place. Hourly rounding.       Problem: Skin Integrity  Goal: Risk for impaired skin integrity will decrease  Note:   Pt at risk for skin breakdown due to stroke. Waffle overlay mattress in use. Q2 turns. Pillows in use for support and positioning. Barrier wipes in use.

## 2020-02-04 NOTE — PROGRESS NOTES
[facility] Medicine Daily Progress Note    Date of Service  2/3/2020    Chief Complaint  74 y.o. male admitted 1/27/2020 with left hand numbness.    Hospital Course    Mr. Lundy has a past medical history of dyslipidemia and asthma that developed acute onset of left arm numbness and weakness.  He presented to the emergency room and was administered alteplase with subsequent ICU admission.  MRI confirmed a hemorrhagic transformation therefore he was given cryoprecipitate.      Interval Problem Update  1/30/2020: Patient seen and evaluated in the intensive care unit. He remains requiring cortrak feeding. We discussed the possibility of a PEG as he will be moving to a SNF otherwise with a cortrak. Dr. Thayer agrees that he is unlikely to swallow safely for quite some time. MR. Lundy is amenable to the procedure. His brother is coming in tomorrow.  1/31: patient seen and evaluated in the ICU. I met with his two daughters at bedside. We discussed post-acute options such as SNF vs rehab. We also discussed that he will likely need to PEG. His oxygen requirements went up to 10 liters from 4 liters thus PEG was put off until tomorrow.   2/1: Mr. Lundy was evaluated and examined on the neuro floor.  He tolerated his PEG tube today well daughters at bedside we discussed his condition.  When I came in the room he was laying relatively flat and was wheezing with increased shortness of breath though this improved once we sat him up at a 45 degree angle.  We discussed that he should not be laying flat and nothing less than 30 degrees.  His brother is coming in tomorrow we discussed this may change if he is a candidate for rehab or skilled nursing facility if his brother can stay with him for a while after rehab stay.  2/2/2020: Patient seen and evaluated on the neurology floor.  He tolerated his PEG tube yesterday well.  Last night he did have some black stools afterwards.  Today he continues to wheeze and is on submental oxygen his  brother will be here today and we discussed that perhaps if his brother is willing to stay with him after rehab, then he may be candidate for rehab versus skilled nursing facility  2/3: Met with patient and his brother at bedside his brother is open to staying down here after his rehab or skilled nursing facility stay for a few weeks to ensure that Mr. Lundy can transition to independent lifestyle.  I met with Dr. Ingram, physiatry, about this and he will look into seeing if he can be a candidate for inpatient rehab.  I met with the  and we will obtain choice in case he needs to go to a skilled nursing facility.  His stepdaughter is at bedside and we discussed his condition as well.  Consultants/Specialty  Critical care  Neurology  Rehab   GI consulted for PEG  Code Status  DNR/DNI    Disposition  neuro    Review of Systems  Review of Systems   Constitutional: Negative for chills and fever.   Respiratory: Positive for cough and wheezing. Negative for sputum production and shortness of breath.    Cardiovascular: Negative for chest pain and palpitations.   Gastrointestinal:        Tolerating PEG feeding   All other systems reviewed and are negative.       Physical Exam  Temp:  [36.1 °C (97 °F)-36.5 °C (97.7 °F)] 36.5 °C (97.7 °F)  Pulse:  [76-84] 82  Resp:  [16-20] 20  BP: (137-148)/(66-79) 148/72  SpO2:  [93 %-97 %] 95 %    Physical Exam  Vitals signs and nursing note reviewed.   Constitutional:       Appearance: Normal appearance. He is obese.   HENT:      Head:      Comments: Left facial droop     Mouth/Throat:      Mouth: Mucous membranes are dry.      Pharynx: Oropharynx is clear.   Neck:      Musculoskeletal: Normal range of motion and neck supple.   Cardiovascular:      Rate and Rhythm: Normal rate and regular rhythm.      Pulses: Normal pulses.      Heart sounds: Normal heart sounds.   Pulmonary:      Breath sounds: Rales present. No wheezing.   Abdominal:      General: There is no distension.       Tenderness: There is no tenderness.      Comments: PEG tube   Musculoskeletal:      Right lower leg: Edema present.      Left lower leg: Edema present.      Comments: scds bilat   Skin:     General: Skin is warm and dry.   Neurological:      Mental Status: He is alert.      Comments: Good strength about equal bilateral upper and lower extremities     Psychiatric:         Mood and Affect: Mood normal.         Behavior: Behavior normal.      Comments: Speech is clear         Fluids    Intake/Output Summary (Last 24 hours) at 2/3/2020 1647  Last data filed at 2/3/2020 0600  Gross per 24 hour   Intake 1050 ml   Output 0 ml   Net 1050 ml       Laboratory  Recent Labs     02/02/20  2143 02/03/20  0509 02/03/20  1353   WBC 11.3* 10.0 10.7   RBC 4.00* 3.89* 3.85*   HEMOGLOBIN 12.7* 12.5* 12.3*   HEMATOCRIT 39.1* 38.3* 37.9*   MCV 97.8 98.5* 98.4*   MCH 31.8 32.1 31.9   MCHC 32.5* 32.6* 32.5*   RDW 46.2 47.5 47.0   PLATELETCT 282 251 285   MPV 10.9 11.2 11.1                       Imaging  CT-HEAD W/O   Final Result      1.  Stable RIGHT frontoparietal intraparenchymal hemorrhage with surrounding edema compared to MRI brain dated 1/29/2020.  Hemorrhage and edema both increased from prior CT dated 1/28/2020.   2.  Acute RIGHT frontal infarct as seen on prior MRI.   3.  No midline shift.         DX-CHEST-LIMITED (1 VIEW)   Final Result         Enlarged cardiac silhouette with interstitial prominence suggesting mild edema.      DX-ABDOMEN FOR TUBE PLACEMENT   Final Result      Cortrak nasogastric tube position consistent with intragastric placement, probably near the pylorus.      MR-BRAIN-W/O   Final Result      1.  Moderate acute/subacute right MCA infarct with hemorrhagic transformation.   2.  Right frontal lobe encephalomalacia.   3.  Mild to moderate cerebral atrophy.      EC-ECHOCARDIOGRAM COMPLETE W/O CONT   Final Result      CT-HEAD W/O   Final Result   Addendum 1 of 1   These findings were discussed with REMI SHAFFER  on 1/28/2020 10:57 AM.      Final      DX-CHEST-PORTABLE (1 VIEW)   Final Result      Cardiomegaly with interstitial prominence.      CT-CTA NECK WITH & W/O-POST PROCESSING   Final Result      1.  Atherosclerotic plaque at the carotid bifurcations bilaterally which results in less than 50% diameter narrowing.      2.  Diminutive right vertebral artery.      3.  Tortuous left internal carotid artery which is retropharyngeal in location.      CT-CTA HEAD WITH & W/O-POST PROCESS   Final Result      1.  Some asymmetric attenuation of the right middle cerebral arterial peripheral vessels without evidence of acute occlusion possibly representing vasospasm or atherosclerotic attenuation.      2.  Diminutive right vertebral artery.      CT-HEAD W/O   Final Result      1.  No evidence of acute intracranial process.      2.  Cerebral atrophy as well as periventricular chronic small vessel ischemic change.           Assessment/Plan  * Cerebrovascular accident (CVA) due to thrombosis of right middle cerebral artery (HCC)  Assessment & Plan  Acute ischemic stroke status post alteplase and ICU admission  He developed a hemorrhagic transformation and he was given cryoprecipitate   Neurology consulted  High intensity statin  PT/OT/ST  GI consulted for a PEG which was placed on 2/1/2020  Rehab consulted rehab vs SNF: He is an excellent candidate for rehab but does not have a disposition afterwards therefore the recommendation from physiatry was to go to skilled nursing facility.  His brother is coming down one 2/2 and will be able to stay with him and therefore this may change    Hypoxia- (present on admission)  Assessment & Plan  Continues to require supplemental oxygen  He has ongoing coarse tracheal breath sounds concerning for ongoing aspiration though he has been n.p.o.  He has been requiring suctioning  He is to be at least at 30 degrees    Morbid obesity with BMI of 40.0-44.9, adult ([facility])  Assessment & Plan  Body mass index  is 40.72 kg/m².      Moderate persistent asthma without complication  Assessment & Plan  History of    Dyslipidemia  Assessment & Plan  High intensity statin    Essential hypertension  Assessment & Plan  Is post permissive hypertension  He was on a nicardipine drip has now been turned off  He has been started back on his home metoprolol dose   He has been started on Norvasc 5 mg daily  has PRN IV hydralazine and labetalol systolic blood pressure over 150    Hypothyroidism- (present on admission)  Assessment & Plan  On replacement therapy which will be continued.    VERENA (obstructive sleep apnea)- (present on admission)  Assessment & Plan  On CPAP nightly with supplemental oxygen.  Respiratory therapy has been consulted for the same.       VTE prophylaxis: SCDs

## 2020-02-04 NOTE — DISCHARGE PLANNING
Spoke with Chato, brother regarding D/C resources/support.  He lives in [place]. He is staying @ [facility] in [place].  He has agreed to stay and will participate with family training @ [facility] Acute rehab for the last week.  He will stay with Familia for 2 weeks once D/C'd from Acute Rehab. Will discuss this case with the Admissions team.

## 2020-02-04 NOTE — CARE PLAN
Problem: Communication  Goal: The ability to communicate needs accurately and effectively will improve  Outcome: PROGRESSING AS EXPECTED  Intervention: [place] patient and significant other/support system to call light to alert staff of needs  Flowsheets (Taken 2/3/2020 2331)  Oriented to:: All of the Following : Location of Bathroom, Visiting Policy, Unit Routine, Call Light and Bedside Controls, Bedside Rail Policy, Smoking Policy, Rights and Responsibilities, Bedside Report, and Patient Education Notebook  Note:   Pt A&Ox4, calls appropriately using call light and is able to make needs known to staff. Pt oriented to room and call light. Hourly rounding in place.        Problem: Safety  Goal: Will remain free from falls  Outcome: PROGRESSING AS EXPECTED  Intervention: Implement fall precautions  Flowsheets (Taken 2/3/2020 2000)  Environmental Precautions: Treaded Slipper Socks on Patient;Personal Belongings, Wastebasket, Call Bell etc. in Easy Reach;Transferred to Stronger Side;Report Given to Other Health Care Providers Regarding Fall Risk;Bed in Low Position;Communication Sign for Patients & Families;Mobility Assessed & Appropriate Sign Placed  Note:   Pt is a low fall risk. Fall precautions in place. Recliner locked and in lowest position. Chair alarm on, call light within reach, non skid socks in place.

## 2020-02-05 ENCOUNTER — HOSPITAL ENCOUNTER (INPATIENT)
Facility: REHABILITATION | Age: 75
LOS: 10 days | DRG: 056 | End: 2020-02-15
Attending: PHYSICAL MEDICINE & REHABILITATION | Admitting: PHYSICAL MEDICINE & REHABILITATION
Payer: MEDICARE

## 2020-02-05 VITALS
HEART RATE: 71 BPM | TEMPERATURE: 98 F | HEIGHT: 67 IN | WEIGHT: 265.88 LBS | OXYGEN SATURATION: 96 % | RESPIRATION RATE: 18 BRPM | SYSTOLIC BLOOD PRESSURE: 128 MMHG | DIASTOLIC BLOOD PRESSURE: 50 MMHG | BODY MASS INDEX: 41.73 KG/M2

## 2020-02-05 PROBLEM — D64.9 ANEMIA: Status: ACTIVE | Noted: 2020-02-05

## 2020-02-05 PROBLEM — R21 RASH: Status: ACTIVE | Noted: 2020-02-05

## 2020-02-05 PROBLEM — E66.9 OBESITY (BMI 35.0-39.9 WITHOUT COMORBIDITY): Status: ACTIVE | Noted: 2020-02-05

## 2020-02-05 PROBLEM — Z78.9 IMPAIRED MOBILITY AND ADLS: Status: ACTIVE | Noted: 2020-02-05

## 2020-02-05 PROBLEM — Z74.09 IMPAIRED MOBILITY AND ADLS: Status: ACTIVE | Noted: 2020-02-05

## 2020-02-05 PROBLEM — E78.1 HYPERTRIGLYCERIDEMIA: Status: ACTIVE | Noted: 2020-02-05

## 2020-02-05 PROBLEM — Z93.1 S/P PERCUTANEOUS ENDOSCOPIC GASTROSTOMY (PEG) TUBE PLACEMENT (HCC): Status: ACTIVE | Noted: 2020-02-05

## 2020-02-05 PROBLEM — B37.0 ORAL THRUSH: Status: ACTIVE | Noted: 2020-02-05

## 2020-02-05 LAB
ANION GAP SERPL CALC-SCNC: 6 MMOL/L (ref 0–11.9)
BASOPHILS # BLD AUTO: 0.9 % (ref 0–1.8)
BASOPHILS # BLD: 0.1 K/UL (ref 0–0.12)
BUN SERPL-MCNC: 28 MG/DL (ref 8–22)
CALCIUM SERPL-MCNC: 9.6 MG/DL (ref 8.5–10.5)
CHLORIDE SERPL-SCNC: 101 MMOL/L (ref 96–112)
CO2 SERPL-SCNC: 33 MMOL/L (ref 20–33)
CREAT SERPL-MCNC: 0.89 MG/DL (ref 0.5–1.4)
EOSINOPHIL # BLD AUTO: 0.41 K/UL (ref 0–0.51)
EOSINOPHIL NFR BLD: 3.8 % (ref 0–6.9)
ERYTHROCYTE [DISTWIDTH] IN BLOOD BY AUTOMATED COUNT: 47.8 FL (ref 35.9–50)
GLUCOSE BLD-MCNC: 103 MG/DL (ref 65–99)
GLUCOSE BLD-MCNC: 106 MG/DL (ref 65–99)
GLUCOSE BLD-MCNC: 143 MG/DL (ref 65–99)
GLUCOSE BLD-MCNC: 150 MG/DL (ref 65–99)
GLUCOSE SERPL-MCNC: 172 MG/DL (ref 65–99)
HCT VFR BLD AUTO: 37.3 % (ref 42–52)
HGB BLD-MCNC: 11.9 G/DL (ref 14–18)
IMM GRANULOCYTES # BLD AUTO: 0.08 K/UL (ref 0–0.11)
IMM GRANULOCYTES NFR BLD AUTO: 0.7 % (ref 0–0.9)
LYMPHOCYTES # BLD AUTO: 1.93 K/UL (ref 1–4.8)
LYMPHOCYTES NFR BLD: 17.8 % (ref 22–41)
MCH RBC QN AUTO: 31.8 PG (ref 27–33)
MCHC RBC AUTO-ENTMCNC: 31.9 G/DL (ref 33.7–35.3)
MCV RBC AUTO: 99.7 FL (ref 81.4–97.8)
MONOCYTES # BLD AUTO: 0.79 K/UL (ref 0–0.85)
MONOCYTES NFR BLD AUTO: 7.3 % (ref 0–13.4)
NEUTROPHILS # BLD AUTO: 7.52 K/UL (ref 1.82–7.42)
NEUTROPHILS NFR BLD: 69.5 % (ref 44–72)
NRBC # BLD AUTO: 0 K/UL
NRBC BLD-RTO: 0 /100 WBC
NT-PROBNP SERPL IA-MCNC: 72 PG/ML (ref 0–125)
PLATELET # BLD AUTO: 324 K/UL (ref 164–446)
PMV BLD AUTO: 11.4 FL (ref 9–12.9)
POTASSIUM SERPL-SCNC: 3.9 MMOL/L (ref 3.6–5.5)
RBC # BLD AUTO: 3.74 M/UL (ref 4.7–6.1)
SODIUM SERPL-SCNC: 140 MMOL/L (ref 135–145)
WBC # BLD AUTO: 10.8 K/UL (ref 4.8–10.8)

## 2020-02-05 PROCEDURE — 85025 COMPLETE CBC W/AUTO DIFF WBC: CPT

## 2020-02-05 PROCEDURE — 80048 BASIC METABOLIC PNL TOTAL CA: CPT

## 2020-02-05 PROCEDURE — 700101 HCHG RX REV CODE 250: Performed by: PHYSICAL MEDICINE & REHABILITATION

## 2020-02-05 PROCEDURE — A9270 NON-COVERED ITEM OR SERVICE: HCPCS | Performed by: PSYCHIATRY & NEUROLOGY

## 2020-02-05 PROCEDURE — 36415 COLL VENOUS BLD VENIPUNCTURE: CPT

## 2020-02-05 PROCEDURE — 82962 GLUCOSE BLOOD TEST: CPT | Mod: 91

## 2020-02-05 PROCEDURE — A9270 NON-COVERED ITEM OR SERVICE: HCPCS | Performed by: PHYSICAL MEDICINE & REHABILITATION

## 2020-02-05 PROCEDURE — 99239 HOSP IP/OBS DSCHRG MGMT >30: CPT | Performed by: FAMILY MEDICINE

## 2020-02-05 PROCEDURE — 94760 N-INVAS EAR/PLS OXIMETRY 1: CPT

## 2020-02-05 PROCEDURE — 700102 HCHG RX REV CODE 250 W/ 637 OVERRIDE(OP): Performed by: PSYCHIATRY & NEUROLOGY

## 2020-02-05 PROCEDURE — 83880 ASSAY OF NATRIURETIC PEPTIDE: CPT

## 2020-02-05 PROCEDURE — 0HBRXZZ EXCISION OF TOE NAIL, EXTERNAL APPROACH: ICD-10-PCS | Performed by: PODIATRIST

## 2020-02-05 PROCEDURE — 0HDRXZZ EXTRACTION OF TOE NAIL, EXTERNAL APPROACH: ICD-10-PCS | Performed by: PODIATRIST

## 2020-02-05 PROCEDURE — 94664 DEMO&/EVAL PT USE INHALER: CPT

## 2020-02-05 PROCEDURE — 82962 GLUCOSE BLOOD TEST: CPT

## 2020-02-05 PROCEDURE — 94640 AIRWAY INHALATION TREATMENT: CPT

## 2020-02-05 PROCEDURE — 99223 1ST HOSP IP/OBS HIGH 75: CPT | Mod: AI | Performed by: PHYSICAL MEDICINE & REHABILITATION

## 2020-02-05 PROCEDURE — 700102 HCHG RX REV CODE 250 W/ 637 OVERRIDE(OP): Performed by: PHYSICAL MEDICINE & REHABILITATION

## 2020-02-05 PROCEDURE — 700102 HCHG RX REV CODE 250 W/ 637 OVERRIDE(OP): Performed by: HOSPITALIST

## 2020-02-05 PROCEDURE — 770010 HCHG ROOM/CARE - REHAB SEMI PRIVAT*

## 2020-02-05 PROCEDURE — A9270 NON-COVERED ITEM OR SERVICE: HCPCS | Performed by: HOSPITALIST

## 2020-02-05 RX ORDER — ONDANSETRON 2 MG/ML
4 INJECTION INTRAMUSCULAR; INTRAVENOUS 4 TIMES DAILY PRN
Status: DISCONTINUED | OUTPATIENT
Start: 2020-02-05 | End: 2020-02-15 | Stop reason: HOSPADM

## 2020-02-05 RX ORDER — AMOXICILLIN 250 MG
2 CAPSULE ORAL 2 TIMES DAILY
Status: DISCONTINUED | OUTPATIENT
Start: 2020-02-05 | End: 2020-02-10

## 2020-02-05 RX ORDER — IPRATROPIUM BROMIDE AND ALBUTEROL SULFATE 2.5; .5 MG/3ML; MG/3ML
3 SOLUTION RESPIRATORY (INHALATION) EVERY 4 HOURS PRN
Qty: 30 BULLET | Status: ON HOLD
Start: 2020-02-05 | End: 2020-02-14

## 2020-02-05 RX ORDER — POLYETHYLENE GLYCOL 3350 17 G/17G
1 POWDER, FOR SOLUTION ORAL
Status: CANCELLED | OUTPATIENT
Start: 2020-02-05

## 2020-02-05 RX ORDER — TRAZODONE HYDROCHLORIDE 100 MG/1
50 TABLET ORAL
Status: CANCELLED | OUTPATIENT
Start: 2020-02-05

## 2020-02-05 RX ORDER — ONDANSETRON 4 MG/1
4 TABLET, ORALLY DISINTEGRATING ORAL 4 TIMES DAILY PRN
Status: DISCONTINUED | OUTPATIENT
Start: 2020-02-05 | End: 2020-02-15 | Stop reason: HOSPADM

## 2020-02-05 RX ORDER — POLYVINYL ALCOHOL 14 MG/ML
1 SOLUTION/ DROPS OPHTHALMIC PRN
Status: DISCONTINUED | OUTPATIENT
Start: 2020-02-05 | End: 2020-02-15 | Stop reason: HOSPADM

## 2020-02-05 RX ORDER — BISACODYL 10 MG
10 SUPPOSITORY, RECTAL RECTAL
Status: CANCELLED | OUTPATIENT
Start: 2020-02-05

## 2020-02-05 RX ORDER — ATORVASTATIN CALCIUM 80 MG/1
80 TABLET, FILM COATED ORAL EVERY EVENING
Status: CANCELLED | OUTPATIENT
Start: 2020-02-05

## 2020-02-05 RX ORDER — ECHINACEA PURPUREA EXTRACT 125 MG
2 TABLET ORAL PRN
Status: DISCONTINUED | OUTPATIENT
Start: 2020-02-05 | End: 2020-02-15 | Stop reason: HOSPADM

## 2020-02-05 RX ORDER — BENZOCAINE/MENTHOL 6 MG-10 MG
LOZENGE MUCOUS MEMBRANE 2 TIMES DAILY
Status: DISCONTINUED | OUTPATIENT
Start: 2020-02-05 | End: 2020-02-15 | Stop reason: HOSPADM

## 2020-02-05 RX ORDER — DOCUSATE SODIUM 100 MG/1
100 CAPSULE, LIQUID FILLED ORAL DAILY
Status: DISCONTINUED | OUTPATIENT
Start: 2020-02-05 | End: 2020-02-15 | Stop reason: HOSPADM

## 2020-02-05 RX ORDER — ASPIRIN 81 MG/1
81 TABLET, CHEWABLE ORAL DAILY
Status: DISCONTINUED | OUTPATIENT
Start: 2020-02-06 | End: 2020-02-15 | Stop reason: HOSPADM

## 2020-02-05 RX ORDER — BISACODYL 10 MG
10 SUPPOSITORY, RECTAL RECTAL
Status: DISCONTINUED | OUTPATIENT
Start: 2020-02-05 | End: 2020-02-10

## 2020-02-05 RX ORDER — ACETAMINOPHEN 325 MG/1
650 TABLET ORAL EVERY 4 HOURS PRN
Status: DISCONTINUED | OUTPATIENT
Start: 2020-02-05 | End: 2020-02-15 | Stop reason: HOSPADM

## 2020-02-05 RX ORDER — AMLODIPINE BESYLATE 5 MG/1
5 TABLET ORAL DAILY
Qty: 30 TAB | Status: ON HOLD
Start: 2020-02-06 | End: 2020-02-14

## 2020-02-05 RX ORDER — TRAZODONE HYDROCHLORIDE 50 MG/1
50 TABLET ORAL
Qty: 30 TAB | Refills: 3 | Status: ON HOLD
Start: 2020-02-05 | End: 2020-02-14

## 2020-02-05 RX ORDER — HYDRALAZINE HYDROCHLORIDE 25 MG/1
25 TABLET, FILM COATED ORAL EVERY 8 HOURS PRN
Status: DISCONTINUED | OUTPATIENT
Start: 2020-02-05 | End: 2020-02-15 | Stop reason: HOSPADM

## 2020-02-05 RX ORDER — METOPROLOL TARTRATE 50 MG/1
50 TABLET, FILM COATED ORAL 2 TIMES DAILY
Qty: 60 TAB | Status: ON HOLD
Start: 2020-02-05 | End: 2020-02-14

## 2020-02-05 RX ORDER — ATORVASTATIN CALCIUM 80 MG/1
80 TABLET, FILM COATED ORAL EVERY EVENING
Qty: 30 TAB | Status: ON HOLD
Start: 2020-02-05 | End: 2020-02-14

## 2020-02-05 RX ORDER — ALUMINA, MAGNESIA, AND SIMETHICONE 2400; 2400; 240 MG/30ML; MG/30ML; MG/30ML
20 SUSPENSION ORAL
Status: DISCONTINUED | OUTPATIENT
Start: 2020-02-05 | End: 2020-02-15 | Stop reason: HOSPADM

## 2020-02-05 RX ORDER — ATORVASTATIN CALCIUM 40 MG/1
80 TABLET, FILM COATED ORAL EVERY EVENING
Status: DISCONTINUED | OUTPATIENT
Start: 2020-02-05 | End: 2020-02-15 | Stop reason: HOSPADM

## 2020-02-05 RX ORDER — IPRATROPIUM BROMIDE AND ALBUTEROL SULFATE 2.5; .5 MG/3ML; MG/3ML
3 SOLUTION RESPIRATORY (INHALATION)
Status: DISCONTINUED | OUTPATIENT
Start: 2020-02-05 | End: 2020-02-06

## 2020-02-05 RX ORDER — AMLODIPINE BESYLATE 5 MG/1
5 TABLET ORAL
Status: CANCELLED | OUTPATIENT
Start: 2020-02-06

## 2020-02-05 RX ORDER — UREA 10 %
3 LOTION (ML) TOPICAL
Qty: 30 TAB | Status: ON HOLD
Start: 2020-02-05 | End: 2020-02-14

## 2020-02-05 RX ORDER — LANOLIN ALCOHOL/MO/W.PET/CERES
3 CREAM (GRAM) TOPICAL NIGHTLY
Status: DISCONTINUED | OUTPATIENT
Start: 2020-02-05 | End: 2020-02-15 | Stop reason: HOSPADM

## 2020-02-05 RX ORDER — LEVOTHYROXINE SODIUM 88 UG/1
88 TABLET ORAL
Status: DISCONTINUED | OUTPATIENT
Start: 2020-02-06 | End: 2020-02-07

## 2020-02-05 RX ORDER — TRAZODONE HYDROCHLORIDE 50 MG/1
50 TABLET ORAL
Status: DISCONTINUED | OUTPATIENT
Start: 2020-02-05 | End: 2020-02-14

## 2020-02-05 RX ORDER — AMOXICILLIN 250 MG
1 CAPSULE ORAL EVERY EVENING
Status: DISCONTINUED | OUTPATIENT
Start: 2020-02-05 | End: 2020-02-05

## 2020-02-05 RX ORDER — AMLODIPINE BESYLATE 5 MG/1
5 TABLET ORAL
Status: DISCONTINUED | OUTPATIENT
Start: 2020-02-06 | End: 2020-02-08

## 2020-02-05 RX ORDER — LACTULOSE 20 G/30ML
30 SOLUTION ORAL
Status: DISCONTINUED | OUTPATIENT
Start: 2020-02-05 | End: 2020-02-15 | Stop reason: HOSPADM

## 2020-02-05 RX ORDER — LANOLIN ALCOHOL/MO/W.PET/CERES
3 CREAM (GRAM) TOPICAL NIGHTLY PRN
Status: DISCONTINUED | OUTPATIENT
Start: 2020-02-05 | End: 2020-02-05

## 2020-02-05 RX ORDER — AMOXICILLIN 250 MG
2 CAPSULE ORAL 2 TIMES DAILY
Status: CANCELLED | OUTPATIENT
Start: 2020-02-05

## 2020-02-05 RX ORDER — TRAZODONE HYDROCHLORIDE 50 MG/1
50 TABLET ORAL
Status: DISCONTINUED | OUTPATIENT
Start: 2020-02-05 | End: 2020-02-05

## 2020-02-05 RX ORDER — LEVOTHYROXINE SODIUM 88 UG/1
88 TABLET ORAL
Status: CANCELLED | OUTPATIENT
Start: 2020-02-06

## 2020-02-05 RX ORDER — BISACODYL 10 MG
10 SUPPOSITORY, RECTAL RECTAL
Status: DISCONTINUED | OUTPATIENT
Start: 2020-02-05 | End: 2020-02-15 | Stop reason: HOSPADM

## 2020-02-05 RX ORDER — POLYETHYLENE GLYCOL 3350 17 G/17G
1 POWDER, FOR SOLUTION ORAL
Status: DISCONTINUED | OUTPATIENT
Start: 2020-02-05 | End: 2020-02-10

## 2020-02-05 RX ADMIN — SENNOSIDES AND DOCUSATE SODIUM 2 TABLET: 8.6; 5 TABLET ORAL at 21:03

## 2020-02-05 RX ADMIN — LEVOTHYROXINE SODIUM 88 MCG: 88 TABLET ORAL at 05:45

## 2020-02-05 RX ADMIN — METOPROLOL TARTRATE 50 MG: 25 TABLET ORAL at 21:03

## 2020-02-05 RX ADMIN — MELATONIN 3 MG: at 21:03

## 2020-02-05 RX ADMIN — ATORVASTATIN CALCIUM 80 MG: 40 TABLET, FILM COATED ORAL at 21:03

## 2020-02-05 RX ADMIN — TRAZODONE HYDROCHLORIDE 50 MG: 50 TABLET ORAL at 21:04

## 2020-02-05 RX ADMIN — NYSTATIN 500000 UNITS: 100000 SUSPENSION ORAL at 21:04

## 2020-02-05 RX ADMIN — METOPROLOL TARTRATE 50 MG: 25 TABLET, FILM COATED ORAL at 05:46

## 2020-02-05 RX ADMIN — SENNOSIDES AND DOCUSATE SODIUM 2 TABLET: 8.6; 5 TABLET ORAL at 05:45

## 2020-02-05 RX ADMIN — AMLODIPINE BESYLATE 5 MG: 5 TABLET ORAL at 05:45

## 2020-02-05 RX ADMIN — HYDROCORTISONE: 1 CREAM TOPICAL at 21:06

## 2020-02-05 RX ADMIN — IPRATROPIUM BROMIDE AND ALBUTEROL SULFATE 3 ML: .5; 3 SOLUTION RESPIRATORY (INHALATION) at 14:45

## 2020-02-05 RX ADMIN — IPRATROPIUM BROMIDE AND ALBUTEROL SULFATE 3 ML: .5; 3 SOLUTION RESPIRATORY (INHALATION) at 21:21

## 2020-02-05 RX ADMIN — NYSTATIN 500000 UNITS: 100000 SUSPENSION ORAL at 17:22

## 2020-02-05 SDOH — HEALTH STABILITY: MENTAL HEALTH: HOW OFTEN DO YOU HAVE 6 OR MORE DRINKS ON ONE OCCASION?: NEVER

## 2020-02-05 SDOH — ECONOMIC STABILITY: FOOD INSECURITY: WITHIN THE PAST 12 MONTHS, YOU WORRIED THAT YOUR FOOD WOULD RUN OUT BEFORE YOU GOT MONEY TO BUY MORE.: NEVER TRUE

## 2020-02-05 SDOH — HEALTH STABILITY: MENTAL HEALTH: HOW MANY STANDARD DRINKS CONTAINING ALCOHOL DO YOU HAVE ON A TYPICAL DAY?: 1 OR 2

## 2020-02-05 SDOH — HEALTH STABILITY: MENTAL HEALTH: HOW OFTEN DO YOU HAVE A DRINK CONTAINING ALCOHOL?: 2-3 TIMES A WEEK

## 2020-02-05 SDOH — ECONOMIC STABILITY: FOOD INSECURITY: WITHIN THE PAST 12 MONTHS, THE FOOD YOU BOUGHT JUST DIDN'T LAST AND YOU DIDN'T HAVE MONEY TO GET MORE.: NEVER TRUE

## 2020-02-05 SDOH — ECONOMIC STABILITY: TRANSPORTATION INSECURITY
IN THE PAST 12 MONTHS, HAS LACK OF TRANSPORTATION KEPT YOU FROM MEETINGS, WORK, OR FROM GETTING THINGS NEEDED FOR DAILY LIVING?: NO

## 2020-02-05 SDOH — ECONOMIC STABILITY: TRANSPORTATION INSECURITY
IN THE PAST 12 MONTHS, HAS THE LACK OF TRANSPORTATION KEPT YOU FROM MEDICAL APPOINTMENTS OR FROM GETTING MEDICATIONS?: NO

## 2020-02-05 ASSESSMENT — LIFESTYLE VARIABLES
HAVE PEOPLE ANNOYED YOU BY CRITICIZING YOUR DRINKING: NO
ALCOHOL_USE: YES
ON A TYPICAL DAY WHEN YOU DRINK ALCOHOL HOW MANY DRINKS DO YOU HAVE: 2
HAVE YOU EVER FELT YOU SHOULD CUT DOWN ON YOUR DRINKING: NO
EVER FELT BAD OR GUILTY ABOUT YOUR DRINKING: NO
CONSUMPTION TOTAL: NEGATIVE
DOES PATIENT WANT TO STOP DRINKING: NO
TOTAL SCORE: 0
HOW MANY TIMES IN THE PAST YEAR HAVE YOU HAD 5 OR MORE DRINKS IN A DAY: 0
EVER HAD A DRINK FIRST THING IN THE MORNING TO STEADY YOUR NERVES TO GET RID OF A HANGOVER: NO
EVER_SMOKED: YES
AVERAGE NUMBER OF DAYS PER WEEK YOU HAVE A DRINK CONTAINING ALCOHOL: 3
TOTAL SCORE: 0
TOTAL SCORE: 0

## 2020-02-05 ASSESSMENT — COPD QUESTIONNAIRES
COPD SCREENING SCORE: 7
DO YOU EVER COUGH UP ANY MUCUS OR PHLEGM?: YES, A FEW DAYS A WEEK OR MONTH
DURING THE PAST 4 WEEKS HOW MUCH DID YOU FEEL SHORT OF BREATH: SOME OF THE TIME
HAVE YOU SMOKED AT LEAST 100 CIGARETTES IN YOUR ENTIRE LIFE: YES

## 2020-02-05 ASSESSMENT — PATIENT HEALTH QUESTIONNAIRE - PHQ9
1. LITTLE INTEREST OR PLEASURE IN DOING THINGS: NOT AT ALL
SUM OF ALL RESPONSES TO PHQ9 QUESTIONS 1 AND 2: 0
2. FEELING DOWN, DEPRESSED, IRRITABLE, OR HOPELESS: NOT AT ALL
2. FEELING DOWN, DEPRESSED, IRRITABLE, OR HOPELESS: NOT AT ALL
1. LITTLE INTEREST OR PLEASURE IN DOING THINGS: NOT AT ALL
SUM OF ALL RESPONSES TO PHQ9 QUESTIONS 1 AND 2: 0

## 2020-02-05 NOTE — FLOWSHEET NOTE
02/05/20 1448   Interdisciplinary Plan of Care-Goals (Indications)   Bronchodilator Indications History / Diagnosis;Physical Exam / Hyperinflation / Wheezing (bronchospasm)   Interdisciplinary Plan of Care-Outcomes    Bronchodilator Outcome Diminished Wheezing and Volume of Air Movement Increased;Patient at Stable Baseline   Education   Education Yes - Pt. / Family has been Instructed in Trach Care   RT Assessment of Delivered Medications   Evaluation of Medication Delivery Daily Yes-- Pt /Family has been Instructed in use of Respiratory Medications and Adverse Reactions   #Demo/Eval outside of SVN, MDI'S Yes   SVN Group   #SVN Performed Yes   Given By: Mouthpiece   Date SVN Last Changed 02/05/20   Date SVN Next Change Due (Q 7 Days) 02/12/20   Respiratory WDL   Respiratory (WDL) X   Chest Exam   Work Of Breathing / Effort Mild   Respiration 18   Pulse 82   Breath Sounds   Pre/Post Intervention Post Intervention Assessment   RUL Breath Sounds Expiratory Wheezes   RML Breath Sounds Expiratory Wheezes   RLL Breath Sounds Diminished   HARMONY Breath Sounds Expiratory Wheezes   LLL Breath Sounds Diminished   Secretions   Cough Strong;Congested   How Sputum Obtained Spontaneous   Sputum Amount Unable to Evaluate   Sputum Color Unable to Evaluate   Sputum Consistency Unable to Evaluate   Oximetry   #Pulse Oximetry (Single Determination) Yes   Oxygen   Home O2 Use Prior To Admission?   (uses 2 liters at noc with cpap)   Pulse Oximetry 98 %   O2 (LPM) 2

## 2020-02-05 NOTE — REHAB-RESPIRATORY IDT TEAM NOTE
Respiratory Therapy   Respiratory Therapy      Section completed by:  Clarissa Roberson, RRT   pt has hx of childhood asthma takes albuterol mdi prn, uses a cpap at [facility]. Family will bring his in tomorrow, as of now he is on 2 liters oxymask. Will start him on duoneb qid per assessment.

## 2020-02-05 NOTE — WOUND TEAM
[facility]  Inpatient Services  Initial Wound and Skin Care Evaluation    Admission Date: 1/27/2020     Consult Date:  2/4/2020@ 0617   HPI, PMH, SH: Reviewed    Unit where seen by Wound Team: [id number]     WOUND CONSULT RELATED TO:  Both anterior ankles     Self Report / Pain Level:  No c/o pain     OBJECTIVE:  Sitting up in chair with socks in place    WOUND TYPE, LOCATION, CHARACTERISTICS (Pressure Injuries: location, stage, POA or date identified)  Wound 01/28/20 Other (comment) Ankle Rash left (Active)         Site Assessment Red;Dry    Kimberlyn-wound Assessment Fragile;Pink;Red    Margins Defined edges    Wound Length (cm) 5 cm 2/4/2020 10:30 AM   Wound Width (cm) 3.5 cm 2/4/2020 10:30 AM   Wound Depth (cm) 0 cm 2/4/2020 10:30 AM   Wound Surface Area (cm^2) 17.5 cm^2 2/4/2020 10:30 AM   Tunneling 0 cm 2/4/2020 10:30 AM   Undermining 0 cm 2/4/2020 10:30 AM   Closure Open to air    Drainage Amount None    Drainage Description Serosanguineous    Treatments Cleansed    Cleansing Normal Saline Irrigation    Periwound Protectant Not Applicable    Dressing Options Petroleum Gauze (clear);Adhesive Foam    Dressing Cleansing/Solutions Not Applicable    Dressing Changed New    Dressing Status Intact    Dressing Change Frequency Every 48 hrs    NEXT Dressing Change  02/06/20    NEXT Weekly Photo (Inpatient Only) 02/12/20    Odor None     Pulses 2+;DP     Exposed Structures None     Tissue Type and Percentage 100% red crust        Wound 02/03/20 Foot rash right (Active)         Site Assessment Pink;Red    Kimberlyn-wound Assessment Intact    Wound Length (cm) 5 cm 2/4/2020 10:30 AM   Wound Width (cm) 9.5 cm 2/4/2020 10:30 AM   Wound Depth (cm) 0 cm 2/4/2020 10:30 AM   Wound Surface Area (cm^2) 47.5 cm^2 2/4/2020 10:30 AM   Tunneling 0 cm 2/4/2020 10:30 AM   Undermining 0 cm 2/4/2020 10:30 AM   Closure Open to air    Drainage Amount None    Treatments Cleansed    Cleansing Normal Saline Irrigation    Dressing Options  Open to Air    NEXT Weekly Photo (Inpatient Only) 02/12/20    Odor None     Pulses 2+;DP     Exposed Structures None     Tissue Type and Percentage 100% pink red           Vascular:  Wounds not r/t vascular problem      Lab Values:    Lab Results   Component Value Date/Time    WBC 10.4 02/04/2020 02:04 PM    RBC 3.86 (L) 02/04/2020 02:04 PM    HEMOGLOBIN 12.2 (L) 02/04/2020 02:04 PM    HEMATOCRIT 37.9 (L) 02/04/2020 02:04 PM        Results from last 7 days   Lab Units 02/03/20  1353 01/30/20  0308   C REACTIVE PROTEIN 4596 mg/dL 5.46* 5.62*             Lab Results   Component Value Date/Time    HBA1C 7.2 (H) 01/28/2020 01:28 AM           Culture: na    INTERVENTIONS BY WOUND TEAM: removed socks, cleaned wounds with NS, dried. Pulses easily palpated. Cleaned with NS, dried.  Applied piece of folded petrolatum gauze over left ankle wound, covered with adhesive foam.    Interdisciplinary consultation: Patient, Bedside RN     EVALUATION: pt has crust/plaque to anterior both ankles L>R.  R with red skin scattered scabs slight crust. L with large crust with fissures firmly adherent. Petrolatum gauze for moisture. Unsure of cause. Recommended derm consult to pt. Petrolatum gauze for moisture.    Goals: Slow steady decrease in wound area and depth weekly.    NURSING PLAN OF CARE ORDERS (X):  Dressing changes: See Dressing Care orders: x  Skin care: See Skin Care orders:   Rectal tube care: See Rectal Tube Care orders:        Other orders:                           RSKIN:   CURRENTLY IN PLACE (X), APPLIED THIS VISIT (A), ORDERED (O):   Q shift Magan:  X  Q shift pressure point assessments:  X  Pressure redistribution mattress  x                           Low Airloss                        Bariatric ANNA                     Bariatric foam                        Heel float boots                     Heel Silicone dressing                         Float Heels off Bed with Pillows               Barrier wipes         Barrier Cream          Barrier paste          Sacral silicone dressing         Silicone O2 tubing         Anchorfast         Cannula fixation Device (Tender )          Gray Foam Ear protectors           Trach with Optifoam split foam                 Waffle cushion        Waffle Overlay         Rectal tube or BMS    Purwick/Condom Cath          Antifungal tx      Interdry          Reposition q 2 hours        Up to chair   x     Ambulate      PT/OT        Dietician        Diabetes Education      PO  x   TF     TPN     NPO   # days   Other        WOUND TEAM PLAN OF CARE (X):   Dressing changes by wound team:          Follow up 1-2 times weekly:               Follow up 3 times weekly:                NPWT change 3 times weekly:     Follow up as needed: if wounds worsen, Nsg to notify      Other (explain):     Anticipated discharge plans (X):   LTACH:        SNF/Rehab:                  Home Care:           Outpatient Wound Center:            Self Care:            Other:  Needs derm follow up

## 2020-02-05 NOTE — PROGRESS NOTES
Hospital Medicine Daily Progress Note    Date of Service  2/4/2020    Chief Complaint  74 y.o. male admitted 1/27/2020 with CVA.    Hospital Course  Admitted with CVA.    Interval Problem Update  CVA - persistent left-sided weakness, CT head today shows stable hemorrhagic transformation  HTN - sbp 120-130  Diabetes - low 100s  Resp failure - O2 4 lpm NC  Low b12    Consultants/Specialty  Neurology  Critical care  Gastroenterology    Code Status  DNR/DNI    Disposition  Rehab    Review of Systems  Review of Systems   Constitutional: Negative for chills, diaphoresis, fever and malaise/fatigue.   HENT: Negative for hearing loss and sore throat.    Eyes: Negative for blurred vision.   Respiratory: Positive for cough and wheezing. Negative for shortness of breath.    Cardiovascular: Negative for chest pain, palpitations and leg swelling.   Gastrointestinal: Negative for abdominal pain, diarrhea, heartburn, nausea and vomiting.   Genitourinary: Negative for dysuria, flank pain and hematuria.   Musculoskeletal: Negative for back pain and neck pain.   Skin: Negative for rash.   Neurological: Positive for focal weakness and weakness. Negative for dizziness, tremors, sensory change, speech change and headaches.   Psychiatric/Behavioral: The patient is not nervous/anxious.         Physical Exam  Temp:  [36.1 °C (97 °F)-36.7 °C (98.1 °F)] 36.7 °C (98.1 °F)  Pulse:  [71-85] 85  Resp:  [16-22] 22  BP: (120-141)/(57-89) 138/58  SpO2:  [91 %-100 %] 98 %    Physical Exam  Constitutional:       Appearance: He is obese.   HENT:      Head: Normocephalic and atraumatic.      Nose: No congestion.      Mouth/Throat:      Mouth: Mucous membranes are moist.   Eyes:      Extraocular Movements: Extraocular movements intact.      Conjunctiva/sclera: Conjunctivae normal.      Pupils: Pupils are equal, round, and reactive to light.   Neck:      Musculoskeletal: No muscular tenderness.   Cardiovascular:      Rate and Rhythm: Normal rate and  regular rhythm.   Pulmonary:      Effort: Pulmonary effort is normal.      Breath sounds: Wheezing present.   Abdominal:      General: Bowel sounds are normal. There is no distension.      Palpations: Abdomen is soft.      Tenderness: There is no tenderness. There is no guarding or rebound.      Comments: Peg tube   Musculoskeletal:      Right lower leg: No edema.      Left lower leg: No edema.   Lymphadenopathy:      Cervical: No cervical adenopathy.   Skin:     General: Skin is warm and dry.   Neurological:      Mental Status: He is alert and oriented to person, place, and time.      Comments: Left facial weakness  MMT RUE and RLE 5/5, LUE 4/5, LLE 3-4/5         Fluids    Intake/Output Summary (Last 24 hours) at 2/4/2020 1636  Last data filed at 2/4/2020 1239  Gross per 24 hour   Intake 150 ml   Output --   Net 150 ml       Laboratory  Recent Labs     02/03/20  2145 02/04/20  0608 02/04/20  1404   WBC 11.5* 11.2* 10.4   RBC 3.90* 3.97* 3.86*   HEMOGLOBIN 12.3* 12.4* 12.2*   HEMATOCRIT 38.6* 38.9* 37.9*   MCV 99.0* 98.0* 98.2*   MCH 31.5 31.2 31.6   MCHC 31.9* 31.9* 32.2*   RDW 47.4 45.6 45.9   PLATELETCT 337 340 326   MPV 11.5 11.4 11.6     Recent Labs     02/04/20  1404   SODIUM 140   POTASSIUM 4.3   CHLORIDE 100   CO2 30   GLUCOSE 122*   BUN 29*   CREATININE 0.92   CALCIUM 9.7                   Imaging  CT-HEAD W/O   Final Result      1.  Stable intraparenchymal hemorrhage involving the right posterior frontal region.      2.  Atrophy and chronic ischemic change.      CT-HEAD W/O   Final Result      1.  Stable RIGHT frontoparietal intraparenchymal hemorrhage with surrounding edema compared to MRI brain dated 1/29/2020.  Hemorrhage and edema both increased from prior CT dated 1/28/2020.   2.  Acute RIGHT frontal infarct as seen on prior MRI.   3.  No midline shift.         DX-CHEST-LIMITED (1 VIEW)   Final Result         Enlarged cardiac silhouette with interstitial prominence suggesting mild edema.       DX-ABDOMEN FOR TUBE PLACEMENT   Final Result      Cortrak nasogastric tube position consistent with intragastric placement, probably near the pylorus.      MR-BRAIN-W/O   Final Result      1.  Moderate acute/subacute right MCA infarct with hemorrhagic transformation.   2.  Right frontal lobe encephalomalacia.   3.  Mild to moderate cerebral atrophy.      EC-ECHOCARDIOGRAM COMPLETE W/O CONT   Final Result      CT-HEAD W/O   Final Result   Addendum 1 of 1   These findings were discussed with REMI SHAFFER on 1/28/2020 10:57 AM.      Final      DX-CHEST-PORTABLE (1 VIEW)   Final Result      Cardiomegaly with interstitial prominence.      CT-CTA NECK WITH & W/O-POST PROCESSING   Final Result      1.  Atherosclerotic plaque at the carotid bifurcations bilaterally which results in less than 50% diameter narrowing.      2.  Diminutive right vertebral artery.      3.  Tortuous left internal carotid artery which is retropharyngeal in location.      CT-CTA HEAD WITH & W/O-POST PROCESS   Final Result      1.  Some asymmetric attenuation of the right middle cerebral arterial peripheral vessels without evidence of acute occlusion possibly representing vasospasm or atherosclerotic attenuation.      2.  Diminutive right vertebral artery.      CT-HEAD W/O   Final Result      1.  No evidence of acute intracranial process.      2.  Cerebral atrophy as well as periventricular chronic small vessel ischemic change.      DX-CHEST-LIMITED (1 VIEW)    (Results Pending)        Assessment/Plan  * Cerebrovascular accident (CVA) due to thrombosis of right middle cerebral artery (HCC)- (present on admission)  Assessment & Plan  s/p tPA  Lipitor  Holding ASA due to hemorrhagic transformation  PT/OT/SLP    Acute respiratory failure with hypoxia (HCC)- (present on admission)  Assessment & Plan  RT protocol  Check CXR, probnp    Vitamin B12 deficiency- (present on admission)  Assessment & Plan  IM B12 today    Dysphagia- (present on  admission)  Assessment & Plan  Peg tube, TF  SLP to follow    Type 2 diabetes mellitus without complication, without long-term current use of insulin (HCC)- (present on admission)  Assessment & Plan  Start SSI    Moderate persistent asthma without complication- (present on admission)  Assessment & Plan  RT protocol    Essential hypertension- (present on admission)  Assessment & Plan  Metoprolol, Norvasc   IV hydralazine and labetalol as needed with parameters    Morbid obesity with BMI of 40.0-44.9, adult (HCC)- (present on admission)  Assessment & Plan  Body mass index is 40.72 kg/m².      Dyslipidemia- (present on admission)  Assessment & Plan  Lipitor    Hypothyroidism- (present on admission)  Assessment & Plan  Synthroid  Check TSH on 3/4/2020    VERENA (obstructive sleep apnea)- (present on admission)  Assessment & Plan  CPAP       VTE prophylaxis: SCD

## 2020-02-05 NOTE — CARE PLAN
Problem: Communication  Goal: The ability to communicate needs accurately and effectively will improve  Outcome: PROGRESSING AS EXPECTED  Intervention: [place] patient and significant other/support system to call light to alert staff of needs  Flowsheets (Taken 2/3/2020 2331)  Oriented to:: All of the Following : Location of Bathroom, Visiting Policy, Unit Routine, Call Light and Bedside Controls, Bedside Rail Policy, Smoking Policy, Rights and Responsibilities, Bedside Report, and Patient Education Notebook  Note:   Pt A&Ox4, calls appropriately using call light and is able to make needs known to staff. Pt oriented to room and call light. Hourly rounding in place.        Problem: Safety  Goal: Will remain free from falls  Outcome: PROGRESSING AS EXPECTED  Intervention: Implement fall precautions  Flowsheets (Taken 2/4/2020 2000)  Environmental Precautions: Treaded Slipper Socks on Patient;Personal Belongings, Wastebasket, Call Bell etc. in Easy Reach;Report Given to Other Health Care Providers Regarding Fall Risk;Transferred to Stronger Side;Bed in Low Position;Communication Sign for Patients & Families;Mobility Assessed & Appropriate Sign Placed  Note:   Pt is a low fall risk. Fall precautions in place. Bed locked and in lowest position. Bed alarm on, call light within reach, non skid socks in place.

## 2020-02-05 NOTE — CARE PLAN
Problem: Other Problem (see comments)  Goal: Other Goal  Description  Nutrition Support tolerated and meeting greater than 85% of estimated needs.   Outcome: NOT MET

## 2020-02-05 NOTE — PREADMISSION SCREENING NOTE
"  Pre-Admission Screening Form    Patient Information:   Name: Familia Lundy     MRN: [id number]       : 1945      Age: 74 y.o.   Gender: male      Race: White [7]       Marital Status:  [5]  Family Contact: Elizabeth LundyChato        Relationship: Daughter [2]  Brother [1]  Home Phone:   [phone]           Cell Phone: [phone]    Advanced Directives: None  Code Status:  DNAR/DNI  Current Attending Provider: Liam Lopez M.D.  Referring Physician: Dr. Garcia   Physiatrist Consult: Dr. Varghese    Referral Date: 20  Primary Payor Source:  MEDICARE  Secondary Payor Source:  [facility]    Medical Information:   Date of Admission to Acute Care [place]  Room Number: S193/02  Rehabilitation Diagnosis: R MCA Infarct  Immunization History   Administered Date(s) Administered   • Influenza Vaccine Adult HD 2015, 2020   • Influenza Vaccine Quad Inj (Pf) 2016, 2017, 2018   • Pneumococcal Conjugate Vaccine (Prevnar/PCV-13) 2015     Allergies   Allergen Reactions   • Amoxicillin Hives     Pt states \"I get hives\".   • Ampicillin Hives     Pt states \"I get hives\".     Past Medical History:   Diagnosis Date   • Asthma    • Chickenpox    • English measles    • Influenza    • Mumps    • Tonsillitis      Past Surgical History:   Procedure Laterality Date   • PB PLACE PERCUT GASTROSTOMY TUBE Left 2020    Procedure: INSERTION, PEG TUBE;  Surgeon: Compa Justice M.D.;  Location: SURGERY [facility];  Service: Gastroenterology   • GASTROSCOPY N/A 2020    Procedure: GASTROSCOPY;  Surgeon: Compa Justice M.D.;  Location: SURGERY [facility];  Service: Gastroenterology   • HIP REPLACEMENT, TOTAL         History Leading to Admission, Conditions that Caused the Need for Rehab (CMS):     Dr. Garcia H&P:  Chief Complaint  Left hand numbness     History of Presenting Illness  74 y.o. male with prior history of dyslipidemia on statin therapy, asthma which is not well " controlled, with rescue inhaler being used 2-3 times a day, and essential hypertension with variable control today, was in his usual state of health until approximately 9:30 PM on the day prior to this admission.  He reports walking out of the shower, and sitting down to watch TV.  He picked up with a remote with no issues, however he felt that he could not feel the remote, and noted lack of feeling in his entire left hand and arm.  There seem to be no exacerbating or alleviating factors and this did not improve, prompting his visit to the emergency department.  After prompt evaluation, it was decided that he should receive tissue plasminogen activator which was given.  He currently denies headache or vision changes he has no chest pain or shortness of breath, no abdominal pain, nausea vomiting, diarrhea or constipation.  He feels no weakness in his upper extremity or face.  No other complaints.  Assessment/Plan:  I anticipate this patient will require at least two midnights for appropriate medical management, necessitating inpatient admission.     * Cerebrovascular accident (CVA) due to thrombosis of right middle cerebral artery (HCC)  Assessment & Plan  Suspected, now status post TPA therapy.  Plan for ICU admission for every hour neuro checks post TPA, imaging as needed and ordered, aspirin to start 24 hours after TPA.  Continue with statin therapy, changing to Lipitor 80 mg.  Monitor.     Morbid obesity with BMI of 40.0-44.9, adult ([facility])  Assessment & Plan  Body mass index is 40.72 kg/m².        Moderate persistent asthma without complication  Assessment & Plan  Reports taking albuterol at least 2-3 times a day.  Likely will need a controller medication or inhaled corticosteroid.  This can be further fleshed out after TPA therapy.  Duo nebs have been ordered as needed.     Dyslipidemia  Assessment & Plan  On statin therapy and Lopid, both of which will be continued, but simvastatin will be switched to high-dose  atorvastatin the setting of acute CVA.     Essential hypertension  Assessment & Plan  Currently variably controlled on current medication regimen.  As needed medications placed in the setting of post TPA.     Hypothyroidism- (present on admission)  Assessment & Plan  On replacement therapy which will be continued.     VERENA (obstructive sleep apnea)- (present on admission)  Assessment & Plan  On CPAP nightly with supplemental oxygen.  Respiratory therapy has been consulted for the same.           VTE prophylaxis: SCD    Dr. Varghese (Physiatry) recommendations:  ASSESSMENT:  Patient is a 74 y.o. right-hand-dominant male admitted with left-sided weakness, diagnosed with moderate acute/subacute right MCA infarct with a small focal hemorrhagic transformation.  Received TPA.  Prior medical history of asthma, hypertension, dyslipidemia, newly discovered diabetes.  Also with new development of sleep apnea.        # Rehabilitation: Patient clearly has significant amount of rehabilitation needs, given PT and OT with report maximum assist for transfers/mobility and ADLs.  Patient also has swallowing difficulties and currently is n.p.o. with NG tube in place.   - Unfortunately, admission to acute rehab facility requires a safe discharge plan back home.  At this time, patient does not have any caregivers that can help him when he gets home.  He will not be able to return to his previous residence without assistance, even if he were to go through acute rehabilitation.  - At this time, recommending discharge to SNF.    - Will continue to follow  - Avoid SSRI, given hemorrhagic transformation         # Bowel: None documented over 2 days, just added phong SAMANIEGO, monitor     # Bladder: Denies issues     # DVT prophylaxis: Given hemorrhagic transformation, no blood thinners  - Continue SCDs        Discussed with pt, summarized hospitalization and care, options for next step of care  Labs reviewed  Imaging personally reviewed. Repeat CT  showing small right focal hemorrhagic transformation.    Discussed with rehab team about recommendations.        Thank you for allowing us to participate in the care of this patient.      Dr. Valencia (Neurology) recommendations:  Assessment and Plan:     Familia Lundy is a 74 y.o. man with relevant history of HTN and HLD on Plavix presenting for whom neurology was consulted to address acute onset left sided hemiparesis and hemianesthesia localized to the right MCA territory with cortical findings of a visual field cut. Patient is a candidate for thrombolytic therapy given within the window, NIHSS 10, and BP < 180. Decision to mix tPA 23:07 - pharmacy contacted via telephone to confirm. Possibly underlying etiologies include cardioembolic vs. Large vessel disease (thromboembolic). Not a candidate for IA given no LVO on CTA.     Plan:     - ACUTE TREATMENT WITH TPA 23:15 as managed with pharmacy and coordinated care  - Admit to the intensive care unit  - Neurology checks and vital signs per protocol; perform swallow screen  - Maintain blood pressure <180/105 per thrombolytic therapy guidelines  - obtain normoglycemia and avoid hypo- or hyper -natremia; aim for normothermia  - Hold antiplatelets and any blood thinners for 24 hours status post tPA administration  - high intensity statin per SPARCL Trial if and when safe to swallow  - Obtain STAT head CT for acute neurologic deterioration, otherwise repeat non-contrast head CT 24 hours s/p tPA  - serum studies for stroke risk factors: lipid panel & hemoglobin A1C  - To identify stroke territory, obtain MRI brain  --> (If the MRI brain study is completed before the 24hr post head CT, then the head CT study can be discontinued)  - Obtain a 2D echocardiogram w/ bubble study  - evaluate and treat with PT/OT/ST  - stroke education  - smoking cessation     The evaluation of the patient, and recommended management, was discussed with Dr. Horn (ED), nursing, and pharmacy.   Discussed indication, risk and benefit of tPA with patient prior to bolus.     2322 addendum: the patient's line infiltrated.  Examination reveals a saline flush amount of fluid in the forearm.  Updated recs: we will NOT re-bolus as it is felt the bolus was delivered IV and the majority of infiltrate is saline; the gtt was stopped and the infusion will be administered via other access    Compa Justice M.D.   Physician   Gastroenterology   Procedures   Signed   Date of Service:  2/1/2020 12:00 AM                    []Hide copied text    []Hover for details  DATE OF SERVICE:  02/01/2020     INDICATION FOR PROCEDURE:  Feeding difficulty, with anticipated need for   long-term alternate feeding.     CONSENT:  Informed consent was obtained from the patient after benefits, risks   and possible alternatives were discussed.     MEDICATIONS:  The patient received general anesthetic from Dr. Martinez from   anesthesia, please see his notes for full details.     PROCEDURE DESCRIPTION:  The patient was placed in the supine position and was   given oxygen via endotracheal tube.  After the patient had been endotracheally   intubated and sedated, the upper endoscope was placed in the patient's mouth   and advanced easily and carefully to esophagus and subsequently to the stomach   and duodenum.     FINDINGS:  The esophagus appeared normal including views of the GE junction.    The stomach was empty, and otherwise normal, including retroflexed views of   the cardia and angulus.  The duodenum appeared normal to the second portion.    The patient's Cortrak was removed.  The overlying skin was blotted, and an   area of excellent 1:1 impression was found in the mid to distal stomach.  The   site was prepped and draped in the usual manner.  The site was then   anesthetized using 1% Xylocaine solution.  A small vertical incision was made,   and through this incision, a trocar was placed with 1 pass into the gastric   lumen easily.  The  trocar was then removed leaving the plastic sheath in   place.  A wire was then placed through the plastic sheath and grasped by the   scope using standard snare technique.  The wire was then pulled through the   patient's mouth in standard fashion.  The PEG tube was then affixed to the   wire and the entire apparatus was pulled into good position across the   abdominal and gastric walls.  The final resting position at the level of the   skin was 7.5 cm.  The scope was then reinserted into the patient's mouth and   the internal bumper was noted to be in good position adjacent to the anterior   gastric wall.  The scope was then withdrawn, and the PEG tube was dressed and   prepared in the usual manner.     COMPLICATIONS:  No complications during or in the immediate postoperative   period.     IMPRESSION:  1.  Successful placement of 20-[place]izen [place] tractional removable feeding gastrostomy   tube.  2.  Removal of Cortrak tube.  3.  Otherwise, normal upper endoscopy.     RECOMMENDATION:  As per orders regarding medications and tube feeds.        ____________________________________     NABEEL CLARK MD     WP / NTS     DD:  02/01/2020 09:27:27  DT:  02/01/2020 09:56:39     D#:  [id number]  Job#:  [id number]         Last signed by: Nabeel Clark M.D. at 2/2/2020  8:29 AM     Brain MRI 01-29-20:  1.  Moderate acute/subacute right MCA infarct with hemorrhagic transformation.  2.  Right frontal lobe encephalomalacia.  3.  Mild to moderate cerebral atrophy.    Co-morbidities: See PMH  Potential Risk - Complications: Aphasia, Cognitive Impairment, Contractures, Deep Vein Thrombosis, Dysphagia, Incontinence, Malnutrition, Pain, Perceptual Impairment, Pneumonia, Pressure Ulcer and Urinary Tract Infection  Level of Risk: High    Ongoing Medical Management Needed (Medical/Nursing Needs):   Patient Active Problem List    Diagnosis Date Noted   • Cerebrovascular accident (CVA) due to thrombosis of right middle cerebral artery (HCC) 01/28/2020   "    Priority: High   • Acute respiratory failure with hypoxia ([facility]) 12/12/2015     Priority: High   • Type 2 diabetes mellitus without complication, without long-term current use of insulin ([facility]) 02/04/2020     Priority: Medium   • Dysphagia 02/04/2020     Priority: Medium   • Vitamin B12 deficiency 02/04/2020     Priority: Medium   • Essential hypertension 01/28/2020     Priority: Medium   • Moderate persistent asthma without complication 01/28/2020     Priority: Medium   • Acute conjunctivitis of right eye 05/09/2018     Priority: Medium   • Cerebral infarct ([facility]) 05/08/2018     Priority: Medium   • Vertigo 05/07/2018     Priority: Medium   • Dyslipidemia 01/28/2020     Priority: Low   • Morbid obesity with BMI of 40.0-44.9, adult ([facility]) 01/28/2020     Priority: Low   • Hypothyroidism 05/07/2018     Priority: Low   • VERENA (obstructive sleep apnea) 09/28/2016     Priority: Low     Alert     Current Vital Signs:   Temperature: 36.9 °C (98.5 °F) Pulse: 88 Respiration: 18 Blood Pressure : 129/53  Weight: 120.6 kg (265 lb 14 oz) Height: 170.2 cm (5' 7\")  Pulse Oximetry: 98 % O2 (LPM): 4      Completed Laboratory Reports:  Recent Labs     02/02/20  1329 02/02/20  2143 02/03/20  0509 02/03/20  1353 02/03/20  2145 02/04/20  0608 02/04/20  0918 02/04/20  1257 02/04/20  1404 02/04/20  1719 02/04/20  2337 02/05/20  0544 02/05/20  0732   WBC 10.4 11.3* 10.0 10.7 11.5* 11.2*  --   --  10.4  --   --   --  10.8   HEMOGLOBIN 12.8* 12.7* 12.5* 12.3* 12.3* 12.4*  --   --  12.2*  --   --   --  11.9*   HEMATOCRIT 40.0* 39.1* 38.3* 37.9* 38.6* 38.9*  --   --  37.9*  --   --   --  37.3*   PLATELETCT 294 282 251 285 337 340  --   --  326  --   --   --  324   SODIUM  --   --   --   --   --   --   --   --  140  --   --   --  140   POTASSIUM  --   --   --   --   --   --   --   --  4.3  --   --   --  3.9   BUN  --   --   --   --   --   --   --   --  29*  --   --   --  28*   CREATININE  --   --   --   --   --   --   --   --  0.92  --   --   -- "  0.89   GLUCOSE  --   --   --   --   --   --   --   --  122*  --   --   --  172*   POCGLUCOSE  --   --   --   --   --   --  173* 103*  --  132* 143* 150*  --      Additional Labs: Not Applicable    Prior Living Situation:   Housing / Facility: 1 Story House  Steps Into Home: 2  Steps In Home: 0  Lives with - Patient's Self Care Capacity: Alone and Able to Care For Self  Equipment Owned: Front-Wheel Walker, Single Point Cane    Prior Level of Function / Living Situation:   Physical Therapy: Prior Services: None  Housing / Facility: 1 Story House  Steps Into Home: 2  Steps In Home: 0  Rail: None  Bathroom Set up: Walk In Shower(step in shower)  Equipment Owned: Front-Wheel Walker, Single Point Cane  Lives with - Patient's Self Care Capacity: Alone and Able to Care For Self  Bed Mobility: Independent  Transfer Status: Independent  Ambulation: Independent  Distance Ambulation (Feet): (community distances)  Assistive Devices Used: None  Stairs: Independent  Current Level of Function:   Level Of Assist: Minimal Assist  Assistive Device: Front Wheel Walker  Distance (Feet): 100  Deviation: Decreased Base Of Support  # of Stairs Climbed: 2  Level of Assist with Stairs: Minimal Assist  Weight Bearing Status: no restrictions  Skilled Intervention: Verbal Cuing  Comments: distance limited by pt, reports fatigue, needs cues to not hold breath, amb on oxy mask at 6L 94%;   Supine to Sit: (up in chair)  Sit to Supine: (wants to stay in chair)  Scooting: Supervised  Sit to Stand: Minimal Assist  Bed, Chair, Wheelchair Transfer: Minimal Assist  Toilet Transfers: Unable to Participate  Transfer Method: Stand Pivot  Skilled Intervention: Verbal Cuing  Comments: coodination of L LE decreased with heel-toe tap and L heel to shin.   Sitting in Chair: 60+  Sitting Edge of Bed: NT   Standin+  Occupational Therapy:   Self Feeding: Independent  Grooming / Hygiene: Independent  Bathing: Independent  Dressing: Independent  Toileting:  Independent  Medication Management: Independent  Laundry: Independent  Kitchen Mobility: Independent  Finances: Independent  Home Management: Independent  Shopping: Independent  Prior Level Of Mobility: Independent Without Device in Community, Independent With Device in Community  Driving / Transportation: Driving Independent  Prior Services: None  Housing / Facility: 1 Story House  Occupation (Pre-Hospital Vocational): Retired Due To Age  Current Level of Function:   Eating: Total Assist(pt has peg tube)  Bathing: Not Tested  Upper Body Dressing: Minimal Assist  Lower Body Dressing: Minimal Assist  Toileting: Minimal Assist  Skilled Intervention: Compensatory Strategies, Postural Facilitation, Sequencing, Verbal Cuing, Tactile Cuing  Speech Language Pathology:   Assessment : Pt seen early morning and FEES discussed with exam scheduled at 9:00.  Pt seen sitting edge of bed with PT/OT and demonstrating reduced sensation LLE.  Noted dysarthria with L labial weakness.  Regarding FEES, pt repositioned upright in bed, fully AAO, with 5L NC O2 and mild expiratory wheeze at baseline.  Pt reports he uses CPAP at night at baseline, but without O2 requirements during the day.  Pt is retired and enjoys fishing.  Scope presented to both nares with the R nares more open than the L.  Dried material noted in both nares.  Sub-optimal full viewing of the pharynx due to excessive secretions throughout.  Multiple maneuvers attempted, scope removed 1x, wiped and reinserted with adequate  closure, reduced epiglottic movement and asymmetrical laryngeal adduction.  Vocal cords adduct with less movement of L cord and posterior glottal chink present for most tasks.  Pt with wad/glob of thick yellowish colored secretions noted superior to the UES and immobile throughout evaluation, post ice chip presentations, post cough.  Blaze aspiration of ice chip noted directly into airway prior to swallow; up to 16 second delay triggering phayrngeal  swallow noted during exam as well.  Pt unable to spontaneously trigger second or additional dry swallows during exam.  Pt unable to eat safely, nor eat to meet nutritional needs.  RT called to room following SLP evaluation for suction, and RN aware of status as well.  Recommend NPO, non-oral source of nutrition, oral motor exercise program as tolerated given current wheezing.  SLP 5x/wk and transitional care post acute care discharge.  Of note, aspiration was silent.  Problem List: Dysphagia  Diet / Liquid Recommendation: NPO, Pre-Feeding Trials with SLP Only  Comments: Pt seen for dysphagia tx focused on swallow exercise training, PO trials of ice chips (6) and NTL water via 1/2 tsp (5), and compensatory strategy training. Three second prep and double swallow swallow trained with PO trials to reduce premature spillage and risk of aspiration before the swallow (see FEES results from 1/29). Pt did have overt reflexive coughing before triggering a swallow with NTL x1, concerning for aspiration before the swallow. Increased wet/audible breath sounds noted with tx trials of ice chips. Pt did trigger a delayed productive cough with ice chips x2 and was able to suction yellowish thin secretions from oral cavity via [place]. Taught pt oral motor exercises targeting lingual/labiofacial strength/ROM/coorindation x10 each, falsetto x10, CTAR x3 (30s each) and x30 (1s each), lingual presses x8, effortful swallow x5 with mod verbal/visual/written cues. Continue NPO/TF; pt will need repeat dx swallow assessment after trial of swallow exercises before initiating a PO diet, as deemed appropriate by primary SLP.  Rehabilitation Prognosis/Potential: Good  Estimated Length of Stay: 14-21 days    Nursing:   Orientation : Oriented x 4  Continent    Scope/Intensity of Services Recommended:  Physical Therapy: 1 hr / day  5 days / week. Therapeutic Interventions Required: Maximize Endurance, Mobility, Strength and Safety  Occupational  Therapy: 1 hr / day 5 days / week. Therapeutic Interventions Required: Maximize Self Care, ADLs, IADLs and Energy Conservation  Speech & Language Pathology: 1 hr / day 5 days / week. Therapeutic Interventions Required: Maximize Cognition, Swallowing and Safety  Rehabilitation Nursin/7. Therapeutic Interventions Required: Monitor Pain, Skin, Wound(s), Vital Signs, Intake and Output, Labs, Safety, Aspiration Risk and Family Training  Rehabilitation Physician: 3 - 5 days / week. Therapeutic Interventions Required: Medical Management  Respiratory Care: Pulmonary Toileting. Therapeutic Interventions Required: Pulmonary Toileting, O2 Weaning and Aspiration Risk  Dietician: Tube Feedings. Therapeutic Interventions Required: .    Rehabilitation Goals and Plan (Expected frequency & duration of treatment in the IRF):   Return to the Community, Modified Independent Level of Care and Outpatient Support  Anticipated Date of Rehabilitation Admission: 20  Patient/Family oriented IRF level of care/facility/plan: Yes  Patient/Family willing to participate in IRF care/facility/plan: Yes  Patient able to tolerate IRF level of care proposed: Yes  Patient has potential to benefit IRF level of care proposed: Yes  Comments: Not Applicable    Special Needs or Precautions - Medical Necessity:  Tube Feedings   Safety Concerns/Precautions:  Fall Risk / High Risk for Falls, Balance, Cognition and Bed / Chair Alarm  Complex Wound Care: Surgical  Pain Management  Special Equipment: Peg  IV Site: Peripheral  Requires Oxygen  Current Medications:    Current Facility-Administered Medications Ordered in Epic   Medication Dose Route Frequency Provider Last Rate Last Dose   • insulin regular (HUMULIN R) injection 1-6 Units  1-6 Units Subcutaneous Q6HRS Liam Lopez M.D.   Stopped at 20 1259   • traZODone (DESYREL) tablet 50 mg  50 mg Enteral Tube QHS Reinier Johns M.D.   50 mg at 200   • Melatonin 3mg (Patient's Own  Medication)   Enteral Tube QHS PRN Reinier Johns M.D.   3 mg at 02/04/20 2120   • amLODIPine (NORVASC) tablet 5 mg  5 mg Enteral Tube Q DAY Reinier Johns M.D.   5 mg at 02/05/20 0545   • labetalol (NORMODYNE/TRANDATE) injection 10-20 mg  10-20 mg Intravenous Q4HRS PRN Aquilino Thayer M.D.   10 mg at 02/02/20 1316    Or   • hydrALAZINE (APRESOLINE) injection 20 mg  20 mg Intravenous Q2HRS PRN Aquilino Thayer M.D.       • acetaminophen (TYLENOL) tablet 650 mg  650 mg Enteral Tube Q6HRS PRN Aquilino Thayer M.D.   650 mg at 02/01/20 1303   • senna-docusate (PERICOLACE or SENOKOT S) 8.6-50 MG per tablet 2 Tab  2 Tab Enteral Tube BID Aquilino Thayer M.D.   2 Tab at 02/05/20 0545    And   • polyethylene glycol/lytes (MIRALAX) PACKET 1 Packet  1 Packet Enteral Tube QDAY PRN Aquilino Thayer M.D.        And   • magnesium hydroxide (MILK OF MAGNESIA) suspension 30 mL  30 mL Enteral Tube QDAY PRN Aquilino Thayer M.D.        And   • bisacodyl (DULCOLAX) suppository 10 mg  10 mg Rectal QDAY PRN Aquilino Thayer M.D.       • levothyroxine (SYNTHROID) tablet 88 mcg  88 mcg Enteral Tube AM ES Aquilino Thayer M.D.   88 mcg at 02/05/20 0545   • atorvastatin (LIPITOR) tablet 80 mg  80 mg Enteral Tube Q EVENING Aquilino Thayer M.D.   80 mg at 02/04/20 1745   • metoprolol (LOPRESSOR) tablet 50 mg  50 mg Enteral Tube BID Aquilino Thayer M.D.   50 mg at 02/05/20 0546   • ondansetron (ZOFRAN ODT) dispertab 4 mg  4 mg Enteral Tube Q4HRS PRN Aquilino Thayer M.D.       • Pharmacy Consult: Enteral tube insertion - review meds/change route/product selection  1 Each Other PHARMACY TO DOSE Aquilino Thayer M.D.       • albuterol inhaler 2 Puff  2 Puff Inhalation Q6HRS PRN Nikhil Garcia M.D.   2 Puff at 02/03/20 1453   • ondansetron (ZOFRAN) syringe/vial injection 4 mg  4 mg Intravenous Q4HRS PRN Nikhil Garcia M.D.       • ipratropium-albuterol (DUONEB) nebulizer solution  3 mL Nebulization Q4H PRN (RT) Nikhil Garcia M.D.   Stopped at  01/31/20 0728   • fentaNYL (SUBLIMAZE) injection 12.5 mcg  12.5 mcg Intravenous Q2HRS PRN Aquilino Thayer M.D.   12.5 mcg at 01/28/20 1659     No current Epic-ordered outpatient medications on file.     Diet:   DIET ORDERS (From admission to next 24h)     Start     Ordered    01/31/20 0035  Diet NPO  AT MIDNIGHT     Question:  Restrict to:  Answer:  Strict    01/31/20 1112    01/29/20 1323  Diet Tube Feeding  CONTINUOUS DIET     Question Answer Comment   Which Rate/Volume? 80 mL/hr    Formula: REPLETE FIBER    Specify Type: CONTINUOUS        01/29/20 1322                Anticipated Discharge Destination / Patient/Family Goal:  Destination: Home Alone Support System: Family   Anticipated home health services: OT, PT, SLP, Nursing, Social Work and Aide  Previously used HH service/ provider: Not Applicable  Anticipated DME Needs: Oxygen, Walker, Wheelchair and Life Line  Outpatient Services: OT, PT and SLP  Alternative resources to address additional identified needs:     Chato, brother will stay with Familia for 2 weeks once D/C'd from [facility] and will return to [place].      Pre-Screen Completed: 2/5/2020 8:55 AM Vasquez Alfonso L.P.N.

## 2020-02-05 NOTE — DISCHARGE PLANNING
Anticipated Discharge Disposition:   [facility]    Action:   FITO ARELLANO contacted by Vasquez with [facility] concerning accepting of patient as well as transportation time of 10:30 am today.     RN CM contacted patient's daughter, Elizabeth [phone], to inform of [facility]    Barriers to Discharge:   None    Plan:   RN CM notified patient's daughter, Elizabeth, charge nurse, and bedside nurse of the transfer today to [facility].    RN CM prepared COBRA.  Completed COBRA placed at front of patient's chart.      ADDENDUM:  RN CM requested to contact patient's brother by patient concerning the transfer to [facility] @ 10:40am.

## 2020-02-05 NOTE — DISCHARGE PLANNING
Dr. Ewing has accepted Familia.  Transport has been arranged for 1030. Msg placed to Dr. Lopez.  Aneta (CM) & Teena (BSN) are aware.

## 2020-02-05 NOTE — PROGRESS NOTES
Pt discharged via wc to [facility]. Report called to Deirdre PAYTON. Belongings including home melatonin sent with pt.

## 2020-02-05 NOTE — DIETARY
"Nutrition Support Assessment:  Day 0 of admit.  Familia Lundy is a 74 y.o. male with admitting DX of Stroke     Current problem list:  1. CVA  2. Newly diagnosed Diabetes type 2  3. HTN  4. Asthma  5. Dysphagia  6. Vitamin B12 deficiency  7. Dyslipidemia  8. Hypothyroidism     Assessment:  Estimated Nutritional Needs based on:   Height: 172.7 cm (5' 8\")  Weight: 113.4 kg (250 lb)  Weight to Use in Calculations: 113.4 kg (249 lb 15.7 oz)  Ideal Body Weight: 69.9 kg (154 lb)  Percent Ideal Body Weight: 162.3  Body mass index is 38.01 kg/m²., BMI classification: Class 2 Obesity    Calculation/Equation: REE with MSJ = 1858 kcal  Total Calories / day: 1860 - 2040  (Calories / k - 18)  Total Grams Protein / day: 113 - 136  (Grams Protein / k - 1.2)     Evaluation:   1. Pt NPO with tube feed due to dysphagia.  2. PEG placed 20.  3. Current tube feed order is Replete Fiber at goal rate of 80 ml/hour which provides 1920 kcal, 123 gm protein, and 1597 ml free water in 24 hours.  4. Pt with new diagnosis of Diabetes per discharge summary from [facility].  A1c = 7.2.  5. Labs 2/5 include Na 140, K+ 3.9, Glu 172 (H), BUN 28 (H), Creat 0.89.  2/3: Pre-alb 13 (L), CRP 5.46 (H)  6. Medications include lipitor, SSI, senna, synthroid  7. LBM 2/5 per [facility] flow sheet.   8. Skin: Per [facility] flow sheet, pt with rash on ankle.  9. As pt with newly diagnosed Diabetes, will transition to lower carbohydrate containing formula.  Discussed with MD.  10. Spoke with pt re: tube feed formula.  Pt agreeable to change to bolus feeds.  He states no food allergies or diet restrictions at home.     Malnutrition Risk: Criteria not met.     Recommendations/Plan:  1. Change tube feed to Impact Peptide 1.5.  Start bolus feeds at 100 ml, if tolerated, advance by 100 ml per feed to goal of 330 ml QID.  This will provide 1980 kcal, 124 gm protein, 1016 ml free water per day.  2. Add 200 ml free water flush QID between " feeds.  3. Monitor weight.  4. RD following.

## 2020-02-05 NOTE — H&P
REHABILITATION HISTORY AND PHYSICAL/POST ADMISSION EVALUATION    2/5/2020  2:31 PM  Familia Lundy  [id number]  Admission  2/5/2020 11:04 AM  [facility] Code/Reason for admission: 0.1.1 (L) Body Involvement (R) Brain   Etiologic diagnosis/problem: Cerebrovascular accident (CVA) due to thrombosis of right middle cerebral artery (HCC)  Chief Complaint: left sided abnormal sensation    HPI:  The patient is a 74 y.o. male with a past medical history of asthma, hypertension, hypothyroidism, sleep apnea, dysplipidemia; now admitted for acute inpatient rehabilitation with severe functional debility after an acute stroke.      On admission the patient and medical record report he presented to the hospital on 1/27 with left sided arm weakness and decreased sensation. NIHSS 10. Head CT negative, s/p tPA. NIHSS improved to 7. He was seen and evaluated by neurology, cleared to start ASA on 2/4 if repeat Head CT stable - which it was. Recommendation for outpatient cardiac monitor.    MRI with right MCA stroke with hemorrhagic transformation. Had PEG tube placed on 2/1 with Dr. Justice. He did require oxygen supplementation for hypoxia. New diagnosis of diabetes. HgbA1c 7.2, LDL 73, TGs, 196, ECHO EF 55 %, ascending aortic aneurysm.    Patient current reports he is right handed. Still has decreased sensation on the left arm, no complaints in the leg. Symptoms have improved. He denies any pain. No visual changes, does wear glasses. Has moved bowels, no issue with urination. Reports he has slept in a recliner for 10 years, and wants to continue to do so here. No orthopnea, just is comfortable with the recliner. His brother is at bedside and confirms he will assist at discharge.     Patient was evaluated by Rehab Medicine physician and Physical Therapy, Occupational Therapy and Speech Therapy and determined to be appropriate for acute inpatient rehab and was transferred to [facility] on 2/5/2020 11:04 AM.    With this acute  therapeutic intervention, this patient hopes to improve his functional status, and return to independent living with the supportive care of his brother.    REVIEW OF SYSTEMS:     A complete review of systems was performed and was negative in detail with the exception of items mentioned elsewhere in this document.    PMH:  Past Medical History:   Diagnosis Date   • Asthma    • Chickenpox    • Irish measles    • Hyperlipidemia    • Hypertension    • Hypothyroidism    • Influenza    • Mumps    • Sleep apnea    • Tonsillitis        PSH:  Past Surgical History:   Procedure Laterality Date   • PB PLACE PERCUT GASTROSTOMY TUBE Left 2/1/2020    Procedure: INSERTION, PEG TUBE;  Surgeon: Compa Justice M.D.;  Location: SURGERY [facility];  Service: Gastroenterology   • GASTROSCOPY N/A 2/1/2020    Procedure: GASTROSCOPY;  Surgeon: Compa Justice M.D.;  Location: SURGERY [facility];  Service: Gastroenterology   • HIP REPLACEMENT, TOTAL         Family History   Problem Relation Age of Onset   • Cancer Mother    • Diabetes Mother    • Cancer Father         MEDICATIONS:  Current Facility-Administered Medications   Medication Dose   • Respiratory Care per Protocol     • Pharmacy Consult Request ...Pain Management Review 1 Each  1 Each   • acetaminophen (TYLENOL) tablet 650 mg  650 mg   • hydrALAZINE (APRESOLINE) tablet 25 mg  25 mg   • docusate sodium (COLACE) capsule 100 mg  100 mg    And   • lactulose 20 GM/30ML solution 30 mL  30 mL    And   • bisacodyl (DULCOLAX) suppository 10 mg  10 mg   • artificial tears ophthalmic solution 1 Drop  1 Drop   • benzocaine-menthol (CEPACOL) lozenge 1 Lozenge  1 Lozenge   • mag hydrox-al hydrox-simeth (MAALOX PLUS ES or MYLANTA DS) suspension 20 mL  20 mL   • ondansetron (ZOFRAN ODT) dispertab 4 mg  4 mg    Or   • ondansetron (ZOFRAN) syringe/vial injection 4 mg  4 mg   • traZODone (DESYREL) tablet 50 mg  50 mg   • sodium chloride (OCEAN) 0.65 % nasal spray 2 Spray  2 Spray   •  "melatonin tablet 3 mg  3 mg   • senna-docusate (PERICOLACE or SENOKOT S) 8.6-50 MG per tablet 2 Tab  2 Tab    And   • polyethylene glycol/lytes (MIRALAX) PACKET 1 Packet  1 Packet    And   • magnesium hydroxide (MILK OF MAGNESIA) suspension 30 mL  30 mL    And   • bisacodyl (DULCOLAX) suppository 10 mg  10 mg   • atorvastatin (LIPITOR) tablet 80 mg  80 mg   • [START ON 2/6/2020] amLODIPine (NORVASC) tablet 5 mg  5 mg   • insulin regular (HUMULIN R) injection 1-6 Units  1-6 Units   • [START ON 2/6/2020] levothyroxine (SYNTHROID) tablet 88 mcg  88 mcg   • metoprolol (LOPRESSOR) tablet 50 mg  50 mg   • traZODone (DESYREL) tablet 50 mg  50 mg   • ipratropium-albuterol (DUONEB) nebulizer solution  3 mL       ALLERGIES:  Amoxicillin and Ampicillin    PSYCHOSOCIAL HISTORY:    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     He is a . He has 2 daughters, and one grandchild.    He lives alone in single story home, no steps to enter in [place].    He quit tobacco several years ago, 1 ppd for 60 years. Has 2 liquor drinks daily.     LEVEL OF FUNCTION PRIOR TO DISABILTY:  Independent,     LEVEL OF FUNCTION PRIOR TO ADMISSION to [facility]:  Min assist ambulation  Mod assist ADLs    CURRENT LEVEL OF FUNCTION:   Same as level of function prior to admission to [facility]    PHYSICAL EXAM:     VITAL SIGNS:   height is 1.727 m (5' 8\") and weight is 113.4 kg (250 lb). His oral temperature is 36.5 °C (97.7 °F). His blood pressure is 121/71 and his pulse is 76. His respiration is 18 and oxygen saturation is 98%.     GENERAL: No apparent distress, obese  HEENT: Normocephalic/atraumatic, EOMI, PERRL and No nystagmus, white plaque on tongue  CARDIAC: Regular rate and rhythm, normal S1, S2, no murmurs, no peripheral edema  LUNGS: wheezing, normal respiratory effort, on 2 L mask, course rhonchi  ABDOMINAL: bowel sounds present, soft, " nontender, nondistended and protuberant, PEG placed, mild surrounding erythema   EXTREMITIES: no spasticity, no edema or no calf tenderness bilaterally, long yellow toenails  MSK: no joint swelling    NEURO:    Mental status:  A&Ox4 (person, place, date, situation) answers questions appropriately follows commands  Speech: fluent, no aphasia or dysarthria    CRANIAL NERVES:  2,3: visual acuity grossly intact, PERRL  3,4,6: EOMI bilaterally, no nystagmus or diplopia  5: decreased on top left face  7: left facial droop  8: hearing grossly intact  9,10: symmetric palate elevation  11: SCM/Trapezius strength 5/5 bilaterally  12: tongue protrudes midline    Motor:  Shoulder flexors:  Right -  5/5, Left -  5/5  Elbow flexors:  Right -  5/5, Left -  5/5  Elbow extensors:  Right -  5/5, Left -  5/5  Symmetrical   Hip flexors:  Right -  5/5, Left -  5/5  Knee ext:  Right -  5/5, Left -  5/5  Dorsiflexors:  Right -  5/5, Left -  5/5  EHL:  Right -  5/5, Left -  5/5  Plantar flexors:  Right -  5/5, Left -  5/5   Positive left pronator drift      Sensory:   intact to light touch through out  Positive extinction to double simultaneous touch bilaterally on left UE, negative on LLE    DTRs: 2+ in bilateral biceps, triceps, brachioradialis, 3+ in bilateral patellar and achilles tendons  No clonus at bilateral ankles  Negative babinski b/l  Brief positive left Parekh    RADIOLOGY:              Results for orders placed during the hospital encounter of 01/27/20   MR-BRAIN-W/O    Impression 1.  Moderate acute/subacute right MCA infarct with hemorrhagic transformation.  2.  Right frontal lobe encephalomalacia.  3.  Mild to moderate cerebral atrophy.                                                                                           Results for orders placed during the hospital encounter of 01/27/20   CT-CTA NECK WITH & W/O-POST PROCESSING    Impression 1.  Atherosclerotic plaque at the carotid bifurcations bilaterally which  results in less than 50% diameter narrowing.    2.  Diminutive right vertebral artery.    3.  Tortuous left internal carotid artery which is retropharyngeal in location.                                LABS:  Recent Labs     02/04/20  1404 02/05/20  0732   SODIUM 140 140   POTASSIUM 4.3 3.9   CHLORIDE 100 101   CO2 30 33   GLUCOSE 122* 172*   BUN 29* 28*   CREATININE 0.92 0.89   CALCIUM 9.7 9.6     Recent Labs     02/04/20  0608 02/04/20  1404 02/05/20  0732   WBC 11.2* 10.4 10.8   RBC 3.97* 3.86* 3.74*   HEMOGLOBIN 12.4* 12.2* 11.9*   HEMATOCRIT 38.9* 37.9* 37.3*   MCV 98.0* 98.2* 99.7*   MCH 31.2 31.6 31.8   MCHC 31.9* 32.2* 31.9*   RDW 45.6 45.9 47.8   PLATELETCT 340 326 324   MPV 11.4 11.6 11.4         PRIMARY REHAB DIAGNOSIS:    This patient is a 74 y.o. male admitted for acute inpatient rehabilitation with Cerebrovascular accident (CVA) due to thrombosis of right middle cerebral artery (HCC).    IMPAIRMENTS:   Cognitive  ADLs/IADLs  Mobility  Swallow    SECONDARY DIAGNOSIS/MEDICAL CO-MORBIDITIES AFFECTING FUNCTION:    Asthma  Hypertension  Hypothyroidism  Sleep apnea  Diabetes  Anemia  Hypertriglyceridemia  Obesity    RELEVANT CHANGES SINCE PREADMISSION EVALUATION:    Status unchanged    The patient's rehabilitation potential is Excellent  The patient's medical prognosis is excellent    PLAN:   Discussion and Recommendations, discussed with the patient and/or family:   1. The patient requires an acute inpatient rehabilitation program with a coordinated program of care at an intensity and frequency not available at a lower level of care. This recommendation is substantiated by the patient's medical physicians who recommend that the patient's intervention and assessment of medical issues needs to be done at an acute level of care for patient's safety and maximum outcome.     2. A coordinated program of care will be supplied by an interdisciplinary team of physical therapy, occupational therapy, rehab physician,  rehab nursing, and, if needed, speech therapy and rehab psychology. Rehab team presents a patient-specific rehabilitation and education program concentrating on prevention of future problems related to accessibility, mobility, skin, bowel, bladder, sexuality, and psychosocial and medical/surgical problems.     3. Need for Rehabilitation Physician: The rehab physician will be evaluating the patient on a multi-weekly basis to help coordinate the program of care. The rehab physician communicates between medical physicians, therapists, and nurses to maximize the patient's potential outcome. Specific areas in which the rehab physician will be providing daily assessment include the following:   A. Assessing the patient's heart rate and blood pressure response (vitals monitoring) to activity and making adjustments in medications or conservative measures as needed.   B. The rehab physician will be assessing the frequency at which the program can be increased to allow the patient to reach optimal functional outcome.   C. The rehab physician will also provide assessments in daily skin care, especially in light of patient's impairments in mobility.   D. The rehab physician will provide special expertise in understanding how to work with functional impairment and recommend appropriate interventions, compensatory techniques, and education that will facilitate the patient's outcome.     4. Rehab R.N.   The rehab RN will be working with patient to carry over in room mobility and activities of daily living when the patient is not in 3 hours of skilled therapy. Rehab nursing will be working in conjunction with rehab physician to address all the medical issues above and continue to assess laboratory work and discuss abnormalities with the treating physicians, assess vitals, and response to activity, and discuss and report abnormalities with the rehab physician. Rehab RN will also continue daily skin care, supervise bladder/bowel  "program, instruct in medication administration, and ensure patient safety.     5. Therapies to treat at intensity and frequency of (may change after completion of evaluation by all therapeutic disciplines):       PT:  Physical therapy to address mobility, transfer, gait training and evaluation for adaptive equipment needs 1hour/day at least 5 days/week for the duration of the ELOS (see below)       OT:  Occupational therapy to address ADLs, self-care, home management training, functional mobility/transfers and assistive device evaluation, and community re-integration 1hour/day at least 5 days/week for the duration of the ELOS (see below).        ST/Dysphagia:  Speech therapy to address speech, language, and cognitive deficits as well as swallowing difficulties with retraining/dysphagia management and community re-integration with comprehension, expression, cognitive training 1hour/day at least 5 days/week for the duration of the ELOS (see below).     6. Medical management / Rehabilitation Issues/Adverse Potential affecting function as part of rehabilitation plan.    Insomnia  Asthma  Hypertension  Hypothyroidism  Sleep apnea  Diabetes  Anemia  Hypertriglyceridemia  Obesity  Oral thrush  Rash    I performed a complete drug regimen review and did not identify any potential clinically significant medication issues.    The patient's CODE STATUS was changed from DNR/DNI to FULL CODE, on admission, with the patient and/or family at bedside. Patient doesn't want to be on \"machines\" but wants attempted resuscitation.     REHABILITATION ISSUES/ADVERSE POTENTIAL:  1.  CVA (Cerebrovascular Accident): Cont aspirin for secondary prophylaxis as well as lipid and blood pressure management. Patient demonstrates functional deficits in strength, balance, coordination, and ADL's. Patient is admitted to [facility] for comprehensive rehabilitation therapy as described below.   Rehabilitation nursing monitors bowel " and bladder control, educates on medication administration, co-morbidities and monitors patient safety.    2.  DVT prophylaxis:  Patient is on nothing for anticoagulation upon transfer, likely due to the hemorrhagic transformation of his stroke. Encourage OOB. Monitor daily for signs and symptoms of DVT including but not limited to swelling and pain to prevent the development of DVT that may interfere with therapies.    3.  Pain: No issues with pain currently / Controlled with as needed oral analgesics.    4.  Nutrition/Dysphagia: Dietician monitors nutrient intake, recommend supplements prn and provide nutrition education to pt/family to promote optimal nutrition for wound healing/recovery.     5.  Bladder/bowel:  Start bowel and bladder program as described below, to prevent constipation, urinary retention (which may lead to UTI), and urinary incontinence (which will impact upon pt's functional independence).   - TV Q3h while awake with post void bladder scans, I&O cath for PVRs >400  - up to commode after meal     6.  Skin/dermal ulcer prophylaxis: Monitor for new skin conditions with q.2 h. turns as required to prevent the development of skin breakdown.     7.  Cognition/Behavior:  Psychologist Dr. Valenzuela provides adjustment counseling to illness and psychosocial barriers that may be potential barriers to rehabilitation.     8. Respiratory therapy: RT performs O2 management prn, breathing retraining, pulmonary hygiene and bronchospasm management prn to optimize participation in therapies.    Pt was seen today for 75 min, and entire time spent in face-to-face contact was >50% in counseling and coordination of care as detailed in A/P above.        GOALS/EXPECTED LEVEL OF FUNCTION BASED ON CURRENT MEDICAL AND FUNCTIONAL STATUS (may change based on patient's medical status and rate of impairment recovery):  Transfers:   Modified Independent  Mobility/Gait:   Modified Independent  ADL's:   Supervision  Cognition:   Modified Independent to supervision  Swallowing:  Regular with thins    DISPOSITION: Discharge to pre-morbid independent living setting with the supportive care of patient's family.      ELOS: 14-21 days    Alla Ewing M.D.  Physical Medicine and Rehabilitation

## 2020-02-05 NOTE — DISCHARGE SUMMARY
Discharge Summary    CHIEF COMPLAINT ON ADMISSION  Chief Complaint   Patient presents with   • Possible Stroke       Reason for Admission  Possible Stroke     CODE STATUS  DNAR/DNI    HPI & HOSPITAL COURSE  This is a 74 y.o. male here with CVA due to thrombosis of the right middle cerebral artery.  He received TPA and was admitted to the ICU.  He had acute respiratory failure with hypoxia requiring oxygen supplementation.  He had newly diagnosed diabetesand sliding scale insulin.  Was noted to have dysphagia and put on tube feeding.  He required PEG tube placement and he continues to require tube feeding.  SLP continues to follow him.  He had hemorrhagic transformation from the CVA.  His antiplatelets were held.  Repeat CT scan of the head on 2/4/2020 appears stable.  Patient can likely start antiplatelet soon.  He also noted to be vitamin B12 deficient and given an IM B12 injection.  He will need continued rehabilitation and will be transferred to a rehab facility.       Therefore, he is discharged in good and stable condition to an inpatient rehabilitation hospital.    The patient met 2-midnight criteria for an inpatient stay at the time of discharge.      FOLLOW UP ITEMS POST DISCHARGE  None    DISCHARGE DIAGNOSES  Principal Problem:    Cerebrovascular accident (CVA) due to thrombosis of right middle cerebral artery (HCC) POA: Yes  Active Problems:    Acute respiratory failure with hypoxia (HCC) POA: Yes    Essential hypertension POA: Yes    Moderate persistent asthma without complication POA: Yes    Type 2 diabetes mellitus without complication, without long-term current use of insulin (HCC) POA: Yes    Dysphagia POA: Yes    Vitamin B12 deficiency POA: Yes    VEERNA (obstructive sleep apnea) POA: Yes    Hypothyroidism POA: Yes    Dyslipidemia POA: Yes    Morbid obesity with BMI of 40.0-44.9, adult (HCC) POA: Yes  Resolved Problems:    * No resolved hospital problems. *      FOLLOW UP  No future appointments.  No  follow-up provider specified.    MEDICATIONS ON DISCHARGE     Medication List      START taking these medications      Instructions   amLODIPine 5 MG Tabs  Start taking on:  February 6, 2020  Commonly known as:  NORVASC   1 Tab by Per G Tube route every day.  Dose:  5 mg     atorvastatin 80 MG tablet  Commonly known as:  LIPITOR   1 Tab by Per G Tube route every evening.  Dose:  80 mg     insulin regular 100 Unit/mL Soln  Commonly known as:  HUMULIN R   Inject 1-6 Units as instructed every 6 hours.  Dose:  1-6 Units     ipratropium-albuterol 0.5-2.5 (3) MG/3ML nebulizer solution  Commonly known as:  DUONEB   3 mL by Nebulization route every four hours as needed for Shortness of Breath.  Dose:  3 mL     Melatonin 1 MG Tabs   3 Tabs by Enteral Tube route at bedtime as needed (sleeplessness).  Dose:  3 mg     traZODone 50 MG Tabs  Commonly known as:  DESYREL   1 Tab by Per G Tube route every bedtime.  Dose:  50 mg        CHANGE how you take these medications      Instructions   metoprolol 50 MG Tabs  What changed:    · how to take this  · when to take this  Commonly known as:  LOPRESSOR   1 Tab by Per G Tube route 2 Times a Day.  Dose:  50 mg        CONTINUE taking these medications      Instructions   albuterol 108 (90 Base) MCG/ACT Aers inhalation aerosol   Inhale 2 Puffs by mouth every 6 hours as needed for Shortness of Breath.  Dose:  2 Puff     levothyroxine 88 MCG Tabs  Commonly known as:  SYNTHROID   Take 88 mcg by mouth Every morning on an empty stomach.  Dose:  88 mcg        STOP taking these medications    clopidogrel 75 MG Tabs  Commonly known as:  PLAVIX     fish oil 1000 MG Caps capsule     gemfibrozil 600 MG Tabs  Commonly known as:  LOPID     meclizine 25 MG Tabs  Commonly known as:  ANTIVERT     moxifloxacin 0.5 % Soln  Commonly known as:  VIGAMOX     ondansetron 4 MG Tbdp  Commonly known as:  ZOFRAN ODT     simvastatin 40 MG Tabs  Commonly known as:  ZOCOR            Allergies  Allergies   Allergen  "Reactions   • Amoxicillin Hives     Pt states \"I get hives\".   • Ampicillin Hives     Pt states \"I get hives\".       DIET  Orders Placed This Encounter   Procedures   • Diet NPO     Standing Status:   Standing     Number of Occurrences:   15     Order Specific Question:   Restrict to:     Answer:   Strict [1]       ACTIVITY  As tolerated and directed by rehab.  Weight bearing as tolerated    LINES, DRAINS, AND WOUNDS  This is an automated list. Peripheral IVs will be removed prior to discharge.  Peripheral IV 01/27/20 18 G Right Antecubital (Active)   Site Assessment Clean;Dry;Intact 2/5/2020  8:30 AM   Dressing Type Transparent 2/5/2020  8:30 AM   Line Status Saline locked 2/5/2020  8:30 AM   Dressing Status Intact 2/5/2020  8:30 AM   Dressing Intervention N/A 2/4/2020  8:00 PM   [facility]fil[facility] Grading ([facility]) 0 2/5/2020  8:30 AM   Phlebitis Scale ([facility] Only) 0 2/5/2020  8:30 AM       Wound 01/28/20 Other (comment) Ankle Rash left (Active)   Wound Image   2/4/2020 10:30 AM   Site Assessment Red;Dry 2/5/2020  8:30 AM   Kimberlyn-wound Assessment Fragile;Pink;Red 2/5/2020  8:30 AM   Margins Defined edges 2/5/2020  8:30 AM   Wound Length (cm) 5 cm 2/4/2020 10:30 AM   Wound Width (cm) 3.5 cm 2/4/2020 10:30 AM   Wound Depth (cm) 0 cm 2/4/2020 10:30 AM   Wound Surface Area (cm^2) 17.5 cm^2 2/4/2020 10:30 AM   Tunneling 0 cm 2/4/2020 10:30 AM   Undermining 0 cm 2/4/2020 10:30 AM   Closure Open to air 2/4/2020  8:00 AM   Drainage Amount None 2/5/2020  8:30 AM   Drainage Description Serosanguineous 2/1/2020  7:25 AM   Treatments Cleansed 2/4/2020 10:30 AM   Cleansing Normal Saline Irrigation 2/4/2020 10:30 AM   Periwound Protectant Not Applicable 2/4/2020 10:30 AM   Dressing Options Petroleum Gauze (clear);Adhesive Foam 2/5/2020  8:30 AM   Dressing Cleansing/Solutions Not Applicable 2/5/2020  8:30 AM   Dressing Changed Other (Comment) 2/4/2020  8:00 PM   Dressing Status Intact 2/5/2020  8:30 AM   Dressing Change Frequency " Every 48 hrs 2/5/2020  8:30 AM   NEXT Dressing Change  02/06/20 2/4/2020  8:00 PM   NEXT Weekly Photo (Inpatient Only) 02/12/20 2/4/2020  8:00 PM   WOUND NURSE ONLY - Odor None 2/4/2020 10:30 AM   WOUND NURSE ONLY - Pulses 2+;DP 2/4/2020 10:30 AM   WOUND NURSE ONLY - Exposed Structures None 2/4/2020 10:30 AM   WOUND NURSE ONLY - Tissue Type and Percentage 100% red crust 2/4/2020 10:30 AM   WOUND NURSE ONLY - Time Spent with Patient (mins) 45 2/4/2020 10:30 AM       Wound 02/03/20 Foot rash right (Active)   Wound Image   2/4/2020  6:00 AM   Site Assessment Pink;Red 2/5/2020  8:30 AM   Kimberlyn-wound Assessment Intact 2/5/2020  8:30 AM   Wound Length (cm) 5 cm 2/4/2020 10:30 AM   Wound Width (cm) 9.5 cm 2/4/2020 10:30 AM   Wound Depth (cm) 0 cm 2/4/2020 10:30 AM   Wound Surface Area (cm^2) 47.5 cm^2 2/4/2020 10:30 AM   Tunneling 0 cm 2/4/2020 10:30 AM   Undermining 0 cm 2/4/2020 10:30 AM   Closure Open to air 2/5/2020  8:30 AM   Drainage Amount None 2/5/2020  8:30 AM   Treatments Cleansed 2/4/2020 10:30 AM   Cleansing Normal Saline Irrigation 2/4/2020 10:30 AM   Dressing Options Open to Air 2/5/2020  8:30 AM   NEXT Weekly Photo (Inpatient Only) 02/12/20 2/4/2020  8:00 PM   WOUND NURSE ONLY - Odor None 2/4/2020 10:30 AM   WOUND NURSE ONLY - Pulses 2+;DP 2/4/2020 10:30 AM   WOUND NURSE ONLY - Exposed Structures None 2/4/2020 10:30 AM   WOUND NURSE ONLY - Tissue Type and Percentage 100% pink red 2/4/2020 10:30 AM       Peripheral IV 01/27/20 18 G Right Antecubital (Active)   Site Assessment Clean;Dry;Intact 2/5/2020  8:30 AM   Dressing Type Transparent 2/5/2020  8:30 AM   Line Status Saline locked 2/5/2020  8:30 AM   Dressing Status Intact 2/5/2020  8:30 AM   Dressing Intervention N/A 2/4/2020  8:00 PM   Infiltration Grading ([facility] 0 2/5/2020  8:30 AM   Phlebitis Scale ([facility] Only) 0 2/5/2020  8:30 AM               MENTAL STATUS ON TRANSFER  Level of Consciousness: Alert  Orientation : Oriented x 4  Speech: Speech  Clear    CONSULTATIONS  Neurology - Annie  Critical care  Gastroenterology - Seik    PROCEDURES  PEG tube placement 2/1/2020    LABORATORY  Lab Results   Component Value Date    SODIUM 140 02/05/2020    POTASSIUM 3.9 02/05/2020    CHLORIDE 101 02/05/2020    CO2 33 02/05/2020    GLUCOSE 172 (H) 02/05/2020    BUN 28 (H) 02/05/2020    CREATININE 0.89 02/05/2020        Lab Results   Component Value Date    WBC 10.8 02/05/2020    HEMOGLOBIN 11.9 (L) 02/05/2020    HEMATOCRIT 37.3 (L) 02/05/2020    PLATELETCT 324 02/05/2020        Total time of the discharge process exceeds 40 minutes.

## 2020-02-05 NOTE — DISCHARGE INSTRUCTIONS
Discharge Instructions    Discharged to other by medical transportation with escort. Discharged via wheelchair, hospital escort: Yes.  Special equipment needed: Oxygen    Be sure to schedule a follow-up appointment with your primary care doctor or any specialists as instructed.     Discharge Plan:   Diet Plan: Discussed  Activity Level: Discussed  Confirmed Follow up Appointment: Patient to Call and Schedule Appointment  Confirmed Symptoms Management: Discussed  Medication Reconciliation Updated: Yes  Influenza Vaccine Indication: Indicated: 65 years and older  Influenza Vaccine Given - only chart on this line when given: Influenza Vaccine Given (See MAR)    I understand that a diet low in cholesterol, fat, and sodium is recommended for good health. Unless I have been given specific instructions below for another diet, I accept this instruction as my diet prescription.   Other diet: Replete @80    Special Instructions:     Stroke/CVA/TIA/Hemorrhagic Ischemia Discharge Instructions  You have had a stroke. Your risk factors have been identified as follows:  Age - Over 55  Gender - Men are at a higher risk than women  High Cholesterol and lipids  Overweight  It is important that you reduce your risk factors to avoid another stroke in the future. Here are some general guidelines to follow:  · Eat healthy - avoid food high in fat.  · Get regular exercise.  · Maintain a healthy weight.  · Avoid smoking.  · Avoid alcohol and illegal drug use.  · Take your medications as directed.  For more information regarding risk factors, refer to pages 17-19 in your Stroke Patient Education Guide. Stroke Education Guide was given to patient.    Warning signs of a stroke include (which can also be found on page 3 of your Stroke Patient Education Guide):  · Sudden numbness of weakness of the face, arm or leg (especially on one side of the body).  · Sudden confusion, trouble speaking or understanding.  · Sudden trouble seeing in one or  both eyes.  · Sudden trouble walking, dizziness, loss of balance or coordination.  · Sudden severe headache with no known cause.  It is very important to get treatment quickly when a stroke occurs. If you experience any of the above warning signs, call 200 immediately.     Some patients who have had a stroke will be going home on a blood thinner medication called Warfarin (Coumadin).  This medication requires very close monitoring and follow up.  This follow up can be provided by either your Primary Care Physician or by [facility] Outpatient Anticoagulation Service.  The Outpatient Anticoagulation Service is located at the [facility] ([facility]  If you do not know when your follow up appointment is scheduled, call [phone] to verify your appointment time.      · Is patient discharged on Warfarin / Coumadin?   No     Depression / Suicide Risk    As you are discharged from this [facility], it is important to learn how to keep safe from harming yourself.    Recognize the warning signs:  · Abrupt changes in personality, positive or negative- including increase in energy   · Giving away possessions  · Change in eating patterns- significant weight changes-  positive or negative  · Change in sleeping patterns- unable to sleep or sleeping all the time   · Unwillingness or inability to communicate  · Depression  · Unusual sadness, discouragement and loneliness  · Talk of wanting to die  · Neglect of personal appearance   · Rebelliousness- reckless behavior  · Withdrawal from people/activities they love  · Confusion- inability to concentrate     If you or a loved one observes any of these behaviors or has concerns about self-harm, here's what you can do:  · Talk about it- your feelings and reasons for harming yourself  · Remove any means that you might use to hurt yourself (examples: pills, rope, extension cords, firearm)  · Get professional  help from the community (Mental Health, Substance Abuse, psychological counseling)  · Do not be alone:Call your Safe Contact- someone whom you trust who will be there for you.  · Call your local CRISIS HOTLINE 123-3333 or 515-640-6443  · Call your local Children's Mobile Crisis Response Team [place] (946) 054-1236 or www.iMOSPHERE  · Call the toll free National Suicide Prevention Hotlines   · National Suicide Prevention Lifeline 788-989-EJQE (1208)  · National Hope Line Network 800-SUICIDE (936-3247)

## 2020-02-05 NOTE — REHAB-DIETARY IDT TEAM NOTE
"Dietary   Nutrition  Dietary Problems     Problem: Other Problem (see comments)     Description:     Goal: Other Goal     Description: Nutrition Support tolerated and meeting greater than 85% of estimated needs.                   Nutrition Support Assessment:  Day 0 of admit.  Familia Lundy is a 74 y.o. male with admitting DX of Stroke     Current problem list:  1. CVA  2. Newly diagnosed Diabetes type 2  3. HTN  4. Asthma  5. Dysphagia  6. Vitamin B12 deficiency  7. Dyslipidemia  8. Hypothyroidism     Assessment:  Estimated Nutritional Needs based on:   Height: 172.7 cm (5' 8\")  Weight: 113.4 kg (250 lb)  Weight to Use in Calculations: 113.4 kg (249 lb 15.7 oz)  Ideal Body Weight: 69.9 kg (154 lb)  Percent Ideal Body Weight: 162.3  Body mass index is 38.01 kg/m²., BMI classification: Class 2 Obesity     Calculation/Equation: REE with MSJ = 1858 kcal  Total Calories / day: 1860 - 2040  (Calories / k - 18)  Total Grams Protein / day: 113 - 136  (Grams Protein / k - 1.2)     Evaluation:   1. Pt NPO with tube feed due to dysphagia.  2. PEG placed 20.  3. Current tube feed order is Replete Fiber at goal rate of 80 ml/hour which provides 1920 kcal, 123 gm protein, and 1597 ml free water in 24 hours.  4. Pt with new diagnosis of Diabetes per discharge summary from [facility].  A1c = 7.2.  5. Labs 2/5 include Na 140, K+ 3.9, Glu 172 (H), BUN 28 (H), Creat 0.89.  2/3: Pre-alb 13 (L), CRP 5.46 (H)  6. Medications include lipitor, SSI, senna, synthroid  7. LBM 2/5 per [facility] flow sheet.   8. Skin: Per [facility] flow sheet, pt with rash on ankle.  9. As pt with newly diagnosed Diabetes, will transition to lower carbohydrate containing formula.  Discussed with MD.  10. Spoke with pt re: tube feed formula.  Pt agreeable to change to bolus feeds.  He states no food allergies or diet restrictions at home.     Malnutrition Risk: Criteria not met.     Recommendations/Plan:  1. Change tube feed to Impact Peptide " 1.5.  Start bolus feeds at 100 ml, if tolerated, advance by 100 ml per feed to goal of 330 ml QID.  This will provide 1980 kcal, 124 gm protein, 1016 ml free water per day.  2. Add 200 ml free water flush QID between feeds.  3. Monitor weight.  4. RD following.    Section completed by:  Maria Ines Morales R.D.

## 2020-02-05 NOTE — PROGRESS NOTES
Monitor summary: SR 73-83, VA 0.16, QRS 0.10, QT 0.36, rare frequent PVCs per strip from monitor room.

## 2020-02-06 PROBLEM — R79.89 AZOTEMIA: Status: ACTIVE | Noted: 2020-02-06

## 2020-02-06 PROBLEM — E55.9 VITAMIN D DEFICIENCY: Status: ACTIVE | Noted: 2020-02-06

## 2020-02-06 LAB
25(OH)D3 SERPL-MCNC: 13 NG/ML (ref 30–100)
ALBUMIN SERPL BCP-MCNC: 4.1 G/DL (ref 3.2–4.9)
ALBUMIN/GLOB SERPL: 1.3 G/DL
ALP SERPL-CCNC: 81 U/L (ref 30–99)
ALT SERPL-CCNC: 25 U/L (ref 2–50)
ANION GAP SERPL CALC-SCNC: 7 MMOL/L (ref 0–11.9)
AST SERPL-CCNC: 28 U/L (ref 12–45)
BASOPHILS # BLD AUTO: 0.6 % (ref 0–1.8)
BASOPHILS # BLD: 0.06 K/UL (ref 0–0.12)
BILIRUB SERPL-MCNC: 0.4 MG/DL (ref 0.1–1.5)
BUN SERPL-MCNC: 27 MG/DL (ref 8–22)
CALCIUM SERPL-MCNC: 9.1 MG/DL (ref 8.5–10.5)
CHLORIDE SERPL-SCNC: 100 MMOL/L (ref 96–112)
CO2 SERPL-SCNC: 35 MMOL/L (ref 20–33)
CREAT SERPL-MCNC: 0.99 MG/DL (ref 0.5–1.4)
EOSINOPHIL # BLD AUTO: 0.37 K/UL (ref 0–0.51)
EOSINOPHIL NFR BLD: 3.6 % (ref 0–6.9)
ERYTHROCYTE [DISTWIDTH] IN BLOOD BY AUTOMATED COUNT: 45.9 FL (ref 35.9–50)
GLOBULIN SER CALC-MCNC: 3.1 G/DL (ref 1.9–3.5)
GLUCOSE BLD-MCNC: 117 MG/DL (ref 65–99)
GLUCOSE BLD-MCNC: 130 MG/DL (ref 65–99)
GLUCOSE BLD-MCNC: 143 MG/DL (ref 65–99)
GLUCOSE BLD-MCNC: 96 MG/DL (ref 65–99)
GLUCOSE SERPL-MCNC: 138 MG/DL (ref 65–99)
HCT VFR BLD AUTO: 37 % (ref 42–52)
HGB BLD-MCNC: 11.7 G/DL (ref 14–18)
IMM GRANULOCYTES # BLD AUTO: 0.09 K/UL (ref 0–0.11)
IMM GRANULOCYTES NFR BLD AUTO: 0.9 % (ref 0–0.9)
LYMPHOCYTES # BLD AUTO: 2.42 K/UL (ref 1–4.8)
LYMPHOCYTES NFR BLD: 23.9 % (ref 22–41)
MAGNESIUM SERPL-MCNC: 2.3 MG/DL (ref 1.5–2.5)
MCH RBC QN AUTO: 31.1 PG (ref 27–33)
MCHC RBC AUTO-ENTMCNC: 31.6 G/DL (ref 33.7–35.3)
MCV RBC AUTO: 98.4 FL (ref 81.4–97.8)
MONOCYTES # BLD AUTO: 0.75 K/UL (ref 0–0.85)
MONOCYTES NFR BLD AUTO: 7.4 % (ref 0–13.4)
NEUTROPHILS # BLD AUTO: 6.45 K/UL (ref 1.82–7.42)
NEUTROPHILS NFR BLD: 63.6 % (ref 44–72)
NRBC # BLD AUTO: 0 K/UL
NRBC BLD-RTO: 0 /100 WBC
PLATELET # BLD AUTO: 380 K/UL (ref 164–446)
PMV BLD AUTO: 11.5 FL (ref 9–12.9)
POTASSIUM SERPL-SCNC: 3.8 MMOL/L (ref 3.6–5.5)
PROT SERPL-MCNC: 7.2 G/DL (ref 6–8.2)
RBC # BLD AUTO: 3.76 M/UL (ref 4.7–6.1)
SODIUM SERPL-SCNC: 142 MMOL/L (ref 135–145)
WBC # BLD AUTO: 10.1 K/UL (ref 4.8–10.8)

## 2020-02-06 PROCEDURE — 94640 AIRWAY INHALATION TREATMENT: CPT

## 2020-02-06 PROCEDURE — 97535 SELF CARE MNGMENT TRAINING: CPT

## 2020-02-06 PROCEDURE — 36415 COLL VENOUS BLD VENIPUNCTURE: CPT

## 2020-02-06 PROCEDURE — 82962 GLUCOSE BLOOD TEST: CPT

## 2020-02-06 PROCEDURE — 82306 VITAMIN D 25 HYDROXY: CPT

## 2020-02-06 PROCEDURE — 99233 SBSQ HOSP IP/OBS HIGH 50: CPT | Performed by: PHYSICAL MEDICINE & REHABILITATION

## 2020-02-06 PROCEDURE — 92610 EVALUATE SWALLOWING FUNCTION: CPT

## 2020-02-06 PROCEDURE — 80053 COMPREHEN METABOLIC PANEL: CPT

## 2020-02-06 PROCEDURE — 97163 PT EVAL HIGH COMPLEX 45 MIN: CPT

## 2020-02-06 PROCEDURE — 700101 HCHG RX REV CODE 250: Performed by: PHYSICAL MEDICINE & REHABILITATION

## 2020-02-06 PROCEDURE — 700102 HCHG RX REV CODE 250 W/ 637 OVERRIDE(OP): Performed by: PHYSICAL MEDICINE & REHABILITATION

## 2020-02-06 PROCEDURE — 770010 HCHG ROOM/CARE - REHAB SEMI PRIVAT*

## 2020-02-06 PROCEDURE — 83735 ASSAY OF MAGNESIUM: CPT

## 2020-02-06 PROCEDURE — 97530 THERAPEUTIC ACTIVITIES: CPT

## 2020-02-06 PROCEDURE — 92523 SPEECH SOUND LANG COMPREHEN: CPT

## 2020-02-06 PROCEDURE — A9270 NON-COVERED ITEM OR SERVICE: HCPCS | Performed by: PHYSICAL MEDICINE & REHABILITATION

## 2020-02-06 PROCEDURE — 99223 1ST HOSP IP/OBS HIGH 75: CPT | Performed by: HOSPITALIST

## 2020-02-06 PROCEDURE — 97166 OT EVAL MOD COMPLEX 45 MIN: CPT

## 2020-02-06 PROCEDURE — 85025 COMPLETE CBC W/AUTO DIFF WBC: CPT

## 2020-02-06 PROCEDURE — 700112 HCHG RX REV CODE 229: Performed by: PHYSICAL MEDICINE & REHABILITATION

## 2020-02-06 PROCEDURE — 94760 N-INVAS EAR/PLS OXIMETRY 1: CPT

## 2020-02-06 RX ORDER — IPRATROPIUM BROMIDE AND ALBUTEROL SULFATE 2.5; .5 MG/3ML; MG/3ML
3 SOLUTION RESPIRATORY (INHALATION) EVERY 4 HOURS PRN
Status: DISCONTINUED | OUTPATIENT
Start: 2020-02-06 | End: 2020-02-15 | Stop reason: HOSPADM

## 2020-02-06 RX ORDER — BUDESONIDE AND FORMOTEROL FUMARATE DIHYDRATE 160; 4.5 UG/1; UG/1
2 AEROSOL RESPIRATORY (INHALATION)
Status: DISCONTINUED | OUTPATIENT
Start: 2020-02-06 | End: 2020-02-07

## 2020-02-06 RX ORDER — VITAMIN B COMPLEX
2000 TABLET ORAL DAILY
Status: DISCONTINUED | OUTPATIENT
Start: 2020-02-06 | End: 2020-02-15 | Stop reason: HOSPADM

## 2020-02-06 RX ADMIN — IPRATROPIUM BROMIDE AND ALBUTEROL SULFATE 3 ML: .5; 3 SOLUTION RESPIRATORY (INHALATION) at 12:14

## 2020-02-06 RX ADMIN — NYSTATIN 500000 UNITS: 100000 SUSPENSION ORAL at 11:41

## 2020-02-06 RX ADMIN — TRAZODONE HYDROCHLORIDE 50 MG: 50 TABLET ORAL at 21:22

## 2020-02-06 RX ADMIN — ATORVASTATIN CALCIUM 80 MG: 40 TABLET, FILM COATED ORAL at 21:23

## 2020-02-06 RX ADMIN — MELATONIN 3 MG: at 21:23

## 2020-02-06 RX ADMIN — ASPIRIN 81 MG 81 MG: 81 TABLET ORAL at 08:46

## 2020-02-06 RX ADMIN — CHOLECALCIFEROL TAB 25 MCG (1000 UNIT) 2000 UNITS: 25 TAB at 11:41

## 2020-02-06 RX ADMIN — NYSTATIN 500000 UNITS: 100000 SUSPENSION ORAL at 21:23

## 2020-02-06 RX ADMIN — METOPROLOL TARTRATE 50 MG: 25 TABLET ORAL at 08:45

## 2020-02-06 RX ADMIN — AMLODIPINE BESYLATE 5 MG: 5 TABLET ORAL at 05:24

## 2020-02-06 RX ADMIN — NYSTATIN 500000 UNITS: 100000 SUSPENSION ORAL at 08:45

## 2020-02-06 RX ADMIN — IPRATROPIUM BROMIDE AND ALBUTEROL SULFATE 3 ML: .5; 3 SOLUTION RESPIRATORY (INHALATION) at 05:26

## 2020-02-06 RX ADMIN — HYDROCORTISONE: 1 CREAM TOPICAL at 21:23

## 2020-02-06 RX ADMIN — BUDESONIDE AND FORMOTEROL FUMARATE DIHYDRATE 2 PUFF: 160; 4.5 AEROSOL RESPIRATORY (INHALATION) at 17:35

## 2020-02-06 RX ADMIN — HYDROCORTISONE: 1 CREAM TOPICAL at 09:00

## 2020-02-06 RX ADMIN — NYSTATIN 500000 UNITS: 100000 SUSPENSION ORAL at 17:19

## 2020-02-06 RX ADMIN — LEVOTHYROXINE SODIUM 88 MCG: 88 TABLET ORAL at 05:26

## 2020-02-06 RX ADMIN — METOPROLOL TARTRATE 50 MG: 25 TABLET ORAL at 21:22

## 2020-02-06 RX ADMIN — SENNOSIDES AND DOCUSATE SODIUM 2 TABLET: 8.6; 5 TABLET ORAL at 21:23

## 2020-02-06 RX ADMIN — SENNOSIDES AND DOCUSATE SODIUM 2 TABLET: 8.6; 5 TABLET ORAL at 08:45

## 2020-02-06 RX ADMIN — DOCUSATE SODIUM 100 MG: 100 CAPSULE, LIQUID FILLED ORAL at 08:45

## 2020-02-06 ASSESSMENT — ENCOUNTER SYMPTOMS
COUGH: 0
VOMITING: 0
FOCAL WEAKNESS: 1
PALPITATIONS: 0
POLYDIPSIA: 0
NAUSEA: 0
EYES NEGATIVE: 1
BRUISES/BLEEDS EASILY: 0
FEVER: 0
ABDOMINAL PAIN: 0
SENSORY CHANGE: 1
SHORTNESS OF BREATH: 0
CHILLS: 0

## 2020-02-06 ASSESSMENT — BRIEF INTERVIEW FOR MENTAL STATUS (BIMS)
WHAT YEAR IS IT: CORRECT
ASKED TO RECALL BED: YES, AFTER CUEING (A PIECE OF FURNITURE")"
WHAT DAY OF THE WEEK IS IT: CORRECT
WHAT MONTH IS IT: ACCURATE WITHIN 5 DAYS
BIMS SUMMARY SCORE: 14
INITIAL REPETITION OF BED BLUE SOCK - FIRST ATTEMPT: 3
ASKED TO RECALL SOCK: YES, NO CUE REQUIRED
ASKED TO RECALL BLUE: YES, NO CUE REQUIRED

## 2020-02-06 ASSESSMENT — PATIENT HEALTH QUESTIONNAIRE - PHQ9
2. FEELING DOWN, DEPRESSED, IRRITABLE, OR HOPELESS: NOT AT ALL
SUM OF ALL RESPONSES TO PHQ9 QUESTIONS 1 AND 2: 0
1. LITTLE INTEREST OR PLEASURE IN DOING THINGS: NOT AT ALL

## 2020-02-06 ASSESSMENT — ACTIVITIES OF DAILY LIVING (ADL): TOILETING: INDEPENDENT

## 2020-02-06 NOTE — CONSULTS
DATE OF SERVICE:  02/05/2020    PODIATRY CONSULTATION    CONSULT REQUESTED BY:  Alla Ewing MD    REASON FOR CONSULTATION:  Diabetic foot care of patient with the incurvated   toenail hallux left with some blood.    HISTORY OF PRESENT ILLNESS:  History and physical reviewed 02/05/2020, Dr. Ewing, reveals the patient had a right-sided stroke with left hemiparesis for   rehab.    PAST MEDICAL HISTORY:  Asthma, chickenpox, Maltese measles, hyperlipidemia,   hypertension, hypothyroidism, influenza, mumps, sleep apnea, and tonsillitis.    PAST SURGICAL HISTORY:  ____ placed percutaneous gastrotomy tube, gastroscopy   x2, hip replacement, total.    FAMILY HISTORY:  Mother cancer, mother diabetes, father cancer.    MEDICATIONS:  Upon admission, Tylenol, Apresoline, Colace, Dulcolax, Cepacol,   Maalox, Zofran ODT, Zofran, Desyrel, Ocean nasal spray, Kimberlyn-Colace, MiraLax,   milk of magnesia, Lipitor, Norvasc, Humulin R, Synthroid, Lopressor, DuoNeb.    ALLERGIES:  AMOXICILLIN AND PENICILLIN.    PSYCHOSOCIAL HISTORY:  The patient is a retired reactor nuclear.  He is a   , has 2 daughters and 1 grandchild, lives alone in a single nima home.    Recently quit tobacco, prior did smoke 1 pack per day for 60 years.  Two   liquor drinks daily.    PHYSICAL EXAMINATION:  VITAL SIGNS:  Height is 5 feet 8 inches, weight is 205 pounds.  Oral   temperature 97.7 degrees Fahrenheit, blood pressure 121/71, pulse 76,   respiration 18, O2 sat 98%.  LOWER EXTREMITY EXAM:  Please refer to Dr. Ewing's note.  Lower extremity   does relate long yellow toenails.  MUSCULOSKELETAL:  Strength is 5/5 bilaterally.  Sensation is impaired.    Temperature is mildly cool.  There is positive 1 edema.  There is a history of   paresthesias of the left side only.  NEUROLOGIC:  Decreased in sharp, dull.  Light touch is intact.  Vibratory is   intact.  Kalona-Alejandra 5.07 monofilament was not felt digital level.    Pulses are 2/4 dorsalis pedis  and posterior pulse bilateral.  Nails 1 through   10 are thickened, elongated, yellow-green discoloration, crumbly, yellow   subungual debris, tenderness to touch.  There is mild incurvation of the   lateral left great toenail with mild dry heme, stable, no purulence, no   cellulitis noted.  Minimal tenderness to touch, likely secondary to the recent   stroke.    ASSESSMENT:  1.  Diabetes with neuropathy.  2.  Onychomycosis.  3.  Pain.    PLAN:  Professional care necessary secondary to the patient's underlying   systemic condition, diabetes mellitus with neuropathy.  Professional care   necessary secondary to the pain and morbidity incurred by diagnostic condition   of incurvated toenail with the mycosis 1 through 10.    TREATMENT GOALS:  Decrease pain, decrease infection potential, increase   activities of daily living.    FOLLOWUP:  As needed.  Reconsult for emergent podiatric needs.    DESCRIPTION OF PROCEDURE:  History and physical review, med sheet review,   order review, discussion with staff, discussion with patient, evaluation of   new patient.    TREATMENT PERFORMED:  Toenail border avulsion performed without anesthesia   without incident, debridement of tender mycotic toenails 1 through 10, dremel   of the thickness.    Thank you for the consult.       ____________________________________     MARIO POSADA / FABIOLA    DD:  02/05/2020 19:28:25  DT:  02/06/2020 00:10:40    D#:  [id number]  Job#:  [id number]

## 2020-02-06 NOTE — PROGRESS NOTES
2 RN skin check done with admitting RN and Angélica RN. Face photo and skin photos documented in media. Appropriate LDAs opened.   Pt with Magan score of 18.

## 2020-02-06 NOTE — ASSESSMENT & PLAN NOTE
Currently resolved  Bun: 35 (2/7) --> 27 (2/8) --> 18 (2/12)  S/P recent IVF's  Encouraging oral fluids  Monitor

## 2020-02-06 NOTE — PROGRESS NOTES
"Rehab Progress Note     Date of Service: 2/6/2020  Chief Complaint: follow up stroke    Interval Events (Subjective)    Patient seen and examined today during his speech therapy session.  He was found to have mild cognitive deficits on the scan mostly in memory.  Speech therapy reports he does have a delayed swallow but improved compared to acute.  We will do a modified barium swallow study tomorrow.  Patient is currently denying any pain.  He reports he slept okay in his recliner last night.  He has no new complaints.    Objective:  VITAL SIGNS: /48   Pulse 94   Temp 36.8 °C (98.3 °F) (Oral)   Resp 16   Ht 1.727 m (5' 8\")   Wt 113.4 kg (250 lb)   SpO2 90%   BMI 38.01 kg/m²   Gen: alert, no apparent distress, obese  CV: regular rate and rhythm, no murmurs, no peripheral edema  Resp: clear to ascultation bilaterally, normal respiratory effort, on O2  GI: soft, non-tender abdomen, bowel sounds present    Recent Results (from the past 72 hour(s))   CBC WITH DIFFERENTIAL    Collection Time: 02/03/20  9:45 PM   Result Value Ref Range    WBC 11.5 (H) 4.8 - 10.8 K/uL    RBC 3.90 (L) 4.70 - 6.10 M/uL    Hemoglobin 12.3 (L) 14.0 - 18.0 g/dL    Hematocrit 38.6 (L) 42.0 - 52.0 %    MCV 99.0 (H) 81.4 - 97.8 fL    MCH 31.5 27.0 - 33.0 pg    MCHC 31.9 (L) 33.7 - 35.3 g/dL    RDW 47.4 35.9 - 50.0 fL    Platelet Count 337 164 - 446 K/uL    MPV 11.5 9.0 - 12.9 fL    Neutrophils-Polys 63.30 44.00 - 72.00 %    Lymphocytes 23.50 22.00 - 41.00 %    Monocytes 7.60 0.00 - 13.40 %    Eosinophils 3.80 0.00 - 6.90 %    Basophils 0.80 0.00 - 1.80 %    Immature Granulocytes 1.00 (H) 0.00 - 0.90 %    Nucleated RBC 0.00 /100 WBC    Neutrophils (Absolute) 7.28 1.82 - 7.42 K/uL    Lymphs (Absolute) 2.71 1.00 - 4.80 K/uL    Monos (Absolute) 0.88 (H) 0.00 - 0.85 K/uL    Eos (Absolute) 0.44 0.00 - 0.51 K/uL    Baso (Absolute) 0.09 0.00 - 0.12 K/uL    Immature Granulocytes (abs) 0.11 0.00 - 0.11 K/uL    NRBC (Absolute) 0.00 K/uL   CBC " WITH DIFFERENTIAL    Collection Time: 02/04/20  6:08 AM   Result Value Ref Range    WBC 11.2 (H) 4.8 - 10.8 K/uL    RBC 3.97 (L) 4.70 - 6.10 M/uL    Hemoglobin 12.4 (L) 14.0 - 18.0 g/dL    Hematocrit 38.9 (L) 42.0 - 52.0 %    MCV 98.0 (H) 81.4 - 97.8 fL    MCH 31.2 27.0 - 33.0 pg    MCHC 31.9 (L) 33.7 - 35.3 g/dL    RDW 45.6 35.9 - 50.0 fL    Platelet Count 340 164 - 446 K/uL    MPV 11.4 9.0 - 12.9 fL    Neutrophils-Polys 61.90 44.00 - 72.00 %    Lymphocytes 22.80 22.00 - 41.00 %    Monocytes 8.90 0.00 - 13.40 %    Eosinophils 4.40 0.00 - 6.90 %    Basophils 1.10 0.00 - 1.80 %    Immature Granulocytes 0.90 0.00 - 0.90 %    Nucleated RBC 0.00 /100 WBC    Neutrophils (Absolute) 6.92 1.82 - 7.42 K/uL    Lymphs (Absolute) 2.55 1.00 - 4.80 K/uL    Monos (Absolute) 1.00 (H) 0.00 - 0.85 K/uL    Eos (Absolute) 0.49 0.00 - 0.51 K/uL    Baso (Absolute) 0.12 0.00 - 0.12 K/uL    Immature Granulocytes (abs) 0.10 0.00 - 0.11 K/uL    NRBC (Absolute) 0.00 K/uL   ACCU-CHEK GLUCOSE    Collection Time: 02/04/20  9:18 AM   Result Value Ref Range    Glucose - Accu-Ck 173 (H) 65 - 99 mg/dL   ACCU-CHEK GLUCOSE    Collection Time: 02/04/20 12:57 PM   Result Value Ref Range    Glucose - Accu-Ck 103 (H) 65 - 99 mg/dL   Basic Metabolic Panel    Collection Time: 02/04/20  2:04 PM   Result Value Ref Range    Sodium 140 135 - 145 mmol/L    Potassium 4.3 3.6 - 5.5 mmol/L    Chloride 100 96 - 112 mmol/L    Co2 30 20 - 33 mmol/L    Glucose 122 (H) 65 - 99 mg/dL    Bun 29 (H) 8 - 22 mg/dL    Creatinine 0.92 0.50 - 1.40 mg/dL    Calcium 9.7 8.5 - 10.5 mg/dL    Anion Gap 10.0 0.0 - 11.9   TSH    Collection Time: 02/04/20  2:04 PM   Result Value Ref Range    TSH 5.630 (H) 0.380 - 5.330 uIU/mL   VITAMIN B12    Collection Time: 02/04/20  2:04 PM   Result Value Ref Range    Vitamin B12 -True Cobalamin 255 211 - 911 pg/mL   CBC WITH DIFFERENTIAL    Collection Time: 02/04/20  2:04 PM   Result Value Ref Range    WBC 10.4 4.8 - 10.8 K/uL    RBC 3.86 (L) 4.70  - 6.10 M/uL    Hemoglobin 12.2 (L) 14.0 - 18.0 g/dL    Hematocrit 37.9 (L) 42.0 - 52.0 %    MCV 98.2 (H) 81.4 - 97.8 fL    MCH 31.6 27.0 - 33.0 pg    MCHC 32.2 (L) 33.7 - 35.3 g/dL    RDW 45.9 35.9 - 50.0 fL    Platelet Count 326 164 - 446 K/uL    MPV 11.6 9.0 - 12.9 fL    Neutrophils-Polys 71.20 44.00 - 72.00 %    Lymphocytes 18.20 (L) 22.00 - 41.00 %    Monocytes 5.90 0.00 - 13.40 %    Eosinophils 2.70 0.00 - 6.90 %    Basophils 1.20 0.00 - 1.80 %    Immature Granulocytes 0.80 0.00 - 0.90 %    Nucleated RBC 0.00 /100 WBC    Neutrophils (Absolute) 7.38 1.82 - 7.42 K/uL    Lymphs (Absolute) 1.89 1.00 - 4.80 K/uL    Monos (Absolute) 0.61 0.00 - 0.85 K/uL    Eos (Absolute) 0.28 0.00 - 0.51 K/uL    Baso (Absolute) 0.12 0.00 - 0.12 K/uL    Immature Granulocytes (abs) 0.08 0.00 - 0.11 K/uL    NRBC (Absolute) 0.00 K/uL   ESTIMATED GFR    Collection Time: 02/04/20  2:04 PM   Result Value Ref Range    GFR If African American >60 >60 mL/min/1.73 m 2    GFR If Non African American >60 >60 mL/min/1.73 m 2   ACCU-CHEK GLUCOSE    Collection Time: 02/04/20  5:19 PM   Result Value Ref Range    Glucose - Accu-Ck 132 (H) 65 - 99 mg/dL   ACCU-CHEK GLUCOSE    Collection Time: 02/04/20 11:37 PM   Result Value Ref Range    Glucose - Accu-Ck 143 (H) 65 - 99 mg/dL   ACCU-CHEK GLUCOSE    Collection Time: 02/05/20  5:44 AM   Result Value Ref Range    Glucose - Accu-Ck 150 (H) 65 - 99 mg/dL   CBC WITH DIFFERENTIAL    Collection Time: 02/05/20  7:32 AM   Result Value Ref Range    WBC 10.8 4.8 - 10.8 K/uL    RBC 3.74 (L) 4.70 - 6.10 M/uL    Hemoglobin 11.9 (L) 14.0 - 18.0 g/dL    Hematocrit 37.3 (L) 42.0 - 52.0 %    MCV 99.7 (H) 81.4 - 97.8 fL    MCH 31.8 27.0 - 33.0 pg    MCHC 31.9 (L) 33.7 - 35.3 g/dL    RDW 47.8 35.9 - 50.0 fL    Platelet Count 324 164 - 446 K/uL    MPV 11.4 9.0 - 12.9 fL    Neutrophils-Polys 69.50 44.00 - 72.00 %    Lymphocytes 17.80 (L) 22.00 - 41.00 %    Monocytes 7.30 0.00 - 13.40 %    Eosinophils 3.80 0.00 - 6.90 %     Basophils 0.90 0.00 - 1.80 %    Immature Granulocytes 0.70 0.00 - 0.90 %    Nucleated RBC 0.00 /100 WBC    Neutrophils (Absolute) 7.52 (H) 1.82 - 7.42 K/uL    Lymphs (Absolute) 1.93 1.00 - 4.80 K/uL    Monos (Absolute) 0.79 0.00 - 0.85 K/uL    Eos (Absolute) 0.41 0.00 - 0.51 K/uL    Baso (Absolute) 0.10 0.00 - 0.12 K/uL    Immature Granulocytes (abs) 0.08 0.00 - 0.11 K/uL    NRBC (Absolute) 0.00 K/uL   Basic Metabolic Panel    Collection Time: 02/05/20  7:32 AM   Result Value Ref Range    Sodium 140 135 - 145 mmol/L    Potassium 3.9 3.6 - 5.5 mmol/L    Chloride 101 96 - 112 mmol/L    Co2 33 20 - 33 mmol/L    Glucose 172 (H) 65 - 99 mg/dL    Bun 28 (H) 8 - 22 mg/dL    Creatinine 0.89 0.50 - 1.40 mg/dL    Calcium 9.6 8.5 - 10.5 mg/dL    Anion Gap 6.0 0.0 - 11.9   proBrain Natriuretic Peptide, NT    Collection Time: 02/05/20  7:32 AM   Result Value Ref Range    NT-proBNP 72 0 - 125 pg/mL   ESTIMATED GFR    Collection Time: 02/05/20  7:32 AM   Result Value Ref Range    GFR If African American >60 >60 mL/min/1.73 m 2    GFR If Non African American >60 >60 mL/min/1.73 m 2   ACCU-CHEK GLUCOSE    Collection Time: 02/05/20 11:43 AM   Result Value Ref Range    Glucose - Accu-Ck 103 (H) 65 - 99 mg/dL   ACCU-CHEK GLUCOSE    Collection Time: 02/05/20  5:27 PM   Result Value Ref Range    Glucose - Accu-Ck 106 (H) 65 - 99 mg/dL   ACCU-CHEK GLUCOSE    Collection Time: 02/05/20 11:31 PM   Result Value Ref Range    Glucose - Accu-Ck 143 (H) 65 - 99 mg/dL   ACCU-CHEK GLUCOSE    Collection Time: 02/06/20  5:30 AM   Result Value Ref Range    Glucose - Accu-Ck 130 (H) 65 - 99 mg/dL   CBC with Differential    Collection Time: 02/06/20  5:44 AM   Result Value Ref Range    WBC 10.1 4.8 - 10.8 K/uL    RBC 3.76 (L) 4.70 - 6.10 M/uL    Hemoglobin 11.7 (L) 14.0 - 18.0 g/dL    Hematocrit 37.0 (L) 42.0 - 52.0 %    MCV 98.4 (H) 81.4 - 97.8 fL    MCH 31.1 27.0 - 33.0 pg    MCHC 31.6 (L) 33.7 - 35.3 g/dL    RDW 45.9 35.9 - 50.0 fL    Platelet Count  380 164 - 446 K/uL    MPV 11.5 9.0 - 12.9 fL    Neutrophils-Polys 63.60 44.00 - 72.00 %    Lymphocytes 23.90 22.00 - 41.00 %    Monocytes 7.40 0.00 - 13.40 %    Eosinophils 3.60 0.00 - 6.90 %    Basophils 0.60 0.00 - 1.80 %    Immature Granulocytes 0.90 0.00 - 0.90 %    Nucleated RBC 0.00 /100 WBC    Neutrophils (Absolute) 6.45 1.82 - 7.42 K/uL    Lymphs (Absolute) 2.42 1.00 - 4.80 K/uL    Monos (Absolute) 0.75 0.00 - 0.85 K/uL    Eos (Absolute) 0.37 0.00 - 0.51 K/uL    Baso (Absolute) 0.06 0.00 - 0.12 K/uL    Immature Granulocytes (abs) 0.09 0.00 - 0.11 K/uL    NRBC (Absolute) 0.00 K/uL   Comp Metabolic Panel (CMP)    Collection Time: 02/06/20  5:44 AM   Result Value Ref Range    Sodium 142 135 - 145 mmol/L    Potassium 3.8 3.6 - 5.5 mmol/L    Chloride 100 96 - 112 mmol/L    Co2 35 (H) 20 - 33 mmol/L    Anion Gap 7.0 0.0 - 11.9    Glucose 138 (H) 65 - 99 mg/dL    Bun 27 (H) 8 - 22 mg/dL    Creatinine 0.99 0.50 - 1.40 mg/dL    Calcium 9.1 8.5 - 10.5 mg/dL    AST(SGOT) 28 12 - 45 U/L    ALT(SGPT) 25 2 - 50 U/L    Alkaline Phosphatase 81 30 - 99 U/L    Total Bilirubin 0.4 0.1 - 1.5 mg/dL    Albumin 4.1 3.2 - 4.9 g/dL    Total Protein 7.2 6.0 - 8.2 g/dL    Globulin 3.1 1.9 - 3.5 g/dL    A-G Ratio 1.3 g/dL   Magnesium    Collection Time: 02/06/20  5:44 AM   Result Value Ref Range    Magnesium 2.3 1.5 - 2.5 mg/dL   Vitamin D, 25-hydroxy (blood)    Collection Time: 02/06/20  5:44 AM   Result Value Ref Range    25-Hydroxy   Vitamin D 25 13 (L) 30 - 100 ng/mL   ESTIMATED GFR    Collection Time: 02/06/20  5:44 AM   Result Value Ref Range    GFR If African American >60 >60 mL/min/1.73 m 2    GFR If Non African American >60 >60 mL/min/1.73 m 2   ACCU-CHEK GLUCOSE    Collection Time: 02/06/20 11:39 AM   Result Value Ref Range    Glucose - Accu-Ck 117 (H) 65 - 99 mg/dL       Current Facility-Administered Medications   Medication Frequency   • vitamin D (cholecalciferol) tablet 2,000 Units DAILY   • ipratropium-albuterol (DUONEB)  nebulizer solution Q4HRS PRN   • budesonide-formoterol (SYMBICORT) 160-4.5 MCG/ACT inhaler 2 Puff BID (RT)   • insulin regular (HUMULIN R) injection 2-12 Units Q6HRS   • Respiratory Care per Protocol Continuous RT   • Pharmacy Consult Request ...Pain Management Review 1 Each PHARMACY TO DOSE   • acetaminophen (TYLENOL) tablet 650 mg Q4HRS PRN   • hydrALAZINE (APRESOLINE) tablet 25 mg Q8HRS PRN   • docusate sodium (COLACE) capsule 100 mg DAILY    And   • lactulose 20 GM/30ML solution 30 mL Q24HRS PRN    And   • bisacodyl (DULCOLAX) suppository 10 mg QDAY PRN   • artificial tears ophthalmic solution 1 Drop PRN   • benzocaine-menthol (CEPACOL) lozenge 1 Lozenge Q2HRS PRN   • mag hydrox-al hydrox-simeth (MAALOX PLUS ES or MYLANTA DS) suspension 20 mL Q2HRS PRN   • ondansetron (ZOFRAN ODT) dispertab 4 mg 4X/DAY PRN    Or   • ondansetron (ZOFRAN) syringe/vial injection 4 mg 4X/DAY PRN   • sodium chloride (OCEAN) 0.65 % nasal spray 2 Spray PRN   • senna-docusate (PERICOLACE or SENOKOT S) 8.6-50 MG per tablet 2 Tab BID    And   • polyethylene glycol/lytes (MIRALAX) PACKET 1 Packet QDAY PRN    And   • magnesium hydroxide (MILK OF MAGNESIA) suspension 30 mL QDAY PRN    And   • bisacodyl (DULCOLAX) suppository 10 mg QDAY PRN   • atorvastatin (LIPITOR) tablet 80 mg Q EVENING   • amLODIPine (NORVASC) tablet 5 mg Q DAY   • levothyroxine (SYNTHROID) tablet 88 mcg AM ES   • metoprolol (LOPRESSOR) tablet 50 mg BID   • traZODone (DESYREL) tablet 50 mg QHS   • melatonin tablet 3 mg Nightly   • aspirin (ASA) chewable tab 81 mg DAILY   • nystatin (MYCOSTATIN) 310627 UNIT/ML suspension 500,000 Units 4X/DAY   • hydrocortisone 1 % cream BID       No orders of the defined types were placed in this encounter.      Assessment:  Active Hospital Problems    Diagnosis   • *Cerebrovascular accident (CVA) due to thrombosis of right middle cerebral artery (HCC)   • Acute respiratory failure with hypoxia (HCC)   • Type 2 diabetes mellitus without  complication, without long-term current use of insulin (HCC)   • Dysphagia   • Vitamin B12 deficiency   • Essential hypertension   • Moderate persistent asthma without complication   • Dyslipidemia   • Hypothyroidism   • VERENA (obstructive sleep apnea)   • Vitamin D deficiency   • Azotemia   • Obesity (BMI 35.0-39.9 without comorbidity)   • Rash   • S/P percutaneous endoscopic gastrostomy (PEG) tube placement (HCC)   • Hypertriglyceridemia   • Oral thrush   • Anemia   • Impaired mobility and ADLs     This patient is a 74 y.o. male admitted for acute inpatient rehabilitation with Cerebrovascular accident (CVA) due to thrombosis of right middle cerebral artery (HCC).    Medical Decision Making and Plan:    Right MCA stroke  S/p tPA  Hemorraghic transformation  Dysphagia, s/p PEG  Cognitive deficits  Left pronator drift  Non-dominant  Continue full rehab program  PT/OT/SLP, 1 hr each discipline, 5 days per week  Speech therapy to advance diet, MBS tomorrow    Bowel  Meds as needed  Last BM 2/6    Bladder  Check PVRs - 67, 54  ICP for over 400 cc  Scheduled toileting    Insomnia  Schedule melatonin and trazodone    DVT prophylaxis  Lovenox    Appreciate assistance of hospitalist with his medical comorbidities:    Asthma  Hypertension  Hypothyroidism  Sleep apnea  Diabetes with hyperglycemia  Anemia  Hypertriglyceridemia  Obesity  Oral thrush  Rash  Vit D deficiency    Total time:  35 minutes.  I spent greater than 50% of the time for patient care, counseling, and coordination on this date, including patient face-to face time, unit/floor time with review of records/pertinent lab data and studies, as well as discussing diagnostic evaluation/work up, planned therapeutic interventions, and future disposition of care, as per the interval events/subjective and the assessment and plan as noted above.      Alla Ewing M.D.   Physical Medicine and Rehabilitation

## 2020-02-06 NOTE — THERAPY
Physical Therapy   Initial Evaluation     Patient Name: Familia Lundy  Age:  74 y.o., Sex:  male  Medical Record #: [id number]  Today's Date: 2/6/2020     Subjective    Pt reports he feels off balance while walking     Objective       02/06/20 1231   Prior Living Situation   Prior Services None;Home-Independent   Housing / Facility 1 Story House   Steps Into Home 2   Steps In Home 0   Rail None   Equipment Owned Wheelchair;Single Point Cane;Front-Wheel Walker   Lives with - Patient's Self Care Capacity Alone and Able to Care For Self   Comments Pt reports that he has a daughter who lives in [place], the rest of his family lives in [facility]. His brother may be available to assist at d/c.    Prior Level of Functional Mobility   Bed Mobility Independent   Transfer Status Independent   Ambulation Independent   Distance Ambulation (Feet) 1000  (comunity distance)   Assistive Devices Used None   Wheelchair Other (Comments)  (NA)   Stairs Independent   IRF-MARIEL:  Prior Functioning: Everyday Activities   Indoor Mobility (Ambulation) Independent   Stairs Independent   Prior Device Use None of the given options   Passive ROM Lower Body   Passive ROM Lower Body WDL   Active ROM Lower Body    Active ROM Lower Body  WDL   Strength Lower Body   Lower Body Strength  X   Comments globally RLE 5/5, LLE 4+/5   Sensation Lower Body   Lower Extremity Sensation   X   Lt Lower Extremity Light Touch Impaired   Lower Body Muscle Tone   Lower Body Muscle Tone  WDL   Balance Assessment   Sitting Balance (Static) Fair +   Sitting Balance (Dynamic) Fair   Standing Balance (Static) Fair -   Standing Balance (Dynamic) Poor +   Weight Shift Sitting Fair   Weight Shift Standing Poor   Bed Mobility    Supine to Sit Minimal Assist   Sit to Supine Minimal Assist   Sit to Stand Contact Guard Assist   Scooting Contact Guard Assist   Rolling Minimal Assist to Rt.;Minimum Assist to Lt.   Neurological Concerns   Neurological Concerns No   Coordination  Lower Body    Coordination Lower Body  X   Rapid Alternating Movements Left Impaired   IRF-MARIEL:  Roll Left and Right   Assistance Needed Physical assistance   Physical Assistance Level Less than 25%   CARE Score 3   Discharge Goal:  Assistance Needed Independent;Adaptive equipment   Discharge Goal:  Physical Assistance Level No physical assistance or only touching/steadying assist   Discharge Goal:  Score 6   IRF-MARIEL:  Sit to Lying   Assistance Needed Physical assistance   Physical Assistance Level Less than 25%   CARE Score 3   Discharge Goal:  Assistance Needed Independent;Adaptive equipment   Discharge Goal:  Physical Assistance Level No physical assistance or only touching/steadying assist   Discharge Goal:  Score 6   IRF-MARIEL:  Lying to Sitting on Side of Bed   Assistance Needed Physical assistance   Physical Assistance Level Less than 25%   CARE Score 3   Discharge Goal:  Assistance Needed Independent;Adaptive equipment   Discharge Goal:  Physical Assistance Level No physical assistance or only touching/steadying assist   Discharge Goal:  Score 6   IRF-MARIEL:  Sit to Stand   Assistance Needed Incidental touching;Adaptive equipment   Physical Assistance Level No physical assistance or only touching/steadying assist   CARE Score 4   Discharge Goal:  Assistance Needed Independent;Adaptive equipment   Discharge Goal:  Physical Assistance Level No physical assistance or only touching/steadying assist   Discahrge Goal:  Score 6   IRF-MARIEL:  Chair/Bed-to-Chair Transfer   Assistance Needed Physical assistance   Physical Assistance Level Less than 25%   CARE Score 3   Discharge Goal:  Assistance Needed Independent;Adaptive equipment   Discharge Goal:  Physical Assistance Level No physical assistance or only touching/steadying assist   Discharge Goal:  Score 6   IRF-MARIEL:  Car Transfer   Assistance Needed Physical assistance   Physical Assistance Level Less than 25%   CARE Score 3   Discharge Goal:  Assistance Needed  Independent;Adaptive equipment   Discharge Goal:  Physical Assistance Level No physical assistance or only touching/steadying assist   Discharge Goal:  Score 6   IRF MARIEL:  Walking   Does the Patient Walk? Yes   IRF MARIEL:  Walk 10 Feet   Assistance Needed Incidental touching   Physical Assistance Level No physical assistance or only touching/steadying assist   CARE Score 4   Discharge Goal:  Assistance Needed Independent;Adaptive equipment   Discharge Goal:  Physical Assistance Level No physical assistance or only touching/steadying assist   Discharge Goal:  Score 6   IRF-MARIEL:  Walk 50 Feet with Two Turns   Assistance Needed Incidental touching   Physical Assistance Level No physical assistance or only touching/steadying assist   CARE Score 4   Discharge Goal:  Assistance Needed Independent;Adaptive equipment   Discharge Goal:  Physical Assistance Level No physical assistance or only touching/steadying assist   Discharge Goal:  Score 6   IRF-MARIEL:  Walk 150 Feet   Reason if not Attempted Safety concerns   CARE Score 88   Discharge Goal:  Assistance Needed Independent;Adaptive equipment   Discharge Goal:  Physical Assistance Level No physical assistance or only touching/steadying assist   Discharge Goal:  Score 6   IRF MARIEL:  Walking 10 Feet on Uneven Surfaces   Assistance Needed Incidental touching   Physical Assistance Level No physical assistance or only touching/steadying assist   CARE Score 4   Discharge Goal:  Assistance Needed Independent;Adaptive equipment   Discharge Goal:  Physical Assistance Level No physical assistance or only touching/steadying assist   Discharge Goal:  Score 6   IRF MARIEL:  1 Step (Curb)   Reason if not Attempted Safety concerns   CARE Score 88   Discharge Goal:  Assistance Needed Independent;Adaptive equipment   Discharge Goal:  Physical Assistance Level No physical assistance or only touching/steadying assist   Discharge Goal:  Score 6   IRF-MARIEL:  4 Steps   Assistance Needed Incidental  touching;Adaptive equipment   Physical Assistance Level No physical assistance or only touching/steadying assist   CARE Score 4   Discharge Goal:  Assistance Needed Independent;Adaptive equipment   Discharge Goal:  Physical Assistance Level No physical assistance or only touching/steadying assist   Discharge Goal:  Score 6   IRF MARIEL:  12 Steps   Assistance Needed Incidental touching;Adaptive equipment   Physical Assistance Level No physical assistance or only touching/steadying assist   CARE Score 4   Discharge Goal:  Assistance Needed Independent;Adaptive equipment   Discharge Goal:  Physical Assistance Level No physical assistance or only touching/steadying assist   Discharge Goal:  Score 6   IRF MARIEL:  Picking Up Object   Reason if not Attempted Safety concerns   CARE Score 88   Discharge Goal:  Assistance Needed Independent;Adaptive equipment   Discharge Goal:  Physical Assistance Level No physical assistance or only touching/steadying assist   Discharge Goal:  Score 6   IRF-MARIEL:  Wheel 50 Feet with Two Turns   Indicate the Type of Wheelchair or Scooter Used Manual   Assistance Needed Supervision;Adaptive equipment   Physical Assistance Level No physical assistance or only touching/steadying assist   CARE Score 4   Discharge Goal:  Assistance Needed Independent;Adaptive equipment   Discharge Goal:  Physical Assistance Level No physical assistance or only touching/steadying assist   Discharge Goal:  Score 6   IRF-MARIEL:  Wheel 150 Feet   Indicate the Type of Wheelchair or Scooter Used Manual   Assistance Needed Supervision;Adaptive equipment   Physical Assistance Level No physical assistance or only touching/steadying assist   CARE Score 4   Discharge Goal:  Assistance Needed Independent;Adaptive equipment   Discharge Goal:  Physical Assistance Level No physical assistance or only touching/steadying assist   Discharge Goal:  Score 6   Problem List    Problems Impaired Bed Mobility;Impaired Transfers;Impaired  Ambulation;Functional Strength Deficit;Impaired Balance;Impaired Coordination;Decreased Activity Tolerance;Safety Awareness Deficits / Cognition;Motor Planning / Sequencing   Precautions   Precautions Fall Risk;PEG Tube   Current Discharge Plan   Current Discharge Plan Return to Prior Living Situation   Interdisciplinary Plan of Care Collaboration   Patient Position at End of Therapy Seated;Call Light within Reach;Tray Table within Reach;Self Releasing Lap Belt Applied;Chair Alarm On   Benefit   Therapy Benefit Patient Would Benefit from Inpatient Rehabilitation Physical Therapy to Maximize Functional [place] with ADLs, IADLs and Mobility.   PT Total Time Spent   PT Individual Total Time Spent (Mins) 60   PT Charge Group   PT Therapeutic Activities 1   PT Evaluation PT Evaluation High       FIM Bed/Chair/Wheelchair Transfers Score: 4 - Minimal Assistance  Bed/Chair/Wheelchair Transfers Description:       FIM Walking Score:  2 - Max Assistance  Walking Description:  Walker, Extra time, Safety concerns, Verbal cueing, Requires incidental assist    FIM Wheelchair Score:  5 - Standby Prompting/Supervision or Set-up  Wheelchair Description:  Extra time, Safety concerns, Supervision for safety, Verbal cueing    FIM Stairs Score:  4 - Minimal Assistance  Stairs Description:  Ascends/descends 12 to 14 steps, Requires incidental assist, Hand rails, Extra time, Safety concerns, Verbal cueing    Assessment  Patient is 74 y.o. male with a diagnosis of Cerebrovascular accident (CVA) due to thrombosis of right middle cerebral artery.  Additional factors influencing patient status / progress (ie: cognitive factors, co-morbidities, social support, etc): See H&P for PMH, comorbidities, and other factors. See flow sheet for list of impairments, functional deficits, and social support.      Plan  Recommend Physical Therapy  minutes per day 5-7 days per week for 10 days for the following treatments:  PT Group Therapy, PT E  Stim Attended, PT Gait Training, PT Therapeutic Exercises, PT Neuro Re-Ed/Balance, PT Therapeutic Activity, PT Manual Therapy and PT Evaluation.    Goals:  Long term and short term goals have been discussed with patient and they are in agreement.    Physical Therapy Problems     Problem: Mobility     Dates: Start: 02/06/20       Description:     Goal: STG-Within one week, patient will ambulate household distance     Dates: Start: 02/06/20   Expected End: 02/13/20       Description: 1) Individualized goal:  150 ft With LRD at CGA in order to progress towards PLOF  2) Interventions:  PT Group Therapy, PT E Stim Attended, PT Gait Training, PT Therapeutic Exercises, PT Neuro Re-Ed/Balance, PT Therapeutic Activity, PT Manual Therapy and PT Evaluation             Goal: STG-Within one week, patient will ambulate up/down a curb     Dates: Start: 02/06/20   Expected End: 02/13/20       Description: 1) Individualized goal:  navigate up/down curb with LRD at CGA in order to enter/exit home safely  2) Interventions:  PT Group Therapy, PT E Stim Attended, PT Gait Training, PT Therapeutic Exercises, PT Neuro Re-Ed/Balance, PT Therapeutic Activity, PT Manual Therapy and PT Evaluation                   Problem: Mobility Transfers     Dates: Start: 02/06/20       Description:     Goal: STG-Within one week, patient will transfer bed to chair     Dates: Start: 02/06/20   Expected End: 02/13/20       Description: 1) Individualized goal:  At SPV with LRD In order to maximize self mobility  2) Interventions:  PT Group Therapy, PT E Stim Attended, PT Gait Training, PT Therapeutic Exercises, PT Neuro Re-Ed/Balance, PT Therapeutic Activity, PT Manual Therapy and PT Evaluation                   Problem: PT-Long Term Goals     Dates: Start: 02/06/20       Description:     Goal: LTG-By discharge, patient will ambulate     Dates: Start: 02/06/20   Expected End: 02/16/20       Description: 1) Individualized goal: 150 ft With LRD at mod I in  order to progress towards PLOF  2) Interventions: PT Group Therapy, PT E Stim Attended, PT Gait Training, PT Therapeutic Exercises, PT Neuro Re-Ed/Balance, PT Therapeutic Activity, PT Manual Therapy and PT Evaluation             Goal: LTG-By discharge, patient will transfer one surface to another     Dates: Start: 02/06/20   Expected End: 02/16/20       Description: 1) Individualized goal: At mod I with LRD In order to maximize self mobility  2) Interventions: PT Group Therapy, PT E Stim Attended, PT Gait Training, PT Therapeutic Exercises, PT Neuro Re-Ed/Balance, PT Therapeutic Activity, PT Manual Therapy and PT Evaluation             Goal: LTG-By discharge, patient will transfer in/out of a car     Dates: Start: 02/06/20   Expected End: 02/16/20       Description: 1) Individualized goal: At mod I with LRD In order to maximize self mobility  2) Interventions: PT Group Therapy, PT E Stim Attended, PT Gait Training, PT Therapeutic Exercises, PT Neuro Re-Ed/Balance, PT Therapeutic Activity, PT Manual Therapy and PT Evaluation           Goal: LTG-By discharge, patient will     Dates: Start: 02/06/20   Expected End: 02/16/20       Description: 1) Individualized goal: navigate up/down curb with LRD at mod I in order to enter/exit home safely  2) Interventions: PT Group Therapy, PT E Stim Attended, PT Gait Training, PT Therapeutic Exercises, PT Neuro Re-Ed/Balance, PT Therapeutic Activity, PT Manual Therapy and PT Evaluation

## 2020-02-06 NOTE — FLOWSHEET NOTE
02/06/20 1214   Events/Summary/Plan   Events/Summary/Plan SVN, SpO2 check   Interdisciplinary Plan of Care-Goals (Indications)   Bronchodilator Indications History / Diagnosis   Interdisciplinary Plan of Care-Outcomes    Bronchodilator Outcome Diminished Wheezing and Volume of Air Movement Increased   Education   Education Yes - Pt. / Family has been Instructed in use of Respiratory Equipment   RT Assessment of Delivered Medications   Evaluation of Medication Delivery Daily Yes-- Pt /Family has been Instructed in use of Respiratory Medications and Adverse Reactions   SVN Group   #SVN Performed Yes   Given By: Mouthpiece   Chest Exam   Respiration 16   Pulse 82   Breath Sounds   Pre/Post Intervention Pre Intervention Assessment   RUL Breath Sounds Clear   RML Breath Sounds Diminished;Crackles   RLL Breath Sounds Diminished;Crackles   HARMONY Breath Sounds Clear   LLL Breath Sounds Diminished;Crackles   Secretions   Cough Strong;Productive   How Sputum Obtained Expectorated   Sputum Amount Small   Sputum Color White   Sputum Consistency Thick   Oximetry   #Pulse Oximetry (Single Determination) Yes   Oxygen   Home O2 Use Prior To Admission? Yes   Home O2 LPM Flow 2 LPM   Home O2 Frequency of Use At Sleep   Pulse Oximetry 91 %   O2 (LPM) 2

## 2020-02-06 NOTE — PROGRESS NOTES
Patient admitted to facility at 1055 via wheelchair; accompanied by hospital transport.  Patient assisted to room and positioned in bed for comfort and safety; call light within reach.  Patient assisted with stowing belongings and oriented to room and facility.  Admission assessment performed and documented in computer.  Admission paperwork completed; signed copies placed in chart.  Will continue to monitor.

## 2020-02-06 NOTE — THERAPY
Occupational Therapy   Initial Evaluation     Patient Name: Familia Lundy  Age:  74 y.o., Sex:  male  Medical Record #: [id number]  Today's Date: 2/6/2020     Subjective    Pt received resting in recliner chair. Agreeable to OT evaluation. Pt reports independent PLOF for ADLs, IADLs, driving and household/community mobility without an assistive device. Pt is retired, enjoys participating in a weekly bowling league. Pt reports impaired but improving sensation in LUE.      Objective       02/06/20 0701   Prior Living Situation   Prior Services None;Home-Independent   Housing / Facility 1 Story House  (in [place])   Steps Into Home 1   Steps In Home 0   Rail Unable To Determine At This Time   Elevator No   Bathroom Set up Walk In Shower;Grab Bars;Shower Chair   Equipment Owned Grab Bar(s) In Tub / Shower;Tub / Shower Seat;Other (Comments)  (recliner, CPAP)   Lives with - Patient's Self Care Capacity Alone and Unable to Care For Self   Comments Pt reports that he has a daughter who lives in [place], the rest of his family lives in [facility]. His brother will be available to assist at d/c.    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   Cognition    Cognition / Consciousness X   Orientation Level Oriented x 4   Level of Consciousness Alert   Safety Awareness Impaired   IRF-MARIEL:  Cognitive Pattern Assessment   Cognitive Pattern Assessment Used BIMS   IRF-MARIEL:  Brief Interview for Mental Status (BIMS)   Repetition of Three Words (First Attempt) 3   Temporal Orientation: Able to Report the Correct Year Correct   Temporal Orientation: Able to Report the Correct Month Accurate within 5 days   Temporal Orientation: Able to Report the Correct Day of the Week Correct   Able to Recall \"Sock\" Yes, no cue required   Able to Recall \"Blue\" Yes, no cue required   Able to Recall \"Bed\" Yes, after cueing (\"a piece of furniture\")   BIMS Summary Score 14   Vision Screen   Vision Not tested  (reading glasses; reports no changes/difficulty w vision)   Passive ROM Upper Body   Passive ROM Upper Body WDL   Active ROM Upper Body   Active ROM Upper Body  WDL   Dominant Hand Right   Strength Upper Body   Upper Body Strength  X   Comments 5/5 RUE; 4/5 LUE   Sensation Upper Body   Upper Extremity Sensation  X   Lt Upper Extremity Light Touch Impaired   Lt Upper Extremity Proprioception Impaired   Upper Body Muscle Tone   Upper Body Muscle Tone  WDL   Balance Assessment   Sitting Balance (Static) Fair +   Sitting Balance (Dynamic) Fair   Standing Balance (Static) Fair -   Standing Balance (Dynamic) Poor +   Weight Shift Sitting Fair   Weight Shift Standing Fair   Comments during ADLs and bathroom txfrs.   Coordination Upper Body   Coordination X   Fine Motor Coordination impaired thumb/finger opposition LUE   Gross Motor Coordination impaired finger to nose LUE   IRF-MARIEL:  Eating   Assistance Needed physical assistance   [place] Physical Assistance Level Total assistance  (PEG tube)   CARE Score 1   Discharge Goal:  Assistance " Needed Independent   Discharge Goal:  Physical Assistance Level No physical assistance or only touching/steadying assist   Discharge Goal:  Score 6   IRF-MARIEL:  Oral Hygiene   Assistance Needed Set-up / clean-up;Supervision   Physical Assistance Level No physical assistance   CARE Score 4   Discharge Goal:  Assistance Needed Independent   Discharge Goal:  Physical Assistance Level No physical assistance or only touching/steadying assist   Discharge Goal:  Score 6   IRF-MARIEL:  Shower/Bathe Self   Assistance Needed Set-up / clean-up;Supervision;Verbal cues;Physical assistance   Physical Assistance Level Less than 25%   CARE Score 3   Discharge Goal:  Assistance Needed Independent;Adaptive equipment   Discharge Goal:  Physical Assistance Level No physical assistance or only touching/steadying assist   Discharge Goal Score 6   IRF-MARIEL:  Upper Body Dressing   Assistance Needed Set-up / clean-up;Supervision   Physical Assistance Level No physical assistance or only touching/steadying assist   CARE Score 4   Discharge Goal:  Assistance Needed Independent   Discharge Goal:  Physical Assistance Level No physical assistance or only touching/steadying assist   Dischage Goal:  Score 6   IRF-MARIEL:  Lower Body Dressing   Assistance Needed Set-up / clean-up;Supervision;Verbal cues;Physical assistance   Physical Assistance Level 50%-74%   CARE Score 2   Discharge Goal:  Assistance Needed Independent;Adaptive equipment   Discharge Goal:  Physical Assistance Level No physical assistance or only touching/steadying assist   Discharge Goal:  Score 6   IRF MARIEL:  Putting On/Taking Off Footwear   Assistance Needed Set-up / clean-up;Supervision;Verbal cues;Physical assistance   Physical Assistance Level 50%-74%   CARE Score 2   Discharge Goal:  Assistance Needed Independent;Adaptive equipment   Discharge Goal:  Physical Assistance Level No physical assistance or only touching/steadying assist   Discharge Goal:  Score 6   IRF-MARIEL:  Toileting  Hygiene   Assistance Needed Set-up / clean-up;Supervision;Verbal cues;Incidental touching   Physical Assistance Level Less than 25%   CARE Score 3   Discharge Goal:  Assistance Needed Independent;Adaptive equipment   Discharge Goal:  Physical Assistance Level No physical assistance or only touching/steadying assist   Discharge Goal:  Score 6   IRF-MARIEL:  Toilet Transfer   Assistance Needed Set-up / clean-up;Supervision;Verbal cues;Physical assistance   Physical Assistance Level Less than 25%   CARE Score 3   Discharge Goal:  Assistance Needed Independent;Adaptive equipment   Discharge Goal:  Physical Assistance Level No physical assistance or only touching/steadying assist   Discahrge Goal:  Score 6   IRF-MARIEL:  Hearing, Speech, and Vision   Expression of Ideas and Wants 4   Understanding Verbal and Non-Verbal Content 4   Problem List   Problem List Decreased Active Daily Living Skills;Decreased Homemaking Skills;Decreased Upper Extremity Strength Left;Decreased Functional Mobility;Decreased Activity Tolerance;Impaired Coordination Left Upper Extremity;Impaired Sensation Left Upper Extremity;Impaired Postural Control / Balance  (PEG tube)   Precautions   Precautions Fall Risk;PEG Tube;Other (See Comments)   Comments impaired LUE sensation, wounds on feet   Current Discharge Plan   Current Discharge Plan Return to Prior Living Situation   Benefit    Therapy Benefit Patient Would Benefit from Inpatient Rehab Occupational Therapy to Maximize [place] with ADLs, IADLs and Functional Mobility.   Interdisciplinary Plan of Care Collaboration   IDT Collaboration with  Certified Nursing Assistant;Nursing;Physical Therapist   Patient Position at End of Therapy Seated;Self Releasing Lap Belt Applied;Other (Comments)  (transfer of care to RN for tube feed)   Collaboration Comments CLOF, POC   Equipment Needs   Assistive Device / DME Parallel Bars;Front-Wheel Walker;Shower Chair;Grab Bars In Shower / Tub;Grab Bars By Toilet    Adaptive Equipment Reacher;Sock Aide;Dressing Stick;Long Handled Sponge   OT Total Time Spent   OT Individual Total Time Spent (Mins) 60   OT Charge Group   Charges Yes   OT Self Care / ADL 1   OT Evaluation OT Evaluation Mod       FIM Eating Score:  1 - Total Assistance  Eating Description:  Staff administers tube feed/parenteral nutrition/IVF(PEG tube)    FIM Grooming Score:  5 - Standby Prompting/Supervision or Set-up  Grooming Description:  Increased time, Supervision for safety, Set-up of equipment(SBA/set up seated at sink for oral care adn combing hair.)    FIM Bathing Score:  4 - Minimal Assistance  Bathing Description:  Tub bench, Grab bar, Hand held shower, Increased time, Supervision for safety, Verbal cueing, Set-up of equipment(CGA/GB for standing balance, assist to wash feet.)    FIM Upper Body Dressin - Standby Prompting/Supervision or Set-up  Upper Body Dressing Description:  Increased time, Verbal cueing, Supervision for safety, Set-up of equipment(SBA/set up to don/doff pullover shirt.)    FIM Lower Body Dressing Score:  3 - Moderate Assistance  Lower Body Dressing Description:  Increased time, Supervision for safety, Verbal cueing, Set-up of equipment(assist to thread LLE into underwear/pants, CGA and min A to pull up pants in back; assist to don L sock, pt able to don R sock infigure 4.)    FIM Toileting:  3 - Moderate Assistance  Toileting Description:  Grab bar, Supervision for safety, Increased time, Verbal cueing, Set-up of equipment    FIM Toilet Transfer Score:  4 - Minimal Assistance  Toilet Transfer Description:  Grab bar, Increased time, Supervision for safety, Verbal cueing, Set-up of equipment(w/c<>toilet SPT with GB and min A.)    FIM Tub/Shower Transfers Score:  3 - Moderate Assistance  Tub/Shower Transfers Description:  Grab bar, Increased time, Supervision for safety, Verbal cueing, Set-up of equipment(pt walked from toilet<>shower bench with mod A for balance and GB; no AD  used.)    Assessment  Patient is 74 y.o. male with a diagnosis of R MCA stroke.  Per H&P pt has PMH significant for asthma, hypertension, hypothyroidism, sleep apnea, dysplipidemia. Pt lives alone in a SLH with 1 [place]HA in [place]. He reports independent PLOF for ADLs, IADLs, driving and household/community mobility without an assistive device. Pt has a walk in shower with GB and a shower chair. He was not using an assistive device for mobility at baseline. Pt has supportive family that can assist at d/c as needed. During OT evaluation pt required mod A to supervision for ADLs, and min-mod A for bathroom transfers. He presents with LUE weakness, impaired LUE coordination, impaired LUE sensation, impaired standing balance, decreased endurance, and impaired safety awareness. OT services warranted at this time to address deficits to maximize pt's safety and independence with ADLs, IADLs and related functional mobility.     Plan  Recommend Occupational Therapy  minutes per day 5-7 days per week for 2 weeks for the following treatments:  OT E Stim Attended, OT Group Therapy, OT Self Care/ADL, OT Community Reintegration, OT Neuro Re-Ed/Balance, OT Sensory Int Techniques, OT Therapeutic Activity, OT Evaluation and OT Therapeutic Exercise.    Goals:  Long term and short term goals have been discussed with patient and they are in agreement.    Occupational Therapy Goals     Problem: Bathing     Dates: Start: 02/06/20       Description:     Goal: STG-Within one week, patient will bathe     Dates: Start: 02/06/20       Description: 1) Individualized Goal: Supervision with AE/DME prn.  2) Interventions: OT E Stim Attended, OT Group Therapy, OT Self Care/ADL, OT Community Reintegration, OT Neuro Re-Ed/Balance, OT Sensory Int Techniques, OT Therapeutic Activity, OT Evaluation and OT Therapeutic Exercise                   Problem: Dressing     Dates: Start: 02/06/20       Description:     Goal: STG-Within one week, patient will  dress LB     Dates: Start: 02/06/20       Description: 1) Individualized Goal: Supervision with AE/DME prn.  2) Interventions: OT E Stim Attended, OT Group Therapy, OT Self Care/ADL, OT Community Reintegration, OT Neuro Re-Ed/Balance, OT Sensory Int Techniques, OT Therapeutic Activity, OT Evaluation and OT Therapeutic Exercise                   Problem: Functional Transfers     Dates: Start: 02/06/20       Description:     Goal: STG-Within one week, patient will transfer to toilet     Dates: Start: 02/06/20       Description: 1) Individualized Goal:  supervision with AD/DME prn.  2) Interventions:  OT E Stim Attended, OT Group Therapy, OT Self Care/ADL, OT Community Reintegration, OT Neuro Re-Ed/Balance, OT Sensory Int Techniques, OT Therapeutic Activity, OT Evaluation and OT Therapeutic Exercise                 Problem: OT Long Term Goals     Dates: Start: 02/06/20       Description:     Goal: LTG-By discharge, patient will complete basic self care tasks     Dates: Start: 02/06/20       Description: 1) Individualized Goal:  Mod I with AE/DME prn.  2) Interventions:  OT E Stim Attended, OT Group Therapy, OT Self Care/ADL, OT Community Reintegration, OT Neuro Re-Ed/Balance, OT Sensory Int Techniques, OT Therapeutic Activity, OT Evaluation and OT Therapeutic Exercise             Goal: LTG-By discharge, patient will perform bathroom transfers     Dates: Start: 02/06/20       Description: 1) Individualized Goal:  Mod I with AD/DME prn.  2) Interventions:  OT E Stim Attended, OT Group Therapy, OT Self Care/ADL, OT Community Reintegration, OT Neuro Re-Ed/Balance, OT Sensory Int Techniques, OT Therapeutic Activity, OT Evaluation and OT Therapeutic Exercise                 Problem: Toileting     Dates: Start: 02/06/20       Description:     Goal: STG-Within one week, patient will complete toileting tasks     Dates: Start: 02/06/20       Description: 1) Individualized Goal:  Supervision with AE/DME prn.  2) Interventions:   OT E Stim Attended, OT Group Therapy, OT Self Care/ADL, OT Community Reintegration, OT Neuro Re-Ed/Balance, OT Sensory Int Techniques, OT Therapeutic Activity, OT Evaluation and OT Therapeutic Exercise

## 2020-02-06 NOTE — THERAPY
Speech Language Pathology   Initial Assessment     Patient Name: Familia Lundy  AGE:  74 y.o., SEX:  male  Medical Record #: [id number]  Today's Date: 2/6/2020     Subjective    The patient is a 74 y.o. male with a past medical history of asthma, hypertension, hypothyroidism, sleep apnea, dysplipidemia; now admitted for acute inpatient rehabilitation with severe functional debility after an acute stroke.       Objective       02/06/20 1033   Prior Living Situation   Prior Services None;Home-Independent   Housing / Facility 1 Story House   Lives with - Patient's Self Care Capacity Alone and Unable to Care For Self   Prior Level Of Function   Communication Within Functional Limits   Swallow Within Functional Limits   Dentition Edentulous   Dentures Uppers;Lowers   Hearing Within Functional Limits for Evaluation   Hearing Aid None   Vision Reading    Patient's Primary Language English   Education High School Graduate or GED   Occupation (Pre-Hospital Vocational) Retired Due To Age   Labial Function   Labial Function (WDL) WDL   Lingual Function   Lick Lips (Circular) Moderate   Jaw Function   Jaw Function (WDL) WDL   Velar Function   Velar Function (WDL) WDL   Laryngeal Function   Laryngeal Function (WDL) WDL   Swallowing   Ice Chips Minimal   Thick Nectar / Honey / Liquid Minimal   Puree Minimal   Dysphagia Strategies / Recommendations   Strategies / Interventions Recommended (Yes / No) Yes   Compensatory Strategies Multiple Swallows;Single Sips / Bites;Alternate Solids / Liquids;No Talking During Eating / Drinking   Diet / Liquid Recommendation Pre-Feeding Trials with SLP Only   Medication Administration  Via Gastric Tube   Therapy Interventions Dysphagia Therapy By Speech Language Pathologist;MBSS Evaluation;Pharyngeal / Laryngeal Exercises;Oral Motor Exercises;Neuromuscular Electric Stimulation   Follow Up SLP Evaluation MBSS for further assessment of swallow and to rule out silent asp   Barriers To Oral Feeding    Barriers To Oral Feeding Impaired Cognition / Attention;Generalized Weakness   Cervical Ausculatation Not Tested   Pre-Feeding Oral Stimulation Trial Not Tested   IRF-MARIEL:  Swallowing/Nutritional Status   Swallowing/Nutritional Status Tube/parenteral feeding   Outcome Measures   Outcome Measures Utilized SCCAN   SCCAN (Scales of Cognitive and Communicative Ability for Neurorehabilitation)   Oral Expression - Raw Score 19   Oral Expression - Scale Performance Score 100   Orientation - Raw Score 12   Orientation - Scale Performance Score 100   Memory - Raw Score 10   Memory - Scale Performance Score 53   Speech Comprehension - Raw Score 13   Speech Comprehension - Scale Performance Score 100   Reading Comprehension - Raw Score 11   Reading Comprehension - Scale Performance Score 92   Writing - Raw Score 6   Writing - Scale Performance Score 86   Attention - Raw Score 13   Attention - Scale Performance Score 81   Problem Solving - Raw Score 19   Problem Solving - Scale Performance Score 83   SCCAN Total Raw Score 80   SCCAN Degree of Severity Mild Impairment   Problem List   Problem List Cognitive-Linguistic Deficits;Cognitive-Language Deficits;Dysphagia;Attention Deficit;Memory Deficit;Executive Function Deficit;Numerical Function Deficit   Current Discharge Plan   Current Discharge Plan Return to Prior Living Situation   Benefit   Therapy Benefit Patient would benefit from Inpatient Rehab Speech-Language Pathology to address above identified deficits.   Interdisciplinary Plan of Care Collaboration   IDT Collaboration with  Nursing;Physician   Patient Position at End of Therapy Seated;Chair Alarm On;Family / Friend in Room;Phone within Reach;Tray Table within Reach;Call Light within Reach   Collaboration Comments results of assessment    Speech Language Pathologist Assigned   Assigned SLP / Pager # [phone] cog/sw   SLP Total Time Spent   SLP Individual Total Time Spent (Mins) 60   SLP Charge Group   Charges Yes   SLP  "Speech Language Evaluation Speech Sound Language Comprehension   SLP Oral Pharyngeal Evaluation Oral Pharyngeal Evaluation       FIM Eating Score:  1 - Total Assistance  Eating Description:       FIM Comprehension Score:  6 - Modified Independent  Comprehension Description:       FIM Expression Score:  6 - Modified Independent  Expression Description:       FIM Social Interaction Score:  7 - Independent  Social Interaction Description:       FIM Problem Solving Score:  5 - Standby Prompting/Supervision or Set-up  Problem Solving Description:       FIM Memory Score:  4 - Minimal Assistance  Memory Description:       Assessment    Patient is 74 y.o. male with a diagnosis of R MCA  Additional factors influencing patient status/progress (ie: cognitive factors, co-morbidities, social support, etc): pt reports living alone, indep and able to care for self prior to recent hospitalization.      Oral motor exam was completed all structures and functions were determined to be WFL with the exception of dentition, pt edentulous dentures not present however pt reporting \"I dont eat with them anyway.\" Pt NPO with PEG, FEES completed on 1/29 with results significant for silent asp/pen of ice chips with delayed onset of swallow noted to be 16 sec. Pt assessed with limited trials of ice ships, ntl and puree textures. Pt demonstrated mild wet vocal quality following PO trials which cleared with use of double swallow and or throat clear. Pt with timely onset of swallow at this time (3-5 seconds). Rec: pt remain NPO with completion of MBSS for further assessment of swallow and to rule out silent aspiration with advancement of diet as ppropriate.     The SCCAN was administered to assess a variety of cognitive skills and determine level of impairment. Pt achieved a raw score of 80 which place pt within the MILD severity range. Pt was indep with all medication management and financial management tasks prior to recent hospitalization. Pt " would benefit from cognitive intervention to address complex attention, executive functioning and short term memory recall. Pt and brother are aware of recommendations and in agreement with POC.        Plan  Recommend Speech Therapy 30-60 minutes per day 5-6 days per week for 2 weeks for the following treatments:  SLP Swallowing Dysfunction Treatment, SLP Oral Pharyngeal Evaluation, SLP Video Swallow Evaluation, SLP Self Care / ADL Training , SLP Cognitive Skill Development and SLP Group Treatment.    Goals:  Long term and short term goals have been discussed with patient and they are in agreement.    Speech Therapy Problems     Problem: Memory STGs     Dates: Start: 02/06/20       Description:     Goal: STG-Within one week, patient will     Dates: Start: 02/06/20       Description: 1) Individualized goal:  Learn and implement memory strategies to assist in recall of information related to hospitalization and safety with 80% accuracy provided MIN A.  2) Interventions:  SLP Self Care / ADL Training , SLP Cognitive Skill Development and SLP Group Treatment                 Problem: Problem Solving STGs     Dates: Start: 02/06/20       Description:     Goal: STG-Within one week, patient will     Dates: Start: 02/06/20       Description: 1) Individualized goal:  Complete medication and financial management tasks with 80% accuracy provided MIN A.  2) Interventions:  SLP Self Care / ADL Training , SLP Cognitive Skill Development and SLP Group Treatment                 Problem: Speech/Swallowing LTGs     Dates: Start: 02/06/20       Description:     Goal: LTG-By discharge, patient will safely swallow     Dates: Start: 02/06/20       Description: 1) Individualized goal:  Regular textures and thin liquids with no overt s/sx of asp/pen noted across meals with 100% accuracy provided MOD I.  2) Interventions:  SLP Swallowing Dysfunction Treatment, SLP Video Swallow Evaluation, SLP Self Care / ADL Training , SLP Cognitive Skill  Development and SLP Group Treatment             Goal: LTG-By discharge, patient will     Dates: Start: 02/06/20       Description: 1) Individualized goal:  Complete functional problem solving and recall information with 80% accuracy provided MOD I.  2) Interventions:  SLP Self Care / ADL Training , SLP Cognitive Skill Development and SLP Group Treatment                   Problem: Swallowing STGs     Dates: Start: 02/06/20       Description:     Goal: STG-Within one week, patient will safely swallow     Dates: Start: 02/06/20       Description: 1) Individualized goal:  prefeeding trials of NTL and puree textures via 1/2 tsp with no overt s/sx of asp/pen noted across 2/2 trials provided MIN cues.   2) Interventions:  SLP Swallowing Dysfunction Treatment, SLP Video Swallow Evaluation, SLP Self Care / ADL Training , SLP Cognitive Skill Development and SLP Group Treatment           Goal: STG-Within one week, patient will     Dates: Start: 02/06/20       Description: 1) Individualized goal:  Participate in MBSS with advancement of diet as appropriate within one week.  2) Interventions:  SLP Swallowing Dysfunction Treatment, SLP Video Swallow Evaluation, SLP Self Care / ADL Training , SLP Cognitive Skill Development and SLP Group Treatment

## 2020-02-06 NOTE — CARE PLAN
Problem: Safety  Goal: Will remain free from injury  Outcome: PROGRESSING AS EXPECTED  Note:   Patient demonstrates good safety technique this shift.  Asks for assistance when needed and does not attempt self transfer.  Able to verbalize needs.       Problem: Respiratory:  Goal: Respiratory status will improve  Outcome: PROGRESSING AS EXPECTED  Note:   Pt is on 2L NC in day and mask at NOC. Per respiratory CPAP machine is clean and set up. Lungs bases are diminished with occasional expiratory wheezes.

## 2020-02-06 NOTE — DISCHARGE PLANNING
CASE MANAGEMENT INITIAL ASSESSMENT    Admit Date:  2/5/2020     I met with patient to discuss role of case management / discharge planning / team conference. Patient alert/oriented and able to answer questions. I followed up with a phone call to patient's brother, Zana Lundy, who lives in [facility] and indicated he will be here for 2 weeks after patient is discharged, to assist patient. Patient has a daughter in [place], [place] that may be able to assist patient intermittently to assist patient when Zana returns to [place].   Patient is a  74 y.o. male transferred from [facility]; was inpatient 1/27 to 2/5/2020.  Patient was admitted to [facility] on 2/5/2020.  The admitting physician is Dr. Alla Ewing.     Diagnosis: 0.1.1 (l) body involvement (r) brain  Stroke (cerebrum) ([facility])    Co-morbidities:   Patient Active Problem List    Diagnosis Date Noted   • Cerebrovascular accident (CVA) due to thrombosis of right middle cerebral artery ([facility]) 01/28/2020     Priority: High   • Acute respiratory failure with hypoxia ([facility]) 12/12/2015     Priority: High   • Type 2 diabetes mellitus without complication, without long-term current use of insulin ([facility]) 02/04/2020     Priority: Medium   • Dysphagia 02/04/2020     Priority: Medium   • Vitamin B12 deficiency 02/04/2020     Priority: Medium   • Essential hypertension 01/28/2020     Priority: Medium   • Moderate persistent asthma without complication 01/28/2020     Priority: Medium   • Acute conjunctivitis of right eye 05/09/2018     Priority: Medium   • Cerebral infarct ([facility]) 05/08/2018     Priority: Medium   • Vertigo 05/07/2018     Priority: Medium   • Dyslipidemia 01/28/2020     Priority: Low   • Morbid obesity with BMI of 40.0-44.9, adult ([facility]) 01/28/2020     Priority: Low   • Hypothyroidism 05/07/2018     Priority: Low   • VERENA (obstructive sleep apnea) 09/28/2016     Priority: Low   • Vitamin D deficiency 02/06/2020   • Azotemia 02/06/2020   •  Obesity (BMI 35.0-39.9 without comorbidity) 02/05/2020   • Rash 02/05/2020   • S/P percutaneous endoscopic gastrostomy (PEG) tube placement (HCC) 02/05/2020   • Hypertriglyceridemia 02/05/2020   • Oral thrush 02/05/2020   • Anemia 02/05/2020   • Impaired mobility and ADLs 02/05/2020     Prior Living Situation:  Housing / Facility: 1 Story House  Lives with - Patient's Self Care Capacity: Alone and Able to Care For Self    Prior Level of Function:  Medication Management: Independent  Finances: Independent  Home Management: Independent  Shopping: Independent  Prior Level Of Mobility: Independent Without Device in Community, Independent Without Device in Home  Driving / Transportation: Driving Independent    Support Systems:  Primary : Chato Lundy, Brother, [phone]  Other support systems: Elizabeth Lundy, Daughter, [phone].       Previous Services Utilized:   Equipment Owned: Front-Wheel Walker, Single Point Cane, Wheelchair, Oxygen  Prior Services: None, Home-Independent    Other Information:  Occupation (Pre-Hospital Vocational): Retired Due To Age     Primary Payor Source: Medicare A, Medicare B  Secondary Payor Source: Supplemental Insurance([employer])  Primary Care Practitioner : Kvng Pickett MD  Other MDs: Dr. Sun, Gastroenterology; Dr. Valencia, Neurology; Dr. Avalos, Pulmonary    Patient / Family Goal:  Patient / Family Goal: To eat, drink, and walk on my own  DC Disposition: To single level home with 2 step entry, in [place], [place]. Brother from [facility] will be here for approximately 2 weeks. Daughter in [place], [place] availability and ability to assist patient TBD.  DC Needs: Family training. HH vs OP therapies.   DME needs TBD - has wc, FWW, SPC. Oxygen concentrator through [facility] for use with CPAP at night.   MD f/u appointments.   Strengths: PLOF - independent, . Motivated.    Additional Case Management Questions:  Have you ever received case management services for yourself or a  family member? no    Do you feel you have and an understanding of what services  provide? yes    Do you have any additional questions regarding case management?  no        CASE MANAGEMENT PLAN OF CARE   Individualized Goals:   1. Address driving/transportation  2. Address patient's supervision needed as brother will stay with patient 2 weeks after DC  3. Be able to eat.  4. Improve functional status to discharge home with intermittent support.    Barriers:   1. Lives alone in [place], [place]  2. Functional deficits  3. Currently NPO with Peg tube     Plan:  1. Continue to follow patient through hospitalization and provide discharge planning in collaboration with patient, family, physicians and ancillary services.     2. Utilize community resources to ensure a safe discharge.

## 2020-02-06 NOTE — CONSULTS
"  HOSPITAL MEDICINE CONSULTATION    Requesting Physician:  Dr. Ewing    Reason for Consult:  Diabetes, Hypertension, Asthma    History of Present Illness:  The patient is a 74-year-old right-handed  male with past medical history significant for obstructive sleep apnea (on continuous positive airway pressure), asthma, hypertension, diabetes, and hypothyroidism.  He was admitted to [facility] on 1/27/20 for left arm weakness and numbness.  He was given TPA.  MR imaging showed moderate acute/subacute right MCA infarct with hemorrhagic transformation.  CT angiogram of the head and neck were unremarkable.  Echocardiogram showed grossly normal ejection fraction.  He underwent percutaneous gastrostomy tube placement on 2/1/20 by Dr. Justice for persistent dysphagia. bDue to his ongoing functional debility, the patient was transferred to [facility] on 2/5/20.  Hospital Medicine consultation is requested to assist in the management of this patient's HTN, DM, and chronic respiratory failure.    Review of Systems:  Review of Systems   Constitutional: Negative for chills and fever.   HENT: Negative.    Eyes: Negative.    Respiratory: Negative for cough and shortness of breath.    Cardiovascular: Negative for chest pain and palpitations.   Gastrointestinal: Negative for abdominal pain, nausea and vomiting.   Skin: Negative for itching and rash.   Neurological: Positive for sensory change and focal weakness.   Endo/Heme/Allergies: Negative for polydipsia. Does not bruise/bleed easily.   All other systems reviewed and are negative.      Allergies:  Allergies   Allergen Reactions   • Amoxicillin Hives     Pt states \"I get hives\".   • Ampicillin Hives     Pt states \"I get hives\".       Medications:    Current Facility-Administered Medications:   •  vitamin D (cholecalciferol) tablet 2,000 Units, 2,000 Units, Enteral Tube, DAILY, Alla Ewing M.D., 2,000 Units at 02/06/20 1141  •  " ipratropium-albuterol (DUONEB) nebulizer solution, 3 mL, Nebulization, Q4HRS PRN, Alla Ewing M.D.  •  budesonide-formoterol (SYMBICORT) 160-4.5 MCG/ACT inhaler 2 Puff, 2 Puff, Inhalation, BID (RT), Alla Ewing M.D.  •  insulin regular (HUMULIN R) injection 2-12 Units, 2-12 Units, Subcutaneous, Q6HRS, Isabel Harris M.D.  •  Respiratory Care per Protocol, , Nebulization, Continuous RT, Alla Ewing M.D.  •  Pharmacy Consult Request ...Pain Management Review 1 Each, 1 Each, Other, PHARMACY TO DOSE, Alla Ewing M.D.  •  acetaminophen (TYLENOL) tablet 650 mg, 650 mg, Oral, Q4HRS PRN, Alla Ewing M.D.  •  hydrALAZINE (APRESOLINE) tablet 25 mg, 25 mg, Oral, Q8HRS PRN, Alla Ewing M.D.  •  docusate sodium (COLACE) capsule 100 mg, 100 mg, Oral, DAILY, 100 mg at 02/06/20 0845 **AND** [DISCONTINUED] senna-docusate (PERICOLACE or SENOKOT S) 8.6-50 MG per tablet 1 Tab, 1 Tab, Oral, Q EVENING **AND** lactulose 20 GM/30ML solution 30 mL, 30 mL, Oral, Q24HRS PRN **AND** bisacodyl (DULCOLAX) suppository 10 mg, 10 mg, Rectal, QDAY PRN, Alla Ewing M.D.  •  artificial tears ophthalmic solution 1 Drop, 1 Drop, Both Eyes, PRN, Alla Ewing M.D.  •  benzocaine-menthol (CEPACOL) lozenge 1 Lozenge, 1 Lozenge, Mouth/Throat, Q2HRS PRN, Alla Ewing M.D.  •  mag hydrox-al hydrox-simeth (MAALOX PLUS ES or MYLANTA DS) suspension 20 mL, 20 mL, Oral, Q2HRS PRN, Alla Ewing M.D.  •  ondansetron (ZOFRAN ODT) dispertab 4 mg, 4 mg, Oral, 4X/DAY PRN **OR** ondansetron (ZOFRAN) syringe/vial injection 4 mg, 4 mg, Intramuscular, 4X/DAY PRN, Alla Ewing M.D.  •  sodium chloride (OCEAN) 0.65 % nasal spray 2 Spray, 2 Spray, Nasal, PRN, Alla Ewing M.D.  •  senna-docusate (PERICOLACE or SENOKOT S) 8.6-50 MG per tablet 2 Tab, 2 Tab, Enteral Tube, BID, 2 Tab at 02/06/20 0845 **AND** polyethylene glycol/lytes (MIRALAX) PACKET 1 Packet, 1 Packet, Enteral Tube, QDAY PRN **AND** magnesium  hydroxide (MILK OF MAGNESIA) suspension 30 mL, 30 mL, Enteral Tube, QDAY PRN **AND** bisacodyl (DULCOLAX) suppository 10 mg, 10 mg, Rectal, QDAY PRN, Alla Ewing M.D.  •  atorvastatin (LIPITOR) tablet 80 mg, 80 mg, Enteral Tube, Q EVENING, Alla Ewing M.D., 80 mg at 02/05/20 2103  •  amLODIPine (NORVASC) tablet 5 mg, 5 mg, Enteral Tube, Q DAY, Alla Ewing M.D., 5 mg at 02/06/20 0524  •  levothyroxine (SYNTHROID) tablet 88 mcg, 88 mcg, Enteral Tube, AM ES, Alla Ewing M.D., 88 mcg at 02/06/20 0526  •  metoprolol (LOPRESSOR) tablet 50 mg, 50 mg, Enteral Tube, BID, Alla Ewing M.D., 50 mg at 02/06/20 0845  •  traZODone (DESYREL) tablet 50 mg, 50 mg, Enteral Tube, QHS, Alla Ewing M.D., 50 mg at 02/05/20 2104  •  melatonin tablet 3 mg, 3 mg, Oral, Nightly, Alla Ewing M.D., 3 mg at 02/05/20 2103  •  aspirin (ASA) chewable tab 81 mg, 81 mg, Enteral Tube, DAILY, Alla Ewing M.D., 81 mg at 02/06/20 0846  •  nystatin (MYCOSTATIN) 177773 UNIT/ML suspension 500,000 Units, 5 mL, Oral, 4X/DAY, Alla wEing M.D., 500,000 Units at 02/06/20 1141  •  hydrocortisone 1 % cream, , Topical, BID, Alla Ewing M.D.    Past Medical/Surgical History:  Past Medical History:   Diagnosis Date   • Asthma    • Chickenpox    • [place] measles    • Hyperlipidemia    • Hypertension    • Hypothyroidism    • Influenza    • Mumps    • Sleep apnea    • Tonsillitis      Past Surgical History:   Procedure Laterality Date   • PB PLACE PERCUT GASTROSTOMY TUBE Left 2/1/2020    Procedure: INSERTION, PEG TUBE;  Surgeon: Compa Justice M.D.;  Location: SURGERY [facility];  Service: Gastroenterology   • GASTROSCOPY N/A 2/1/2020    Procedure: GASTROSCOPY;  Surgeon: Compa Justice M.D.;  Location: SURGERY [facility];  Service: Gastroenterology   • HIP REPLACEMENT, TOTAL         Social History:  Social History     Socioeconomic History   • Marital status:      Spouse name: Not on  file   • Number of children: Not on file   • Years of education: Not on file   • Highest education level: Not on file   Occupational History   • Not on file   Social Needs   • Financial resource strain: Not on file   • Food insecurity:     Worry: Never true     Inability: Never true   • Transportation needs:     Medical: No     Non-medical: No   Tobacco Use   • Smoking status: Former Smoker     Packs/day: 0.50     Years: 46.00     Pack years: 23.00     Types: Cigarettes     Last attempt to quit: 2015     Years since quittin.1   • Smokeless tobacco: Never Used   Substance and Sexual Activity   • Alcohol use: Yes     Alcohol/week: 1.2 oz     Types: 2 Standard drinks or equivalent per week     Frequency: 2-3 times a week     Drinks per session: 1 or 2     Binge frequency: Never     Comment: 2-3 drinks a week   • Drug use: No   • Sexual activity: Not on file   Lifestyle   • Physical activity:     Days per week: Not on file     Minutes per session: Not on file   • Stress: Not on file   Relationships   • Social connections:     Talks on phone: Not on file     Gets together: Not on file     Attends Zoroastrian service: Not on file     Active member of club or organization: Not on file     Attends meetings of clubs or organizations: Not on file     Relationship status: Not on file   • Intimate partner violence:     Fear of current or ex partner: Not on file     Emotionally abused: Not on file     Physically abused: Not on file     Forced sexual activity: Not on file   Other Topics Concern   • Not on file   Social History Narrative   • Not on file       Family History  Family History   Problem Relation Age of Onset   • Cancer Mother    • Diabetes Mother    • Cancer Father        Physical Examination:   Vitals:    20 2103 20 0524 20 0652 20 1214   BP: 132/67 143/75 122/64    Pulse: 80  77 82   Resp:   16 16   Temp:   36.8 °C (98.2 °F)    TempSrc:   Oral    SpO2:   90% 91%   Weight:       Height:            Physical Exam   Constitutional: He is oriented to person, place, and time. No distress.   HENT:   Head: Normocephalic and atraumatic.   Right Ear: External ear normal.   Left Ear: External ear normal.   Eyes: Conjunctivae and EOM are normal. Right eye exhibits no discharge. Left eye exhibits no discharge.   Neck: Normal range of motion. Neck supple. No tracheal deviation present.   Cardiovascular: Normal rate and regular rhythm.   Pulmonary/Chest: No stridor. No respiratory distress. He has no wheezes.   Decreased BS   Abdominal: Soft. Bowel sounds are normal. He exhibits no distension. There is tenderness.   Musculoskeletal:         General: No tenderness or edema.   Neurological: He is alert and oriented to person, place, and time.   Skin: Skin is warm and dry. He is not diaphoretic.   Vitals reviewed.      Laboratory Data:  Recent Labs     02/04/20  1404 02/05/20  0732 02/06/20  0544   WBC 10.4 10.8 10.1   RBC 3.86* 3.74* 3.76*   HEMOGLOBIN 12.2* 11.9* 11.7*   HEMATOCRIT 37.9* 37.3* 37.0*   MCV 98.2* 99.7* 98.4*   MCH 31.6 31.8 31.1   MCHC 32.2* 31.9* 31.6*   RDW 45.9 47.8 45.9   PLATELETCT 326 324 380   MPV 11.6 11.4 11.5     Recent Labs     02/04/20  1404 02/05/20  0732 02/06/20  0544   SODIUM 140 140 142   POTASSIUM 4.3 3.9 3.8   CHLORIDE 100 101 100   CO2 30 33 35*   GLUCOSE 122* 172* 138*   BUN 29* 28* 27*   CREATININE 0.92 0.89 0.99   CALCIUM 9.7 9.6 9.1       Imaging:  No orders to display       Impressions/Recommendations:  Cerebrovascular accident (CVA) due to thrombosis of right middle cerebral artery (HCC)  Had right hemiparesis  S/P TPA  On ASA and Lipitor    Dysphagia  S/P PEG on 2/1/20 by Dr. Justice    Essential hypertension  On Norvasc and Metoprolol  Consider replacing Norvasc w/ Lisinopril given comorbid DM  Observe blood pressure trends    Hypothyroidism  TSH 5.63 (high)  Will check FT4  Continue present dose of Synthroid for now    Type 2 diabetes mellitus without complication, without  long-term current use of insulin (HCC)  HbA1c 7.2  Adjust SSI  Observe serum glucose trends    Vitamin D deficiency  Vit D level 13  On supplementation    Anemia  Has mild macrocytosis  Vit B12 normal  Check Folate    VERENA (obstructive sleep apnea)  On nocturnal CPAP    Acute respiratory failure with hypoxia (HCC)  H/O Asthma and extensive smoking history  Start Symbicort  Monitor need for Seebri and/or Singulair    Azotemia  Encourage PO fluids  Follow renal function    Full Code    Thank you for the opportunity to assist in this patient's care.  We will continue to follow along with you.

## 2020-02-07 ENCOUNTER — APPOINTMENT (OUTPATIENT)
Dept: PAIN MANAGEMENT | Facility: REHABILITATION | Age: 75
DRG: 056 | End: 2020-02-07
Attending: PHYSICAL MEDICINE & REHABILITATION
Payer: MEDICARE

## 2020-02-07 ENCOUNTER — APPOINTMENT (OUTPATIENT)
Dept: RADIOLOGY | Facility: REHABILITATION | Age: 75
DRG: 056 | End: 2020-02-07
Attending: PHYSICAL MEDICINE & REHABILITATION
Payer: MEDICARE

## 2020-02-07 PROBLEM — D72.829 LEUKOCYTOSIS: Status: ACTIVE | Noted: 2020-02-07

## 2020-02-07 PROBLEM — E83.39 HYPOPHOSPHATEMIA: Status: ACTIVE | Noted: 2020-02-07

## 2020-02-07 LAB
ANION GAP SERPL CALC-SCNC: 9 MMOL/L (ref 0–11.9)
APPEARANCE UR: CLEAR
BILIRUB UR QL STRIP.AUTO: NEGATIVE
BUN SERPL-MCNC: 35 MG/DL (ref 8–22)
CALCIUM SERPL-MCNC: 9.9 MG/DL (ref 8.5–10.5)
CHLORIDE SERPL-SCNC: 99 MMOL/L (ref 96–112)
CO2 SERPL-SCNC: 33 MMOL/L (ref 20–33)
COLOR UR: YELLOW
CREAT SERPL-MCNC: 1.1 MG/DL (ref 0.5–1.4)
ERYTHROCYTE [DISTWIDTH] IN BLOOD BY AUTOMATED COUNT: 46.2 FL (ref 35.9–50)
FOLATE SERPL-MCNC: 8 NG/ML
GLUCOSE BLD-MCNC: 113 MG/DL (ref 65–99)
GLUCOSE BLD-MCNC: 135 MG/DL (ref 65–99)
GLUCOSE BLD-MCNC: 159 MG/DL (ref 65–99)
GLUCOSE BLD-MCNC: 173 MG/DL (ref 65–99)
GLUCOSE BLD-MCNC: 99 MG/DL (ref 65–99)
GLUCOSE SERPL-MCNC: 147 MG/DL (ref 65–99)
GLUCOSE UR STRIP.AUTO-MCNC: NEGATIVE MG/DL
HCT VFR BLD AUTO: 39.5 % (ref 42–52)
HGB BLD-MCNC: 12.6 G/DL (ref 14–18)
KETONES UR STRIP.AUTO-MCNC: ABNORMAL MG/DL
LEUKOCYTE ESTERASE UR QL STRIP.AUTO: NEGATIVE
MCH RBC QN AUTO: 31.2 PG (ref 27–33)
MCHC RBC AUTO-ENTMCNC: 31.9 G/DL (ref 33.7–35.3)
MCV RBC AUTO: 97.8 FL (ref 81.4–97.8)
MICRO URNS: ABNORMAL
NITRITE UR QL STRIP.AUTO: NEGATIVE
PH UR STRIP.AUTO: 6.5 [PH] (ref 5–8)
PHOSPHATE SERPL-MCNC: 2.4 MG/DL (ref 2.5–4.5)
PLATELET # BLD AUTO: 465 K/UL (ref 164–446)
PMV BLD AUTO: 11.2 FL (ref 9–12.9)
POTASSIUM SERPL-SCNC: 4.1 MMOL/L (ref 3.6–5.5)
PROT UR QL STRIP: NEGATIVE MG/DL
RBC # BLD AUTO: 4.04 M/UL (ref 4.7–6.1)
RBC UR QL AUTO: NEGATIVE
SODIUM SERPL-SCNC: 141 MMOL/L (ref 135–145)
SP GR UR STRIP.AUTO: 1.03
T4 FREE SERPL-MCNC: 0.86 NG/DL (ref 0.53–1.43)
UROBILINOGEN UR STRIP.AUTO-MCNC: 1 MG/DL
WBC # BLD AUTO: 12.7 K/UL (ref 4.8–10.8)

## 2020-02-07 PROCEDURE — 97116 GAIT TRAINING THERAPY: CPT

## 2020-02-07 PROCEDURE — 82962 GLUCOSE BLOOD TEST: CPT

## 2020-02-07 PROCEDURE — 700112 HCHG RX REV CODE 229: Performed by: PHYSICAL MEDICINE & REHABILITATION

## 2020-02-07 PROCEDURE — A9270 NON-COVERED ITEM OR SERVICE: HCPCS | Performed by: PHYSICAL MEDICINE & REHABILITATION

## 2020-02-07 PROCEDURE — 94760 N-INVAS EAR/PLS OXIMETRY 1: CPT

## 2020-02-07 PROCEDURE — 99232 SBSQ HOSP IP/OBS MODERATE 35: CPT | Performed by: PHYSICAL MEDICINE & REHABILITATION

## 2020-02-07 PROCEDURE — 700102 HCHG RX REV CODE 250 W/ 637 OVERRIDE(OP): Performed by: PHYSICAL MEDICINE & REHABILITATION

## 2020-02-07 PROCEDURE — 700102 HCHG RX REV CODE 250 W/ 637 OVERRIDE(OP): Performed by: HOSPITALIST

## 2020-02-07 PROCEDURE — A9270 NON-COVERED ITEM OR SERVICE: HCPCS | Performed by: HOSPITALIST

## 2020-02-07 PROCEDURE — 92508 TX SP LANG VOICE COMM GROUP: CPT

## 2020-02-07 PROCEDURE — 74230 X-RAY XM SWLNG FUNCJ C+: CPT

## 2020-02-07 PROCEDURE — 97110 THERAPEUTIC EXERCISES: CPT

## 2020-02-07 PROCEDURE — 97530 THERAPEUTIC ACTIVITIES: CPT

## 2020-02-07 PROCEDURE — 99233 SBSQ HOSP IP/OBS HIGH 50: CPT | Performed by: HOSPITALIST

## 2020-02-07 PROCEDURE — 85027 COMPLETE CBC AUTOMATED: CPT

## 2020-02-07 PROCEDURE — 84100 ASSAY OF PHOSPHORUS: CPT

## 2020-02-07 PROCEDURE — 97112 NEUROMUSCULAR REEDUCATION: CPT

## 2020-02-07 PROCEDURE — 80048 BASIC METABOLIC PNL TOTAL CA: CPT

## 2020-02-07 PROCEDURE — 81003 URINALYSIS AUTO W/O SCOPE: CPT

## 2020-02-07 PROCEDURE — 770010 HCHG ROOM/CARE - REHAB SEMI PRIVAT*

## 2020-02-07 PROCEDURE — 82746 ASSAY OF FOLIC ACID SERUM: CPT

## 2020-02-07 PROCEDURE — 92526 ORAL FUNCTION THERAPY: CPT

## 2020-02-07 PROCEDURE — 700105 HCHG RX REV CODE 258: Performed by: HOSPITALIST

## 2020-02-07 PROCEDURE — 36415 COLL VENOUS BLD VENIPUNCTURE: CPT

## 2020-02-07 PROCEDURE — 84439 ASSAY OF FREE THYROXINE: CPT

## 2020-02-07 PROCEDURE — 92611 MOTION FLUOROSCOPY/SWALLOW: CPT

## 2020-02-07 PROCEDURE — 97535 SELF CARE MNGMENT TRAINING: CPT

## 2020-02-07 PROCEDURE — 71045 X-RAY EXAM CHEST 1 VIEW: CPT

## 2020-02-07 RX ORDER — SODIUM CHLORIDE 9 MG/ML
1000 INJECTION, SOLUTION INTRAVENOUS ONCE
Status: COMPLETED | OUTPATIENT
Start: 2020-02-07 | End: 2020-02-08

## 2020-02-07 RX ORDER — LEVOTHYROXINE SODIUM 0.05 MG/1
100 TABLET ORAL
Status: DISCONTINUED | OUTPATIENT
Start: 2020-02-08 | End: 2020-02-15 | Stop reason: HOSPADM

## 2020-02-07 RX ORDER — BUDESONIDE AND FORMOTEROL FUMARATE DIHYDRATE 160; 4.5 UG/1; UG/1
2 AEROSOL RESPIRATORY (INHALATION) 2 TIMES DAILY
Status: DISCONTINUED | OUTPATIENT
Start: 2020-02-07 | End: 2020-02-15 | Stop reason: HOSPADM

## 2020-02-07 RX ADMIN — SENNOSIDES AND DOCUSATE SODIUM 2 TABLET: 8.6; 5 TABLET ORAL at 07:59

## 2020-02-07 RX ADMIN — DOCUSATE SODIUM 100 MG: 100 CAPSULE, LIQUID FILLED ORAL at 07:59

## 2020-02-07 RX ADMIN — BUDESONIDE AND FORMOTEROL FUMARATE DIHYDRATE 2 PUFF: 160; 4.5 AEROSOL RESPIRATORY (INHALATION) at 21:10

## 2020-02-07 RX ADMIN — INSULIN HUMAN 2 UNITS: 100 INJECTION, SOLUTION PARENTERAL at 00:21

## 2020-02-07 RX ADMIN — METOPROLOL TARTRATE 50 MG: 25 TABLET ORAL at 21:09

## 2020-02-07 RX ADMIN — ASPIRIN 81 MG 81 MG: 81 TABLET ORAL at 07:59

## 2020-02-07 RX ADMIN — NYSTATIN 500000 UNITS: 100000 SUSPENSION ORAL at 17:27

## 2020-02-07 RX ADMIN — BUDESONIDE AND FORMOTEROL FUMARATE DIHYDRATE 2 PUFF: 160; 4.5 AEROSOL RESPIRATORY (INHALATION) at 11:43

## 2020-02-07 RX ADMIN — NYSTATIN 500000 UNITS: 100000 SUSPENSION ORAL at 14:02

## 2020-02-07 RX ADMIN — SODIUM CHLORIDE 1000 ML: 9 INJECTION, SOLUTION INTRAVENOUS at 13:50

## 2020-02-07 RX ADMIN — ATORVASTATIN CALCIUM 80 MG: 40 TABLET, FILM COATED ORAL at 21:08

## 2020-02-07 RX ADMIN — CHOLECALCIFEROL TAB 25 MCG (1000 UNIT) 2000 UNITS: 25 TAB at 07:59

## 2020-02-07 RX ADMIN — HYDROCORTISONE: 1 CREAM TOPICAL at 08:02

## 2020-02-07 RX ADMIN — LEVOTHYROXINE SODIUM 88 MCG: 88 TABLET ORAL at 06:13

## 2020-02-07 RX ADMIN — TRAZODONE HYDROCHLORIDE 50 MG: 50 TABLET ORAL at 21:09

## 2020-02-07 RX ADMIN — NYSTATIN 500000 UNITS: 100000 SUSPENSION ORAL at 21:10

## 2020-02-07 RX ADMIN — SENNOSIDES AND DOCUSATE SODIUM 2 TABLET: 8.6; 5 TABLET ORAL at 21:09

## 2020-02-07 RX ADMIN — METOPROLOL TARTRATE 50 MG: 25 TABLET ORAL at 07:59

## 2020-02-07 RX ADMIN — NYSTATIN 500000 UNITS: 100000 SUSPENSION ORAL at 07:59

## 2020-02-07 RX ADMIN — MELATONIN 3 MG: at 21:09

## 2020-02-07 RX ADMIN — AMLODIPINE BESYLATE 5 MG: 5 TABLET ORAL at 06:13

## 2020-02-07 RX ADMIN — HYDROCORTISONE: 1 CREAM TOPICAL at 21:11

## 2020-02-07 RX ADMIN — DIBASIC SODIUM PHOSPHATE, MONOBASIC POTASSIUM PHOSPHATE AND MONOBASIC SODIUM PHOSPHATE 2 TABLET: 852; 155; 130 TABLET ORAL at 15:52

## 2020-02-07 RX ADMIN — DIBASIC SODIUM PHOSPHATE, MONOBASIC POTASSIUM PHOSPHATE AND MONOBASIC SODIUM PHOSPHATE 2 TABLET: 852; 155; 130 TABLET ORAL at 21:08

## 2020-02-07 ASSESSMENT — BALANCE ASSESSMENTS
TRANSFERS: 2
TURN 360 DEGREES: 2
STANDING UNSUPPORTED WITH EYES CLOSED: 4
LOOK OVER LEFT AND RIGHT SHOULDERS WHILE STANDING: 4
PLACE ALTERNATE FOOT ON STEP OR STOOL WHILE STANDING UNSUPPORTED: 2
STANDING UNSUPPORTED: 4
STANDING ON ONE LEG: 4
STANDING UNSUPPORTED ONE FOOT IN FRONT: 3
STANDING UNSUPPORTED WITH FEET TOGETHER: 3
SITTING UNSUPPORTED: 4
LONG VERSION TOTAL SCORE (MAX 56): 44
PICK UP OBJECT FROM THE FLOOR FROM A STANDING POSITION: 3
LONG VERSION TOTAL SCORE (MAX 56): 44
SITTING TO STANDING: 2
REACHING FORWARD WITH OUTSTRETCHED ARM WHILE STANDING: 3
STANDING TO SITTING: 4

## 2020-02-07 ASSESSMENT — 10 METER WALK TEST (10METWT)
TRIAL 1: TIME TO WALK 10 METERS: 13.65
AVERAGE VELOCITY - METERS PER SECOND: .41
TRIAL 2: TIME TO WALK 10 METERS: 13.43
AVERAGE TIME - SECONDS: 14.47
TRIAL 3: TIME TO WALK 10 METERS: 16.34

## 2020-02-07 ASSESSMENT — ENCOUNTER SYMPTOMS
FEVER: 0
NAUSEA: 0
CHILLS: 0
EYES NEGATIVE: 1
ABDOMINAL PAIN: 0
SHORTNESS OF BREATH: 0
VOMITING: 0
BRUISES/BLEEDS EASILY: 0
POLYDIPSIA: 0
COUGH: 0
PALPITATIONS: 0

## 2020-02-07 NOTE — ASSESSMENT & PLAN NOTE
Currently resolved  PO4: 2.4 (2/7) --> 2.9 (2/8) --> 3.6 (2/12) --> 4.0m(2/15)  On supplements --> will d/c   Monitor

## 2020-02-07 NOTE — THERAPY
Speech Language Pathology  Daily Treatment     Patient Name: Familia Lundy  Age:  74 y.o., Sex:  male  Medical Record #: [id number]  Today's Date: 2/7/2020     Subjective    Pt pleasant and cooperative.      Objective       02/07/20 1133   SLP Total Time Spent   SLP Individual Total Time Spent (Mins) 30   Charge Group   SLP Swallowing Dysfunction Treatment Swallowing Dysfunction Treatment       Assessment    Education provided re: dysphagia management and completion of MBSS this afternoon. Pt completed pre feeding trials of NTL and puree via tsp with no overt s/sx of asp/pen noted. MIN cues for implementation of double swallow. MBSS to be completed this afternoon.     Plan    MBSS to be completed this afternoon.

## 2020-02-07 NOTE — CARE PLAN
Problem: Other Problem (see comments)  Goal: Other Goal  Description  Nutrition Support tolerated and meeting greater than 85% of estimated needs.   Outcome: MET

## 2020-02-07 NOTE — REHAB-DIETARY IDT TEAM NOTE
Dietary   Nutrition  Dietary Problems     Problem: Other Problem (see comments)     Description:     Goal: Other Goal (Resolved)     Description: Nutrition Support tolerated and meeting greater than 85% of estimated needs.                     Patient seen in room getting bolus feeding during time of visit.  He is alert and oriented, very pleasant.  He reports no GI issues.  He states he was constipated but had BM this AM.  He denies any nausea/ vomiting/ diarrhea/ feeling full/ hunger pains.  He does state he is thirsty.  He has thrush and is edentulous.  On nystatin currently.  He state his dentures are at home.  States he is brushing his own gums and doing oral care.   Reports good intake PTA with large b'fast, small lunch, large dinner if he feels like cooking.  Denies recent weight changes.  Remains NPO awaiting MBSS with SLP.    Diet: NPO  Appetite: good/ denies hunger pains  TF: Impact Peptide 1.5, 330mL QID ( at meal times +HS) + 200mL free water flush QID providing 1980 kcals, 124g/protein, 1816mL free water per day    Pertinent Labs: Fasting , WBC 12.7, Hgb 12.6/Hct 39.5, BUN 35, PO4 2.4, Serum Glucose 147   Pertinent Medications: Amlodipine, Aspirin, Atorvastatin, Colace, SSI, Synthroid, Nystatin, K-Phos, Senna, Trazodone, Vitamin D  IVF: NS x 1 L today  PRN Medications: noted    Weight: no new weight since admission  Skin: PEG  Vitals: 2L NC, /66  GI:BM today  : WNL  I/Os: +590mL x 24 hours    Plan: Increase free water 300mL QID.  Continue current bolus regimen.  Oral care every shift.  Nystatin already on board.  Rotate PEG site 360 degrees BID.  Remove Jean Valve and cap PEG when not in use.  Diet upgrades per SLP.     Section completed by:  Candida Sosa R.D.

## 2020-02-07 NOTE — THERAPY
Speech Language Pathology  Daily Treatment     Patient Name: Familia Lundy  Age:  74 y.o., Sex:  male  Medical Record #: [id number]  Today's Date: 2/7/2020     Subjective    Pt pleasant and cooperative.      Objective       02/07/20 1003   SLP Total Time Spent   SLP Group Total Time Spent (Mins) 45   Charge Group   SLP Treatment - Group Speech Language Treatment - Group       Assessment    Pt was educated on stroke risk factors via auditory and visual modalities (printed handout reviewed and provided). Topics reviewed included stroke types, controllable stroke risk factors including HTN and cholesterol management medications, weight management, diet, stress management, daily BP monitoring, smoking cessation, and moderating alcohol intake. Patient attended appropriately to information. Patient was encouraged to ask questions, did so, and all questions were answered to patient’s satisfaction. Patient was counseled on the importance of active communication with MD regarding medications, BP tracking and trends to ensure safe BP management. Pt benefited from participating in the stroke education class and demonstrated increased understanding of controllable risk factors as evidenced by participation in group stroke discussion and verbalizing increased understanding of stroke risk factors and prevention.    Plan    Assess recall of stroke eduction

## 2020-02-07 NOTE — ASSESSMENT & PLAN NOTE
WBC's: 12.7 (2/7) --> 12.2 (2/8) --> 10.6 (2/12)  Has been afebrile  UA: neg  CXR: unremarkable  Had some mild erythema around site -- much improved  On Doxycycline (thru 2/16)  Monitor

## 2020-02-07 NOTE — THERAPY
Occupational Therapy  Daily Treatment     Patient Name: Familia Lundy  Age:  74 y.o., Sex:  male  Medical Record #: [id number]  Today's Date: 2020     Precautions  Precautions: (P) Fall Risk, PEG Tube  Comments: (P) impaired LUE sensation, wounds on feetimpaired LUE sensation, wounds on feet    Safety   ADL Safety : (P) Requires Supervision for Safety, Requires Cueing for Safety  Bathroom Safety: (P) Requires Supervision for Safety, Requires Cuing for Safety  Comments: (P) see FIMs for ADL performance details.    Subjective    Pt received resting in chair. Agreeable to OT session.      Objective       20 0831   Precautions   Precautions Fall Risk;PEG Tube   Comments impaired LUE sensation, wounds on feetimpaired LUE sensation, wounds on feet   Safety    ADL Safety  Requires Supervision for Safety;Requires Cueing for Safety   Bathroom Safety Requires Supervision for Safety;Requires Cuing for Safety   Comments see FIMs for ADL performance details.   Vitals   O2 (LPM) 2   O2 Delivery Nasal Cannula   Hand Strengthening   Hand Strengthening Left ;Left Pinch   Comment Placement of graded clothespins on horizontal rungs with focus on L , pinch and coordination. Pt able to complete all resistance levels with increased time. Demonstrates mild coordination impairment.    Box and Blocks Test   Right hand blocks moved in 60 seconds 36   Left hand blocks moved in 60 seconds 33   Interdisciplinary Plan of Care Collaboration   IDT Collaboration with  Family / Caregiver   Patient Position at End of Therapy Seated;Self Releasing Lap Belt Applied;Family / Friend in Room   Collaboration Comments pt's brother present for session.   OT Total Time Spent   OT Individual Total Time Spent (Mins) 60   OT Charge Group   OT Self Care / ADL 1   OT Therapy Activity 3       FIM Upper Body Dressin - Standby Prompting/Supervision or Set-up  Upper Body Dressing Description:  Increased time, Supervision for safety, Set-up of  equipment(SBA/set up to don/doff pullover shirt.)    FIM Lower Body Dressing Score:  4 - Minimal Assistance  Lower Body Dressing Description:  Increased time, Supervision for safety, Verbal cueing, Set-up of equipment(increased time to thread BLEs into pants, CGA/GB for standing balance, set up to don slip on shoes.)    FIM Toiletin - Minimal Assistance  Toileting Description:  Grab bar, Increased time, Supervision for safety, Verbal cueing    FIM Toilet Transfer Score:  4 - Minimal Assistance  Toilet Transfer Description:  Grab bar, Supervision for safety, Verbal cueing, Set-up of equipment(w/c<>toilet SPT with GB and Min A.)    Threshold shower transfer training to simulate pt's home set up. Pt has walk in shower with glass door, vertical GB at shower entrance, horizontal GB on inside shower wall, and small shower bench. Pt completed transfer x3 trials with CGA and cues for sequencing. Pt's brother present and confirms pt's home set up.     Discussed safety/compensatory strategies for LUE sensory impairment, including awareness of positioning of LUE to prevent injury.     Assessment    Pt tolerated session well and making progress with ADLs. He demonstrated mild impairment with L coordination as noted above with BBT. Pt con't to require CGA for standing balance during ADLs and transfers. Pt reports improving but ongoing sensory impairment with LUE.    Plan    ADLs/IADLs and related functional mobility, LUE coordination/strength/sensory re-integration, balance, endurance.

## 2020-02-07 NOTE — PROGRESS NOTES
[facility] Medicine Daily Progress Note    Chief Complaint  Asthma, HTN, DM    Interval Problem Update  Pt denies chest pain, shortness of breath, or palpitations.  Labs reviewed.    Review of Systems  Review of Systems   Constitutional: Negative for chills and fever.   HENT: Negative.    Eyes: Negative.    Respiratory: Negative for cough and shortness of breath.    Cardiovascular: Negative for chest pain and palpitations.   Gastrointestinal: Negative for abdominal pain, nausea and vomiting.   Skin: Negative for itching and rash.   Endo/Heme/Allergies: Negative for polydipsia. Does not bruise/bleed easily.        Physical Exam  Temp:  [36.6 °C (97.9 °F)-36.8 °C (98.3 °F)] 36.6 °C (97.9 °F)  Pulse:  [84-94] 84  Resp:  [16-19] 19  BP: (106-142)/(48-67) 142/66  SpO2:  [90 %] 90 %    Physical Exam  Vitals signs reviewed.   Constitutional:       Appearance: Normal appearance.   HENT:      Head: Normocephalic and atraumatic.      Right Ear: External ear normal.      Left Ear: External ear normal.      Nose: Nose normal.   Eyes:      General:         Right eye: No discharge.         Left eye: No discharge.      Extraocular Movements: Extraocular movements intact.      Conjunctiva/sclera: Conjunctivae normal.   Neck:      Musculoskeletal: Normal range of motion and neck supple.   Cardiovascular:      Rate and Rhythm: Normal rate and regular rhythm.   Pulmonary:      Effort: No respiratory distress.      Breath sounds: No wheezing.      Comments: Decreased BS  Abdominal:      General: Bowel sounds are normal. There is no distension.      Palpations: Abdomen is soft.      Tenderness: There is no tenderness.   Musculoskeletal:      Right lower leg: No edema.      Left lower leg: No edema.   Skin:     General: Skin is warm and dry.   Neurological:      Mental Status: He is alert and oriented to person, place, and time.         Fluids    Intake/Output Summary (Last 24 hours) at 2/7/2020 1231  Last data filed at 2/7/2020  1100  Gross per 24 hour   Intake 630 ml   Output 0 ml   Net 630 ml       Laboratory  Recent Labs     02/05/20  0732 02/06/20  0544 02/07/20  0613   WBC 10.8 10.1 12.7*   RBC 3.74* 3.76* 4.04*   HEMOGLOBIN 11.9* 11.7* 12.6*   HEMATOCRIT 37.3* 37.0* 39.5*   MCV 99.7* 98.4* 97.8   MCH 31.8 31.1 31.2   MCHC 31.9* 31.6* 31.9*   RDW 47.8 45.9 46.2   PLATELETCT 324 380 465*   MPV 11.4 11.5 11.2     Recent Labs     02/05/20  0732 02/06/20  0544 02/07/20  0613   SODIUM 140 142 141   POTASSIUM 3.9 3.8 4.1   CHLORIDE 101 100 99   CO2 33 35* 33   GLUCOSE 172* 138* 147*   BUN 28* 27* 35*   CREATININE 0.89 0.99 1.10   CALCIUM 9.6 9.1 9.9                 Assessment/Plan  * Cerebrovascular accident (CVA) due to thrombosis of right middle cerebral artery (HCC)- (present on admission)  Assessment & Plan  Had right hemiparesis  S/P TPA  On ASA and Lipitor    Acute respiratory failure with hypoxia (HCC)- (present on admission)  Assessment & Plan  H/O Asthma and extensive smoking history  On Symbicort  Monitor need for [place] and/or Singulair    Dysphagia- (present on admission)  Assessment & Plan  S/P PEG on 2/1/20 by Dr. Justice    Type 2 diabetes mellitus without complication, without long-term current use of insulin (HCC)- (present on admission)  Assessment & Plan  HbA1c 7.2  Observe serum glucose trends on SSI    Essential hypertension- (present on admission)  Assessment & Plan  On Norvasc and Metoprolol  Consider replacing Norvasc w/ Lisinopril given comorbid DM  Observe blood pressure trends    Leukocytosis  Assessment & Plan  Agree w/ UA and CXR  Follow WBC    Hypophosphatemia  Assessment & Plan  Start Neutra-Phos supplementation    Azotemia- (present on admission)  Assessment & Plan  BUN/Cr not improved w/ PO fluids  Will give IVF hydration  Follow renal function    Vitamin D deficiency- (present on admission)  Assessment & Plan  Vit D level 13  On supplementation    Anemia- (present on admission)  Assessment & Plan  Has mild  macrocytosis  Vit B12 and Folate both normal  Follow H/H    Hypothyroidism- (present on admission)  Assessment & Plan  TSH 5.63 (high) and FT4 0.86 (low normal)  Will increase Synthroid dose    VERENA (obstructive sleep apnea)- (present on admission)  Assessment & Plan  On nocturnal CPAP     Full Code    Reviewed w/ RN

## 2020-02-08 LAB
ANION GAP SERPL CALC-SCNC: 6 MMOL/L (ref 0–11.9)
BUN SERPL-MCNC: 27 MG/DL (ref 8–22)
CALCIUM SERPL-MCNC: 8.5 MG/DL (ref 8.5–10.5)
CHLORIDE SERPL-SCNC: 103 MMOL/L (ref 96–112)
CO2 SERPL-SCNC: 31 MMOL/L (ref 20–33)
CREAT SERPL-MCNC: 0.82 MG/DL (ref 0.5–1.4)
ERYTHROCYTE [DISTWIDTH] IN BLOOD BY AUTOMATED COUNT: 46.9 FL (ref 35.9–50)
GLUCOSE BLD-MCNC: 104 MG/DL (ref 65–99)
GLUCOSE BLD-MCNC: 113 MG/DL (ref 65–99)
GLUCOSE BLD-MCNC: 120 MG/DL (ref 65–99)
GLUCOSE BLD-MCNC: 84 MG/DL (ref 65–99)
GLUCOSE SERPL-MCNC: 127 MG/DL (ref 65–99)
HCT VFR BLD AUTO: 35.4 % (ref 42–52)
HGB BLD-MCNC: 11.5 G/DL (ref 14–18)
MCH RBC QN AUTO: 31.8 PG (ref 27–33)
MCHC RBC AUTO-ENTMCNC: 32.5 G/DL (ref 33.7–35.3)
MCV RBC AUTO: 97.8 FL (ref 81.4–97.8)
PHOSPHATE SERPL-MCNC: 2.9 MG/DL (ref 2.5–4.5)
PLATELET # BLD AUTO: 379 K/UL (ref 164–446)
PMV BLD AUTO: 11.3 FL (ref 9–12.9)
POTASSIUM SERPL-SCNC: 3.7 MMOL/L (ref 3.6–5.5)
RBC # BLD AUTO: 3.62 M/UL (ref 4.7–6.1)
SODIUM SERPL-SCNC: 140 MMOL/L (ref 135–145)
WBC # BLD AUTO: 12.2 K/UL (ref 4.8–10.8)

## 2020-02-08 PROCEDURE — 700112 HCHG RX REV CODE 229: Performed by: PHYSICAL MEDICINE & REHABILITATION

## 2020-02-08 PROCEDURE — 97112 NEUROMUSCULAR REEDUCATION: CPT

## 2020-02-08 PROCEDURE — A9270 NON-COVERED ITEM OR SERVICE: HCPCS | Performed by: HOSPITALIST

## 2020-02-08 PROCEDURE — 97530 THERAPEUTIC ACTIVITIES: CPT

## 2020-02-08 PROCEDURE — 700102 HCHG RX REV CODE 250 W/ 637 OVERRIDE(OP): Performed by: HOSPITALIST

## 2020-02-08 PROCEDURE — 82962 GLUCOSE BLOOD TEST: CPT | Mod: 91

## 2020-02-08 PROCEDURE — 92526 ORAL FUNCTION THERAPY: CPT

## 2020-02-08 PROCEDURE — 85027 COMPLETE CBC AUTOMATED: CPT

## 2020-02-08 PROCEDURE — 94760 N-INVAS EAR/PLS OXIMETRY 1: CPT

## 2020-02-08 PROCEDURE — 36415 COLL VENOUS BLD VENIPUNCTURE: CPT

## 2020-02-08 PROCEDURE — 99232 SBSQ HOSP IP/OBS MODERATE 35: CPT | Performed by: HOSPITALIST

## 2020-02-08 PROCEDURE — 97535 SELF CARE MNGMENT TRAINING: CPT

## 2020-02-08 PROCEDURE — 80048 BASIC METABOLIC PNL TOTAL CA: CPT

## 2020-02-08 PROCEDURE — 84100 ASSAY OF PHOSPHORUS: CPT

## 2020-02-08 PROCEDURE — A9270 NON-COVERED ITEM OR SERVICE: HCPCS | Performed by: PHYSICAL MEDICINE & REHABILITATION

## 2020-02-08 PROCEDURE — 700102 HCHG RX REV CODE 250 W/ 637 OVERRIDE(OP): Performed by: PHYSICAL MEDICINE & REHABILITATION

## 2020-02-08 PROCEDURE — 770010 HCHG ROOM/CARE - REHAB SEMI PRIVAT*

## 2020-02-08 PROCEDURE — 97116 GAIT TRAINING THERAPY: CPT

## 2020-02-08 RX ORDER — LISINOPRIL 5 MG/1
5 TABLET ORAL
Status: DISCONTINUED | OUTPATIENT
Start: 2020-02-09 | End: 2020-02-15 | Stop reason: HOSPADM

## 2020-02-08 RX ADMIN — MELATONIN 3 MG: at 21:09

## 2020-02-08 RX ADMIN — CHOLECALCIFEROL TAB 25 MCG (1000 UNIT) 2000 UNITS: 25 TAB at 09:16

## 2020-02-08 RX ADMIN — LEVOTHYROXINE SODIUM 100 MCG: 50 TABLET ORAL at 05:49

## 2020-02-08 RX ADMIN — AMLODIPINE BESYLATE 5 MG: 5 TABLET ORAL at 05:49

## 2020-02-08 RX ADMIN — HYDROCORTISONE: 1 CREAM TOPICAL at 09:23

## 2020-02-08 RX ADMIN — TRAZODONE HYDROCHLORIDE 50 MG: 50 TABLET ORAL at 21:09

## 2020-02-08 RX ADMIN — BUDESONIDE AND FORMOTEROL FUMARATE DIHYDRATE 2 PUFF: 160; 4.5 AEROSOL RESPIRATORY (INHALATION) at 21:09

## 2020-02-08 RX ADMIN — BUDESONIDE AND FORMOTEROL FUMARATE DIHYDRATE 2 PUFF: 160; 4.5 AEROSOL RESPIRATORY (INHALATION) at 09:20

## 2020-02-08 RX ADMIN — METOPROLOL TARTRATE 50 MG: 25 TABLET ORAL at 21:09

## 2020-02-08 RX ADMIN — SENNOSIDES AND DOCUSATE SODIUM 2 TABLET: 8.6; 5 TABLET ORAL at 21:09

## 2020-02-08 RX ADMIN — DIBASIC SODIUM PHOSPHATE, MONOBASIC POTASSIUM PHOSPHATE AND MONOBASIC SODIUM PHOSPHATE 2 TABLET: 852; 155; 130 TABLET ORAL at 21:08

## 2020-02-08 RX ADMIN — ASPIRIN 81 MG 81 MG: 81 TABLET ORAL at 09:16

## 2020-02-08 RX ADMIN — DIBASIC SODIUM PHOSPHATE, MONOBASIC POTASSIUM PHOSPHATE AND MONOBASIC SODIUM PHOSPHATE 2 TABLET: 852; 155; 130 TABLET ORAL at 14:19

## 2020-02-08 RX ADMIN — METOPROLOL TARTRATE 50 MG: 25 TABLET ORAL at 09:16

## 2020-02-08 RX ADMIN — DOCUSATE SODIUM 100 MG: 100 CAPSULE, LIQUID FILLED ORAL at 09:23

## 2020-02-08 RX ADMIN — HYDROCORTISONE: 1 CREAM TOPICAL at 21:09

## 2020-02-08 RX ADMIN — DIBASIC SODIUM PHOSPHATE, MONOBASIC POTASSIUM PHOSPHATE AND MONOBASIC SODIUM PHOSPHATE 2 TABLET: 852; 155; 130 TABLET ORAL at 09:16

## 2020-02-08 RX ADMIN — SENNOSIDES AND DOCUSATE SODIUM 2 TABLET: 8.6; 5 TABLET ORAL at 09:16

## 2020-02-08 RX ADMIN — ATORVASTATIN CALCIUM 80 MG: 40 TABLET, FILM COATED ORAL at 21:08

## 2020-02-08 ASSESSMENT — ENCOUNTER SYMPTOMS
FEVER: 0
BRUISES/BLEEDS EASILY: 0
CHILLS: 0
NAUSEA: 0
ABDOMINAL PAIN: 0
EYES NEGATIVE: 1
VOMITING: 0
SHORTNESS OF BREATH: 0
COUGH: 0
PALPITATIONS: 0
POLYDIPSIA: 0

## 2020-02-08 NOTE — CARE PLAN
Problem: Discharge Barriers/Planning  Goal: Patient's continuum of care needs will be met  Note:   Pt's PEG tube site was cleansed, no signs of infection noted.      Problem: Other Problem (see comments)  Intervention: Initiate TF/TPN/PPN (see comments)  Note:   MBSS was completed today by Speech therapist. Pt's diet was upgraded to Dysphagia II. NTL.

## 2020-02-08 NOTE — THERAPY
Speech Language Pathology  Daily Treatment     Patient Name: Familia uLndy  Age:  74 y.o., Sex:  male  Medical Record #: [id number]  Today's Date: 2/8/2020     Subjective    Pt pleasant and cooperative during tx.  Brother present and supportive.      Objective       02/08/20 1101   Dysphagia    Other Treatments use of swallow strategies   Nutritional Liquid Intake Rating Scale 2   Nutritional Food Intake Rating Scale 5   Interdisciplinary Plan of Care Collaboration   IDT Collaboration with  Family / Caregiver   Collaboration Comments brother present during tx          Assessment    Checkbook balancing task: 100% given min--mod verbal cues to attend to details.  Pt tolerated dys2 textures and NTL without overt s/s of aspiration.  Pt followed swallow strategies given supervision.     Plan    Trials of thin liquids outside of meals; financial management.

## 2020-02-08 NOTE — THERAPY
Physical Therapy   Daily Treatment     Patient Name: Familia Lundy  Age:  74 y.o., Sex:  male  Medical Record #: [id number]  Today's Date: 2/7/2020     Precautions  Precautions: Fall Risk, PEG Tube  Comments: impaired LUE sensation, wounds on feetimpaired LUE sensation, wounds on feet    Subjective    Pt reports he is looking forward to walking     Objective       02/07/20 1331   Vitals   Pulse 87  (sitting before ex )   O2 (LPM) 0   O2 Delivery None (Room Air)   Vitals Comments Returned to 93 with 2L on   Standing Lower Body Exercises   Standing Lower Body Exercises Yes   Other Exercises HEP issued - will practice in future therapy sessions   Lower Extremity Outcome Measures   Lower Extremity Outcome Measures Utilized 10 Meter Walk Test;Reed Balance Scale   10 Meter Walk Test   Normal - Trial 1 13.65 seconds   Normal - Trial 2 13.43 seconds   Normal - Trial 3 16.34 seconds   Normal Average Time 14.47 seconds   Normal Average Velocity (m/s) 0.41   Reed Balance Scale   Sitting Unsupported (Score 0-4) 4   Change Of Positon: Sitting To Standing (Score 0-4) 2   Change Of Positon: Standing To Sitting (Score 0-4) 4   Transfers (Score 0-4) 2   Standing Unsupported (Score 0-4) 4   Standing With Eyes Closed (Score 0-4) 4   Standing With Feet Together (Score 0-4) 3   Tandem Standing (Score 0-4) 3   Standing On One Leg (Score 0-4) 4   Turning Trunk (Feet Fixed) (Score 0-4) 4   Retrieving Objects From Floor (Score 0-4) 3   Turning 360 Degrees (Score 0-4) 2   Stool Stepping (Score 0-4) 2   Reaching Forward While Standing (Score 0-4) 3   Reed Balance Total Score (0-56) 44   Interdisciplinary Plan of Care Collaboration   Patient Position at End of Therapy Seated;Call Light within Reach;Tray Table within Reach;Family / Friend in Room;Chair Alarm On   PT Total Time Spent   PT Individual Total Time Spent (Mins) 75   PT Charge Group   PT Gait Training 2   PT Therapeutic Exercise 1   PT Neuromuscular Re-Education / Balance 1   PT  Therapeutic Activities 1       FIM Walking Score:  4 - Minimal Assistance  Walking Description:  Requires incidental assist, Verbal cueing, Extra time, Safety concerns(180 ft x3 no AD, assist for managing lines, CGA, no LOB, bradykinetic)      Assessment    Pt demos high fall risk via Reed and 10MWT, may benefit from AD for safety at OR. Demos good walking with no AD this session with no LOB but very slow and calculated to maintain balance. O2 sats unable to maintain in healthy range without supplemental O2.    Plan    Continue walking no AD for balance, variable gait activities - (Obstacle courses, outside, uneven surfaces, variable environments, ramps, curbs, dual tasking), trial lite gait for increased stepping, vector for higher level dynamic balance challenges with no AD, consider AD for safety at home at OR, Wean O2 with activity, activity tolerance, standing exercise, family training with brother and dtr

## 2020-02-08 NOTE — THERAPY
Speech Language Pathology  Daily Treatment     Patient Name: Familia Lundy  Age:  74 y.o., Sex:  male  Medical Record #: [id number]  Today's Date: 2/8/2020     Subjective    Pt pleasant and cooperative during tx.       Objective       02/08/20 0801   Dysphagia    Other Treatments use of swallow strategies; swallow strategy card provided.    Diet / Liquid Recommendation Dysphagia II;[place] Thick Liquid   Nutritional Liquid Intake Rating Scale 2   Nutritional Food Intake Rating Scale 5   SLP Total Time Spent   SLP Individual Total Time Spent (Mins) 30   Charge Group   SLP Swallowing Dysfunction Treatment Swallowing Dysfunction Treatment       FIM Eating Score:  5 - Standby Prompting/Supervision or Set-up  Eating Description:  Modified diet, Increased time, Supervision for safety, Verbal cueing      Assessment    Pt assessed with dys2/NTL diet.  Pt was provided with swallow strategies card.  Pt utilized swallow strategies given min verbal cues.  Pt presented with cough x2 following dys2 textures.  No overt s/s of aspiration observed following NTL by cup.      Plan    Target dysphagia management; thin liquid trials, money/finance management

## 2020-02-08 NOTE — THERAPY
"Occupational Therapy  Daily Treatment     Patient Name: Familia Lundy  Age:  74 y.o., Sex:  male  Medical Record #: [id number]  Today's Date: 2/8/2020     Precautions  Precautions: Fall Risk, PEG Tube  Comments: impaired LUE sensation, wounds on feetimpaired LUE sensation, wounds on feet    Safety   ADL Safety : Requires Physical Assist for Safety, Requires Supervision for Safety  Bathroom Safety: Requires Supervision for Safety, Requires Cuing for Safety  Comments: see FIMs for ADL performance details.    Subjective    \"Yeah I have a short fuse. I did not like that shoe lace activity. Sorry about that. I'm feeling better now.\"    \"Do you have a leader board for the ring toss? I think I'd be at the top of it!\"     Objective       02/08/20 0701   Safety    ADL Safety  Requires Physical Assist for Safety;Requires Supervision for Safety   Pain 0 - 10 Group   Pain Rating Scale (NPRS) 0   Cognition    Orientation Level Oriented x 4   Comments Difficulty with lacing shoe with ADL board. Pt became quickly frustrated. Could not finish activity.   Fine Motor / Dexterity    Comments  ADL board; Pt with significant difficulty lacing shoe lace portion. pt with mild difficulty with remainder of board.    Balance   Sitting Balance (Static) Fair +   Sitting Balance (Dynamic) Fair +   Standing Balance (Static) Fair   Standing Balance (Dynamic) Fair -   Comments Stood between // bars for ring toss. Pt threw all rings and picked them up from ground with 1 self corrected LOB. completed 4 rounds of game. Pt did good job of mental math to keep his score.   Bed Mobility    Comments Pt sleeps in reclining chair.   Interdisciplinary Plan of Care Collaboration   IDT Collaboration with  Speech Therapist   Patient Position at End of Therapy Seated;Self Releasing Lap Belt Applied;Other (Comments)  (with SLP)   Collaboration Comments transition of care to SLP   OT Total Time Spent   OT Individual Total Time Spent (Mins) 60   OT Charge Group "   OT Self Care / ADL 2   OT Therapy Activity 2        Pt stood for 15 minutes during ADL board activity with FWW.    FIM Toileting Body Dressin - Max Assistance  Toileting Description:  Grab bar, Increased time, Supervision for safety, Verbal cueing(Pt could not reach with RUE to wipe)    FIM Bed/Chair/Wheelchair Transfers Score: 4 - Minimal Assistance  Bed/Chair/Wheelchair Transfers Description:  Increased time, Supervision for safety, Verbal cueing, Set-up of equipment(CGA)    FIM Toilet Transfer Score:  4 - Minimal Assistance  Toilet Transfer Description:  Grab bar, Increased time, Supervision for safety, Verbal cueing(CGA)    Assessment    Pt with frustrated affect at start of session with FMC activity requiring activity to stop to calm down, but affect changed to joyful when doing dynamic balance with ring toss game. Pt with progress to standing activity balance this date and picking up objects from ground.    Plan    ADLs/IADLs and related functional mobility, LUE coordination/strength/sensory re-integration, balance, endurance.

## 2020-02-08 NOTE — FLOWSHEET NOTE
02/07/20 1925   Type of Assessment   Reassessment Yes  (Initiate continuous O2)   Oximetry   #Pulse Oximetry (Single Determination) Yes   Oxygen   Home O2 Use Prior To Admission? Yes   Home O2 LPM Flow 2 LPM   Home O2 Delivery Method   (with CPAP)   Home O2 Frequency of Use At Sleep   Pulse Oximetry 94 %   O2 (LPM) 2   Room Air Challenge Fail  (Room air SpO2=86)   O2 Protocol   O2 Protocol Indications Room Air SpO2 Less Than 90%   Continuous oxygen initiated for: SpO2 Less Than 90% by ABG or Oximetry in Last 24 hrs   O2 Protocol Goals/Outcome Room air SpO2 greater than 90% for 24 hours

## 2020-02-08 NOTE — REHAB-PHARMACY IDT TEAM NOTE
Pharmacy   Pharmacy  Antibiotics/Antifungals/Antivirals:  Medication:      Active Orders (From admission, onward)    None        Route:        NA  Stop Date:  NA  Reason:      NA  Antihypertensives/Cardiac:  Medication:    Orders (72h ago, onward)     Start     Ordered    02/06/20 0600  amLODIPine (NORVASC) tablet 5 mg  Q DAY      02/05/20 1140    02/05/20 2100  atorvastatin (LIPITOR) tablet 80 mg  EVERY EVENING      02/05/20 1140    02/05/20 2100  metoprolol (LOPRESSOR) tablet 50 mg  2 TIMES DAILY      02/05/20 1140    02/05/20 1140  hydrALAZINE (APRESOLINE) tablet 25 mg  EVERY 8 HOURS PRN      02/05/20 1140              Patient Vitals for the past 24 hrs:   BP Pulse   02/07/20 1332 -- 93   02/07/20 1331 -- 87   02/07/20 1330 146/65 78   02/07/20 0700 142/66 84   02/07/20 0613 130/67 --   02/06/20 2122 131/64 91   02/06/20 1910 131/64 91     Anticoagulation:  Medication: Aspirin    Other key medications: A review of the medication list reveals no issues at this time. Patient is currently on antihypertensive(s). Recommend home blood pressure monitoring/recording if antihypertensive(s) regimen(s) continue.    Section completed by: Bronson Solitario [facility]

## 2020-02-08 NOTE — REHAB-CM IDT TEAM NOTE
Case Management    DC Planning  DC destination/dispostion:  To single level home with 2 step entry, in [place], [place]. Brother from [facility] will be here for approximately 2 weeks. Daughter in [place], [place] availability and ability to assist patient TBD.    DC Needs: Family training. HH vs OP therapies.   DME needs TBD - has wc, FWW, SPC. Oxygen concentrator through [facility] for use with CPAP at night.   MD f/u appointments.     Barriers to discharge: Lives alone in [place], [place]. Functional deficits. Dysphagia.     Strengths: PLOF - independent, . Motivated.    Section completed by:  Talia Ocasio R.N.

## 2020-02-08 NOTE — CARE PLAN
Problem: Safety  Goal: Will remain free from falls  Outcome: PROGRESSING AS EXPECTED  Note:   Patient uses call light consistently and appropriately this shift.  Waits for assistance when needed and does not attempt self transfer.  Able to verbalize needs.  Will continue to monitor.       Problem: Infection  Goal: Will remain free from infection  Outcome: PROGRESSING AS EXPECTED  Note:   Patient remains free from s/s infection; afebrile.  Patient is on IV fluids for hydration. Will continue to monitor.

## 2020-02-08 NOTE — CARE PLAN
Problem: Communication  Goal: The ability to communicate needs accurately and effectively will improve  Note:   Pt able to verbalize needs     Problem: Safety  Goal: Will remain free from injury  Note:   Patient demonstrates good safety technique this shift.  Asks for assistance when needed and does not attempt self transfer.  Able to verbalize needs.  Will continue to monitor.

## 2020-02-08 NOTE — PROGRESS NOTES
"Rehab Progress Note     Date of Service: 2/7/2020  Chief Complaint: follow up stroke    Interval Events (Subjective)    Patient seen and examined today in his room.  His brother is present.  He had a modified barium swallow study today that showed deep penetration on nectars and thins.  He is going to be started on a dysphagia 2 diet with nectar's.  He does have a mild leukocytosis today.  He does have mild cough but denies any shortness of breath.  He denies any dysuria.  He has no new complaints.  He continues to sleep well in his recliner.    Objective:  VITAL SIGNS: /65   Pulse 78   Temp 37.1 °C (98.7 °F) (Temporal)   Resp 20   Ht 1.727 m (5' 8\")   Wt 113.4 kg (250 lb)   SpO2 90%   BMI 38.01 kg/m²   Gen: alert, no apparent distress  CV: regular rate and rhythm, no murmurs, no peripheral edema  Resp: clear to ascultation bilaterally, normal respiratory effort  GI: soft, non-tender abdomen, bowel sounds present  Neuro: left pronator drift    Recent Results (from the past 72 hour(s))   ACCU-CHEK GLUCOSE    Collection Time: 02/04/20  5:19 PM   Result Value Ref Range    Glucose - Accu-Ck 132 (H) 65 - 99 mg/dL   ACCU-CHEK GLUCOSE    Collection Time: 02/04/20 11:37 PM   Result Value Ref Range    Glucose - Accu-Ck 143 (H) 65 - 99 mg/dL   ACCU-CHEK GLUCOSE    Collection Time: 02/05/20  5:44 AM   Result Value Ref Range    Glucose - Accu-Ck 150 (H) 65 - 99 mg/dL   CBC WITH DIFFERENTIAL    Collection Time: 02/05/20  7:32 AM   Result Value Ref Range    WBC 10.8 4.8 - 10.8 K/uL    RBC 3.74 (L) 4.70 - 6.10 M/uL    Hemoglobin 11.9 (L) 14.0 - 18.0 g/dL    Hematocrit 37.3 (L) 42.0 - 52.0 %    MCV 99.7 (H) 81.4 - 97.8 fL    MCH 31.8 27.0 - 33.0 pg    MCHC 31.9 (L) 33.7 - 35.3 g/dL    RDW 47.8 35.9 - 50.0 fL    Platelet Count 324 164 - 446 K/uL    MPV 11.4 9.0 - 12.9 fL    Neutrophils-Polys 69.50 44.00 - 72.00 %    Lymphocytes 17.80 (L) 22.00 - 41.00 %    Monocytes 7.30 0.00 - 13.40 %    Eosinophils 3.80 0.00 - 6.90 % "    Basophils 0.90 0.00 - 1.80 %    Immature Granulocytes 0.70 0.00 - 0.90 %    Nucleated RBC 0.00 /100 WBC    Neutrophils (Absolute) 7.52 (H) 1.82 - 7.42 K/uL    Lymphs (Absolute) 1.93 1.00 - 4.80 K/uL    Monos (Absolute) 0.79 0.00 - 0.85 K/uL    Eos (Absolute) 0.41 0.00 - 0.51 K/uL    Baso (Absolute) 0.10 0.00 - 0.12 K/uL    Immature Granulocytes (abs) 0.08 0.00 - 0.11 K/uL    NRBC (Absolute) 0.00 K/uL   Basic Metabolic Panel    Collection Time: 02/05/20  7:32 AM   Result Value Ref Range    Sodium 140 135 - 145 mmol/L    Potassium 3.9 3.6 - 5.5 mmol/L    Chloride 101 96 - 112 mmol/L    Co2 33 20 - 33 mmol/L    Glucose 172 (H) 65 - 99 mg/dL    Bun 28 (H) 8 - 22 mg/dL    Creatinine 0.89 0.50 - 1.40 mg/dL    Calcium 9.6 8.5 - 10.5 mg/dL    Anion Gap 6.0 0.0 - 11.9   proBrain Natriuretic Peptide, NT    Collection Time: 02/05/20  7:32 AM   Result Value Ref Range    NT-proBNP 72 0 - 125 pg/mL   ESTIMATED GFR    Collection Time: 02/05/20  7:32 AM   Result Value Ref Range    GFR If African American >60 >60 mL/min/1.73 m 2    GFR If Non African American >60 >60 mL/min/1.73 m 2   ACCU-CHEK GLUCOSE    Collection Time: 02/05/20 11:43 AM   Result Value Ref Range    Glucose - Accu-Ck 103 (H) 65 - 99 mg/dL   ACCU-CHEK GLUCOSE    Collection Time: 02/05/20  5:27 PM   Result Value Ref Range    Glucose - Accu-Ck 106 (H) 65 - 99 mg/dL   ACCU-CHEK GLUCOSE    Collection Time: 02/05/20 11:31 PM   Result Value Ref Range    Glucose - Accu-Ck 143 (H) 65 - 99 mg/dL   ACCU-CHEK GLUCOSE    Collection Time: 02/06/20  5:30 AM   Result Value Ref Range    Glucose - Accu-Ck 130 (H) 65 - 99 mg/dL   CBC with Differential    Collection Time: 02/06/20  5:44 AM   Result Value Ref Range    WBC 10.1 4.8 - 10.8 K/uL    RBC 3.76 (L) 4.70 - 6.10 M/uL    Hemoglobin 11.7 (L) 14.0 - 18.0 g/dL    Hematocrit 37.0 (L) 42.0 - 52.0 %    MCV 98.4 (H) 81.4 - 97.8 fL    MCH 31.1 27.0 - 33.0 pg    MCHC 31.6 (L) 33.7 - 35.3 g/dL    RDW 45.9 35.9 - 50.0 fL    Platelet  Count 380 164 - 446 K/uL    MPV 11.5 9.0 - 12.9 fL    Neutrophils-Polys 63.60 44.00 - 72.00 %    Lymphocytes 23.90 22.00 - 41.00 %    Monocytes 7.40 0.00 - 13.40 %    Eosinophils 3.60 0.00 - 6.90 %    Basophils 0.60 0.00 - 1.80 %    Immature Granulocytes 0.90 0.00 - 0.90 %    Nucleated RBC 0.00 /100 WBC    Neutrophils (Absolute) 6.45 1.82 - 7.42 K/uL    Lymphs (Absolute) 2.42 1.00 - 4.80 K/uL    Monos (Absolute) 0.75 0.00 - 0.85 K/uL    Eos (Absolute) 0.37 0.00 - 0.51 K/uL    Baso (Absolute) 0.06 0.00 - 0.12 K/uL    Immature Granulocytes (abs) 0.09 0.00 - 0.11 K/uL    NRBC (Absolute) 0.00 K/uL   Comp Metabolic Panel (CMP)    Collection Time: 02/06/20  5:44 AM   Result Value Ref Range    Sodium 142 135 - 145 mmol/L    Potassium 3.8 3.6 - 5.5 mmol/L    Chloride 100 96 - 112 mmol/L    Co2 35 (H) 20 - 33 mmol/L    Anion Gap 7.0 0.0 - 11.9    Glucose 138 (H) 65 - 99 mg/dL    Bun 27 (H) 8 - 22 mg/dL    Creatinine 0.99 0.50 - 1.40 mg/dL    Calcium 9.1 8.5 - 10.5 mg/dL    AST(SGOT) 28 12 - 45 U/L    ALT(SGPT) 25 2 - 50 U/L    Alkaline Phosphatase 81 30 - 99 U/L    Total Bilirubin 0.4 0.1 - 1.5 mg/dL    Albumin 4.1 3.2 - 4.9 g/dL    Total Protein 7.2 6.0 - 8.2 g/dL    Globulin 3.1 1.9 - 3.5 g/dL    A-G Ratio 1.3 g/dL   Magnesium    Collection Time: 02/06/20  5:44 AM   Result Value Ref Range    Magnesium 2.3 1.5 - 2.5 mg/dL   Vitamin D, 25-hydroxy (blood)    Collection Time: 02/06/20  5:44 AM   Result Value Ref Range    25-Hydroxy   Vitamin D 25 13 (L) 30 - 100 ng/mL   ESTIMATED GFR    Collection Time: 02/06/20  5:44 AM   Result Value Ref Range    GFR If African American >60 >60 mL/min/1.73 m 2    GFR If Non African American >60 >60 mL/min/1.73 m 2   ACCU-CHEK GLUCOSE    Collection Time: 02/06/20 11:39 AM   Result Value Ref Range    Glucose - Accu-Ck 117 (H) 65 - 99 mg/dL   ACCU-CHEK GLUCOSE    Collection Time: 02/06/20  5:08 PM   Result Value Ref Range    Glucose - Accu-Ck 96 65 - 99 mg/dL   ACCU-CHEK GLUCOSE    Collection  Time: 02/07/20 12:14 AM   Result Value Ref Range    Glucose - Accu-Ck 159 (H) 65 - 99 mg/dL   PHOSPHORUS    Collection Time: 02/07/20  6:13 AM   Result Value Ref Range    Phosphorus 2.4 (L) 2.5 - 4.5 mg/dL   CBC WITHOUT DIFFERENTIAL    Collection Time: 02/07/20  6:13 AM   Result Value Ref Range    WBC 12.7 (H) 4.8 - 10.8 K/uL    RBC 4.04 (L) 4.70 - 6.10 M/uL    Hemoglobin 12.6 (L) 14.0 - 18.0 g/dL    Hematocrit 39.5 (L) 42.0 - 52.0 %    MCV 97.8 81.4 - 97.8 fL    MCH 31.2 27.0 - 33.0 pg    MCHC 31.9 (L) 33.7 - 35.3 g/dL    RDW 46.2 35.9 - 50.0 fL    Platelet Count 465 (H) 164 - 446 K/uL    MPV 11.2 9.0 - 12.9 fL   Basic Metabolic Panel    Collection Time: 02/07/20  6:13 AM   Result Value Ref Range    Sodium 141 135 - 145 mmol/L    Potassium 4.1 3.6 - 5.5 mmol/L    Chloride 99 96 - 112 mmol/L    Co2 33 20 - 33 mmol/L    Glucose 147 (H) 65 - 99 mg/dL    Bun 35 (H) 8 - 22 mg/dL    Creatinine 1.10 0.50 - 1.40 mg/dL    Calcium 9.9 8.5 - 10.5 mg/dL    Anion Gap 9.0 0.0 - 11.9   FREE THYROXINE    Collection Time: 02/07/20  6:13 AM   Result Value Ref Range    Free T-4 0.86 0.53 - 1.43 ng/dL   FOLATE    Collection Time: 02/07/20  6:13 AM   Result Value Ref Range    Folate -Folic Acid 8.0 >4.0 ng/mL   ESTIMATED GFR    Collection Time: 02/07/20  6:13 AM   Result Value Ref Range    GFR If African American >60 >60 mL/min/1.73 m 2    GFR If Non African American >60 >60 mL/min/1.73 m 2   ACCU-CHEK GLUCOSE    Collection Time: 02/07/20  6:15 AM   Result Value Ref Range    Glucose - Accu-Ck 135 (H) 65 - 99 mg/dL   ACCU-CHEK GLUCOSE    Collection Time: 02/07/20 11:42 AM   Result Value Ref Range    Glucose - Accu-Ck 99 65 - 99 mg/dL       Current Facility-Administered Medications   Medication Frequency   • [START ON 2/8/2020] levothyroxine (SYNTHROID) tablet 100 mcg AM ES   • phosphorus (K-PHOS-NEUTRAL) per tablet 2 Tab TID   • budesonide-formoterol (SYMBICORT) 160-4.5 MCG/ACT inhaler 2 Puff BID   • vitamin D (cholecalciferol) tablet  2,000 Units DAILY   • ipratropium-albuterol (DUONEB) nebulizer solution Q4HRS PRN   • insulin regular (HUMULIN R) injection 2-12 Units Q6HRS   • Respiratory Care per Protocol Continuous RT   • Pharmacy Consult Request ...Pain Management Review 1 Each PHARMACY TO DOSE   • acetaminophen (TYLENOL) tablet 650 mg Q4HRS PRN   • hydrALAZINE (APRESOLINE) tablet 25 mg Q8HRS PRN   • docusate sodium (COLACE) capsule 100 mg DAILY    And   • lactulose 20 GM/30ML solution 30 mL Q24HRS PRN    And   • bisacodyl (DULCOLAX) suppository 10 mg QDAY PRN   • artificial tears ophthalmic solution 1 Drop PRN   • benzocaine-menthol (CEPACOL) lozenge 1 Lozenge Q2HRS PRN   • mag hydrox-al hydrox-simeth (MAALOX PLUS ES or MYLANTA DS) suspension 20 mL Q2HRS PRN   • ondansetron (ZOFRAN ODT) dispertab 4 mg 4X/DAY PRN    Or   • ondansetron (ZOFRAN) syringe/vial injection 4 mg 4X/DAY PRN   • sodium chloride (OCEAN) 0.65 % nasal spray 2 Spray PRN   • senna-docusate (PERICOLACE or SENOKOT S) 8.6-50 MG per tablet 2 Tab BID    And   • polyethylene glycol/lytes (MIRALAX) PACKET 1 Packet QDAY PRN    And   • magnesium hydroxide (MILK OF MAGNESIA) suspension 30 mL QDAY PRN    And   • bisacodyl (DULCOLAX) suppository 10 mg QDAY PRN   • atorvastatin (LIPITOR) tablet 80 mg Q EVENING   • amLODIPine (NORVASC) tablet 5 mg Q DAY   • metoprolol (LOPRESSOR) tablet 50 mg BID   • traZODone (DESYREL) tablet 50 mg QHS   • melatonin tablet 3 mg Nightly   • aspirin (ASA) chewable tab 81 mg DAILY   • nystatin (MYCOSTATIN) 064093 UNIT/ML suspension 500,000 Units 4X/DAY   • hydrocortisone 1 % cream BID       Orders Placed This Encounter   Procedures   • Diet Order Diabetic     Standing Status:   Standing     Number of Occurrences:   1     Order Specific Question:   Diet:     Answer:   Diabetic [3]     Order Specific Question:   Texture/Fiber modifications:     Answer:   Dysphagia 2(Pureed/Chopped)specify fluid consistency(question 6) [2]     Order Specific Question:    Consistency/Fluid modifications:     Answer:   [place] Thick [2]       Assessment:  Active Hospital Problems    Diagnosis   • *Cerebrovascular accident (CVA) due to thrombosis of right middle cerebral artery (HCC)   • Acute respiratory failure with hypoxia (HCC)   • Type 2 diabetes mellitus without complication, without long-term current use of insulin (HCC)   • Dysphagia   • Vitamin B12 deficiency   • Essential hypertension   • Moderate persistent asthma without complication   • Dyslipidemia   • Hypothyroidism   • VERENA (obstructive sleep apnea)   • Vitamin D deficiency   • Azotemia   • Obesity (BMI 35.0-39.9 without comorbidity)   • Rash   • S/P percutaneous endoscopic gastrostomy (PEG) tube placement (HCC)   • Hypertriglyceridemia   • Oral thrush   • Anemia   • Impaired mobility and ADLs     This patient is a 74 y.o. male admitted for acute inpatient rehabilitation with Cerebrovascular accident (CVA) due to thrombosis of right middle cerebral artery (HCC).    Therapy update 2/7/2020  Total assist eating  Supervision grooming  Min assist bathing  Supervision upper body dressing  Min assist lower body dressing  Min assist toileting  Min assistbladder  Min assist bowel  Min assist bed/chair transfer  Min assist toilet transfer  Mod assist tub/shower transfer  Max assist ambulation  Supervision wheelchair propulsion  Min assist stairs  Modified Independent comprehension  Modified Independent expression  Independent social interaction  Supervision problem solving  Min assist memory    We will have his first weekly conference on Monday to pick a discharge date.      Medical Decision Making and Plan:    Right MCA stroke  S/p tPA  Hemorraghic transformation  Dysphagia, s/p PEG  Cognitive deficits  Left pronator drift  Non-dominant  Continue full rehab program  PT/OT/SLP, 1 hr each discipline, 5 days per week  Speech therapy to advance diet, MBS tomorrow    Bowel  Meds as needed  Last BM 2/7    Bladder  Check PVRs - 67,  54  ICP for over 400 cc  Scheduled toileting    Insomnia  Schedule melatonin and trazodone    DVT prophylaxis  Lovenox    Appreciate assistance of hospitalist with his medical comorbidities:    Asthma  Hypertension  Hypothyroidism  Sleep apnea  Diabetes with hyperglycemia  Anemia  Hypertriglyceridemia  Obesity  Oral thrush  Rash  Vit D deficiency  Leukocytosis, CXR and UA ordered    Total time:  36 minutes.  I spent greater than 50% of the time for patient care, counseling, and coordination on this date, including patient face-to face time, unit/floor time with review of records/pertinent lab data and studies, as well as discussing diagnostic evaluation/work up, planned therapeutic interventions, and future disposition of care, as per the interval events/subjective and the assessment and plan as noted above.    I have performed a physical exam, reviewed and updated ROS, as well as the assessment and plan today 2/7/2020. In review of note from 2/6/2020 there are no new changes except as documented above.            Alla Ewing M.D.   Physical Medicine and Rehabilitation

## 2020-02-09 LAB
GLUCOSE BLD-MCNC: 108 MG/DL (ref 65–99)
GLUCOSE BLD-MCNC: 92 MG/DL (ref 65–99)
GLUCOSE BLD-MCNC: 95 MG/DL (ref 65–99)

## 2020-02-09 PROCEDURE — 700102 HCHG RX REV CODE 250 W/ 637 OVERRIDE(OP)

## 2020-02-09 PROCEDURE — A9270 NON-COVERED ITEM OR SERVICE: HCPCS | Performed by: PHYSICAL MEDICINE & REHABILITATION

## 2020-02-09 PROCEDURE — 700102 HCHG RX REV CODE 250 W/ 637 OVERRIDE(OP): Performed by: PHYSICAL MEDICINE & REHABILITATION

## 2020-02-09 PROCEDURE — 700112 HCHG RX REV CODE 229: Performed by: PHYSICAL MEDICINE & REHABILITATION

## 2020-02-09 PROCEDURE — 94760 N-INVAS EAR/PLS OXIMETRY 1: CPT

## 2020-02-09 PROCEDURE — 99232 SBSQ HOSP IP/OBS MODERATE 35: CPT | Performed by: HOSPITALIST

## 2020-02-09 PROCEDURE — A9270 NON-COVERED ITEM OR SERVICE: HCPCS | Performed by: HOSPITALIST

## 2020-02-09 PROCEDURE — 92526 ORAL FUNCTION THERAPY: CPT

## 2020-02-09 PROCEDURE — A9270 NON-COVERED ITEM OR SERVICE: HCPCS

## 2020-02-09 PROCEDURE — 700102 HCHG RX REV CODE 250 W/ 637 OVERRIDE(OP): Performed by: HOSPITALIST

## 2020-02-09 PROCEDURE — 770010 HCHG ROOM/CARE - REHAB SEMI PRIVAT*

## 2020-02-09 PROCEDURE — 82962 GLUCOSE BLOOD TEST: CPT

## 2020-02-09 RX ORDER — LISINOPRIL 5 MG/1
TABLET ORAL
Status: COMPLETED
Start: 2020-02-09 | End: 2020-02-09

## 2020-02-09 RX ADMIN — MELATONIN 3 MG: at 21:12

## 2020-02-09 RX ADMIN — BUDESONIDE AND FORMOTEROL FUMARATE DIHYDRATE 2 PUFF: 160; 4.5 AEROSOL RESPIRATORY (INHALATION) at 08:09

## 2020-02-09 RX ADMIN — ATORVASTATIN CALCIUM 80 MG: 40 TABLET, FILM COATED ORAL at 21:12

## 2020-02-09 RX ADMIN — DIBASIC SODIUM PHOSPHATE, MONOBASIC POTASSIUM PHOSPHATE AND MONOBASIC SODIUM PHOSPHATE 2 TABLET: 852; 155; 130 TABLET ORAL at 08:06

## 2020-02-09 RX ADMIN — BUDESONIDE AND FORMOTEROL FUMARATE DIHYDRATE 2 PUFF: 160; 4.5 AEROSOL RESPIRATORY (INHALATION) at 21:27

## 2020-02-09 RX ADMIN — CHOLECALCIFEROL TAB 25 MCG (1000 UNIT) 2000 UNITS: 25 TAB at 08:05

## 2020-02-09 RX ADMIN — METOPROLOL TARTRATE 50 MG: 25 TABLET ORAL at 08:06

## 2020-02-09 RX ADMIN — TRAZODONE HYDROCHLORIDE 50 MG: 50 TABLET ORAL at 21:11

## 2020-02-09 RX ADMIN — ACETAMINOPHEN 650 MG: 325 TABLET, FILM COATED ORAL at 21:14

## 2020-02-09 RX ADMIN — METOPROLOL TARTRATE 50 MG: 25 TABLET ORAL at 21:12

## 2020-02-09 RX ADMIN — LISINOPRIL 5 MG: 5 TABLET ORAL at 05:41

## 2020-02-09 RX ADMIN — ASPIRIN 81 MG 81 MG: 81 TABLET ORAL at 08:06

## 2020-02-09 RX ADMIN — DIBASIC SODIUM PHOSPHATE, MONOBASIC POTASSIUM PHOSPHATE AND MONOBASIC SODIUM PHOSPHATE 2 TABLET: 852; 155; 130 TABLET ORAL at 21:11

## 2020-02-09 RX ADMIN — HYDROCORTISONE: 1 CREAM TOPICAL at 08:09

## 2020-02-09 RX ADMIN — DOCUSATE SODIUM 100 MG: 100 CAPSULE, LIQUID FILLED ORAL at 08:06

## 2020-02-09 RX ADMIN — HYDROCORTISONE: 1 CREAM TOPICAL at 21:27

## 2020-02-09 RX ADMIN — SENNOSIDES AND DOCUSATE SODIUM 2 TABLET: 8.6; 5 TABLET ORAL at 21:13

## 2020-02-09 RX ADMIN — DIBASIC SODIUM PHOSPHATE, MONOBASIC POTASSIUM PHOSPHATE AND MONOBASIC SODIUM PHOSPHATE 2 TABLET: 852; 155; 130 TABLET ORAL at 17:32

## 2020-02-09 RX ADMIN — SENNOSIDES AND DOCUSATE SODIUM 2 TABLET: 8.6; 5 TABLET ORAL at 08:06

## 2020-02-09 RX ADMIN — LEVOTHYROXINE SODIUM 100 MCG: 50 TABLET ORAL at 05:43

## 2020-02-09 ASSESSMENT — ENCOUNTER SYMPTOMS
COUGH: 0
SHORTNESS OF BREATH: 0
VOMITING: 0
POLYDIPSIA: 0
EYES NEGATIVE: 1
NAUSEA: 0
FEVER: 0
CHILLS: 0
PALPITATIONS: 0
BRUISES/BLEEDS EASILY: 0
ABDOMINAL PAIN: 0

## 2020-02-09 ASSESSMENT — PATIENT HEALTH QUESTIONNAIRE - PHQ9
1. LITTLE INTEREST OR PLEASURE IN DOING THINGS: NOT AT ALL
2. FEELING DOWN, DEPRESSED, IRRITABLE, OR HOPELESS: NOT AT ALL
SUM OF ALL RESPONSES TO PHQ9 QUESTIONS 1 AND 2: 0

## 2020-02-09 NOTE — PROGRESS NOTES
Hospital Medicine Daily Progress Note    Chief Complaint  Asthma, HTN, DM    Interval Problem Update  Doing well, denies complaints.  Labs reviewed.    Review of Systems  Review of Systems   Constitutional: Negative for chills and fever.   HENT: Negative.    Eyes: Negative.    Respiratory: Negative for cough and shortness of breath.    Cardiovascular: Negative for chest pain and palpitations.   Gastrointestinal: Negative for abdominal pain, nausea and vomiting.   Skin: Negative for itching and rash.   Endo/Heme/Allergies: Negative for polydipsia. Does not bruise/bleed easily.        Physical Exam  Temp:  [36.4 °C (97.6 °F)-36.9 °C (98.4 °F)] 36.5 °C (97.7 °F)  Pulse:  [72-87] 72  Resp:  [18-20] 18  BP: (119-129)/(64-71) 127/64  SpO2:  [91 %-94 %] 92 %    Physical Exam  Vitals signs reviewed.   Constitutional:       General: He is not in acute distress.     Appearance: Normal appearance. He is not ill-appearing.   HENT:      Head: Normocephalic and atraumatic.      Right Ear: External ear normal.      Left Ear: External ear normal.      Nose: Nose normal.   Eyes:      General:         Right eye: No discharge.         Left eye: No discharge.      Extraocular Movements: Extraocular movements intact.      Conjunctiva/sclera: Conjunctivae normal.   Neck:      Musculoskeletal: Normal range of motion and neck supple.   Cardiovascular:      Rate and Rhythm: Normal rate and regular rhythm.   Pulmonary:      Effort: No respiratory distress.      Breath sounds: No wheezing.      Comments: Decreased BS  Abdominal:      General: Bowel sounds are normal. There is no distension.      Palpations: Abdomen is soft.      Tenderness: There is no tenderness.   Musculoskeletal:      Right lower leg: No edema.      Left lower leg: No edema.   Skin:     General: Skin is warm and dry.   Neurological:      Mental Status: He is alert and oriented to person, place, and time.         Fluids    Intake/Output Summary (Last 24 hours) at 2/8/2020  1809  Last data filed at 2/8/2020 1230  Gross per 24 hour   Intake 240 ml   Output --   Net 240 ml       Laboratory  Recent Labs     02/06/20 0544 02/07/20 0613 02/08/20  0515   WBC 10.1 12.7* 12.2*   RBC 3.76* 4.04* 3.62*   HEMOGLOBIN 11.7* 12.6* 11.5*   HEMATOCRIT 37.0* 39.5* 35.4*   MCV 98.4* 97.8 97.8   MCH 31.1 31.2 31.8   MCHC 31.6* 31.9* 32.5*   RDW 45.9 46.2 46.9   PLATELETCT 380 465* 379   MPV 11.5 11.2 11.3     Recent Labs     02/06/20 0544 02/07/20 0613 02/08/20  0515   SODIUM 142 141 140   POTASSIUM 3.8 4.1 3.7   CHLORIDE 100 99 103   CO2 35* 33 31   GLUCOSE 138* 147* 127*   BUN 27* 35* 27*   CREATININE 0.99 1.10 0.82   CALCIUM 9.1 9.9 8.5                 Assessment/Plan  * Cerebrovascular accident (CVA) due to thrombosis of right middle cerebral artery (HCC)- (present on admission)  Assessment & Plan  Had right hemiparesis  S/P TPA  On ASA and Lipitor    Acute respiratory failure with hypoxia (HCC)- (present on admission)  Assessment & Plan  H/O Asthma and extensive smoking history  On Symbicort  Monitor need for [place] and/or Singulair    Dysphagia- (present on admission)  Assessment & Plan  S/P PEG on 2/1/20 by Dr. Justice    Type 2 diabetes mellitus without complication, without long-term current use of insulin (HCC)- (present on admission)  Assessment & Plan  HbA1c 7.2  Observe serum glucose trends on SSI    Essential hypertension- (present on admission)  Assessment & Plan  On Norvasc and Metoprolol  Will replace Norvasc w/ Lisinopril given comorbid DM  Observe blood pressure trends    Leukocytosis  Assessment & Plan  UA and CXR both negative for infection  Elevated WBC likely reactive  Follow to resolution    Hypophosphatemia  Assessment & Plan  Phosphorus levels improving  Continue Neutra-Phos supplementation    Azotemia- (present on admission)  Assessment & Plan  BUN/Cr not better w/ PO fluids  IVF hydration given w/ improvement in renal function    Vitamin D deficiency- (present on  admission)  Assessment & Plan  Vit D level 13  On supplementation    Anemia- (present on admission)  Assessment & Plan  Has mild macrocytosis  Vit B12 and Folate both normal  Follow H/H    Hypothyroidism- (present on admission)  Assessment & Plan  TSH 5.63 (high) and FT4 0.86 (low normal)  Synthroid dose increased    VERENA (obstructive sleep apnea)- (present on admission)  Assessment & Plan  On nocturnal CPAP     Full Code

## 2020-02-09 NOTE — FLOWSHEET NOTE
02/09/20 1036   Events/Summary/Plan   Events/Summary/Plan 02 spot check and wean.  Pt found on 7 lpm NC   Interdisciplinary Plan of Care-Goals (Indications)   Bronchodilator Indications History / Diagnosis;Strong Subjective / Objective Improvement   Interdisciplinary Plan of Care-Outcomes    Bronchodilator Outcome Diminished Wheezing and Volume of Air Movement Increased;Patient at Stable Baseline   Education   Education Yes - Pt. / Family has been Instructed in use of Respiratory Equipment   Respiratory WDL   Respiratory (WDL) X   Chest Exam   Respiration 18   Pulse 82   Oximetry   #Pulse Oximetry (Single Determination) Yes   Oxygen   Home O2 Use Prior To Admission? Yes   Home O2 LPM Flow 2 LPM   Home O2 Delivery Method   (NOCS with CPAP)   Home O2 Frequency of Use At Sleep   Pulse Oximetry 94 %   O2 (LPM) 4   O2 Daily Delivery Respiratory  Silicone Nasal Cannula

## 2020-02-09 NOTE — THERAPY
"Speech Language Pathology  Daily Treatment     Patient Name: Familia Lundy  Age:  74 y.o., Sex:  male  Medical Record #: [id number]  Today's Date: 2/9/2020     Subjective  Pt interactive and cooperative for tx session targeting dysphagia management.      Objective     02/09/20 1101   Dysphagia    Other Treatments thin liquid trials, extensive dysphagia education   Diet / Liquid Recommendation Nectar Thick Liquid;Dysphagia II   Nutritional Liquid Intake Rating Scale 2   Nutritional Food Intake Rating Scale 5   Interdisciplinary Plan of Care Collaboration   IDT Collaboration with  Nursing   Patient Position at End of Therapy Seated;Self Releasing Lap Belt Applied;Tray Table within Reach;Phone within Reach;Call Light within Reach   Collaboration Comments Pt in wc at end of session, awaiting assist with toileting. RN adivsed, CNA called. Pt was informed that CNA was coming.    SLP Total Time Spent   SLP Individual Total Time Spent (Mins) 75   Charge Group   SLP Swallowing Dysfunction Treatment Swallowing Dysfunction Treatment     Assessment  Patient was assessed during lunch meal of pureed soup, canned fruit, and chicken salad; thin liquids via cup sip were trialed prior to meal and during meal; 10oz total over 75 mins with no overt s/sx pen/asp. Pt completing small sip/bites, slow rate, double swallow with SPV after discussion of strategies and rationale.  As distraction was introduced (conversation) with patient, he was noted to continue to implement strategies with no cues provided. Pt was receptive to provided education regarding swallow function, relationship to vocal quality and airway status. Results of FEES 1/30 vs MBSS 2/7 were discussed, explaining meaning of \"silent aspiration\" on first study vs \"deep penetration\" of liquids observed with MBSS. Pt asked appropriate questions about provided education.   Recommend continue trials of thins with primary SLP tomorrow; upgrade as appropriate. Despite pt " demonstrating no overt pen/asp with thins and IND with strategies, his variable dyspnea/oxygen requirements present a concern, specifically whether or not his tolerance of thin liquids is impacted by an increased RR.    Plan  Continue trials thin liquids between and during meals. Advance as tolerated.

## 2020-02-09 NOTE — FLOWSHEET NOTE
02/08/20 1757   Events/Summary/Plan   Events/Summary/Plan SpO2 check  (CPAP hrs last night 3:53)   Chest Exam   Respiration 18   Pulse 72   Oximetry   #Pulse Oximetry (Single Determination) Yes   Oxygen   Home O2 Use Prior To Admission? Yes   Home O2 LPM Flow 2 LPM  (Noc bleedin to cpap)   Home O2 Delivery Method   (CPAP)   Home O2 Frequency of Use At Sleep   Pulse Oximetry 92 %   O2 (LPM) 2   O2 Daily Delivery Respiratory  Silicone Nasal Cannula

## 2020-02-09 NOTE — THERAPY
Speech Language Pathology  Daily Treatment     Patient Name: Familia Lundy  Age:  74 y.o., Sex:  male  Medical Record #: [id number]  Today's Date: 2/9/2020     Subjective    Pt pleasant and cooperative during tx.       Objective       02/09/20 0801   Dysphagia    Other Treatments thin liquid trials    Diet / Liquid Recommendation Nectar Thick Liquid;Dysphagia II   Nutritional Liquid Intake Rating Scale 2   Nutritional Food Intake Rating Scale 5   SLP Total Time Spent   SLP Individual Total Time Spent (Mins) 30   Charge Group   SLP Swallowing Dysfunction Treatment Swallowing Dysfunction Treatment       FIM Eating Score:  5 - Standby Prompting/Supervision or Set-up  Eating Description:  Increased time, Modified diet, Supervision for safety, Verbal cueing      Assessment    Pt assessed with dys2/NTL tray.  Pt tolerated dys2 textures and NTL without oral residue or s/s of aspiration.  Pt followed compensatory strategies independently.  Pt tolerated trials of thin liquid by cup without overt s/s of aspiration.  Nrsg to listen to lungs later this day.  Pending results from lung status from nursing, pt will upgrade to thin, or continue thin liquid trials.      Plan    Continue thin liquid trials, use of swallow strategies.

## 2020-02-10 PROCEDURE — 700102 HCHG RX REV CODE 250 W/ 637 OVERRIDE(OP): Performed by: PHYSICAL MEDICINE & REHABILITATION

## 2020-02-10 PROCEDURE — 97116 GAIT TRAINING THERAPY: CPT

## 2020-02-10 PROCEDURE — 99232 SBSQ HOSP IP/OBS MODERATE 35: CPT | Performed by: HOSPITALIST

## 2020-02-10 PROCEDURE — A9270 NON-COVERED ITEM OR SERVICE: HCPCS | Performed by: HOSPITALIST

## 2020-02-10 PROCEDURE — 97130 THER IVNTJ EA ADDL 15 MIN: CPT

## 2020-02-10 PROCEDURE — 97535 SELF CARE MNGMENT TRAINING: CPT

## 2020-02-10 PROCEDURE — 97530 THERAPEUTIC ACTIVITIES: CPT

## 2020-02-10 PROCEDURE — 99233 SBSQ HOSP IP/OBS HIGH 50: CPT | Performed by: PHYSICAL MEDICINE & REHABILITATION

## 2020-02-10 PROCEDURE — 770010 HCHG ROOM/CARE - REHAB SEMI PRIVAT*

## 2020-02-10 PROCEDURE — 97110 THERAPEUTIC EXERCISES: CPT

## 2020-02-10 PROCEDURE — 94760 N-INVAS EAR/PLS OXIMETRY 1: CPT

## 2020-02-10 PROCEDURE — 700102 HCHG RX REV CODE 250 W/ 637 OVERRIDE(OP): Performed by: HOSPITALIST

## 2020-02-10 PROCEDURE — 92526 ORAL FUNCTION THERAPY: CPT

## 2020-02-10 PROCEDURE — 97129 THER IVNTJ 1ST 15 MIN: CPT

## 2020-02-10 PROCEDURE — A9270 NON-COVERED ITEM OR SERVICE: HCPCS | Performed by: PHYSICAL MEDICINE & REHABILITATION

## 2020-02-10 RX ORDER — POLYETHYLENE GLYCOL 3350 17 G/17G
1 POWDER, FOR SOLUTION ORAL
Status: DISCONTINUED | OUTPATIENT
Start: 2020-02-10 | End: 2020-02-15 | Stop reason: HOSPADM

## 2020-02-10 RX ORDER — BISACODYL 10 MG
10 SUPPOSITORY, RECTAL RECTAL
Status: DISCONTINUED | OUTPATIENT
Start: 2020-02-10 | End: 2020-02-15 | Stop reason: HOSPADM

## 2020-02-10 RX ORDER — AMOXICILLIN 250 MG
2 CAPSULE ORAL 2 TIMES DAILY PRN
Status: DISCONTINUED | OUTPATIENT
Start: 2020-02-10 | End: 2020-02-15 | Stop reason: HOSPADM

## 2020-02-10 RX ORDER — DOXYCYCLINE 100 MG/1
100 TABLET ORAL EVERY 12 HOURS
Status: DISCONTINUED | OUTPATIENT
Start: 2020-02-10 | End: 2020-02-15 | Stop reason: HOSPADM

## 2020-02-10 RX ORDER — CEPHALEXIN 250 MG/1
500 CAPSULE ORAL EVERY 6 HOURS
Status: DISCONTINUED | OUTPATIENT
Start: 2020-02-10 | End: 2020-02-10

## 2020-02-10 RX ADMIN — BUDESONIDE AND FORMOTEROL FUMARATE DIHYDRATE 2 PUFF: 160; 4.5 AEROSOL RESPIRATORY (INHALATION) at 09:55

## 2020-02-10 RX ADMIN — BUDESONIDE AND FORMOTEROL FUMARATE DIHYDRATE 2 PUFF: 160; 4.5 AEROSOL RESPIRATORY (INHALATION) at 20:37

## 2020-02-10 RX ADMIN — TRAZODONE HYDROCHLORIDE 50 MG: 50 TABLET ORAL at 20:37

## 2020-02-10 RX ADMIN — DIBASIC SODIUM PHOSPHATE, MONOBASIC POTASSIUM PHOSPHATE AND MONOBASIC SODIUM PHOSPHATE 2 TABLET: 852; 155; 130 TABLET ORAL at 09:57

## 2020-02-10 RX ADMIN — DOXYCYCLINE 100 MG: 100 TABLET, FILM COATED ORAL at 10:38

## 2020-02-10 RX ADMIN — DIBASIC SODIUM PHOSPHATE, MONOBASIC POTASSIUM PHOSPHATE AND MONOBASIC SODIUM PHOSPHATE 2 TABLET: 852; 155; 130 TABLET ORAL at 20:38

## 2020-02-10 RX ADMIN — LISINOPRIL 5 MG: 5 TABLET ORAL at 05:17

## 2020-02-10 RX ADMIN — METOPROLOL TARTRATE 50 MG: 25 TABLET ORAL at 09:53

## 2020-02-10 RX ADMIN — DIBASIC SODIUM PHOSPHATE, MONOBASIC POTASSIUM PHOSPHATE AND MONOBASIC SODIUM PHOSPHATE 2 TABLET: 852; 155; 130 TABLET ORAL at 16:45

## 2020-02-10 RX ADMIN — ATORVASTATIN CALCIUM 80 MG: 40 TABLET, FILM COATED ORAL at 20:38

## 2020-02-10 RX ADMIN — HYDROCORTISONE: 1 CREAM TOPICAL at 10:02

## 2020-02-10 RX ADMIN — METOPROLOL TARTRATE 50 MG: 25 TABLET ORAL at 20:38

## 2020-02-10 RX ADMIN — DOXYCYCLINE 100 MG: 100 TABLET, FILM COATED ORAL at 20:37

## 2020-02-10 RX ADMIN — MELATONIN 3 MG: at 20:38

## 2020-02-10 RX ADMIN — HYDROCORTISONE: 1 CREAM TOPICAL at 20:39

## 2020-02-10 RX ADMIN — LEVOTHYROXINE SODIUM 100 MCG: 50 TABLET ORAL at 05:17

## 2020-02-10 RX ADMIN — CHOLECALCIFEROL TAB 25 MCG (1000 UNIT) 2000 UNITS: 25 TAB at 09:53

## 2020-02-10 RX ADMIN — ASPIRIN 81 MG 81 MG: 81 TABLET ORAL at 09:53

## 2020-02-10 ASSESSMENT — ENCOUNTER SYMPTOMS
NAUSEA: 0
FEVER: 0
SHORTNESS OF BREATH: 0
POLYDIPSIA: 0
ABDOMINAL PAIN: 0
BRUISES/BLEEDS EASILY: 0
CHILLS: 0
EYES NEGATIVE: 1
VOMITING: 0
PALPITATIONS: 0
COUGH: 0

## 2020-02-10 NOTE — CARE PLAN
Problem: Bathing  Goal: STG-Within one week, patient will bathe  Description  1) Individualized Goal: Supervision with AE/DME prn.  2) Interventions: OT E Stim Attended, OT Group Therapy, OT Self Care/ADL, OT Community Reintegration, OT Neuro Re-Ed/Balance, OT Sensory Int Techniques, OT Therapeutic Activity, OT Evaluation and OT Therapeutic Exercise     Outcome: MET     Problem: Dressing  Goal: STG-Within one week, patient will dress LB  Description  1) Individualized Goal: Supervision with AE/DME prn.  2) Interventions: OT E Stim Attended, OT Group Therapy, OT Self Care/ADL, OT Community Reintegration, OT Neuro Re-Ed/Balance, OT Sensory Int Techniques, OT Therapeutic Activity, OT Evaluation and OT Therapeutic Exercise     Outcome: MET     Problem: Toileting  Goal: STG-Within one week, patient will complete toileting tasks  Description  1) Individualized Goal:  Supervision with AE/DME prn.  2) Interventions:  OT E Stim Attended, OT Group Therapy, OT Self Care/ADL, OT Community Reintegration, OT Neuro Re-Ed/Balance, OT Sensory Int Techniques, OT Therapeutic Activity, OT Evaluation and OT Therapeutic Exercise   Outcome: MET     Problem: Functional Transfers  Goal: STG-Within one week, patient will transfer to toilet  Description  1) Individualized Goal:  supervision with AD/DME prn.  2) Interventions:  OT E Stim Attended, OT Group Therapy, OT Self Care/ADL, OT Community Reintegration, OT Neuro Re-Ed/Balance, OT Sensory Int Techniques, OT Therapeutic Activity, OT Evaluation and OT Therapeutic Exercise   Outcome: MET

## 2020-02-10 NOTE — REHAB-SLP IDT TEAM NOTE
Speech Therapy   Cognitive Linquistic Functions  Comprehension Initial:  6 - Modified Independent  Comprehension Current:  6 - Modified Independent   Comprehension Description:     Expression Initial:  6 - Modified Independent  Expression Current:  6 - Modified Independent   Expression Description:     Social Interaction Initial:  7 - Independent  Social Interaction Current:  7 - Independent   Social Interaction Description:     Problem Solving Initial:  5 - Standby Prompting/Supervision or Set-up  Problem Solving Current:  5 - Standby Prompting/Supervision or Set-up   Problem Solving Description:     Memory Initial:  4 - Minimal Assistance  Memory Current:  4 - Minimal Assistance   Memory Description:     Executive Functioning / Organization Initial:     Executive Functioning / Organization Current:   3-4 minimal to moderate assistance    Executive Functioning Desciption:  Attention, organization   Swallowing  Swallowing Status:  Pt advanced to a dys 2 diet with NTL, trials of thin liquids to be continued on this date with advancement as appropriate.   Orders Placed This Encounter   Procedures   • Diet Order Diabetic     Standing Status:   Standing     Number of Occurrences:   1     Order Specific Question:   Diet:     Answer:   Diabetic [3]     Order Specific Question:   Texture/Fiber modifications:     Answer:   Dysphagia 2(Pureed/Chopped)specify fluid consistency(question 6) [2]     Order Specific Question:   Consistency/Fluid modifications:     Answer:   Thin Liquids [3]     Behavior Modification Plan  Allow for rest time, Give clear feedback, Redirect to task/topic, Provide reasonable choices, Decrease the chance of failure by offering activities that are within the patient's abilities, Analyze tasks (break down into smaller steps) and Reinforce participation in desired tasks  Assistive Technology  Low/Impaired vision equipment and Low tech: Calendar, planner, schedule, alarms/timers, pill organizer, post-it  notes, lists  Family Training/Education: ongoing with pt and SO  DC Recommendations: TBD  Strengths:  Able to follow instructions, Independent PLOF, Motivated for self care and independence, Pleasant and cooperative, Supportive family and Willingly participates in therapeutic activities  Barriers:  Aspiration risk and Other: mild cognitive impairments   # of short term goals set=4  # of short term goals met=2  Speech Therapy Problems     Problem: Memory STGs     Dates: Start: 02/06/20       Description:     Goal: STG-Within one week, patient will     Dates: Start: 02/06/20       Description: 1) Individualized goal:  Learn and implement memory strategies to assist in recall of information related to hospitalization and safety with 80% accuracy provided MIN A.  2) Interventions:  SLP Self Care / ADL Training , SLP Cognitive Skill Development and SLP Group Treatment                 Problem: Problem Solving STGs     Dates: Start: 02/06/20       Description:     Goal: STG-Within one week, patient will     Dates: Start: 02/06/20       Description: 1) Individualized goal:  Complete medication and financial management tasks with 80% accuracy provided MIN A.  2) Interventions:  SLP Self Care / ADL Training , SLP Cognitive Skill Development and SLP Group Treatment                 Problem: Speech/Swallowing LTGs     Dates: Start: 02/06/20       Description:     Goal: LTG-By discharge, patient will safely swallow     Dates: Start: 02/06/20       Description: 1) Individualized goal:  Regular textures and thin liquids with no overt s/sx of asp/pen noted across meals with 100% accuracy provided MOD I.  2) Interventions:  SLP Swallowing Dysfunction Treatment, SLP Video Swallow Evaluation, SLP Self Care / ADL Training , SLP Cognitive Skill Development and SLP Group Treatment             Goal: LTG-By discharge, patient will     Dates: Start: 02/06/20       Description: 1) Individualized goal:  Complete functional problem solving and  recall information with 80% accuracy provided MOD I.  2) Interventions:  SLP Self Care / ADL Training , SLP Cognitive Skill Development and SLP Group Treatment                   Problem: Swallowing STGs     Dates: Start: 02/10/20       Description:     Goal: STG-Within one week, patient will safely swallow     Dates: Start: 02/10/20       Description: 1) Individualized goal:  dys 3 textures with thin liquids with no overt s/sx of asp/pen noted across 2/2 meals provided MIN cues   2) Interventions:  SLP Swallowing Dysfunction Treatment, SLP Self Care / ADL Training  and SLP Group Treatment                          Section completed by:  Kenya Lang MS,CCC-SLP

## 2020-02-10 NOTE — PROGRESS NOTES
"Rehab Progress Note     Date of Service: 2/10/2020  Chief Complaint: follow up stroke    Interval Events (Subjective)    Patient seen and examined today in his room.  He is happy that over the weekend his diet was advanced to thins.  He is not eating tube feeds so we will discontinue those.  He does have some mild irritation and erythema around his PEG tube site.  Discussed with Dr. Harris will start antibiotics.  Patient reports he is sleeping okay.  He denies any pain.  He thinks that therapy is going well.  He has no complaints today.     Objective:  VITAL SIGNS: /62   Pulse 74   Temp 36.8 °C (98.3 °F) (Oral)   Resp 18   Ht 1.727 m (5' 8\")   Wt 114.1 kg (251 lb 9.6 oz)   SpO2 92%   BMI 38.26 kg/m²   Gen: alert, no apparent distress, obese  CV: regular rate and rhythm, no murmurs, no peripheral edema  Resp: clear to ascultation bilaterally, normal respiratory effort, on O2  GI: soft, non-tender abdomen, bowel sounds present, erythema and discharge around PEG tube site  Neuro: notable for left pronator drift    Recent Results (from the past 72 hour(s))   URINALYSIS    Collection Time: 02/07/20  4:30 PM   Result Value Ref Range    Color Yellow     Character Clear     Specific Gravity 1.027 <1.035    Ph 6.5 5.0 - 8.0    Glucose Negative Negative mg/dL    Ketones Trace (A) Negative mg/dL    Protein Negative Negative mg/dL    Bilirubin Negative Negative    Urobilinogen, Urine 1.0 Negative    Nitrite Negative Negative    Leukocyte Esterase Negative Negative    Occult Blood Negative Negative    Micro Urine Req see below    ACCU-CHEK GLUCOSE    Collection Time: 02/07/20  5:21 PM   Result Value Ref Range    Glucose - Accu-Ck 173 (H) 65 - 99 mg/dL   ACCU-CHEK GLUCOSE    Collection Time: 02/07/20  9:07 PM   Result Value Ref Range    Glucose - Accu-Ck 113 (H) 65 - 99 mg/dL   PHOSPHORUS    Collection Time: 02/08/20  5:15 AM   Result Value Ref Range    Phosphorus 2.9 2.5 - 4.5 mg/dL   CBC WITHOUT DIFFERENTIAL    " Collection Time: 02/08/20  5:15 AM   Result Value Ref Range    WBC 12.2 (H) 4.8 - 10.8 K/uL    RBC 3.62 (L) 4.70 - 6.10 M/uL    Hemoglobin 11.5 (L) 14.0 - 18.0 g/dL    Hematocrit 35.4 (L) 42.0 - 52.0 %    MCV 97.8 81.4 - 97.8 fL    MCH 31.8 27.0 - 33.0 pg    MCHC 32.5 (L) 33.7 - 35.3 g/dL    RDW 46.9 35.9 - 50.0 fL    Platelet Count 379 164 - 446 K/uL    MPV 11.3 9.0 - 12.9 fL   Basic Metabolic Panel    Collection Time: 02/08/20  5:15 AM   Result Value Ref Range    Sodium 140 135 - 145 mmol/L    Potassium 3.7 3.6 - 5.5 mmol/L    Chloride 103 96 - 112 mmol/L    Co2 31 20 - 33 mmol/L    Glucose 127 (H) 65 - 99 mg/dL    Bun 27 (H) 8 - 22 mg/dL    Creatinine 0.82 0.50 - 1.40 mg/dL    Calcium 8.5 8.5 - 10.5 mg/dL    Anion Gap 6.0 0.0 - 11.9   ESTIMATED GFR    Collection Time: 02/08/20  5:15 AM   Result Value Ref Range    GFR If African American >60 >60 mL/min/1.73 m 2    GFR If Non African American >60 >60 mL/min/1.73 m 2   ACCU-CHEK GLUCOSE    Collection Time: 02/08/20  7:09 AM   Result Value Ref Range    Glucose - Accu-Ck 120 (H) 65 - 99 mg/dL   ACCU-CHEK GLUCOSE    Collection Time: 02/08/20 11:39 AM   Result Value Ref Range    Glucose - Accu-Ck 113 (H) 65 - 99 mg/dL   ACCU-CHEK GLUCOSE    Collection Time: 02/08/20  5:12 PM   Result Value Ref Range    Glucose - Accu-Ck 84 65 - 99 mg/dL   ACCU-CHEK GLUCOSE    Collection Time: 02/08/20  9:07 PM   Result Value Ref Range    Glucose - Accu-Ck 104 (H) 65 - 99 mg/dL   ACCU-CHEK GLUCOSE    Collection Time: 02/09/20  8:05 AM   Result Value Ref Range    Glucose - Accu-Ck 108 (H) 65 - 99 mg/dL   ACCU-CHEK GLUCOSE    Collection Time: 02/09/20 11:34 AM   Result Value Ref Range    Glucose - Accu-Ck 95 65 - 99 mg/dL   ACCU-CHEK GLUCOSE    Collection Time: 02/09/20  5:31 PM   Result Value Ref Range    Glucose - Accu-Ck 92 65 - 99 mg/dL       Current Facility-Administered Medications   Medication Frequency   • senna-docusate (PERICOLACE or SENOKOT S) 8.6-50 MG per tablet 2 Tab BID PRN     And   • polyethylene glycol/lytes (MIRALAX) PACKET 1 Packet QDAY PRN    And   • magnesium hydroxide (MILK OF MAGNESIA) suspension 30 mL QDAY PRN    And   • bisacodyl (DULCOLAX) suppository 10 mg QDAY PRN   • doxycycline monohydrate (ADOXA) tablet 100 mg Q12HRS   • lisinopril (PRINIVIL) tablet 5 mg Q DAY   • levothyroxine (SYNTHROID) tablet 100 mcg AM ES   • phosphorus (K-PHOS-NEUTRAL) per tablet 2 Tab TID   • budesonide-formoterol (SYMBICORT) 160-4.5 MCG/ACT inhaler 2 Puff BID   • vitamin D (cholecalciferol) tablet 2,000 Units DAILY   • ipratropium-albuterol (DUONEB) nebulizer solution Q4HRS PRN   • Respiratory Care per Protocol Continuous RT   • Pharmacy Consult Request ...Pain Management Review 1 Each PHARMACY TO DOSE   • acetaminophen (TYLENOL) tablet 650 mg Q4HRS PRN   • hydrALAZINE (APRESOLINE) tablet 25 mg Q8HRS PRN   • docusate sodium (COLACE) capsule 100 mg DAILY    And   • lactulose 20 GM/30ML solution 30 mL Q24HRS PRN    And   • bisacodyl (DULCOLAX) suppository 10 mg QDAY PRN   • artificial tears ophthalmic solution 1 Drop PRN   • benzocaine-menthol (CEPACOL) lozenge 1 Lozenge Q2HRS PRN   • mag hydrox-al hydrox-simeth (MAALOX PLUS ES or MYLANTA DS) suspension 20 mL Q2HRS PRN   • ondansetron (ZOFRAN ODT) dispertab 4 mg 4X/DAY PRN    Or   • ondansetron (ZOFRAN) syringe/vial injection 4 mg 4X/DAY PRN   • sodium chloride (OCEAN) 0.65 % nasal spray 2 Spray PRN   • atorvastatin (LIPITOR) tablet 80 mg Q EVENING   • metoprolol (LOPRESSOR) tablet 50 mg BID   • traZODone (DESYREL) tablet 50 mg QHS   • melatonin tablet 3 mg Nightly   • aspirin (ASA) chewable tab 81 mg DAILY   • hydrocortisone 1 % cream BID       Orders Placed This Encounter   Procedures   • Diet Order Diabetic     Standing Status:   Standing     Number of Occurrences:   1     Order Specific Question:   Diet:     Answer:   Diabetic [3]     Order Specific Question:   Texture/Fiber modifications:     Answer:   Dysphagia 2(Pureed/Chopped)specify fluid  consistency(question 6) [2]     Order Specific Question:   Consistency/Fluid modifications:     Answer:   Thin Liquids [3]       Assessment:  Active Hospital Problems    Diagnosis   • *Cerebrovascular accident (CVA) due to thrombosis of right middle cerebral artery (HCC)   • Acute respiratory failure with hypoxia (HCC)   • Type 2 diabetes mellitus without complication, without long-term current use of insulin (HCC)   • Dysphagia   • Vitamin B12 deficiency   • Essential hypertension   • Moderate persistent asthma without complication   • Dyslipidemia   • Hypothyroidism   • VERENA (obstructive sleep apnea)   • Vitamin D deficiency   • Azotemia   • Obesity (BMI 35.0-39.9 without comorbidity)   • Rash   • S/P percutaneous endoscopic gastrostomy (PEG) tube placement (HCC)   • Hypertriglyceridemia   • Oral thrush   • Anemia   • Impaired mobility and ADLs     This patient is a 74 y.o. male admitted for acute inpatient rehabilitation with Cerebrovascular accident (CVA) due to thrombosis of right middle cerebral artery (HCC).    I led and attended the weekly conference today, and agree with the IDT conference documentation and plan of care as noted below.    Date of conference: 2/10/2020    Goals and barriers: See IDT note.    Biggest barriers: mild left sided incoordination, respiratory insufficiency, dysphagia, mild cognitive impairments    Therapy update 2/10/2020  Supervision eating  Modified Independent grooming  Supervision bathing  Modified Independent upper body dressing  Supervision lower body dressing  Supervision toileting  Min assistbladder  Min assist bowel  Min assist bed/chair transfer  Supervision toilet transfer  Supervision tub/shower transfer  Min assist ambulation  Supervision wheelchair propulsion  Supervision stairs  Modified Independent comprehension  Modified Independent expression  Independent social interaction  Supervision problem solving  Min assist memory    CM/social support: brother  supportive    Anticipated DC date: 2/15/2020    Home health: PT/OT/SLP/RN    Equip: TBD    Follow up: PCP, rehab clinic optional      Medical Decision Making and Plan:    Right MCA stroke  S/p tPA  Hemorraghic transformation  Dysphagia, s/p PEG 2/1, improved  Cognitive deficits  Left pronator drift  Left arm/hand incoordination  Non-dominant  Continue full rehab program  PT/OT/SLP, 1 hr each discipline, 5 days per week  Speech therapy to advance diet  PEG can be removed 6 weeks after placement    Bowel  Meds as needed  Last BM 2/10    Bladder  Checked PVRs - 67, 54  Not retaining  ICP for over 400 cc  Scheduled toileting    Insomnia, improved  Schedule melatonin and trazodone    DVT prophylaxis  Lovenox    Appreciate assistance of hospitalist with his medical comorbidities:    Asthma  Hypertension  Hypothyroidism  Sleep apnea  Diabetes with hyperglycemia  Anemia  Hypertriglyceridemia  Obesity  Oral thrush  Rash  Vit D deficiency  Leukocytosis, CXR and UA negative, could be due to PEG tube site cellulitis, started on antibiotics    Total time:  40 minutes.  I spent greater than 50% of the time for patient care, counseling, and coordination on this date, including patient face-to face time, unit/floor time with review of records/pertinent lab data and studies, as well as discussing diagnostic evaluation/work up, planned therapeutic interventions, and future disposition of care, as per the interval events/subjective and the assessment and plan as noted above.              Alla Ewing M.D.   Physical Medicine and Rehabilitation

## 2020-02-10 NOTE — PROGRESS NOTES
Hospital Medicine Daily Progress Note    Chief Complaint  Asthma, HTN, DM    Interval Problem Update  No new problems.    Review of Systems  Review of Systems   Constitutional: Negative for chills and fever.   HENT: Negative.    Eyes: Negative.    Respiratory: Negative for cough and shortness of breath.    Cardiovascular: Negative for chest pain and palpitations.   Gastrointestinal: Negative for abdominal pain, nausea and vomiting.   Skin: Negative for itching and rash.   Endo/Heme/Allergies: Negative for polydipsia. Does not bruise/bleed easily.        Physical Exam  Temp:  [36.2 °C (97.2 °F)-36.9 °C (98.4 °F)] 36.5 °C (97.7 °F)  Pulse:  [72-82] 72  Resp:  [18] 18  BP: (123-139)/(59-72) 123/59  SpO2:  [92 %-94 %] 92 %    Physical Exam  Vitals signs reviewed.   Constitutional:       General: He is not in acute distress.     Appearance: Normal appearance. He is not ill-appearing.   HENT:      Head: Normocephalic and atraumatic.      Right Ear: External ear normal.      Left Ear: External ear normal.      Nose: Nose normal.   Eyes:      General:         Right eye: No discharge.         Left eye: No discharge.      Extraocular Movements: Extraocular movements intact.      Conjunctiva/sclera: Conjunctivae normal.   Neck:      Musculoskeletal: Normal range of motion and neck supple.   Cardiovascular:      Rate and Rhythm: Normal rate and regular rhythm.   Pulmonary:      Effort: No respiratory distress.      Breath sounds: No wheezing.      Comments: Decreased BS  Abdominal:      General: Bowel sounds are normal. There is no distension.      Palpations: Abdomen is soft.      Tenderness: There is no tenderness.   Musculoskeletal:      Right lower leg: No edema.      Left lower leg: No edema.   Skin:     General: Skin is warm and dry.   Neurological:      Mental Status: He is alert and oriented to person, place, and time.         Fluids    Intake/Output Summary (Last 24 hours) at 2/9/2020 1613  Last data filed at 2/9/2020  1300  Gross per 24 hour   Intake 560 ml   Output --   Net 560 ml       Laboratory  Recent Labs     02/07/20  0613 02/08/20  0515   WBC 12.7* 12.2*   RBC 4.04* 3.62*   HEMOGLOBIN 12.6* 11.5*   HEMATOCRIT 39.5* 35.4*   MCV 97.8 97.8   MCH 31.2 31.8   MCHC 31.9* 32.5*   RDW 46.2 46.9   PLATELETCT 465* 379   MPV 11.2 11.3     Recent Labs     02/07/20  0613 02/08/20  0515   SODIUM 141 140   POTASSIUM 4.1 3.7   CHLORIDE 99 103   CO2 33 31   GLUCOSE 147* 127*   BUN 35* 27*   CREATININE 1.10 0.82   CALCIUM 9.9 8.5                 Assessment/Plan  * Cerebrovascular accident (CVA) due to thrombosis of right middle cerebral artery (HCC)- (present on admission)  Assessment & Plan  Had right hemiparesis  S/P TPA  On ASA and Lipitor    Acute respiratory failure with hypoxia (HCC)- (present on admission)  Assessment & Plan  H/O Asthma and extensive smoking history  On Symbicort  Monitor need for Seebri and/or Singulair    Dysphagia- (present on admission)  Assessment & Plan  S/P PEG on 2/1/20 by Dr. Justice    Type 2 diabetes mellitus without complication, without long-term current use of insulin ([facility])- (present on admission)  Assessment & Plan  HbA1c 7.2  Will discontinue FSBS and SSI as not routinely needing insulin coverage    Essential hypertension- (present on admission)  Assessment & Plan  Continue Lisinopril and Metoprolol  Observe blood pressure trends    Leukocytosis  Assessment & Plan  UA and CXR both negative for infection  Elevated WBC likely reactive  Follow to resolution    Hypophosphatemia  Assessment & Plan  Phosphorus levels improving  Continue Neutra-Phos supplementation    Azotemia- (present on admission)  Assessment & Plan  BUN/Cr not better w/ PO fluids  IVF hydration given w/ improvement in renal function    Vitamin D deficiency- (present on admission)  Assessment & Plan  Vit D level 13  On supplementation    Anemia- (present on admission)  Assessment & Plan  Has mild macrocytosis  Vit B12 and Folate both  normal  Follow H/H    Hypothyroidism- (present on admission)  Assessment & Plan  TSH 5.63 (high) and FT4 0.86 (low normal)  Synthroid dose increased    VERENA (obstructive sleep apnea)- (present on admission)  Assessment & Plan  On nocturnal CPAP     Full Code

## 2020-02-10 NOTE — THERAPY
Speech Language Pathology  Daily Treatment     Patient Name: Familia Lundy  Age:  74 y.o., Sex:  male  Medical Record #: [id number]  Today's Date: 2/10/2020     Subjective    Pt pleasant and cooperative.     Objective       02/10/20 1103   SLP Total Time Spent   SLP Individual Total Time Spent (Mins) 60   Charge Group   SLP Swallowing Dysfunction Treatment Swallowing Dysfunction Treatment   SLP Cognitive Skill Development First 15 Minutes 1   SLP Cognitive Skill Development Additional 15 Minutes 1       Assessment    Medication list was initiated at this time however unable to be completed due to time constraints. Pt required MAX A to identify purpose of each medication. Pt reporting that he has never used a pill box however is open to doing so once returning home. Pt assessed with dys 3 textures and thin liquids, tolerated well however additional mastication time required. Rec: advance to dys 3 textures at this time.    Plan    Complete medication list and functional med sort

## 2020-02-10 NOTE — CARE PLAN
Problem: Problem Solving STGs  Goal: STG-Within one week, patient will  Description  1) Individualized goal:  Complete medication and financial management tasks with 80% accuracy provided MIN A.  2) Interventions:  SLP Self Care / ADL Training , SLP Cognitive Skill Development and SLP Group Treatment   Outcome: NOT MET     Problem: Memory STGs  Goal: STG-Within one week, patient will  Description  1) Individualized goal:  Learn and implement memory strategies to assist in recall of information related to hospitalization and safety with 80% accuracy provided MIN A.  2) Interventions:  SLP Self Care / ADL Training , SLP Cognitive Skill Development and SLP Group Treatment   Outcome: NOT MET     Problem: Swallowing STGs  Goal: STG-Within one week, patient will safely swallow  Description  1) Individualized goal:  prefeeding trials of NTL and puree textures via 1/2 tsp with no overt s/sx of asp/pen noted across 2/2 trials provided MIN cues.   2) Interventions:  SLP Swallowing Dysfunction Treatment, SLP Video Swallow Evaluation, SLP Self Care / ADL Training , SLP Cognitive Skill Development and SLP Group Treatment   Outcome: MET  Goal: STG-Within one week, patient will  Description  1) Individualized goal:  Participate in MBSS with advancement of diet as appropriate within one week.  2) Interventions:  SLP Swallowing Dysfunction Treatment, SLP Video Swallow Evaluation, SLP Self Care / ADL Training , SLP Cognitive Skill Development and SLP Group Treatment   Outcome: MET

## 2020-02-10 NOTE — CARE PLAN
Problem: Mobility  Goal: STG-Within one week, patient will ambulate up/down a curb  Description  1) Individualized goal:  navigate up/down curb with LRD at [facility] in order to enter/exit home safely  2) Interventions:  PT Group Therapy, PT E Stim Attended, PT Gait Training, PT Therapeutic Exercises, PT Neuro Re-Ed/Balance, PT Therapeutic Activity, PT Manual Therapy and PT Evaluation     Outcome: NOT MET  Note:   NT dt focus on other goals     Problem: Mobility Transfers  Goal: STG-Within one week, patient will transfer bed to chair  Description  1) Individualized goal:  At \[facility]\"" with LRD In order to maximize self mobility  2) Interventions:  PT Group Therapy, PT E Stim Attended, PT Gait Training, PT Therapeutic Exercises, PT Neuro Re-Ed/Balance, PT Therapeutic Activity, PT Manual Therapy and PT Evaluation     Outcome: PROGRESSING AS EXPECTED  Note:   [facility]     Problem: Mobility  Goal: STG-Within one week, patient will ambulate household distance  Description  1) Individualized goal:  150 ft With LRD at [facility] in order to progress towards PLOF  2) Interventions:  PT Group Therapy, PT E Stim Attended, PT Gait Training, PT Therapeutic Exercises, PT Neuro Re-Ed/Balance, PT Therapeutic Activity, PT Manual Therapy and PT Evaluation     Outcome: MET

## 2020-02-10 NOTE — THERAPY
Physical Therapy   Daily Treatment     Patient Name: Familia Lundy  Age:  74 y.o., Sex:  male  Medical Record #: [id number]  Today's Date: 2/10/2020     Precautions  Precautions: Fall Risk, PEG Tube  Comments: impaired L UE sensation, wounds on feet    Subjective    Pt reports he feels like his balance is getting better     Objective       02/10/20 1231   Vitals   Pulse Oximetry 93 %   O2 (LPM) 0   O2 Delivery None (Room Air)   Vitals Comments At rest, down to 86 with activity and recovers in < 60 sec   Standing Lower Body Exercises   Hamstring Curl 1 set of 15;Bilateral    Hip Extension 1 set of 15;Bilateral    Hip Abduction 1 set of 15;Bilateral   Marching 1 set of 15   Heel Rise 1 set of 15;Bilateral   Toe Rise 1 set of 15;Bilateral   Mini Squat 1 set of 15;Partial   Other Exercises VCs and demo for form and technique   Interdisciplinary Plan of Care Collaboration   Patient Position at End of Therapy Seated;Call Light within Reach;Tray Table within Reach;Self Releasing Lap Belt Applied;Chair Alarm On;Family / Friend in Room   PT Total Time Spent   PT Individual Total Time Spent (Mins) 60   PT Charge Group   PT Gait Training 2   PT Therapeutic Exercise 1   PT Therapeutic Activities 1       FIM Walking Score:  5 - Standby Prompting/Supervision or Set-up  Walking Description:  Extra time, Safety concerns, Verbal cueing, Supervision for safety(475 ft x3, FWW for part and no AD for most of this session, FT with brother on how to provide close SPV during walking with no AD - brother cleared for walking on unit with pt no AD at SPV)    FIM Stairs Score:  4 - Minimal Assistance  Stairs Description:  Ascends/descends 12 to 14 steps, Requires incidental assist, Extra time, Safety concerns, Verbal cueing, Supervision for safety(no HRs, CGA - cues for safety and seqeuncing,  )    Family training for stairs - demo for how to perform CGA during stairs with no HRs - similar to home. Brother provides safe CGA, pt has no LOB  doing stairs no HRs.     Assessment    Pts brother cleared to walk with pt with FWW or with no AD at [facility]. Pt cont to demo improved walking distance and activity tolerance.    Plan    Continue walking no AD for balance, variable gait activities - (Obstacle courses, outside, uneven surfaces, variable environments, ramps, curbs, dual tasking), trial lite gait for increased stepping, vector for higher level dynamic balance challenges with no AD, consider AD for safety at home at [place], Wean O2 with activity, activity tolerance, standing exercise, family training with brother and dtr

## 2020-02-10 NOTE — REHAB-OT IDT TEAM NOTE
Occupational Therapy   Activities of Daily Living  Eating Initial:  1 - Total Assistance  Eating Current:  5 - Standby Prompting/Supervision or Set-up   Eating Description:  Increased time, Modified diet, Supervision for safety, Verbal cueing  Grooming Initial:  5 - Standby Prompting/Supervision or Set-up  Grooming Current:  6 - Modified Independent   Grooming Description:  Increased time(seated at sink for oral care and shaving.)  Bathing Initial:  4 - Minimal Assistance  Bathing Current:  5 - Standby Prompting/Supervision or Set-up   Bathing Description:  Grab bar, Tub bench, Hand held shower, Increased time, Supervision for safety, Verbal cueing, Set-up of equipment(SBA/set up with use of GB for standing balance.)  Upper Body Dressing Initial:  5 - Standby Prompting/Supervision or Set-up  Upper Body Dressing Current:  6 - Modified Independent   Upper Body Dressing Description:  Increased time(increased time to don/doff pullover shirt.)  Lower Body Dressing Initial:  3 - Moderate Assistance  Lower Body Dressing Current:  5 - Standby Prompting/Supervsion or Set-up   Lower Body Dressing Description:  5 - Standby Prompting/Supervsion or Set-up  Toileting Initial:  3 - Moderate Assistance  Toileting Current:  5 - Standby Prompting/Supervision or Set-up   Toileting Description:  Grab bar, Increased time, Supervision for safety  Toilet Transfer Initial:  4 - Minimal Assistance  Toilet Transfer Current:  5 - Standby Prompting/Supervision or Set-up   Toilet Transfer Description:  5 - Standby Prompting/Supervision or Set-up  Tub / Shower Transfer Initial:  3 - Moderate Assistance  Tub / Shower Transfer Current:  5 - Standby Prompting/Supervision or Set-up   Tub / Shower Transfer Description:  Grab bar, Increased time, Supervision for safety, Verbal cueing, Set-up of equipment(SBA with GB and shower bench)  IADL:  TBD.   Family Training/Education:  Ongoing with pt and pt's brother: safety with ADLs and bathroom transfers,  bathroom DME, balance and coordination activities, compensatory strategies for LUE impaired sensation.   DME/DC Recommendations:  Home health OT?      Strengths:  Able to follow instructions, Alert and oriented, Independent PLOF, Making steady progress towards goals, Motivated for self care and independence, Pleasant and cooperative, Supportive family and Willingly participates in therapeutic activities  Barriers:  Decreased endurance, Poor activity tolerance, Poor balance and Other: LUE coordination and sensation, lives alone     # of short term goals set= 4    # of short term goals met= 4     Occupational Therapy Goals     Problem: Functional Transfers     Dates: Start: 02/10/20       Description:     Goal: STG-Within one week, patient will transfer to step in shower     Dates: Start: 02/10/20       Description: 1) Individualized Goal: with threshold to simulate home set up at supervision to mod I with FWW.  2) Interventions: OT E Stim Attended, OT Group Therapy, OT Self Care/ADL, OT Community Reintegration, OT Neuro Re-Ed/Balance, OT Sensory Int Techniques, OT Therapeutic Activity, OT Evaluation and OT Therapeutic Exercise                    Problem: IADL's     Dates: Start: 02/10/20       Description:     Goal: STG-Within one week, patient will access kitchen area     Dates: Start: 02/10/20       Description: 1) Individualized Goal: at supervision to mod I with FWW.  2) Interventions: OT E Stim Attended, OT Group Therapy, OT Self Care/ADL, OT Community Reintegration, OT Neuro Re-Ed/Balance, OT Sensory Int Techniques, OT Therapeutic Activity, OT Evaluation and OT Therapeutic Exercise                    Problem: OT Long Term Goals     Dates: Start: 02/06/20       Description:     Goal: LTG-By discharge, patient will complete basic self care tasks     Dates: Start: 02/06/20       Description: 1) Individualized Goal:  Mod I with AE/DME prn.  2) Interventions:  OT E Stim Attended, OT Group Therapy, OT Self Care/ADL,  OT Community Reintegration, OT Neuro Re-Ed/Balance, OT Sensory Int Techniques, OT Therapeutic Activity, OT Evaluation and OT Therapeutic Exercise             Goal: LTG-By discharge, patient will perform bathroom transfers     Dates: Start: 02/06/20       Description: 1) Individualized Goal:  Mod I with AD/DME prn.  2) Interventions:  OT E Stim Attended, OT Group Therapy, OT Self Care/ADL, OT Community Reintegration, OT Neuro Re-Ed/Balance, OT Sensory Int Techniques, OT Therapeutic Activity, OT Evaluation and OT Therapeutic Exercise           Goal: LTG-By discharge, patient will complete basic home management     Dates: Start: 02/10/20       Description: 1) Individualized Goal: mod I with AE/AD/DME prn  2) Interventions: OT E Stim Attended, OT Group Therapy, OT Self Care/ADL, OT Community Reintegration, OT Neuro Re-Ed/Balance, OT Sensory Int Techniques, OT Therapeutic Activity, OT Evaluation and OT Therapeutic Exercise                          Section completed by:  Rosaura Horner, OT

## 2020-02-10 NOTE — REHAB-NURSING IDT TEAM NOTE
Nursing   Nursing  Diet/Nutrition:  Diabetic, Dysphagia II and Thin Liquids  Medication Administration:  Crush all Medications in Puree  % consumed at meals in last 24 hours:  Consumed % of meals per documentation.  Meal/Snack Consumption for the past 24 hrs:   Oral Nutrition   02/09/20 1800 Dinner;Between 25-50% Consumed   02/09/20 1300 Lunch;Between % Consumed   02/09/20 0900 Breakfast;Between % Consumed     Snack schedule:  HS  Supplement:  none  Appetite:  Good  Fluid Intake/Output in past 24 hours: In: 560 [P.O.:560]  Out: -   Admit Weight:  Weight: 113.4 kg (250 lb)  Weight Last 7 Days   [113.4 kg (250 lb)-114.1 kg (251 lb 9.6 oz)] 114.1 kg (251 lb 9.6 oz) (02/09 1100)    Pain Issues:    Location:  Abdomen (02/09 2100)  Anterior (02/09 2100)         Severity:  Mild   Scheduled pain medications:  None     PRN pain medications used in last 24 hours:  acetaminophen (TYLENOL)    Non Pharmacologic Interventions:  warm blanket, distraction, emotional support, food, heat, relaxation, repositioned and rest  Effectiveness of pain management plan:  good=patient states acceptable comfort after interventions    Bowel:    Bowel Assist Initial Score:  4 - Minimal Assistance  Bowel Assist Current Score:  4 - Minimal Assistance  Bowl Accidents in last 7 days:     Last bowel movement: 02/09/20  Stool Description: Small, Loose     Usual bowel pattern:  daily  Scheduled bowel medications:  senna-docusate (PERICOLACE or SENOKOT S)   PRN bowel medications used in last 24 hours:  None  Nursing Interventions:  Increased time, Scheduled medication, Supervision, Verbal cueing, Set-up, Positioning on commode/toilet, Brief  Effectiveness of bowel program:   good=regular, predictable, controlled emptying of bowel  Bladder:    Bladder Assist Initial Score:  4 - Minimal Assistance  Bladder Assist Current Score:  4 - Minimal Assistance  Bladder Accidents in last 7 days:     PVR range for past 24-48 hours: --  ()  Intermittent Catheterization:  0  Medications affecting bladder:  None    Time void schedule/voiding pattern:  Voiding every 4-6 hours  Interventions:  Increased time, Supervision, Verbal cueing, Brief, Time void patient initiated  Effectiveness of bladder training:  Good=regular, predictable, emptying of bladder, patient initiates time voiding      Diabetes:  Blood Sugar Frequency:  Before meals and at bedtime    Range of BS for last 48 hours:     Recent Labs     02/08/20  0709 02/08/20  1139 02/08/20  1712 02/08/20  2107 02/09/20  0805 02/09/20  1134 02/09/20  1731   POCGLUCOSE 120* 113* 84 104* 108* 95 92     Scheduled diabetic medications:  None  Sliding scale usage in past 24 hours:  No  Total Short acting insulin in the past 24 hours:  None  Total Long acting insulin in the past 24 hours:  None      Sleep/wake cycle:   Average hours slept:  Sleeps 4-6 hours without waking  Sleep medication usage:  melatonin 5 mg and trazodone    Patient/Family Training/Education:  Aspiration Precautions, Bladder Management/Training, Bowel Management/Training, Diabetes Management, Diet/Nutrition, Fall Prevention, General Self Care, Medication Management, Pain Management, Respiratory Hygiene, Safe Transfers, Safety and Skin Care  Discharge Supply Recommendations:  Glucometer and Strips and Blood Pressure Monitor  Strengths: Alert and oriented, Willingly participates in therapeutic activities, Able to follow instructions, Supportive family, Pleasant and cooperative, Effective communication skills, Motivated for self care and independence, Good endurance and Manages pain appropriately   Barriers:   Aspiration risk, Constipation, Fatigue, Generalized weakness and Tube feeding       Nursing Problems     Problem: Bowel/Gastric:     Description:     Goal: Normal bowel function is maintained or improved     Description:           Goal: Will not experience complications related to bowel motility     Description:                  Problem: Communication     Description:     Goal: The ability to communicate needs accurately and effectively will improve     Description:                 Problem: Discharge Barriers/Planning     Description:     Goal: Patient's continuum of care needs will be met     Description:                 Problem: Infection     Description:     Goal: Will remain free from infection     Description:                 Problem: Knowledge Deficit     Description:     Goal: Knowledge of disease process/condition, treatment plan, diagnostic tests, and medications will improve     Description:           Goal: Knowledge of the prescribed therapeutic regimen will improve     Description:                 Problem: Pain Management     Description:     Goal: Pain level will decrease to patient's comfort goal     Description:                 Problem: Respiratory:     Description:     Goal: Respiratory status will improve     Description:                 Problem: Safety     Description:     Goal: Will remain free from injury     Description:           Goal: Will remain free from falls     Description:                 Problem: Skin Integrity     Description:     Goal: Risk for impaired skin integrity will decrease (Resolved)     Description:                 Problem: Venous Thromboembolism (VTW)/Deep Vein Thrombosis (DVT) Prevention:     Description:     Goal: Patient will participate in Venous Thrombosis (VTE)/Deep Vein Thrombosis (DVT)Prevention Measures     Description:                        Long Term Goals:   At discharge patient will be able to function safely at home and in the community with support.    Section completed by:  Tricia Knowles R.N.

## 2020-02-10 NOTE — REHAB-RESPIRATORY IDT TEAM NOTE
Respiratory Therapy   Respiratory Therapy    Pt is currently requiring between 2-4 lpm NC.  He does not use oxygen at home suring the day.   He is using his home CPAP unit here with 2 lpm 02 which is his home regimen.  Pt has a history of asthma and he states he always has a congested cough.  Section completed by:  Rosi Meier, RRT

## 2020-02-10 NOTE — CARE PLAN
Problem: Safety  Goal: Will remain free from injury  Outcome: PROGRESSING AS EXPECTED  Note:   Pt uses call light  appropriately. Waits for assistance and does not attempt self transfer this shift. Able to verbalize needs.      Problem: Pain Management  Goal: Pain level will decrease to patient's comfort goal  Outcome: PROGRESSING AS EXPECTED  Note:   Tylenol given PRN per MAR for c/o abdominal pain with good relief. Pt sleeps good. Will continue to monitor.

## 2020-02-10 NOTE — THERAPY
Occupational Therapy  Daily Treatment     Patient Name: Familia Lundy  Age:  74 y.o., Sex:  male  Medical Record #: [id number]  Today's Date: 2/10/2020     Precautions  Precautions: (P) Fall Risk, PEG Tube  Comments: (P) impaired L UE sensation, wounds on feet    Safety   ADL Safety : (P) Requires Supervision for Safety, Modified Independent  Bathroom Safety: (P) Requires Supervision for Safety, Modified Independent  Comments: (P) see FIMs for ADL performance details.    Subjective    Pt received resting in recliner chair. Agreeable to shower for OT session.     Objective       02/10/20 0701   Precautions   Precautions Fall Risk;PEG Tube   Comments impaired L UE sensation, wounds on feet   Safety    ADL Safety  Requires Supervision for Safety;Modified Independent   Bathroom Safety Requires Supervision for Safety;Modified Independent   Comments see FIMs for ADL performance details.   Vitals   O2 (LPM) 2   O2 Delivery Nasal Cannula   Cognition    Level of Consciousness Alert   Balance   Sitting Balance (Static) Fair +   Sitting Balance (Dynamic) Fair +   Standing Balance (Static) Fair   Standing Balance (Dynamic) Fair -   Weight Shift Sitting Fair   Weight Shift Standing Fair   Skilled Intervention Verbal Cuing   Comments during ADLs and bathroom txfrs from FWW level.   Interdisciplinary Plan of Care Collaboration   Patient Position at End of Therapy Seated;Self Releasing Lap Belt Applied  (t-dine for breakfast)   OT Total Time Spent   OT Individual Total Time Spent (Mins) 60   OT Charge Group   OT Self Care / ADL 4       FIM Grooming Score:  6 - Modified Independent  Grooming Description:  Increased time(seated at sink for oral care and shaving.)    FIM Bathing Score:  5 - Standby Prompting/Supervision or Set-up  Bathing Description:       FIM Upper Body Dressin - Modified Independent  Upper Body Dressing Description:  Increased time(increased time to don/doff pullover shirt.)    FIM Lower Body Dressing Score:   5 - Standby Prompting/Supervsion or Set-up  Lower Body Dressing Description:  Increased time, Supervision for safety, Verbal cueing, Set-up of equipment(SBA/set up to don/doff underwear, pants and slip on shoes.)    FIM Toiletin - Standby Prompting/Supervision or Set-up  Toileting Description:  Grab bar, Increased time, Supervision for safety    FIM Toilet Transfer Score:  5 - Standby Prompting/Supervision or Set-up  Toilet Transfer Description:  Grab bar, Increased time, Supervision for safety(SBA with GB)    FIM Tub/Shower Transfers Score:  5 - Standby Prompting/Supervision or Set-up  Tub/Shower Transfers Description:  Grab bar, Increased time, Supervision for safety, Verbal cueing, Set-up of equipment(SBA with GB and shower bench)      Assessment    Pt tolerated session well and demonstrates increased independence with ADLs and bathroom transfers. He con't to require increased time and supv for safety d/t impaired balance and coordination, and decreased activity tolerance.     Plan    ADLs/IADLs and related functional mobility, LUE coordination/strength/sensory re-integration, balance, endurance.

## 2020-02-10 NOTE — REHAB-PT IDT TEAM NOTE
"Physical Therapy   Mobility  Bed mobility:   CGA  Bed /Chair/Wheelchair Transfer Initial:  4 - Minimal Assistance  Bed /Chair/Wheelchair Transfer Current:  4 - Minimal Assistance   Bed/Chair/Wheelchair Transfer Description:  Increased time, Supervision for safety, Verbal cueing, Set-up of equipment(CGA)  Walk Initial:  2 - Max Assistance  Walk Current:  4 - Minimal Assistance   Walk Description:  (Pt amb indoors x330' with FWW SPV, x200' no AD SBA.  Pt amb outdoors over uneven terrain: cobblestone, ramps, grass 200'x2 SBA/CGA.)  Wheelchair Initial:  5 - Standby Prompting/Supervision or Set-up  Wheelchair Current:  5 - Standby Prompting/Supervision or Set-up   Wheelchair Description:  Extra time, Safety concerns, Supervision for safety, Verbal cueing  Stairs Initial:  4 - Minimal Assistance  Stairs Current: 5 - Standby Prompting/Supervision or Set-up   Stairs Description: (up/down 4x4, 6\" stairs with 2 rails for 2 reps, no rails for 2 reps.  CGA/SBA alternating pattern.  Incr lateral sway without UE support.  Up/down 6\" curb outdoors x2 reps SBA)  Patient/Family Training/Education:  Ongoing, HEP  DME/DC Recommendations:  TBD - Maybe FWW or SPC? OP PT  Strengths:  Able to follow instructions, Independent PLOF, Motivated for self care and independence, Pleasant and cooperative, Supportive family and Willingly participates in therapeutic activities  Barriers:   Decreased endurance and Poor balance  # of short term goals set= 3  # of short term goals met=1 (1 partially met)  Physical Therapy Problems     Problem: Mobility     Dates: Start: 02/06/20       Description:     Goal: STG-Within one week, patient will ambulate up/down a curb     Dates: Start: 02/06/20   Expected End: 02/13/20       Description: 1) Individualized goal:  navigate up/down curb with LRD at [facility] in order to enter/exit home safely  2) Interventions:  PT Group Therapy, PT E Stim Attended, PT Gait Training, PT Therapeutic Exercises, PT Neuro " Re-Ed/Balance, PT Therapeutic Activity, PT Manual Therapy and PT Evaluation       Note:     Goal Note filed on 02/10/20 1128 by Travis Rodriguez, PT    NT dt focus on other goals                  Goal: STG-Within one week, patient will ambulate household distance     Dates: Start: 02/10/20   Expected End: 02/17/20       Description: 1) Individualized goal: 150 ft With LRD at [facility] in order to progress towards PLOF  2) Interventions: PT Group Therapy, PT E Stim Attended, PT Gait Training, PT Therapeutic Exercises, PT Neuro Re-Ed/Balance, PT Therapeutic Activity, PT Manual Therapy and PT Evaluation                   Problem: Mobility Transfers     Dates: Start: 02/06/20       Description:     Goal: STG-Within one week, patient will transfer bed to chair     Dates: Start: 02/06/20   Expected End: 02/13/20       Description: 1) Individualized goal:  At V with LRD In order to maximize self mobility  2) Interventions:  PT Group Therapy, PT E Stim Attended, PT Gait Training, PT Therapeutic Exercises, PT Neuro Re-Ed/Balance, PT Therapeutic Activity, PT Manual Therapy and PT Evaluation       Note:     Goal Note filed on 02/10/20 1128 by Travis Rodriguez, PT    CGA                        Problem: PT-Long Term Goals     Dates: Start: 02/06/20       Description:     Goal: LTG-By discharge, patient will ambulate     Dates: Start: 02/06/20   Expected End: 02/16/20       Description: 1) Individualized goal: 150 ft With LRD at mod I in order to progress towards PLOF  2) Interventions: PT Group Therapy, PT E Stim Attended, PT Gait Training, PT Therapeutic Exercises, PT Neuro Re-Ed/Balance, PT Therapeutic Activity, PT Manual Therapy and PT Evaluation             Goal: LTG-By discharge, patient will transfer one surface to another     Dates: Start: 02/06/20   Expected End: 02/16/20       Description: 1) Individualized goal: At mod I with LRD In order to maximize self mobility  2) Interventions: PT Group Therapy, PT E Stim  Attended, PT Gait Training, PT Therapeutic Exercises, PT Neuro Re-Ed/Balance, PT Therapeutic Activity, PT Manual Therapy and PT Evaluation             Goal: LTG-By discharge, patient will transfer in/out of a car     Dates: Start: 02/06/20   Expected End: 02/16/20       Description: 1) Individualized goal: At mod I with LRD In order to maximize self mobility  2) Interventions: PT Group Therapy, PT E Stim Attended, PT Gait Training, PT Therapeutic Exercises, PT Neuro Re-Ed/Balance, PT Therapeutic Activity, PT Manual Therapy and PT Evaluation           Goal: LTG-By discharge, patient will     Dates: Start: 02/06/20   Expected End: 02/16/20       Description: 1) Individualized goal: navigate up/down curb with LRD at mod I in order to enter/exit home safely  2) Interventions: PT Group Therapy, PT E Stim Attended, PT Gait Training, PT Therapeutic Exercises, PT Neuro Re-Ed/Balance, PT Therapeutic Activity, PT Manual Therapy and PT Evaluation                           Section completed by:  Travis Rodriguez, PT

## 2020-02-10 NOTE — PROGRESS NOTES
Hospital Medicine Daily Progress Note    Chief Complaint  Asthma, HTN, DM    Interval Problem Update  Per Dr. Ewing, pt c/o pain at G-Tube site.    Review of Systems  Review of Systems   Constitutional: Negative for chills and fever.   HENT: Negative.    Eyes: Negative.    Respiratory: Negative for cough and shortness of breath.    Cardiovascular: Negative for chest pain and palpitations.   Gastrointestinal: Negative for abdominal pain, nausea and vomiting.   Musculoskeletal:        Wound pain   Skin: Negative for itching and rash.   Endo/Heme/Allergies: Negative for polydipsia. Does not bruise/bleed easily.        Physical Exam  Temp:  [36.5 °C (97.7 °F)-36.9 °C (98.5 °F)] 36.8 °C (98.3 °F)  Pulse:  [72-81] 74  Resp:  [18] 18  BP: (102-125)/(51-63) 116/62  SpO2:  [91 %-93 %] 92 %    Physical Exam  Vitals signs reviewed.   Constitutional:       General: He is not in acute distress.     Appearance: Normal appearance. He is not ill-appearing.   HENT:      Head: Normocephalic and atraumatic.      Right Ear: External ear normal.      Left Ear: External ear normal.      Nose: Nose normal.   Eyes:      General:         Right eye: No discharge.         Left eye: No discharge.      Extraocular Movements: Extraocular movements intact.      Conjunctiva/sclera: Conjunctivae normal.   Neck:      Musculoskeletal: Normal range of motion and neck supple.   Cardiovascular:      Rate and Rhythm: Normal rate and regular rhythm.   Pulmonary:      Effort: No respiratory distress.      Breath sounds: No wheezing.      Comments: Decreased BS  Abdominal:      General: Bowel sounds are normal. There is no distension.      Palpations: Abdomen is soft.      Tenderness: There is no tenderness.      Comments: G-Tube site w/ surrounding induration and purulent appearing discharge   Musculoskeletal:      Right lower leg: No edema.      Left lower leg: No edema.   Skin:     General: Skin is warm and dry.   Neurological:      Mental Status: He is  alert and oriented to person, place, and time.         Fluids    Intake/Output Summary (Last 24 hours) at 2/10/2020 1046  Last data filed at 2/10/2020 0900  Gross per 24 hour   Intake 1060 ml   Output --   Net 1060 ml       Laboratory  Recent Labs     02/08/20  0515   WBC 12.2*   RBC 3.62*   HEMOGLOBIN 11.5*   HEMATOCRIT 35.4*   MCV 97.8   MCH 31.8   MCHC 32.5*   RDW 46.9   PLATELETCT 379   MPV 11.3     Recent Labs     02/08/20  0515   SODIUM 140   POTASSIUM 3.7   CHLORIDE 103   CO2 31   GLUCOSE 127*   BUN 27*   CREATININE 0.82   CALCIUM 8.5                 Assessment/Plan  * Cerebrovascular accident (CVA) due to thrombosis of right middle cerebral artery (HCC)- (present on admission)  Assessment & Plan  Had right hemiparesis  S/P TPA  On ASA and Lipitor    Acute respiratory failure with hypoxia (HCC)- (present on admission)  Assessment & Plan  H/O Asthma and extensive smoking history  On Symbicort  Monitor need for [place] and/or Singulair    Dysphagia- (present on admission)  Assessment & Plan  S/P PEG on 2/1/20 by Dr. Justice    Type 2 diabetes mellitus without complication, without long-term current use of insulin ([facility])- (present on admission)  Assessment & Plan  HbA1c 7.2  Discontinued FSBS and SSI as not routinely needing insulin coverage    Essential hypertension- (present on admission)  Assessment & Plan  Continue Lisinopril and Metoprolol  Observe blood pressure trends    Leukocytosis  Assessment & Plan  UA and CXR both negative for infection  Elevated WBC may be 2/2 G-Tube site infxn  Start Doxycycline for empiric MRSA soft tissue coverage    Hypophosphatemia  Assessment & Plan  Phosphorus levels improving  Continue Neutra-Phos supplementation    Azotemia- (present on admission)  Assessment & Plan  BUN/Cr not better w/ PO fluids  Renal function improved w/ IVF    Vitamin D deficiency- (present on admission)  Assessment & Plan  Vit D level 13  On supplementation    Anemia- (present on admission)  Assessment &  Plan  Has mild macrocytosis  Vit B12 and Folate both normal  Follow H/H    Hypothyroidism- (present on admission)  Assessment & Plan  TSH 5.63 (high) and FT4 0.86 (low normal)  Synthroid dose increased    VERENA (obstructive sleep apnea)- (present on admission)  Assessment & Plan  On nocturnal CPAP     Full Code    Discussed w/ Pharmacy and Dr. Ewing

## 2020-02-10 NOTE — REHAB-COLLABORATIVE ONGOING IDT TEAM NOTE
Weekly Interdisciplinary Team Conference Note    Weekly Interdisciplinary Team Conference # 1  Date:  2/10/2020    Clinicians present and reporting at team conference include the following:   MD: Alla Ewing MD   RN:  Damaris Galvin RN   PT:   Maximus Rodriguez PT, DPT  OT:  Rosaura Horner MS, OTR/L   ST:  Kenya Lang MS, CCC-SLP  CM:  Talia Ocasio RN CCM  REC:  Not Applicable  RT:  Rosi Meier RRT  RPh:  Br[facility] Solitario [facility]  Other:   None  All reporting clinicians have a working knowledge of this patient's plan of care.    Targeted DC Date:  2/15/2020     Medical    Patient Active Problem List    Diagnosis Date Noted   • Cerebrovascular accident (CVA) due to thrombosis of right middle cerebral artery (HCC) 01/28/2020     Priority: High   • Acute respiratory failure with hypoxia ([facility]) 12/12/2015     Priority: High   • Type 2 diabetes mellitus without complication, without long-term current use of insulin (HCC) 02/04/2020     Priority: Medium   • Dysphagia 02/04/2020     Priority: Medium   • Vitamin B12 deficiency 02/04/2020     Priority: Medium   • Essential hypertension 01/28/2020     Priority: Medium   • Moderate persistent asthma without complication 01/28/2020     Priority: Medium   • Acute conjunctivitis of right eye 05/09/2018     Priority: Medium   • Cerebral infarct (HCC) 05/08/2018     Priority: Medium   • Vertigo 05/07/2018     Priority: Medium   • Dyslipidemia 01/28/2020     Priority: Low   • Morbid obesity with BMI of 40.0-44.9, adult ([facility]) 01/28/2020     Priority: Low   • Hypothyroidism 05/07/2018     Priority: Low   • VERENA (obstructive sleep apnea) 09/28/2016     Priority: Low   • Hypophosphatemia 02/07/2020   • Leukocytosis 02/07/2020   • Vitamin D deficiency 02/06/2020   • Azotemia 02/06/2020   • Obesity (BMI 35.0-39.9 without comorbidity) 02/05/2020   • Rash 02/05/2020   • S/P percutaneous endoscopic gastrostomy (PEG) tube placement ([facility]) 02/05/2020   • Hypertriglyceridemia 02/05/2020   • Oral  thrush 02/05/2020   • Anemia 02/05/2020   • Impaired mobility and ADLs 02/05/2020     Results     Procedure Component Value Ref Range Date/Time    ACCU-CHEK GLUCOSE [[id number]] Collected:  02/09/20 1731    Order Status:  Completed Lab Status:  Final result Updated:  02/09/20 1748     Glucose - Accu-Ck 92 65 - 99 mg/dL     ACCU-CHEK GLUCOSE [[id number]] Collected:  02/09/20 1134    Order Status:  Completed Lab Status:  Final result Updated:  02/09/20 1718     Glucose - Accu-Ck 95 65 - 99 mg/dL         Nursing  Diet/Nutrition:  Diabetic, Dysphagia II and Thin Liquids  Medication Administration:  Crush all Medications in Puree  % consumed at meals in last 24 hours:  Consumed % of meals per documentation.  Meal/Snack Consumption for the past 24 hrs:   Oral Nutrition   02/09/20 1800 Dinner;Between 25-50% Consumed   02/09/20 1300 Lunch;Between % Consumed   02/09/20 0900 Breakfast;Between % Consumed     Snack schedule:  HS  Supplement:  none  Appetite:  Good  Fluid Intake/Output in past 24 hours: In: 560 [P.O.:560]  Out: -   Admit Weight:  Weight: 113.4 kg (250 lb)  Weight Last 7 Days   [113.4 kg (250 lb)-114.1 kg (251 lb 9.6 oz)] 114.1 kg (251 lb 9.6 oz) (02/09 1100)    Pain Issues:    Location:  Abdomen (02/09 2100)  Anterior (02/09 2100)         Severity:  Mild   Scheduled pain medications:  None     PRN pain medications used in last 24 hours:  acetaminophen (TYLENOL)    Non Pharmacologic Interventions:  warm blanket, distraction, emotional support, food, heat, relaxation, repositioned and rest  Effectiveness of pain management plan:  good=patient states acceptable comfort after interventions    Bowel:    Bowel Assist Initial Score:  4 - Minimal Assistance  Bowel Assist Current Score:  4 - Minimal Assistance  Bowl Accidents in last 7 days:     Last bowel movement: 02/09/20  Stool Description: Small, Loose     Usual bowel pattern:  daily  Scheduled bowel medications:  senna-docusate (PERICOLACE or  SENOKOT S)   PRN bowel medications used in last 24 hours:  None  Nursing Interventions:  Increased time, Scheduled medication, Supervision, Verbal cueing, Set-up, Positioning on commode/toilet, Brief  Effectiveness of bowel program:   good=regular, predictable, controlled emptying of bowel  Bladder:    Bladder Assist Initial Score:  4 - Minimal Assistance  Bladder Assist Current Score:  4 - Minimal Assistance  Bladder Accidents in last 7 days:     PVR range for past 24-48 hours: -- ()  Intermittent Catheterization:  0  Medications affecting bladder:  None    Time void schedule/voiding pattern:  Voiding every 4-6 hours  Interventions:  Increased time, Supervision, Verbal cueing, Brief, Time void patient initiated  Effectiveness of bladder training:  Good=regular, predictable, emptying of bladder, patient initiates time voiding      Diabetes:  Blood Sugar Frequency:  Before meals and at bedtime    Range of BS for last 48 hours:     Recent Labs     02/08/20  0709 02/08/20  1139 02/08/20  1712 02/08/20  2107 02/09/20  0805 02/09/20  1134 02/09/20  1731   POCGLUCOSE 120* 113* 84 104* 108* 95 92     Scheduled diabetic medications:  None  Sliding scale usage in past 24 hours:  No  Total Short acting insulin in the past 24 hours:  None  Total Long acting insulin in the past 24 hours:  None      Sleep/wake cycle:   Average hours slept:  Sleeps 4-6 hours without waking  Sleep medication usage:  melatonin 5 mg and trazodone    Patient/Family Training/Education:  Aspiration Precautions, Bladder Management/Training, Bowel Management/Training, Diabetes Management, Diet/Nutrition, Fall Prevention, General Self Care, Medication Management, Pain Management, Respiratory Hygiene, Safe Transfers, Safety and Skin Care  Discharge Supply Recommendations:  Glucometer and Strips and Blood Pressure Monitor  Strengths: Alert and oriented, Willingly participates in therapeutic activities, Able to follow instructions, Supportive family,  Pleasant and cooperative, Effective communication skills, Motivated for self care and independence, Good endurance and Manages pain appropriately   Barriers:   Aspiration risk, Constipation, Fatigue, Generalized weakness and Tube feeding       Nursing Problems     Problem: Bowel/Gastric:     Description:     Goal: Normal bowel function is maintained or improved     Description:           Goal: Will not experience complications related to bowel motility     Description:                 Problem: Communication     Description:     Goal: The ability to communicate needs accurately and effectively will improve     Description:                 Problem: Discharge Barriers/Planning     Description:     Goal: Patient's continuum of care needs will be met     Description:                 Problem: Infection     Description:     Goal: Will remain free from infection     Description:                 Problem: Knowledge Deficit     Description:     Goal: Knowledge of disease process/condition, treatment plan, diagnostic tests, and medications will improve     Description:           Goal: Knowledge of the prescribed therapeutic regimen will improve     Description:                 Problem: Pain Management     Description:     Goal: Pain level will decrease to patient's comfort goal     Description:                 Problem: Respiratory:     Description:     Goal: Respiratory status will improve     Description:                 Problem: Safety     Description:     Goal: Will remain free from injury     Description:           Goal: Will remain free from falls     Description:                 Problem: Skin Integrity     Description:     Goal: Risk for impaired skin integrity will decrease (Resolved)     Description:                 Problem: Venous Thromboembolism (VTW)/Deep Vein Thrombosis (DVT) Prevention:     Description:     Goal: Patient will participate in Venous Thrombosis (VTE)/Deep Vein Thrombosis (DVT)Prevention Measures      "Description:                        Long Term Goals:   At discharge patient will be able to function safely at home and in the community with support.    Section completed by:  Tricia Knowles R.N.        Respiratory Therapy    Pt is currently requiring between 2-4 lpm NC.  He does not use oxygen at home suring the day.   He is using his home CPAP unit here with 2 lpm 02 which is his home regimen.  Pt has a history of asthma and he states he always has a congested cough.  Section completed by:  Rosi Meier, RRT    Mobility  Bed mobility:   CGA  Bed /Chair/Wheelchair Transfer Initial:  4 - Minimal Assistance  Bed /Chair/Wheelchair Transfer Current:  4 - Minimal Assistance   Bed/Chair/Wheelchair Transfer Description:  Increased time, Supervision for safety, Verbal cueing, Set-up of equipment(CGA)  Walk Initial:  2 - Max Assistance  Walk Current:  4 - Minimal Assistance   Walk Description:  (Pt amb indoors x330' with FWW SPV, x200' no AD SBA.  Pt amb outdoors over uneven terrain: cobblestone, ramps, grass 200'x2 SBA/CGA.)  Wheelchair Initial:  5 - Standby Prompting/Supervision or Set-up  Wheelchair Current:  5 - Standby Prompting/Supervision or Set-up   Wheelchair Description:  Extra time, Safety concerns, Supervision for safety, Verbal cueing  Stairs Initial:  4 - Minimal Assistance  Stairs Current: 5 - Standby Prompting/Supervision or Set-up   Stairs Description: (up/down 4x4, 6\" stairs with 2 rails for 2 reps, no rails for 2 reps.  CGA/SBA alternating pattern.  Incr lateral sway without UE support.  Up/down 6\" curb outdoors x2 reps SBA)  Patient/Family Training/Education:  Ongoing, HEP  DME/DC Recommendations:  TBD - Maybe FWW or SPC? OP PT  Strengths:  Able to follow instructions, Independent PLOF, Motivated for self care and independence, Pleasant and cooperative, Supportive family and Willingly participates in therapeutic activities  Barriers:   Decreased endurance and Poor balance  # of short term goals " set= 3  # of short term goals met=1 (1 partially met)  Physical Therapy Problems     Problem: Mobility     Dates: Start: 02/06/20       Description:     Goal: STG-Within one week, patient will ambulate up/down a curb     Dates: Start: 02/06/20   Expected End: 02/13/20       Description: 1) Individualized goal:  navigate up/down curb with LRD at CGA in order to enter/exit home safely  2) Interventions:  PT Group Therapy, PT E Stim Attended, PT Gait Training, PT Therapeutic Exercises, PT Neuro Re-Ed/Balance, PT Therapeutic Activity, PT Manual Therapy and PT Evaluation       Note:     Goal Note filed on 02/10/20 1128 by Travis Rodriguez, PT    NT dt focus on other goals                  Goal: STG-Within one week, patient will ambulate household distance     Dates: Start: 02/10/20   Expected End: 02/17/20       Description: 1) Individualized goal: 150 ft With LRD at SPV in order to progress towards PLOF  2) Interventions: PT Group Therapy, PT E Stim Attended, PT Gait Training, PT Therapeutic Exercises, PT Neuro Re-Ed/Balance, PT Therapeutic Activity, PT Manual Therapy and PT Evaluation                   Problem: Mobility Transfers     Dates: Start: 02/06/20       Description:     Goal: STG-Within one week, patient will transfer bed to chair     Dates: Start: 02/06/20   Expected End: 02/13/20       Description: 1) Individualized goal:  At SPV with LRD In order to maximize self mobility  2) Interventions:  PT Group Therapy, PT E Stim Attended, PT Gait Training, PT Therapeutic Exercises, PT Neuro Re-Ed/Balance, PT Therapeutic Activity, PT Manual Therapy and PT Evaluation       Note:     Goal Note filed on 02/10/20 1128 by Travis Rodriguez, PT    [facility]                        Problem: PT-Long Term Goals     Dates: Start: 02/06/20       Description:     Goal: LTG-By discharge, patient will ambulate     Dates: Start: 02/06/20   Expected End: 02/16/20       Description: 1) Individualized goal: 150 ft With LRD at mod I in  order to progress towards PLOF  2) Interventions: PT Group Therapy, PT E Stim Attended, PT Gait Training, PT Therapeutic Exercises, PT Neuro Re-Ed/Balance, PT Therapeutic Activity, PT Manual Therapy and PT Evaluation             Goal: LTG-By discharge, patient will transfer one surface to another     Dates: Start: 02/06/20   Expected End: 02/16/20       Description: 1) Individualized goal: At mod I with LRD In order to maximize self mobility  2) Interventions: PT Group Therapy, PT E Stim Attended, PT Gait Training, PT Therapeutic Exercises, PT Neuro Re-Ed/Balance, PT Therapeutic Activity, PT Manual Therapy and PT Evaluation             Goal: LTG-By discharge, patient will transfer in/out of a car     Dates: Start: 02/06/20   Expected End: 02/16/20       Description: 1) Individualized goal: At mod I with LRD In order to maximize self mobility  2) Interventions: PT Group Therapy, PT E Stim Attended, PT Gait Training, PT Therapeutic Exercises, PT Neuro Re-Ed/Balance, PT Therapeutic Activity, PT Manual Therapy and PT Evaluation           Goal: LTG-By discharge, patient will     Dates: Start: 02/06/20   Expected End: 02/16/20       Description: 1) Individualized goal: navigate up/down curb with LRD at mod I in order to enter/exit home safely  2) Interventions: PT Group Therapy, PT E Stim Attended, PT Gait Training, PT Therapeutic Exercises, PT Neuro Re-Ed/Balance, PT Therapeutic Activity, PT Manual Therapy and PT Evaluation                           Section completed by:  Travis Rodriguez, PT    Activities of Daily Living  Eating Initial:  1 - Total Assistance  Eating Current:  5 - Standby Prompting/Supervision or Set-up   Eating Description:  Increased time, Modified diet, Supervision for safety, Verbal cueing  Grooming Initial:  5 - Standby Prompting/Supervision or Set-up  Grooming Current:  6 - Modified Independent   Grooming Description:  Increased time(seated at sink for oral care and shaving.)  Bathing Initial:   4 - Minimal Assistance  Bathing Current:  5 - Standby Prompting/Supervision or Set-up   Bathing Description:  Grab bar, Tub bench, Hand held shower, Increased time, Supervision for safety, Verbal cueing, Set-up of equipment(SBA/set up with use of GB for standing balance.)  Upper Body Dressing Initial:  5 - Standby Prompting/Supervision or Set-up  Upper Body Dressing Current:  6 - Modified Independent   Upper Body Dressing Description:  Increased time(increased time to don/doff pullover shirt.)  Lower Body Dressing Initial:  3 - Moderate Assistance  Lower Body Dressing Current:  5 - Standby Prompting/Supervsion or Set-up   Lower Body Dressing Description:  5 - Standby Prompting/Supervsion or Set-up  Toileting Initial:  3 - Moderate Assistance  Toileting Current:  5 - Standby Prompting/Supervision or Set-up   Toileting Description:  Grab bar, Increased time, Supervision for safety  Toilet Transfer Initial:  4 - Minimal Assistance  Toilet Transfer Current:  5 - Standby Prompting/Supervision or Set-up   Toilet Transfer Description:  5 - Standby Prompting/Supervision or Set-up  Tub / Shower Transfer Initial:  3 - Moderate Assistance  Tub / Shower Transfer Current:  5 - Standby Prompting/Supervision or Set-up   Tub / Shower Transfer Description:  Grab bar, Increased time, Supervision for safety, Verbal cueing, Set-up of equipment(SBA with GB and shower bench)  IADL:  TBD.   Family Training/Education:  Ongoing with pt and pt's brother: safety with ADLs and bathroom transfers, bathroom DME, balance and coordination activities, compensatory strategies for LUE impaired sensation.   DME/DC Recommendations:  Home health OT?      Strengths:  Able to follow instructions, Alert and oriented, Independent PLOF, Making steady progress towards goals, Motivated for self care and independence, Pleasant and cooperative, Supportive family and Willingly participates in therapeutic activities  Barriers:  Decreased endurance, Poor activity  tolerance, Poor balance and Other: LUE coordination and sensation, lives alone     # of short term goals set= 4    # of short term goals met= 4     Occupational Therapy Goals     Problem: Functional Transfers     Dates: Start: 02/10/20       Description:     Goal: STG-Within one week, patient will transfer to step in shower     Dates: Start: 02/10/20       Description: 1) Individualized Goal: with threshold to simulate home set up at supervision to mod I with FWW.  2) Interventions: OT E Stim Attended, OT Group Therapy, OT Self Care/ADL, OT Community Reintegration, OT Neuro Re-Ed/Balance, OT Sensory Int Techniques, OT Therapeutic Activity, OT Evaluation and OT Therapeutic Exercise                    Problem: IADL's     Dates: Start: 02/10/20       Description:     Goal: STG-Within one week, patient will access kitchen area     Dates: Start: 02/10/20       Description: 1) Individualized Goal: at supervision to mod I with FWW.  2) Interventions: OT E Stim Attended, OT Group Therapy, OT Self Care/ADL, OT Community Reintegration, OT Neuro Re-Ed/Balance, OT Sensory Int Techniques, OT Therapeutic Activity, OT Evaluation and OT Therapeutic Exercise                    Problem: OT Long Term Goals     Dates: Start: 02/06/20       Description:     Goal: LTG-By discharge, patient will complete basic self care tasks     Dates: Start: 02/06/20       Description: 1) Individualized Goal:  Mod I with AE/DME prn.  2) Interventions:  OT E Stim Attended, OT Group Therapy, OT Self Care/ADL, OT Community Reintegration, OT Neuro Re-Ed/Balance, OT Sensory Int Techniques, OT Therapeutic Activity, OT Evaluation and OT Therapeutic Exercise             Goal: LTG-By discharge, patient will perform bathroom transfers     Dates: Start: 02/06/20       Description: 1) Individualized Goal:  Mod I with AD/DME prn.  2) Interventions:  OT E Stim Attended, OT Group Therapy, OT Self Care/ADL, OT Community Reintegration, OT Neuro Re-Ed/Balance, OT Sensory  Int Techniques, OT Therapeutic Activity, OT Evaluation and OT Therapeutic Exercise           Goal: LTG-By discharge, patient will complete basic home management     Dates: Start: 02/10/20       Description: 1) Individualized Goal: mod I with AE/AD/DME prn  2) Interventions: OT E Stim Attended, OT Group Therapy, OT Self Care/ADL, OT Community Reintegration, OT Neuro Re-Ed/Balance, OT Sensory Int Techniques, OT Therapeutic Activity, OT Evaluation and OT Therapeutic Exercise                          Section completed by:  Rosaura Horner, OT    Cognitive Linquistic Functions  Comprehension Initial:  6 - Modified Independent  Comprehension Current:  6 - Modified Independent   Comprehension Description:     Expression Initial:  6 - Modified Independent  Expression Current:  6 - Modified Independent   Expression Description:     Social Interaction Initial:  7 - Independent  Social Interaction Current:  7 - Independent   Social Interaction Description:     Problem Solving Initial:  5 - Standby Prompting/Supervision or Set-up  Problem Solving Current:  5 - Standby Prompting/Supervision or Set-up   Problem Solving Description:     Memory Initial:  4 - Minimal Assistance  Memory Current:  4 - Minimal Assistance   Memory Description:     Executive Functioning / Organization Initial:     Executive Functioning / Organization Current:   3-4 minimal to moderate assistance    Executive Functioning Desciption:  Attention, organization   Swallowing  Swallowing Status:  Pt advanced to a dys 2 diet with NTL, trials of thin liquids to be continued on this date with advancement as appropriate.   Orders Placed This Encounter   Procedures   • Diet Order Diabetic     Standing Status:   Standing     Number of Occurrences:   1     Order Specific Question:   Diet:     Answer:   Diabetic [3]     Order Specific Question:   Texture/Fiber modifications:     Answer:   Dysphagia 2(Pureed/Chopped)specify fluid consistency(question 6) [2]     Order  Specific Question:   Consistency/Fluid modifications:     Answer:   Thin Liquids [3]     Behavior Modification Plan  Allow for rest time, Give clear feedback, Redirect to task/topic, Provide reasonable choices, Decrease the chance of failure by offering activities that are within the patient's abilities, Analyze tasks (break down into smaller steps) and Reinforce participation in desired tasks  Assistive Technology  Low/Impaired vision equipment and Low tech: Calendar, planner, schedule, alarms/timers, pill organizer, post-it notes, lists  Family Training/Education: ongoing with pt and SO  DC Recommendations: TBD  Strengths:  Able to follow instructions, Independent PLOF, Motivated for self care and independence, Pleasant and cooperative, Supportive family and Willingly participates in therapeutic activities  Barriers:  Aspiration risk and Other: mild cognitive impairments   # of short term goals set=4  # of short term goals met=2  Speech Therapy Problems     Problem: Memory STGs     Dates: Start: 02/06/20       Description:     Goal: STG-Within one week, patient will     Dates: Start: 02/06/20       Description: 1) Individualized goal:  Learn and implement memory strategies to assist in recall of information related to hospitalization and safety with 80% accuracy provided MIN A.  2) Interventions:  SLP Self Care / ADL Training , SLP Cognitive Skill Development and SLP Group Treatment                 Problem: Problem Solving STGs     Dates: Start: 02/06/20       Description:     Goal: STG-Within one week, patient will     Dates: Start: 02/06/20       Description: 1) Individualized goal:  Complete medication and financial management tasks with 80% accuracy provided MIN A.  2) Interventions:  SLP Self Care / ADL Training , SLP Cognitive Skill Development and SLP Group Treatment                 Problem: Speech/Swallowing LTGs     Dates: Start: 02/06/20       Description:     Goal: LTG-By discharge, patient will safely  swallow     Dates: Start: 02/06/20       Description: 1) Individualized goal:  Regular textures and thin liquids with no overt s/sx of asp/pen noted across meals with 100% accuracy provided MOD I.  2) Interventions:  SLP Swallowing Dysfunction Treatment, SLP Video Swallow Evaluation, SLP Self Care / ADL Training , SLP Cognitive Skill Development and SLP Group Treatment             Goal: LTG-By discharge, patient will     Dates: Start: 02/06/20       Description: 1) Individualized goal:  Complete functional problem solving and recall information with 80% accuracy provided MOD I.  2) Interventions:  SLP Self Care / ADL Training , SLP Cognitive Skill Development and SLP Group Treatment                   Problem: Swallowing STGs     Dates: Start: 02/10/20       Description:     Goal: STG-Within one week, patient will safely swallow     Dates: Start: 02/10/20       Description: 1) Individualized goal:  dys 3 textures with thin liquids with no overt s/sx of asp/pen noted across 2/2 meals provided MIN cues   2) Interventions:  SLP Swallowing Dysfunction Treatment, SLP Self Care / ADL Training  and SLP Group Treatment                          Section completed by:  Kenya Lang MS,[facility]-SLP       Nutrition  Dietary Problems     Problem: Other Problem (see comments)     Description:     Goal: Other Goal (Resolved)     Description: Nutrition Support tolerated and meeting greater than 85% of estimated needs.                     Patient s/p MBSS with diet advanced to DM/ Dysphagia 2 with thin liquids.  PO averaging ~78% of meals since diet upgrade.  Discussed with Dr. Ewing- will discontinue TF.    PEG site appears to be infected per Dr. Ewing.  I did not observe site during follow up today.  PEG placed 2/1/20.  Can be removed after 6 weeks.    No GI/ issues/ complaints per patient.  He is alert and oriented.  Happy to be eating.  Thrush ongoing.    Pertinent Labs: accuchecks are excellent, WBC 12.2, BUN improved to 27    Pertinent Medication:  Noted- abx added  Weight: 114.1kg  Via stand up scale- stable  Skin: PEG possibly infected   Vitals: 4L NC, WNL  GI: loose BM today- refused bowel meds  : WNL  I/Os: +880mL x 24 hours with no output recorded    Plan: Discontinue TF and free water.  Continue 30mL flush BID to maintain tube patency.  Diet upgrades per SLP.  RD following PRN.     Section completed by:  Candida Sosa R.D.    REHAB-Pharmacy IDT Team Note by Bronson Solitario RP at 2/7/2020  4:44 PM  Version 1 of 1    Author:  Bronson Solitario RPH Service:  -- Author Type:  Pharmacist    Filed:  2/7/2020  4:46 PM Date of Service:  2/7/2020  4:44 PM Status:  Signed    :  Bronson Solitario RPH (Pharmacist)         Pharmacy   Pharmacy  Antibiotics/Antifungals/Antivirals:  Medication:      Active Orders (From admission, onward)    None        Route:        NA  Stop Date:  NA  Reason:      NA  Antihypertensives/Cardiac:  Medication:    Orders (72h ago, onward)     Start     Ordered    02/06/20 0600  amLODIPine (NORVASC) tablet 5 mg  Q DAY      02/05/20 1140    02/05/20 2100  atorvastatin (LIPITOR) tablet 80 mg  EVERY EVENING      02/05/20 1140    02/05/20 2100  metoprolol (LOPRESSOR) tablet 50 mg  2 TIMES DAILY      02/05/20 1140    02/05/20 1140  hydrALAZINE (APRESOLINE) tablet 25 mg  EVERY 8 HOURS PRN      02/05/20 1140              Patient Vitals for the past 24 hrs:   BP Pulse   02/07/20 1332 -- 93   02/07/20 1331 -- 87   02/07/20 1330 146/65 78   02/07/20 0700 142/66 84   02/07/20 0613 130/67 --   02/06/20 2122 131/64 91   02/06/20 1910 131/64 91     Anticoagulation:  Medication: Aspirin    Other key medications: A review of the medication list reveals no issues at this time. Patient is currently on antihypertensive(s). Recommend home blood pressure monitoring/recording if antihypertensive(s) regimen(s) continue.    Section completed by: Bronson Solitario RP[AW.1]     Attribution Key     AW.1 - Bronson Solitario RP on  2/7/2020  4:44 PM                  DC Planning  DC destination/dispostion:  To single level home with 2 step entry, in [place], [place]. Brother from [facility] will be here for approximately 2 weeks. Daughter in [place], [place] availability and ability to assist patient TBD.    DC Needs: Family training. HH vs OP therapies.   DME needs TBD - has wc, FWW, SPC. Oxygen concentrator through [facility] for use with CPAP at night.   MD f/u appointments.     Barriers to discharge: Lives alone in [place], [place]. Functional deficits. Dysphagia.     Strengths: PLOF - independent, . Motivated.    Section completed by:  Talia Ocasio R.N.      Physician Summary  Alla Ewing MD participated and led team conference discussion.

## 2020-02-10 NOTE — REHAB-DIETARY IDT TEAM NOTE
Dietary   Nutrition  Dietary Problems     Problem: Other Problem (see comments)     Description:     Goal: Other Goal (Resolved)     Description: Nutrition Support tolerated and meeting greater than 85% of estimated needs.                     Patient s/p MBSS with diet advanced to DM/ Dysphagia 2 with thin liquids.  PO averaging ~78% of meals since diet upgrade.  Discussed with Dr. Ewing- will discontinue TF.    PEG site appears to be infected per Dr. Ewing.  I did not observe site during follow up today.  PEG placed 2/1/20.  Can be removed after 6 weeks.    No GI/ issues/ complaints per patient.  He is alert and oriented.  Happy to be eating.  Thrush ongoing.    Pertinent Labs: accuchecks are excellent, WBC 12.2, BUN improved to 27   Pertinent Medication:  Noted- abx added  Weight: 114.1kg  Via stand up scale- stable  Skin: PEG possibly infected   Vitals: 4L NC, WNL  GI: loose BM today- refused bowel meds  : WNL  I/Os: +880mL x 24 hours with no output recorded    Plan: Discontinue TF and free water.  Continue 30mL flush BID to maintain tube patency.  Diet upgrades per SLP.  RD following PRN.     Section completed by:  Candida Sosa R.D.

## 2020-02-11 PROCEDURE — A9270 NON-COVERED ITEM OR SERVICE: HCPCS | Performed by: HOSPITALIST

## 2020-02-11 PROCEDURE — 97110 THERAPEUTIC EXERCISES: CPT

## 2020-02-11 PROCEDURE — A9270 NON-COVERED ITEM OR SERVICE: HCPCS | Performed by: PHYSICAL MEDICINE & REHABILITATION

## 2020-02-11 PROCEDURE — 97530 THERAPEUTIC ACTIVITIES: CPT

## 2020-02-11 PROCEDURE — 92526 ORAL FUNCTION THERAPY: CPT

## 2020-02-11 PROCEDURE — 92508 TX SP LANG VOICE COMM GROUP: CPT

## 2020-02-11 PROCEDURE — 700112 HCHG RX REV CODE 229: Performed by: PHYSICAL MEDICINE & REHABILITATION

## 2020-02-11 PROCEDURE — 97116 GAIT TRAINING THERAPY: CPT

## 2020-02-11 PROCEDURE — 94760 N-INVAS EAR/PLS OXIMETRY 1: CPT

## 2020-02-11 PROCEDURE — 99232 SBSQ HOSP IP/OBS MODERATE 35: CPT | Performed by: HOSPITALIST

## 2020-02-11 PROCEDURE — 97112 NEUROMUSCULAR REEDUCATION: CPT

## 2020-02-11 PROCEDURE — 99232 SBSQ HOSP IP/OBS MODERATE 35: CPT | Performed by: PHYSICAL MEDICINE & REHABILITATION

## 2020-02-11 PROCEDURE — 700102 HCHG RX REV CODE 250 W/ 637 OVERRIDE(OP): Performed by: HOSPITALIST

## 2020-02-11 PROCEDURE — 700102 HCHG RX REV CODE 250 W/ 637 OVERRIDE(OP): Performed by: PHYSICAL MEDICINE & REHABILITATION

## 2020-02-11 PROCEDURE — 770010 HCHG ROOM/CARE - REHAB SEMI PRIVAT*

## 2020-02-11 RX ADMIN — DIBASIC SODIUM PHOSPHATE, MONOBASIC POTASSIUM PHOSPHATE AND MONOBASIC SODIUM PHOSPHATE 2 TABLET: 852; 155; 130 TABLET ORAL at 21:13

## 2020-02-11 RX ADMIN — HYDROCORTISONE: 1 CREAM TOPICAL at 07:51

## 2020-02-11 RX ADMIN — CHOLECALCIFEROL TAB 25 MCG (1000 UNIT) 2000 UNITS: 25 TAB at 09:19

## 2020-02-11 RX ADMIN — DOXYCYCLINE 100 MG: 100 TABLET, FILM COATED ORAL at 21:13

## 2020-02-11 RX ADMIN — HYDROCORTISONE: 1 CREAM TOPICAL at 21:20

## 2020-02-11 RX ADMIN — DOCUSATE SODIUM 100 MG: 100 CAPSULE, LIQUID FILLED ORAL at 09:19

## 2020-02-11 RX ADMIN — MELATONIN 3 MG: at 21:13

## 2020-02-11 RX ADMIN — LISINOPRIL 5 MG: 5 TABLET ORAL at 05:24

## 2020-02-11 RX ADMIN — BUDESONIDE AND FORMOTEROL FUMARATE DIHYDRATE 2 PUFF: 160; 4.5 AEROSOL RESPIRATORY (INHALATION) at 21:17

## 2020-02-11 RX ADMIN — LEVOTHYROXINE SODIUM 100 MCG: 50 TABLET ORAL at 05:24

## 2020-02-11 RX ADMIN — METOPROLOL TARTRATE 50 MG: 25 TABLET ORAL at 21:13

## 2020-02-11 RX ADMIN — TRAZODONE HYDROCHLORIDE 50 MG: 50 TABLET ORAL at 21:18

## 2020-02-11 RX ADMIN — DIBASIC SODIUM PHOSPHATE, MONOBASIC POTASSIUM PHOSPHATE AND MONOBASIC SODIUM PHOSPHATE 2 TABLET: 852; 155; 130 TABLET ORAL at 09:18

## 2020-02-11 RX ADMIN — DOXYCYCLINE 100 MG: 100 TABLET, FILM COATED ORAL at 09:19

## 2020-02-11 RX ADMIN — METOPROLOL TARTRATE 50 MG: 25 TABLET ORAL at 09:19

## 2020-02-11 RX ADMIN — ASPIRIN 81 MG 81 MG: 81 TABLET ORAL at 09:19

## 2020-02-11 RX ADMIN — ATORVASTATIN CALCIUM 80 MG: 40 TABLET, FILM COATED ORAL at 21:14

## 2020-02-11 RX ADMIN — DIBASIC SODIUM PHOSPHATE, MONOBASIC POTASSIUM PHOSPHATE AND MONOBASIC SODIUM PHOSPHATE 2 TABLET: 852; 155; 130 TABLET ORAL at 15:06

## 2020-02-11 RX ADMIN — BUDESONIDE AND FORMOTEROL FUMARATE DIHYDRATE 2 PUFF: 160; 4.5 AEROSOL RESPIRATORY (INHALATION) at 07:51

## 2020-02-11 ASSESSMENT — ENCOUNTER SYMPTOMS
NAUSEA: 0
FEVER: 0
SHORTNESS OF BREATH: 0
CHILLS: 0
VOMITING: 0
ABDOMINAL PAIN: 0
DIARRHEA: 0
NERVOUS/ANXIOUS: 0

## 2020-02-11 NOTE — THERAPY
Physical Therapy   Daily Treatment     Patient Name: Familia Lundy  Age:  74 y.o., Sex:  male  Medical Record #: [id number]  Today's Date: 2/11/2020     Precautions  Precautions: Fall Risk, PEG Tube  Comments: impaired L UE sensation, wounds on feet    Subjective    Pt reports he feels like he does not need supplemental O2     Objective       02/11/20 0931   Vitals   Pulse Oximetry 93 %   O2 (LPM) 0   O2 Delivery None (Room Air)   Vitals Comments at rest, with activity down to 86 with activity, recover in < 1 min   Sitting Lower Body Exercises   Sitting Lower Body Exercises Yes   Nustep Resistance Level 1  (8 min, VCs for pacing, .23 miles, 419 steps, 52 SPM)   Bed Mobility    Supine to Sit Independent   Sit to Supine Independent   Sit to Stand Modified Independent   Scooting Independent   Rolling Independent   Interdisciplinary Plan of Care Collaboration   Patient Position at End of Therapy Other (Comments)  (ambulate back to room with brother no AD)   PT Total Time Spent   PT Individual Total Time Spent (Mins) 60   PT Charge Group   PT Gait Training 1   PT Neuromuscular Re-Education / Balance 2   PT Therapeutic Activities 1       FIM Bed/Chair/Wheelchair Transfers Score: 6 - Modified Independent  Bed/Chair/Wheelchair Transfers Description:  Increased time    FIM Walking Score:  5 - Standby Prompting/Supervision or Set-up  Walking Description:  Extra time, Safety concerns, Verbal cueing, Supervision for safety(450 x3, no AD, SPV, no LOB or foot catching, dual tasking while tying his pants and walking - slower speed, but no LOB - required stop to finish tying pants dt LUE incoordination)    Dynamic balance - walking over foam surfaces, stepping on objects and over obstacles - CGA no AD, no LOB or foot catching.    Assessment    Pt cont to demo drop in O2 with activity but always recovers to greater than 90% within a minute of rest. Pt demos continued improvement with decreased LOB with walking and dynamic balance  activities.     Plan    Continue walking no AD for balance, variable gait activities - (Obstacle courses, outside, uneven surfaces, variable environments, ramps, curbs, dual tasking), trial lite gait for increased stepping, consider AD for safety at home at PR, Wean O2 with activity, activity tolerance, family training with brother and dtr, community outing? Fall recovery practice and edu

## 2020-02-11 NOTE — THERAPY
"Speech Language Pathology  Daily Treatment     Patient Name: Familia Lundy  Age:  74 y.o., Sex:  male  Medical Record #: [id number]  Today's Date: 2/11/2020     Subjective    Pt visibly frustrated when arriving to therapeutic dining regarding being unable to use restroom on his own, \"I just want peace and quiet\" Pt educated on role of SLP in swallow therapy and this being a scheduled therapy session.      Objective     02/11/20 1134   Dysphagia    Diet / Liquid Recommendation Thin Liquid;Dysphagia III   Nutritional Liquid Intake Rating Scale 3   Nutritional Food Intake Rating Scale 6   Interdisciplinary Plan of Care Collaboration   IDT Collaboration with  Family / Caregiver   Patient Position at End of Therapy Seated;Chair Alarm On;Family / Friend in Room   Collaboration Comments pt's brother present for session   SLP Total Time Spent   SLP Individual Total Time Spent (Mins) 30   Charge Group   SLP Swallowing Dysfunction Treatment Swallowing Dysfunction Treatment         Assessment    Pt tolerating mechanical soft textures/thin liquids with no overt s/sx of pen/asp. Pt benefits from written cue card for carryover of compensatory strategies and intermittent verbal cues for serial swallows. Pt able to remind self to not talk with food in mouth (gesturing by holding finger to lips).     Plan    Trial regular textures for lunch 2/12/20    "

## 2020-02-11 NOTE — CARE PLAN
Problem: Safety  Goal: Will remain free from injury  Outcome: PROGRESSING AS EXPECTED  Patient unsteady with generalized weakness, assisted with transfers to prevent falls/injury.     Problem: Bowel/Gastric:  Goal: Normal bowel function is maintained or improved  Outcome: PROGRESSING AS EXPECTED  Patient with loose stools since last night, bowel meds held, will continue to monitor.

## 2020-02-11 NOTE — FLOWSHEET NOTE
02/10/20 1702   Events/Summary/Plan   Events/Summary/Plan 02 spot check,  NC anchors placed.  No wean yet   Interdisciplinary Plan of Care-Goals (Indications)   Bronchodilator Indications History / Diagnosis   Interdisciplinary Plan of Care-Outcomes    Bronchodilator Outcome Diminished Wheezing and Volume of Air Movement Increased   Education   Education Yes - Pt. / Family has been Instructed in use of Respiratory Equipment   Respiratory WDL   Respiratory (WDL) X   Chest Exam   Respiration 18   Pulse 82   Oximetry   #Pulse Oximetry (Single Determination) Yes   Oxygen   Pulse Oximetry 94 %  (after deep breathing)   O2 (LPM) 4   O2 Daily Delivery Respiratory  Silicone Nasal Cannula

## 2020-02-11 NOTE — THERAPY
Speech Language Pathology  Daily Treatment     Patient Name: Familia Lundy  Age:  74 y.o., Sex:  male  Medical Record #: [id number]  Today's Date: 2/11/2020     Subjective    Patient arrived on time to  with assistance.      Objective       02/11/20 0802   Dysphagia    Other Treatments dysphagia III trials    Diet / Liquid Recommendation Dysphagia III;Thin Liquid   Nutritional Liquid Intake Rating Scale 3   Nutritional Food Intake Rating Scale 5   SLP Total Time Spent   SLP Individual Total Time Spent (Mins) 60   Charge Group   SLP Swallowing Dysfunction Treatment Swallowing Dysfunction Treatment           Assessment    Patient was assessed with dysphagia III trials. No overt s/sx of aspiration were noted. Patient did require minimal cues when visiting with his brother during meal.     Plan    Upgrade to dysphagia III, initiate trials of regular.

## 2020-02-11 NOTE — PROGRESS NOTES
"Rehab Progress Note     Date of Service: 2/11/2020  Chief Complaint: follow up stroke    Interval Events (Subjective)    Patient seen and examined today in the therapy gym.  He is working with physical therapy walking over uneven surfaces without any assistive device.  Yesterday though not today he had trouble clearing his left foot when fatigued.  He does still have a left pronator drift.  Also working with titration off the oxygen.  Patient has no new complaints.  We discussed his plan discharge date of the 15th which both he and his brother are in agreement with.  Patient has no new complaints.    Objective:  VITAL SIGNS: /63   Pulse 77   Temp 36.3 °C (97.4 °F) (Oral)   Resp 18   Ht 1.727 m (5' 8\")   Wt 114.1 kg (251 lb 9.6 oz)   SpO2 93%   BMI 38.26 kg/m²   Gen: alert, no apparent distress  CV: regular rate and rhythm, no murmurs, no peripheral edema  Resp: clear to ascultation bilaterally, normal respiratory effort  GI: soft, non-tender abdomen, bowel sounds present, decreased erythema and discharge around his PEG tube site  Neuro: notable for left pronator drift      Recent Results (from the past 72 hour(s))   ACCU-CHEK GLUCOSE    Collection Time: 02/08/20  5:12 PM   Result Value Ref Range    Glucose - Accu-Ck 84 65 - 99 mg/dL   ACCU-CHEK GLUCOSE    Collection Time: 02/08/20  9:07 PM   Result Value Ref Range    Glucose - Accu-Ck 104 (H) 65 - 99 mg/dL   ACCU-CHEK GLUCOSE    Collection Time: 02/09/20  8:05 AM   Result Value Ref Range    Glucose - Accu-Ck 108 (H) 65 - 99 mg/dL   ACCU-CHEK GLUCOSE    Collection Time: 02/09/20 11:34 AM   Result Value Ref Range    Glucose - Accu-Ck 95 65 - 99 mg/dL   ACCU-CHEK GLUCOSE    Collection Time: 02/09/20  5:31 PM   Result Value Ref Range    Glucose - Accu-Ck 92 65 - 99 mg/dL       Current Facility-Administered Medications   Medication Frequency   • senna-docusate (PERICOLACE or SENOKOT S) 8.6-50 MG per tablet 2 Tab BID PRN    And   • polyethylene glycol/lytes " (MIRALAX) PACKET 1 Packet QDAY PRN    And   • magnesium hydroxide (MILK OF MAGNESIA) suspension 30 mL QDAY PRN    And   • bisacodyl (DULCOLAX) suppository 10 mg QDAY PRN   • doxycycline monohydrate (ADOXA) tablet 100 mg Q12HRS   • lisinopril (PRINIVIL) tablet 5 mg Q DAY   • levothyroxine (SYNTHROID) tablet 100 mcg AM ES   • phosphorus (K-PHOS-NEUTRAL) per tablet 2 Tab TID   • budesonide-formoterol (SYMBICORT) 160-4.5 MCG/ACT inhaler 2 Puff BID   • vitamin D (cholecalciferol) tablet 2,000 Units DAILY   • ipratropium-albuterol (DUONEB) nebulizer solution Q4HRS PRN   • Respiratory Care per Protocol Continuous RT   • Pharmacy Consult Request ...Pain Management Review 1 Each PHARMACY TO DOSE   • acetaminophen (TYLENOL) tablet 650 mg Q4HRS PRN   • hydrALAZINE (APRESOLINE) tablet 25 mg Q8HRS PRN   • docusate sodium (COLACE) capsule 100 mg DAILY    And   • lactulose 20 GM/30ML solution 30 mL Q24HRS PRN    And   • bisacodyl (DULCOLAX) suppository 10 mg QDAY PRN   • artificial tears ophthalmic solution 1 Drop PRN   • benzocaine-menthol (CEPACOL) lozenge 1 Lozenge Q2HRS PRN   • mag hydrox-al hydrox-simeth (MAALOX PLUS ES or MYLANTA DS) suspension 20 mL Q2HRS PRN   • ondansetron (ZOFRAN ODT) dispertab 4 mg 4X/DAY PRN    Or   • ondansetron (ZOFRAN) syringe/vial injection 4 mg 4X/DAY PRN   • sodium chloride (OCEAN) 0.65 % nasal spray 2 Spray PRN   • atorvastatin (LIPITOR) tablet 80 mg Q EVENING   • metoprolol (LOPRESSOR) tablet 50 mg BID   • traZODone (DESYREL) tablet 50 mg QHS   • melatonin tablet 3 mg Nightly   • aspirin (ASA) chewable tab 81 mg DAILY   • hydrocortisone 1 % cream BID       Orders Placed This Encounter   Procedures   • Diet Order Diabetic     Standing Status:   Standing     Number of Occurrences:   1     Order Specific Question:   Diet:     Answer:   Diabetic [3]     Order Specific Question:   Texture/Fiber modifications:     Answer:   Dysphagia 3(Mechanical Soft)specify fluid consistency(question 6) [3]      Order Specific Question:   Consistency/Fluid modifications:     Answer:   Thin Liquids [3]       Assessment:  Active Hospital Problems    Diagnosis   • *Cerebrovascular accident (CVA) due to thrombosis of right middle cerebral artery (HCC)   • Acute respiratory failure with hypoxia (HCC)   • Type 2 diabetes mellitus without complication, without long-term current use of insulin (HCC)   • Dysphagia   • Vitamin B12 deficiency   • Essential hypertension   • Moderate persistent asthma without complication   • Dyslipidemia   • Hypothyroidism   • VERENA (obstructive sleep apnea)   • Vitamin D deficiency   • Azotemia   • Obesity (BMI 35.0-39.9 without comorbidity)   • Rash   • S/P percutaneous endoscopic gastrostomy (PEG) tube placement (HCC)   • Hypertriglyceridemia   • Oral thrush   • Anemia   • Impaired mobility and ADLs     This patient is a 74 y.o. male admitted for acute inpatient rehabilitation with Cerebrovascular accident (CVA) due to thrombosis of right middle cerebral artery (HCC).    I led and attended the weekly conference, and agree with the IDT conference documentation and plan of care as noted below.    Date of conference: 2/10/2020    Goals and barriers: See IDT note.    Biggest barriers: mild left sided incoordination, respiratory insufficiency, dysphagia, mild cognitive impairments    CM/social support: brother supportive    Anticipated DC date: 2/15/2020    Home health: PT/OT/SLP/RN    Equip: TBD    Follow up: PCP, rehab clinic optional      Medical Decision Making and Plan:    Right MCA stroke  S/p tPA  Hemorraghic transformation  Dysphagia, s/p PEG 2/1, improved  Cognitive deficits  Left pronator drift  Left arm/hand incoordination  Non-dominant  Continue full rehab program  PT/OT/SLP, 1 hr each discipline, 5 days per week  Speech therapy to advance diet  PEG can be removed 6 weeks after placement  Continue aspirin and statin for secondary stroke prophylaxis    Bowel  Meds as needed  Last BM  2/11    Bladder  Checked PVRs - 67, 54  Not retaining  ICP for over 400 cc  Scheduled toileting    Insomnia, improved  Schedule melatonin and trazodone    DVT prophylaxis  Lovenox    Appreciate assistance of hospitalist with his medical comorbidities:    Asthma  Hypertension  Hypothyroidism  Sleep apnea  Diabetes with hyperglycemia  Anemia  Hypertriglyceridemia  Obesity  Oral thrush  Rash  Vit D deficiency  Leukocytosis, CXR and UA negative, could be due to PEG tube site cellulitis, started on antibiotics    Total time:  26 minutes.  I spent greater than 50% of the time for patient care, counseling, and coordination on this date, including patient face-to face time, unit/floor time with review of records/pertinent lab data and studies, as well as discussing diagnostic evaluation/work up, planned therapeutic interventions, and future disposition of care, as per the interval events/subjective and the assessment and plan as noted above.    I have performed a physical exam, reviewed and updated ROS, as well as the assessment and plan today 2/11/2020. In review of note from 2/10/2020 there are no new changes except as documented above.    Alla Ewing M.D.   Physical Medicine and Rehabilitation

## 2020-02-11 NOTE — THERAPY
"Occupational Therapy  Daily Treatment     Patient Name: Familia Lundy  Age:  74 y.o., Sex:  male  Medical Record #: [id number]  Today's Date: 2/11/2020     Precautions  Precautions: Fall Risk, PEG Tube  Comments: impaired L UE sensation, wounds on feet    Safety   ADL Safety : Requires Supervision for Safety, Modified Independent  Bathroom Safety: Requires Supervision for Safety, Modified Independent  Comments: see FIMs for ADL performance details.    Subjective    \"I don't really cook at home, I just microwave meals\"       Objective       02/11/20 1301   Precautions   Precautions Fall Risk;PEG Tube   Comments impaired L UE sensation, wounds on feet   Sitting Upper Body Exercises   Upper Extremity Bike Level 3 Resistance  (FluidoBike 5 min x2, 1 RB, .95km)   IADL Treatments   Kitchen Mobility Education Pt educated on safe kitchen mobility techniques without use of an assistive device. Pt retrieved cones from cupboards, appliances, countertop, etc at various heights at supervision level with no observed LOB. Discussed safety strategies, including keeping frequently used items within easy reach, using reacher to retrieve items as needed, using countertop for UB support for balance, and completing portions of cooking tasks while seated for energy conservation.    Dynavision   Patient Position Standing   Application Attention regulation;Bilateral integration;Visual-motor integration;Visual-spatial integration   Mode A   Time per trial (sec) 60   Number of Rings   (5)   Quadrants LUQ;LLQ;RUQ;RLQ   Number of Hits 35  (55 (trial 2))   Average Reaction Time 1.71 sec (trial 1)  (1.09 sec (trial 2))   Clinical Observations Coordination impaired  (LUE coordination mildly impaired)   Interdisciplinary Plan of Care Collaboration   IDT Collaboration with  Family / Caregiver   Patient Position at End of Therapy Seated;Self Releasing Lap Belt Applied;Call Light within Reach;Tray Table within Reach;Family / Friend in Room "   Collaboration Comments pt's brother present   OT Total Time Spent   OT Individual Total Time Spent (Mins) 60   OT Charge Group   OT Therapy Activity 2   OT Therapeutic Exercise  2     Functional ambulation outdoors over variable surfaces including uneven sidewalk, pavement, gravel and grass x~850 ft without an assistive device, CGA-SBA and cues for pacing. Pt required 1 seated rest break.     Assessment    Pt tolerated session well and making progress with endurance and standing balance. He con't to fatigue quickly and requires cues for pacing. Pt demo mild LUE coordination impairments during dynavision activity.     Plan    ADLs/IADLs and related functional mobility, LUE coordination/strength/sensory re-integration, balance, endurance.

## 2020-02-11 NOTE — CARE PLAN
Problem: Safety  Goal: Will remain free from falls  Intervention: Implement fall precautions  Note:   Use of call light encouraged to make needs known.Fall precautions and frequent rounding in place for safety.Call light within reach.Will continue to monitor and assess needs and safety.     Problem: Bowel/Gastric:  Goal: Normal bowel function is maintained or improved  Intervention: Educate patient and significant other/support system about signs and symptoms of constipation and interventions to implement  Note:   Pt is continent of bowel per report.LBM 02/10.Will continue to monitor.

## 2020-02-11 NOTE — PROGRESS NOTES
Hospital Medicine Daily Progress Note    Chief Complaint:  Hypertension  Diabetes  G-tube site infection    Interval History:  No significant events or changes since last visit    Review of Systems  Review of Systems   Constitutional: Negative for chills and fever.   Respiratory: Negative for shortness of breath.    Cardiovascular: Negative for chest pain.   Gastrointestinal: Negative for abdominal pain, diarrhea, nausea and vomiting.   Psychiatric/Behavioral: The patient is not nervous/anxious.         Physical Exam  Temp:  [36.3 °C (97.4 °F)-36.8 °C (98.3 °F)] 36.3 °C (97.4 °F)  Pulse:  [72-86] 77  Resp:  [18-20] 18  BP: ()/(50-65) 111/63  SpO2:  [86 %-97 %] 95 %    Physical Exam  Vitals signs and nursing note reviewed.   Constitutional:       Appearance: Normal appearance.   HENT:      Head: Atraumatic.   Eyes:      Conjunctiva/sclera: Conjunctivae normal.      Pupils: Pupils are equal, round, and reactive to light.   Neck:      Musculoskeletal: Normal range of motion and neck supple.   Cardiovascular:      Rate and Rhythm: Normal rate and regular rhythm.      Heart sounds: No murmur.   Pulmonary:      Effort: Pulmonary effort is normal.      Breath sounds: No stridor. No wheezing or rales.   Abdominal:      General: There is no distension.      Palpations: Abdomen is soft.      Tenderness: There is no tenderness.      Comments: G-Tube site has some surrounding induration and purulent appearing discharge   Musculoskeletal:      Right lower leg: No edema.      Left lower leg: No edema.   Skin:     General: Skin is warm and dry.      Findings: No rash.   Neurological:      Mental Status: He is alert and oriented to person, place, and time.   Psychiatric:         Mood and Affect: Mood normal.         Behavior: Behavior normal.         Fluids    Intake/Output Summary (Last 24 hours) at 2/11/2020 0843  Last data filed at 2/10/2020 2050  Gross per 24 hour   Intake 1040 ml   Output --   Net 1040 ml        Laboratory                      Assessment/Plan  * Cerebrovascular accident (CVA) due to thrombosis of right middle cerebral artery (HCC)- (present on admission)  Assessment & Plan  S/P TPA  On ASA  On Lipitor    Chronic respiratory failure (HCC)- (present on admission)  Assessment & Plan  Hx of asthma  Hx extensive smoking history  On Symbicort    Dysphagia- (present on admission)  Assessment & Plan  S/P PEG on 2/1/20 by Dr. Justice    Type 2 diabetes mellitus without complication, without long-term current use of insulin (HCC)- (present on admission)  Assessment & Plan  Hba1c: 7.2  Off diabetic meds  Off accuchecks    Essential hypertension- (present on admission)  Assessment & Plan  BP low normal but ok  On Lisinopril: 5 mg daily  On Lopressor: 50 mg bid  Cont to monitor    Leukocytosis  Assessment & Plan  WBC's: 12.7 (2/7) --> 12.2 (2/8)  UA: neg  CXR: unremarkable  ? 2nd to G-tube site infection  On Doxycycline (thru 2/16)  Monitor    Hypophosphatemia  Assessment & Plan  PO4: 2.4 (2/7) --> 2.9 (2/8)  On supplements  Monitor    Azotemia- (present on admission)  Assessment & Plan  Bun: 35 (2/7) --> 27 (2/8)  S/P recent IVF's  Encouraging oral fluids  Monitor    Vitamin D deficiency- (present on admission)  Assessment & Plan  Vit D: 13  On supplements    Hypothyroidism- (present on admission)  Assessment & Plan  TSH 5.63  FT4 0.86  Synthroid dose increased by Dr. Harris    VERENA (obstructive sleep apnea)- (present on admission)  Assessment & Plan  On nocturnal CPAP

## 2020-02-11 NOTE — DISCHARGE PLANNING
Met with patient and his brother following Team Conference to relay progress, anticipated discharge date of 2/15/20. Patient chose [facility] as the home health provider. DME needed is still TBD.   We discussed the discharge process and the option of seeing Dr. Bingham at the rehab clinic to be evaluated to return to driving. Patient wishes to work with his PCP.   Patient's daughter is a teacher and has Fridays off. He asked that the PCP appointment be made on a Friday.  Opportunity for discussion/questions provided.

## 2020-02-11 NOTE — THERAPY
"Speech Language Pathology  Daily Treatment     Patient Name: Familia Lundy  Age:  74 y.o., Sex:  male  Medical Record #: [id number]  Today's Date: 2/11/2020     Subjective    Pt agreeable to participate in group ST. Appeared in good spirits, recalled other group member by name.      Objective     02/11/20 1034   Interdisciplinary Plan of Care Collaboration   IDT Collaboration with  Family / Caregiver   Patient Position at End of Therapy Seated;Self Releasing Lap Belt Applied;Call Light within Reach;Family / Friend in Room   Collaboration Comments pt participated in group ST targeting attention and working memory   SLP Total Time Spent   SLP Group Total Time Spent (Mins) 45   Charge Group   SLP Treatment - Group Speech Language Treatment - Group         Assessment    Pt participated in group ST targeting attention and working memory. Pt independently using writing/short hand to aid in recall of items/topics up to 5 units. Pt able to recall up to 4/5 units IND. Pt independently asking SLP to \"slow down\" to ensure all information was obtained/comprehended. Education and purpose of activities and how this applies to therapy disciplines and return to home environment provided with pt able to verbalize how to implement while at [facility] (e.g. ask staff to repeat info, state back to ensure understanding)    Plan    Continue POC    "

## 2020-02-11 NOTE — FLOWSHEET NOTE
02/11/20 0748   Events/Summary/Plan   Events/Summary/Plan 02 spot check and wean   Interdisciplinary Plan of Care-Goals (Indications)   Bronchodilator Indications History / Diagnosis   Interdisciplinary Plan of Care-Outcomes    Bronchodilator Outcome Diminished Wheezing and Volume of Air Movement Increased   Education   Education Yes - Pt. / Family has been Instructed in use of Respiratory Equipment   Respiratory WDL   Respiratory (WDL) X   Chest Exam   Respiration 18   Pulse 77   Breath Sounds   RML Breath Sounds Coarse Crackles   RLL Breath Sounds Coarse Crackles   HARMONY Breath Sounds Diminished   LLL Breath Sounds Coarse Crackles   Secretions   Cough Non Productive   Oximetry   #Pulse Oximetry (Single Determination) Yes   Oxygen   Home O2 Use Prior To Admission? Yes   Home O2 LPM Flow 2 LPM   Home O2 Delivery Method   (CPAP)   Home O2 Frequency of Use At Sleep   Pulse Oximetry 95 %   O2 (LPM) 3  (weaned from 4)   O2 Daily Delivery Respiratory  Silicone Nasal Cannula

## 2020-02-11 NOTE — CARE PLAN
Problem: Safety  Goal: Will remain free from injury  Note:   Patient uses call light consistently and appropriately this shift.  Waits for assistance when needed and does not attempt self transfer.  Able to verbalize needs.  Will continue to monitor.      Problem: Pain Management  Goal: Pain level will decrease to patient's comfort goal  Note:   Patient able to verbalize needs.  Denies pain or discomfort this shift and no s/s same noted.  Will continue to monitor.

## 2020-02-12 PROBLEM — E87.6 HYPOKALEMIA: Status: ACTIVE | Noted: 2020-02-12

## 2020-02-12 LAB
ANION GAP SERPL CALC-SCNC: 10 MMOL/L (ref 0–11.9)
BASOPHILS # BLD AUTO: 0.7 % (ref 0–1.8)
BASOPHILS # BLD: 0.07 K/UL (ref 0–0.12)
BUN SERPL-MCNC: 18 MG/DL (ref 8–22)
CALCIUM SERPL-MCNC: 8.5 MG/DL (ref 8.5–10.5)
CHLORIDE SERPL-SCNC: 99 MMOL/L (ref 96–112)
CO2 SERPL-SCNC: 29 MMOL/L (ref 20–33)
CREAT SERPL-MCNC: 1.01 MG/DL (ref 0.5–1.4)
EOSINOPHIL # BLD AUTO: 0.25 K/UL (ref 0–0.51)
EOSINOPHIL NFR BLD: 2.4 % (ref 0–6.9)
ERYTHROCYTE [DISTWIDTH] IN BLOOD BY AUTOMATED COUNT: 46.5 FL (ref 35.9–50)
GLUCOSE SERPL-MCNC: 103 MG/DL (ref 65–99)
HCT VFR BLD AUTO: 34.1 % (ref 42–52)
HGB BLD-MCNC: 11.1 G/DL (ref 14–18)
IMM GRANULOCYTES # BLD AUTO: 0.1 K/UL (ref 0–0.11)
IMM GRANULOCYTES NFR BLD AUTO: 0.9 % (ref 0–0.9)
LYMPHOCYTES # BLD AUTO: 1.93 K/UL (ref 1–4.8)
LYMPHOCYTES NFR BLD: 18.2 % (ref 22–41)
MAGNESIUM SERPL-MCNC: 2.1 MG/DL (ref 1.5–2.5)
MCH RBC QN AUTO: 31.3 PG (ref 27–33)
MCHC RBC AUTO-ENTMCNC: 32.6 G/DL (ref 33.7–35.3)
MCV RBC AUTO: 96.1 FL (ref 81.4–97.8)
MONOCYTES # BLD AUTO: 0.99 K/UL (ref 0–0.85)
MONOCYTES NFR BLD AUTO: 9.4 % (ref 0–13.4)
NEUTROPHILS # BLD AUTO: 7.24 K/UL (ref 1.82–7.42)
NEUTROPHILS NFR BLD: 68.4 % (ref 44–72)
NRBC # BLD AUTO: 0 K/UL
NRBC BLD-RTO: 0 /100 WBC
PHOSPHATE SERPL-MCNC: 3.6 MG/DL (ref 2.5–4.5)
PLATELET # BLD AUTO: 405 K/UL (ref 164–446)
PMV BLD AUTO: 11.3 FL (ref 9–12.9)
POTASSIUM SERPL-SCNC: 3 MMOL/L (ref 3.6–5.5)
RBC # BLD AUTO: 3.55 M/UL (ref 4.7–6.1)
SODIUM SERPL-SCNC: 138 MMOL/L (ref 135–145)
WBC # BLD AUTO: 10.6 K/UL (ref 4.8–10.8)

## 2020-02-12 PROCEDURE — 80048 BASIC METABOLIC PNL TOTAL CA: CPT

## 2020-02-12 PROCEDURE — 97130 THER IVNTJ EA ADDL 15 MIN: CPT

## 2020-02-12 PROCEDURE — A9270 NON-COVERED ITEM OR SERVICE: HCPCS | Performed by: PHYSICAL MEDICINE & REHABILITATION

## 2020-02-12 PROCEDURE — 99233 SBSQ HOSP IP/OBS HIGH 50: CPT | Performed by: PHYSICAL MEDICINE & REHABILITATION

## 2020-02-12 PROCEDURE — 36415 COLL VENOUS BLD VENIPUNCTURE: CPT

## 2020-02-12 PROCEDURE — 84100 ASSAY OF PHOSPHORUS: CPT

## 2020-02-12 PROCEDURE — 700102 HCHG RX REV CODE 250 W/ 637 OVERRIDE(OP): Performed by: HOSPITALIST

## 2020-02-12 PROCEDURE — 85025 COMPLETE CBC W/AUTO DIFF WBC: CPT

## 2020-02-12 PROCEDURE — 97530 THERAPEUTIC ACTIVITIES: CPT

## 2020-02-12 PROCEDURE — 97129 THER IVNTJ 1ST 15 MIN: CPT

## 2020-02-12 PROCEDURE — 700112 HCHG RX REV CODE 229: Performed by: PHYSICAL MEDICINE & REHABILITATION

## 2020-02-12 PROCEDURE — 99232 SBSQ HOSP IP/OBS MODERATE 35: CPT | Performed by: HOSPITALIST

## 2020-02-12 PROCEDURE — A9270 NON-COVERED ITEM OR SERVICE: HCPCS | Performed by: HOSPITALIST

## 2020-02-12 PROCEDURE — 97116 GAIT TRAINING THERAPY: CPT

## 2020-02-12 PROCEDURE — 700102 HCHG RX REV CODE 250 W/ 637 OVERRIDE(OP): Performed by: PHYSICAL MEDICINE & REHABILITATION

## 2020-02-12 PROCEDURE — 92526 ORAL FUNCTION THERAPY: CPT

## 2020-02-12 PROCEDURE — 97112 NEUROMUSCULAR REEDUCATION: CPT

## 2020-02-12 PROCEDURE — 83735 ASSAY OF MAGNESIUM: CPT

## 2020-02-12 PROCEDURE — 770010 HCHG ROOM/CARE - REHAB SEMI PRIVAT*

## 2020-02-12 PROCEDURE — 94760 N-INVAS EAR/PLS OXIMETRY 1: CPT

## 2020-02-12 RX ORDER — POTASSIUM CHLORIDE 20 MEQ/1
40 TABLET, EXTENDED RELEASE ORAL 2 TIMES DAILY
Status: COMPLETED | OUTPATIENT
Start: 2020-02-12 | End: 2020-02-12

## 2020-02-12 RX ADMIN — HYDROCORTISONE: 1 CREAM TOPICAL at 09:53

## 2020-02-12 RX ADMIN — MELATONIN 3 MG: at 20:28

## 2020-02-12 RX ADMIN — BUDESONIDE AND FORMOTEROL FUMARATE DIHYDRATE 2 PUFF: 160; 4.5 AEROSOL RESPIRATORY (INHALATION) at 20:32

## 2020-02-12 RX ADMIN — LISINOPRIL 5 MG: 5 TABLET ORAL at 05:28

## 2020-02-12 RX ADMIN — DOXYCYCLINE 100 MG: 100 TABLET, FILM COATED ORAL at 20:27

## 2020-02-12 RX ADMIN — POTASSIUM CHLORIDE 40 MEQ: 1500 TABLET, EXTENDED RELEASE ORAL at 09:54

## 2020-02-12 RX ADMIN — METOPROLOL TARTRATE 50 MG: 25 TABLET ORAL at 20:27

## 2020-02-12 RX ADMIN — CHOLECALCIFEROL TAB 25 MCG (1000 UNIT) 2000 UNITS: 25 TAB at 09:54

## 2020-02-12 RX ADMIN — POTASSIUM CHLORIDE 40 MEQ: 1500 TABLET, EXTENDED RELEASE ORAL at 20:28

## 2020-02-12 RX ADMIN — LEVOTHYROXINE SODIUM 100 MCG: 50 TABLET ORAL at 05:28

## 2020-02-12 RX ADMIN — DOXYCYCLINE 100 MG: 100 TABLET, FILM COATED ORAL at 09:55

## 2020-02-12 RX ADMIN — TRAZODONE HYDROCHLORIDE 50 MG: 50 TABLET ORAL at 20:28

## 2020-02-12 RX ADMIN — DIBASIC SODIUM PHOSPHATE, MONOBASIC POTASSIUM PHOSPHATE AND MONOBASIC SODIUM PHOSPHATE 2 TABLET: 852; 155; 130 TABLET ORAL at 20:27

## 2020-02-12 RX ADMIN — DIBASIC SODIUM PHOSPHATE, MONOBASIC POTASSIUM PHOSPHATE AND MONOBASIC SODIUM PHOSPHATE 2 TABLET: 852; 155; 130 TABLET ORAL at 15:46

## 2020-02-12 RX ADMIN — DIBASIC SODIUM PHOSPHATE, MONOBASIC POTASSIUM PHOSPHATE AND MONOBASIC SODIUM PHOSPHATE 2 TABLET: 852; 155; 130 TABLET ORAL at 09:54

## 2020-02-12 RX ADMIN — BUDESONIDE AND FORMOTEROL FUMARATE DIHYDRATE 2 PUFF: 160; 4.5 AEROSOL RESPIRATORY (INHALATION) at 09:53

## 2020-02-12 RX ADMIN — HYDROCORTISONE: 1 CREAM TOPICAL at 20:33

## 2020-02-12 RX ADMIN — ATORVASTATIN CALCIUM 80 MG: 40 TABLET, FILM COATED ORAL at 20:28

## 2020-02-12 RX ADMIN — DOCUSATE SODIUM 100 MG: 100 CAPSULE, LIQUID FILLED ORAL at 09:55

## 2020-02-12 RX ADMIN — ASPIRIN 81 MG 81 MG: 81 TABLET ORAL at 09:55

## 2020-02-12 RX ADMIN — METOPROLOL TARTRATE 50 MG: 25 TABLET ORAL at 09:55

## 2020-02-12 ASSESSMENT — ENCOUNTER SYMPTOMS
FEVER: 0
VOMITING: 0
CHILLS: 0
DIARRHEA: 0
NAUSEA: 0
ABDOMINAL PAIN: 0
SHORTNESS OF BREATH: 0
NERVOUS/ANXIOUS: 0

## 2020-02-12 NOTE — THERAPY
Physical Therapy   Daily Treatment     Patient Name: Familia Lundy  Age:  74 y.o., Sex:  male  Medical Record #: [id number]  Today's Date: 2/12/2020     Precautions  Precautions: Fall Risk, PEG Tube  Comments: impaired L UE sensation, wounds on feet    Subjective    Patient refused practice of floor recovery, but was agreeable to home safety education, and floor recovery sequencing/ demo.      Objective       02/12/20 0901   Precautions   Precautions PEG Tube;Fall Risk   Comments impaired L UE sensation, wounds on feet   Sitting Lower Body Exercises   Sitting Lower Body Exercises   (Seated HEP handout provided)   Ankle Pumps 1 set of 15;Bilateral   Hip Abduction 1 set of 15;Bilateral   Hip Adduction 1 set of 15;Bilateral   Long Arc Quad 1 set of 15;Bilateral   Marching Reciprocal;1 set of 15   Hamstring Curl 1 set of 15;Bilateral   Neuro-Muscular Treatments   Comments Fall recovery/ home safety packet reviewed/ provided.  Demostration of floor recovery.     Interdisciplinary Plan of Care Collaboration   IDT Collaboration with  Family / Caregiver   Patient Position at End of Therapy Seated;Family / Friend in Room   Collaboration Comments Review of [facility] as option; brochure provided. Review of fall recovery technique.    PT Total Time Spent   PT Individual Total Time Spent (Mins) 30   PT Charge Group   PT Therapeutic Activities 2       FIM Wheelchair Score:  6 - Modified Independent  Wheelchair Description:         Assessment    Patient was limited by refusal to ambulate during session, and refusal to trial floor recovery. He was receptive to education, safety recommendations, and floor recovery demonstration.      Plan    Balance training (SANDS 44/56), gait on uneven surfaces, D/C anticipated for 3 days.

## 2020-02-12 NOTE — PROGRESS NOTES
"Rehab Progress Note     Date of Service: 2/12/2020  Chief Complaint: follow up stroke    Interval Events (Subjective)    Patient seen and examined today in his room.  We had a long discussion about his new diagnosis of diabetes due to hemoglobin A1c of 7.2.  He reports in the past he was told he was borderline.  We discussed dietary recommendations as well as need to exercise.  Patient reports he is worried about having another stroke.  Patient also wants to know if he is going to need oxygen during the day at discharge.  He has no other complaints.    Objective:  VITAL SIGNS: /68   Pulse 74   Temp 36.4 °C (97.6 °F) (Oral)   Resp 20   Ht 1.727 m (5' 8\")   Wt 114.1 kg (251 lb 9.6 oz)   SpO2 94%   BMI 38.26 kg/m²   Gen: alert, no apparent distress  CV: regular rate and rhythm, no murmurs, no peripheral edema  Resp: clear to ascultation bilaterally, normal respiratory effort, on O2  GI: soft, non-tender abdomen, bowel sounds present, PEG tube      Recent Results (from the past 72 hour(s))   ACCU-CHEK GLUCOSE    Collection Time: 02/09/20  5:31 PM   Result Value Ref Range    Glucose - Accu-Ck 92 65 - 99 mg/dL   Basic Metabolic Panel    Collection Time: 02/12/20  5:45 AM   Result Value Ref Range    Sodium 138 135 - 145 mmol/L    Potassium 3.0 (L) 3.6 - 5.5 mmol/L    Chloride 99 96 - 112 mmol/L    Co2 29 20 - 33 mmol/L    Glucose 103 (H) 65 - 99 mg/dL    Bun 18 8 - 22 mg/dL    Creatinine 1.01 0.50 - 1.40 mg/dL    Calcium 8.5 8.5 - 10.5 mg/dL    Anion Gap 10.0 0.0 - 11.9   MAGNESIUM    Collection Time: 02/12/20  5:45 AM   Result Value Ref Range    Magnesium 2.1 1.5 - 2.5 mg/dL   PHOSPHORUS    Collection Time: 02/12/20  5:45 AM   Result Value Ref Range    Phosphorus 3.6 2.5 - 4.5 mg/dL   CBC WITH DIFFERENTIAL    Collection Time: 02/12/20  5:45 AM   Result Value Ref Range    WBC 10.6 4.8 - 10.8 K/uL    RBC 3.55 (L) 4.70 - 6.10 M/uL    Hemoglobin 11.1 (L) 14.0 - 18.0 g/dL    Hematocrit 34.1 (L) 42.0 - 52.0 %    " MCV 96.1 81.4 - 97.8 fL    MCH 31.3 27.0 - 33.0 pg    MCHC 32.6 (L) 33.7 - 35.3 g/dL    RDW 46.5 35.9 - 50.0 fL    Platelet Count 405 164 - 446 K/uL    MPV 11.3 9.0 - 12.9 fL    Neutrophils-Polys 68.40 44.00 - 72.00 %    Lymphocytes 18.20 (L) 22.00 - 41.00 %    Monocytes 9.40 0.00 - 13.40 %    Eosinophils 2.40 0.00 - 6.90 %    Basophils 0.70 0.00 - 1.80 %    Immature Granulocytes 0.90 0.00 - 0.90 %    Nucleated RBC 0.00 /100 WBC    Neutrophils (Absolute) 7.24 1.82 - 7.42 K/uL    Lymphs (Absolute) 1.93 1.00 - 4.80 K/uL    Monos (Absolute) 0.99 (H) 0.00 - 0.85 K/uL    Eos (Absolute) 0.25 0.00 - 0.51 K/uL    Baso (Absolute) 0.07 0.00 - 0.12 K/uL    Immature Granulocytes (abs) 0.10 0.00 - 0.11 K/uL    NRBC (Absolute) 0.00 K/uL   ESTIMATED GFR    Collection Time: 02/12/20  5:45 AM   Result Value Ref Range    GFR If African American >60 >60 mL/min/1.73 m 2    GFR If Non African American >60 >60 mL/min/1.73 m 2       Current Facility-Administered Medications   Medication Frequency   • potassium chloride SA (Kdur) tablet 40 mEq BID   • senna-docusate (PERICOLACE or SENOKOT S) 8.6-50 MG per tablet 2 Tab BID PRN    And   • polyethylene glycol/lytes (MIRALAX) PACKET 1 Packet QDAY PRN    And   • magnesium hydroxide (MILK OF MAGNESIA) suspension 30 mL QDAY PRN    And   • bisacodyl (DULCOLAX) suppository 10 mg QDAY PRN   • doxycycline monohydrate (ADOXA) tablet 100 mg Q12HRS   • lisinopril (PRINIVIL) tablet 5 mg Q DAY   • levothyroxine (SYNTHROID) tablet 100 mcg AM ES   • phosphorus (K-PHOS-NEUTRAL) per tablet 2 Tab TID   • budesonide-formoterol (SYMBICORT) 160-4.5 MCG/ACT inhaler 2 Puff BID   • vitamin D (cholecalciferol) tablet 2,000 Units DAILY   • ipratropium-albuterol (DUONEB) nebulizer solution Q4HRS PRN   • Respiratory Care per Protocol Continuous RT   • Pharmacy Consult Request ...Pain Management Review 1 Each PHARMACY TO DOSE   • acetaminophen (TYLENOL) tablet 650 mg Q4HRS PRN   • hydrALAZINE (APRESOLINE) tablet 25 mg  Q8HRS PRN   • docusate sodium (COLACE) capsule 100 mg DAILY    And   • lactulose 20 GM/30ML solution 30 mL Q24HRS PRN    And   • bisacodyl (DULCOLAX) suppository 10 mg QDAY PRN   • artificial tears ophthalmic solution 1 Drop PRN   • benzocaine-menthol (CEPACOL) lozenge 1 Lozenge Q2HRS PRN   • mag hydrox-al hydrox-simeth (MAALOX PLUS ES or MYLANTA DS) suspension 20 mL Q2HRS PRN   • ondansetron (ZOFRAN ODT) dispertab 4 mg 4X/DAY PRN    Or   • ondansetron (ZOFRAN) syringe/vial injection 4 mg 4X/DAY PRN   • sodium chloride (OCEAN) 0.65 % nasal spray 2 Spray PRN   • atorvastatin (LIPITOR) tablet 80 mg Q EVENING   • metoprolol (LOPRESSOR) tablet 50 mg BID   • traZODone (DESYREL) tablet 50 mg QHS   • melatonin tablet 3 mg Nightly   • aspirin (ASA) chewable tab 81 mg DAILY   • hydrocortisone 1 % cream BID       Orders Placed This Encounter   Procedures   • Diet Order Diabetic     Standing Status:   Standing     Number of Occurrences:   1     Order Specific Question:   Diet:     Answer:   Diabetic [3]     Order Specific Question:   Texture/Fiber modifications:     Answer:   Dysphagia 3(Mechanical Soft)specify fluid consistency(question 6) [3]     Order Specific Question:   Consistency/Fluid modifications:     Answer:   Thin Liquids [3]       Assessment:  Active Hospital Problems    Diagnosis   • *Cerebrovascular accident (CVA) due to thrombosis of right middle cerebral artery (HCC)   • Acute respiratory failure with hypoxia ([facility])   • Type 2 diabetes mellitus without complication, without long-term current use of insulin ([facility])   • Dysphagia   • Vitamin B12 deficiency   • Essential hypertension   • Moderate persistent asthma without complication   • Dyslipidemia   • Hypothyroidism   • VERENA (obstructive sleep apnea)   • Vitamin D deficiency   • Azotemia   • Obesity (BMI 35.0-39.9 without comorbidity)   • Rash   • S/P percutaneous endoscopic gastrostomy (PEG) tube placement ([facility])   • Hypertriglyceridemia   • Oral thrush   •  Anemia   • Impaired mobility and ADLs     This patient is a 74 y.o. male admitted for acute inpatient rehabilitation with Cerebrovascular accident (CVA) due to thrombosis of right middle cerebral artery (HCC).    I led and attended the weekly conference, and agree with the IDT conference documentation and plan of care as noted below.    Date of conference: 2/10/2020    Goals and barriers: See IDT note.    Biggest barriers: mild left sided incoordination, respiratory insufficiency, dysphagia, mild cognitive impairments    CM/social support: brother supportive    Anticipated DC date: 2/15/2020    Home health: PT/OT/SLP/RN    Equip: TBD    Follow up: PCP, rehab clinic optional      Medical Decision Making and Plan:    Right MCA stroke  S/p tPA  Hemorraghic transformation  Dysphagia, s/p PEG 2/1, improved  Cognitive deficits  Left pronator drift  Left arm/hand incoordination  Non-dominant  Continue full rehab program  PT/OT/SLP, 1 hr each discipline, 5 days per week  Speech therapy to advance diet  PEG can be removed 6 weeks after placement  Continue aspirin and statin for secondary stroke prophylaxis    Bowel  Meds as needed  Last BM 2/12    Bladder  Checked PVRs - 67, 54  Not retaining  ICP for over 400 cc  Scheduled toileting    Insomnia, improved  Schedule melatonin and trazodone    DVT prophylaxis  Lovenox    Appreciate assistance of hospitalist with his medical comorbidities:    Asthma, Symbicort  Hypertension, lisinopril, metoprolol  Hypothyroidism, levythyroxine  Sleep apnea, CPAP  Diabetes with hyperglycemia  Anemia, stable  Hypertriglyceridemia  Obesity  Oral thrush  Rash, hydrocortisone cream  Vit D deficiency, on supplementation  Leukocytosis, CXR and UA negative  PEG tube site cellulitis, doxycycline  Hypophosphatemia, on supplementation  Hypokalemia, on supplementation    Total time:  37 minutes.  I spent greater than 50% of the time for patient care, counseling, and coordination on this date, including  patient face-to face time, unit/floor time with review of records/pertinent lab data and studies, as well as discussing diagnostic evaluation/work up, planned therapeutic interventions, and future disposition of care, as per the interval events/subjective and the assessment and plan as noted above.    Prolonged discussion today about diabetes, stroke risk factors, diet and exercise.      Alla Ewing M.D.   Physical Medicine and Rehabilitation

## 2020-02-12 NOTE — THERAPY
Speech Language Pathology  Daily Treatment     Patient Name: Familia Lundy  Age:  74 y.o., Sex:  male  Medical Record #: [id number]  Today's Date: 2/12/2020     Subjective    Pt was willing to participate in this ST session      Objective       02/12/20 1401   SLP Total Time Spent   SLP Individual Total Time Spent (Mins) 30   Charge Group   SLP Cognitive Skill Development First 15 Minutes 1   SLP Cognitive Skill Development Additional 15 Minutes 1       Assessment    Reviewed pt's current medications.  Pt was able to recall the purpose of approximately 50% of his medications when given the names.  Pt reported that he managed his medications at home independently by lining them up on a shelf and taking them out of the bottles.  Pt educated on the benefit of using a pill box vs taking them out of the bottles.  Pt reported that it is easier for him to take them out of the bottles, but he would be willing to practice using a pill box.      Plan    Complete a functional medication sorting task

## 2020-02-12 NOTE — PROGRESS NOTES
Hospital Medicine Daily Progress Note    Chief Complaint:  Hypertension  Diabetes  G-tube site infection    Interval History:  No significant events or changes since last visit    Review of Systems  Review of Systems   Constitutional: Negative for chills and fever.   Respiratory: Negative for shortness of breath.    Cardiovascular: Negative for chest pain.   Gastrointestinal: Negative for abdominal pain, diarrhea, nausea and vomiting.   Psychiatric/Behavioral: The patient is not nervous/anxious.         Physical Exam  Temp:  [36.4 °C (97.6 °F)-37.1 °C (98.8 °F)] 36.4 °C (97.6 °F)  Pulse:  [74-82] 74  Resp:  [18-20] 20  BP: (104-114)/(59-68) 114/68  SpO2:  [86 %-96 %] 94 %    Physical Exam  Vitals signs and nursing note reviewed.   Constitutional:       General: He is not in acute distress.  HENT:      Mouth/Throat:      Mouth: Mucous membranes are moist.      Pharynx: Oropharynx is clear.   Eyes:      General: No scleral icterus.  Neck:      Musculoskeletal: No neck rigidity.   Cardiovascular:      Rate and Rhythm: Normal rate and regular rhythm.      Heart sounds: No murmur.   Pulmonary:      Effort: Pulmonary effort is normal.      Breath sounds: Normal breath sounds. No stridor.   Abdominal:      General: There is no distension.      Palpations: Abdomen is soft.      Tenderness: There is no abdominal tenderness.      Comments: G-Tube site has some surrounding induration and purulent appearing discharge   Musculoskeletal:      Right lower leg: No edema.      Left lower leg: No edema.   Skin:     General: Skin is warm and dry.      Findings: No rash.   Neurological:      Mental Status: He is alert and oriented to person, place, and time.   Psychiatric:         Mood and Affect: Mood normal.         Behavior: Behavior normal.         Fluids    Intake/Output Summary (Last 24 hours) at 2/12/2020 0826  Last data filed at 2/11/2020 1800  Gross per 24 hour   Intake 480 ml   Output --   Net 480 ml       Laboratory  Recent  Labs     02/12/20  0545   WBC 10.6   RBC 3.55*   HEMOGLOBIN 11.1*   HEMATOCRIT 34.1*   MCV 96.1   MCH 31.3   MCHC 32.6*   RDW 46.5   PLATELETCT 405   MPV 11.3     Recent Labs     02/12/20  0545   SODIUM 138   POTASSIUM 3.0*   CHLORIDE 99   CO2 29   GLUCOSE 103*   BUN 18   CREATININE 1.01   CALCIUM 8.5                 Assessment/Plan  * Cerebrovascular accident (CVA) due to thrombosis of right middle cerebral artery (HCC)- (present on admission)  Assessment & Plan  S/P TPA  On ASA  On Lipitor    Chronic respiratory failure (HCC)- (present on admission)  Assessment & Plan  Hx of asthma  Hx extensive smoking history  On Symbicort    Dysphagia- (present on admission)  Assessment & Plan  S/P PEG on 2/1/20 by Dr. Justice    Type 2 diabetes mellitus without complication, without long-term current use of insulin ([facility])- (present on admission)  Assessment & Plan  Hba1c: 7.2  Off diabetic meds  Off accuchecks    Essential hypertension- (present on admission)  Assessment & Plan  BP ok  On Lisinopril: 5 mg daily  On Lopressor: 50 mg bid  Cont to monitor    Hypokalemia  Assessment & Plan  K+: 3.0 (2/12)  Will give KCL 40 meq x 2 (2/12)  Monitor    Leukocytosis  Assessment & Plan  Currently resolved  WBC's: 12.7 (2/7) --> 12.2 (2/8) --> 10.6 (2/12)  Has been afebrile  UA: neg  CXR: unremarkable  ? 2nd to G-tube site infection  On Doxycycline (thru 2/16)  Monitor    Hypophosphatemia  Assessment & Plan  Currently resolved  PO4: 2.4 (2/7) --> 2.9 (2/8) --> 3.6 (2/12)  On supplements  Monitor    Azotemia- (present on admission)  Assessment & Plan  Currently resolved  Bun: 35 (2/7) --> 27 (2/8) --> 18 (2/12)  S/P recent IVF's  Encouraging oral fluids  Monitor    Vitamin D deficiency- (present on admission)  Assessment & Plan  Vit D: 13  On supplements    Hypothyroidism- (present on admission)  Assessment & Plan  TSH 5.63  FT4 0.86  Synthroid dose increased by Dr. Harris    VERENA (obstructive sleep apnea)- (present on admission)  Assessment &  Plan  On nocturnal CPAP

## 2020-02-12 NOTE — FLOWSHEET NOTE
02/12/20 1437   Events/Summary/Plan   Events/Summary/Plan RA spot check after5 minutes: lowest SAT was 90%.

## 2020-02-12 NOTE — THERAPY
Speech Language Pathology  Daily Treatment     Patient Name: Familia Lundy  Age:  74 y.o., Sex:  male  Medical Record #: [id number]  Today's Date: 2/12/2020     Subjective    Pt pleasant and cooperative, brother present and supportive.     Objective       02/12/20 1133   SLP Total Time Spent   SLP Individual Total Time Spent (Mins) 30   Charge Group   SLP Swallowing Dysfunction Treatment Swallowing Dysfunction Treatment       Assessment    Pt assessed with regular texture trials and thin liquids, pt presented with cough X2 following fresh fruit intake, min cues for hard cough. Pt mildly agitated with SLP providing verbal prompts. Education provided: re attempt to advance diet, increased textures may result in increased difficulty and need for monitoring. Pt receptive at this time.     Plan    Advance, assess tolerance X1 with d/c from further swallow intervention as appropriate.

## 2020-02-12 NOTE — FLOWSHEET NOTE
02/12/20 0932   Events/Summary/Plan   Events/Summary/Plan 02 spot check   Interdisciplinary Plan of Care-Goals (Indications)   Bronchodilator Indications History / Diagnosis   Interdisciplinary Plan of Care-Outcomes    Bronchodilator Outcome Diminished Wheezing and Volume of Air Movement Increased   Education   Education Yes - Pt. / Family has been Instructed in use of Respiratory Equipment   Respiratory WDL   Respiratory (WDL) X   Chest Exam   Respiration 20   Pulse 77   Oximetry   $ Pulse Oximetry (Spot Check) Yes   Oxygen   O2 (LPM) 2   O2 Delivery Device Silicone Nasal Cannula   Pulse Oximetry 94 %

## 2020-02-12 NOTE — THERAPY
Occupational Therapy  Daily Treatment     Patient Name: Familia Lundy  Age:  74 y.o., Sex:  male  Medical Record #: [id number]  Today's Date: 2/12/2020     Precautions  Precautions: Fall Risk, PEG Tube  Comments: impaired L UE sensation, wounds on feet    Safety   ADL Safety : Requires Supervision for Safety, Modified Independent  Bathroom Safety: Requires Supervision for Safety, Modified Independent  Comments: see FIMs for ADL performance details.    Subjective    Pt received in room with his brother present. Agreeable to OT session.     Objective       02/12/20 1031   Precautions   Precautions Fall Risk;PEG Tube   Comments impaired L UE sensation, wounds on feet   Vitals   O2 (LPM) 2   O2 Delivery Device Nasal Cannula   Interdisciplinary Plan of Care Collaboration   IDT Collaboration with  Family / Caregiver   Patient Position at End of Therapy Seated;Self Releasing Lap Belt Applied;Other (Comments)  (seated in t-dine for lunch)   Collaboration Comments pt's brother present for session.   OT Total Time Spent   OT Individual Total Time Spent (Mins) 60   OT Charge Group   OT Neuromuscular Re-education / Balance 2   OT Therapy Activity 2     Activities completed to address balance, coordination and endurance, including:  -Functional ambulation from pt room<> therapy gyms without AD, SBA, increased time.  -Agility ladder exercises completed in // bars, incorporating various stepping patterns including fwd/bwd/lateral stepping. Pt completed 3 laps each of 3 different stepping patterns with CGA-SBA without use of UB support.  -Bowling activity standing at EOM with SBA x10 rounds with no LOB.      Assessment    Pt tolerated session well and making progress with balance and overall endurance. Pt con't to require cues for pacing and breathing technique, and con't to require supplemental O2 with activity. Pt agreeable to participate in community outing on Friday.     Plan    ADLs/IADLs and related functional mobility, LUE  coordination/strength/sensory re-integration, balance, endurance. Community outing on Friday.

## 2020-02-12 NOTE — ASSESSMENT & PLAN NOTE
K+: 3.0 (2/12) --> 3.5 (2/15)  S/P KCL 40 meq x 2 (2/12)  Will give KCL 40 meq x 1 (2/15)  Monitor

## 2020-02-12 NOTE — THERAPY
Speech Language Pathology  Daily Treatment     Patient Name: Familia Lundy  Age:  74 y.o., Sex:  male  Medical Record #: [id number]  Today's Date: 2/12/2020     Subjective    Pt pleasant and cooperative.      Objective       02/12/20 0803   SLP Total Time Spent   SLP Individual Total Time Spent (Mins) 30   Charge Group   SLP Swallowing Dysfunction Treatment Swallowing Dysfunction Treatment       Assessment    Pt assessed with dys 3 textures and thin liquids, tolerated well with no overt s/sx of asp/pen noted throughout the meal. SPV for implementation of swallow strategies. Regular texture trials to be completed at lunch.    Plan    Regular texture trials at lunch

## 2020-02-12 NOTE — DIETARY
Nutrition Services: Pt told SLP that he does not have a dx of diabetes. Per SLP, pt transferred from [facility] on diabetic formula. SLP requested that RD review pt's hx and possibly liberalize diabetic diet if appropriate. Current PO intake has been adequate at % of most of his recent meals. Pt has a dx of type 2 DM without long-term current use of insulin. Most recent HbA1c on 1/28/20 was elevated at 7.2. Glucose Accuchecks were D/C'd due to glucose being well controlled. Pt may benefit from continuation of diabetic diet since his glucose levels may be well controlled due to limiting carbs on current diet. RD discussed with pt and he is agreeable at this time to continuing with this diet. He still insists that he does not have diabetes. RD will continue to follow weekly or PRN.

## 2020-02-13 PROCEDURE — 99231 SBSQ HOSP IP/OBS SF/LOW 25: CPT | Performed by: PHYSICAL MEDICINE & REHABILITATION

## 2020-02-13 PROCEDURE — 99232 SBSQ HOSP IP/OBS MODERATE 35: CPT | Performed by: HOSPITALIST

## 2020-02-13 PROCEDURE — A9270 NON-COVERED ITEM OR SERVICE: HCPCS | Performed by: HOSPITALIST

## 2020-02-13 PROCEDURE — 97112 NEUROMUSCULAR REEDUCATION: CPT

## 2020-02-13 PROCEDURE — A9270 NON-COVERED ITEM OR SERVICE: HCPCS | Performed by: PHYSICAL MEDICINE & REHABILITATION

## 2020-02-13 PROCEDURE — 97116 GAIT TRAINING THERAPY: CPT

## 2020-02-13 PROCEDURE — 97535 SELF CARE MNGMENT TRAINING: CPT

## 2020-02-13 PROCEDURE — 770010 HCHG ROOM/CARE - REHAB SEMI PRIVAT*

## 2020-02-13 PROCEDURE — 97130 THER IVNTJ EA ADDL 15 MIN: CPT

## 2020-02-13 PROCEDURE — 700102 HCHG RX REV CODE 250 W/ 637 OVERRIDE(OP): Performed by: HOSPITALIST

## 2020-02-13 PROCEDURE — 92526 ORAL FUNCTION THERAPY: CPT

## 2020-02-13 PROCEDURE — 700112 HCHG RX REV CODE 229: Performed by: PHYSICAL MEDICINE & REHABILITATION

## 2020-02-13 PROCEDURE — 700102 HCHG RX REV CODE 250 W/ 637 OVERRIDE(OP): Performed by: PHYSICAL MEDICINE & REHABILITATION

## 2020-02-13 PROCEDURE — 97129 THER IVNTJ 1ST 15 MIN: CPT

## 2020-02-13 RX ADMIN — DIBASIC SODIUM PHOSPHATE, MONOBASIC POTASSIUM PHOSPHATE AND MONOBASIC SODIUM PHOSPHATE 2 TABLET: 852; 155; 130 TABLET ORAL at 16:37

## 2020-02-13 RX ADMIN — HYDROCORTISONE: 1 CREAM TOPICAL at 08:51

## 2020-02-13 RX ADMIN — DOXYCYCLINE 100 MG: 100 TABLET, FILM COATED ORAL at 19:59

## 2020-02-13 RX ADMIN — BUDESONIDE AND FORMOTEROL FUMARATE DIHYDRATE 2 PUFF: 160; 4.5 AEROSOL RESPIRATORY (INHALATION) at 20:00

## 2020-02-13 RX ADMIN — METOPROLOL TARTRATE 50 MG: 25 TABLET ORAL at 19:59

## 2020-02-13 RX ADMIN — ASPIRIN 81 MG 81 MG: 81 TABLET ORAL at 08:51

## 2020-02-13 RX ADMIN — DIBASIC SODIUM PHOSPHATE, MONOBASIC POTASSIUM PHOSPHATE AND MONOBASIC SODIUM PHOSPHATE 2 TABLET: 852; 155; 130 TABLET ORAL at 19:59

## 2020-02-13 RX ADMIN — METOPROLOL TARTRATE 50 MG: 25 TABLET ORAL at 08:51

## 2020-02-13 RX ADMIN — ATORVASTATIN CALCIUM 80 MG: 40 TABLET, FILM COATED ORAL at 19:59

## 2020-02-13 RX ADMIN — TRAZODONE HYDROCHLORIDE 50 MG: 50 TABLET ORAL at 20:01

## 2020-02-13 RX ADMIN — CHOLECALCIFEROL TAB 25 MCG (1000 UNIT) 2000 UNITS: 25 TAB at 08:50

## 2020-02-13 RX ADMIN — DOXYCYCLINE 100 MG: 100 TABLET, FILM COATED ORAL at 08:51

## 2020-02-13 RX ADMIN — DOCUSATE SODIUM 100 MG: 100 CAPSULE, LIQUID FILLED ORAL at 08:51

## 2020-02-13 RX ADMIN — BUDESONIDE AND FORMOTEROL FUMARATE DIHYDRATE 2 PUFF: 160; 4.5 AEROSOL RESPIRATORY (INHALATION) at 08:51

## 2020-02-13 RX ADMIN — MELATONIN 3 MG: at 19:59

## 2020-02-13 RX ADMIN — HYDROCORTISONE: 1 CREAM TOPICAL at 19:59

## 2020-02-13 RX ADMIN — DIBASIC SODIUM PHOSPHATE, MONOBASIC POTASSIUM PHOSPHATE AND MONOBASIC SODIUM PHOSPHATE 2 TABLET: 852; 155; 130 TABLET ORAL at 08:51

## 2020-02-13 ASSESSMENT — BALANCE ASSESSMENTS
STANDING ON ONE LEG: 4
LONG VERSION TOTAL SCORE (MAX 56): 50
LOOK OVER LEFT AND RIGHT SHOULDERS WHILE STANDING: 4
LONG VERSION TOTAL SCORE (MAX 56): 50
SITTING UNSUPPORTED: 4
REACHING FORWARD WITH OUTSTRETCHED ARM WHILE STANDING: 4
STANDING UNSUPPORTED: 4
TRANSFERS: 4
SITTING TO STANDING: 3
TURN 360 DEGREES: 2
PICK UP OBJECT FROM THE FLOOR FROM A STANDING POSITION: 3
STANDING TO SITTING: 3
STANDING UNSUPPORTED WITH FEET TOGETHER: 3
STANDING UNSUPPORTED ONE FOOT IN FRONT: 4
STANDING UNSUPPORTED WITH EYES CLOSED: 4
PLACE ALTERNATE FOOT ON STEP OR STOOL WHILE STANDING UNSUPPORTED: 4

## 2020-02-13 ASSESSMENT — 10 METER WALK TEST (10METWT)
TRIAL 3: TIME TO WALK 10 METERS: 6.12
TRIAL 2: TIME TO WALK 10 METERS: 5.87
TRIAL 1: TIME TO WALK 10 METERS: 7.87
TRIAL 1: TIME TO WALK 10 METERS: 6.11
TRIAL 3: TIME TO WALK 10 METERS: 7.53
AVERAGE VELOCITY - METERS PER SECOND: .75
AVERAGE TIME - SECONDS: 7.95
AVERAGE VELOCITY - METERS PER SECOND: 1
TRIAL 2: TIME TO WALK 10 METERS: 8.44

## 2020-02-13 ASSESSMENT — ENCOUNTER SYMPTOMS
DIZZINESS: 0
COUGH: 0
NERVOUS/ANXIOUS: 0
BLURRED VISION: 0
DIARRHEA: 0
FEVER: 0

## 2020-02-13 NOTE — CARE PLAN
Problem: Safety  Goal: Will remain free from injury  Note: Patient educated on importance of utilizing call light to minimize the potential of injury from falls. Patient verbalizes understanding.       Problem: Bowel/Gastric:  Goal: Normal bowel function is maintained or improved  Note: Patient educated regarding diet, fluids, medications and mobilization to maximize potential for regular bowel movements. Patient engages in education and verbalizes understanding.      Problem: Knowledge Deficit  Goal: Knowledge of disease process/condition, treatment plan, diagnostic tests, and medications will improve  Note: Patient educated to change positions to minimize the potential of skin break down and possible further complications of open wounds due to pressure, when sitting or laying for periods over half of an hour. Patient engaged in education and verbalizes understanding.

## 2020-02-13 NOTE — PROGRESS NOTES
Hospital Medicine Daily Progress Note    Chief Complaint:  Hypertension  Diabetes  G-tube site infection    Interval History:  No significant events or changes since last visit    Review of Systems  Review of Systems   Constitutional: Negative for fever.   Eyes: Negative for blurred vision.   Respiratory: Negative for cough.    Cardiovascular: Negative for chest pain.   Gastrointestinal: Negative for diarrhea.   Musculoskeletal: Negative for joint pain.   Neurological: Negative for dizziness.   Psychiatric/Behavioral: The patient is not nervous/anxious.         Physical Exam  Temp:  [36.6 °C (97.8 °F)-36.8 °C (98.3 °F)] 36.8 °C (98.2 °F)  Pulse:  [68-86] 68  Resp:  [18-20] 18  BP: (109-130)/(54-66) 130/60  SpO2:  [87 %-94 %] 94 %    Physical Exam  Vitals signs and nursing note reviewed.   Constitutional:       Appearance: He is not diaphoretic.   HENT:      Mouth/Throat:      Pharynx: No oropharyngeal exudate or posterior oropharyngeal erythema.   Eyes:      Extraocular Movements: Extraocular movements intact.   Neck:      Vascular: No carotid bruit.   Cardiovascular:      Rate and Rhythm: Normal rate and regular rhythm.      Heart sounds: No murmur.   Pulmonary:      Effort: Pulmonary effort is normal.      Breath sounds: Normal breath sounds. No stridor.   Abdominal:      General: Bowel sounds are normal.      Palpations: Abdomen is soft.      Comments: G-Tube site has some surrounding induration and purulent appearing discharge   Musculoskeletal:      Right lower leg: No edema.      Left lower leg: No edema.   Skin:     General: Skin is warm and dry.      Findings: No rash.   Neurological:      Mental Status: He is alert and oriented to person, place, and time.   Psychiatric:         Mood and Affect: Mood normal.         Behavior: Behavior normal.         Fluids    Intake/Output Summary (Last 24 hours) at 2/13/2020 0819  Last data filed at 2/12/2020 1800  Gross per 24 hour   Intake 1180 ml   Output --   Net 1180  ml       Laboratory  Recent Labs     02/12/20  0545   WBC 10.6   RBC 3.55*   HEMOGLOBIN 11.1*   HEMATOCRIT 34.1*   MCV 96.1   MCH 31.3   MCHC 32.6*   RDW 46.5   PLATELETCT 405   MPV 11.3     Recent Labs     02/12/20  0545   SODIUM 138   POTASSIUM 3.0*   CHLORIDE 99   CO2 29   GLUCOSE 103*   BUN 18   CREATININE 1.01   CALCIUM 8.5                 Assessment/Plan  * Cerebrovascular accident (CVA) due to thrombosis of right middle cerebral artery (HCC)- (present on admission)  Assessment & Plan  S/P TPA  On ASA  On Lipitor    Chronic respiratory failure (HCC)- (present on admission)  Assessment & Plan  Hx of asthma  Hx extensive smoking history  On Symbicort    Dysphagia- (present on admission)  Assessment & Plan  S/P PEG on 2/1/20 by Dr. Justice    Type 2 diabetes mellitus without complication, without long-term current use of insulin ([facility])- (present on admission)  Assessment & Plan  Hba1c: 7.2  Off diabetic meds  Off accuchecks    Essential hypertension- (present on admission)  Assessment & Plan  BP ok  On Lisinopril: 5 mg daily  On Lopressor: 50 mg bid  Cont to monitor    Hypokalemia  Assessment & Plan  K+: 3.0 (2/12)  S/P KCL 40 meq x 2 (2/12)  Monitor    Leukocytosis  Assessment & Plan  Currently resolved  WBC's: 12.7 (2/7) --> 12.2 (2/8) --> 10.6 (2/12)  Has been afebrile  UA: neg  CXR: unremarkable  ? 2nd to G-tube site infection  On Doxycycline (thru 2/16)  Monitor    Hypophosphatemia  Assessment & Plan  Currently resolved  PO4: 2.4 (2/7) --> 2.9 (2/8) --> 3.6 (2/12)  On supplements  Monitor    Azotemia- (present on admission)  Assessment & Plan  Currently resolved  Bun: 35 (2/7) --> 27 (2/8) --> 18 (2/12)  S/P recent IVF's  Encouraging oral fluids  Monitor    Vitamin D deficiency- (present on admission)  Assessment & Plan  Vit D: 13  On supplements    Hypothyroidism- (present on admission)  Assessment & Plan  TSH 5.63  FT4 0.86  Synthroid dose increased by Dr. Harris    VERENA (obstructive sleep apnea)- (present on  admission)  Assessment & Plan  On nocturnal CPAP

## 2020-02-13 NOTE — PROGRESS NOTES
"Rehab Progress Note     Date of Service: 2/13/2020  Chief Complaint: follow up stroke    Interval Events (Subjective)    Patient seen and examined today in the cafeteria. He just finished his lunch. His brother is present. He has questions about warning signs of a stroke, and we discussed TIA. Patient to do outing tomorrow to bowl with recreational therapy. He has no new complaints today.    Objective:  VITAL SIGNS: /60   Pulse 68   Temp 36.8 °C (98.2 °F) (Oral)   Resp 18   Ht 1.727 m (5' 8\")   Wt 114.1 kg (251 lb 9.6 oz)   SpO2 94%   BMI 38.26 kg/m²   Gen: alert, no apparent distress  CV: regular rate and rhythm, no murmurs, no peripheral edema  Resp: clear to ascultation bilaterally, normal respiratory effort  GI: soft, non-tender abdomen, bowel sounds present. PEG tube site with decreased discharge and erythema  Neuro: notable for non focal exam      Recent Results (from the past 72 hour(s))   Basic Metabolic Panel    Collection Time: 02/12/20  5:45 AM   Result Value Ref Range    Sodium 138 135 - 145 mmol/L    Potassium 3.0 (L) 3.6 - 5.5 mmol/L    Chloride 99 96 - 112 mmol/L    Co2 29 20 - 33 mmol/L    Glucose 103 (H) 65 - 99 mg/dL    Bun 18 8 - 22 mg/dL    Creatinine 1.01 0.50 - 1.40 mg/dL    Calcium 8.5 8.5 - 10.5 mg/dL    Anion Gap 10.0 0.0 - 11.9   MAGNESIUM    Collection Time: 02/12/20  5:45 AM   Result Value Ref Range    Magnesium 2.1 1.5 - 2.5 mg/dL   PHOSPHORUS    Collection Time: 02/12/20  5:45 AM   Result Value Ref Range    Phosphorus 3.6 2.5 - 4.5 mg/dL   CBC WITH DIFFERENTIAL    Collection Time: 02/12/20  5:45 AM   Result Value Ref Range    WBC 10.6 4.8 - 10.8 K/uL    RBC 3.55 (L) 4.70 - 6.10 M/uL    Hemoglobin 11.1 (L) 14.0 - 18.0 g/dL    Hematocrit 34.1 (L) 42.0 - 52.0 %    MCV 96.1 81.4 - 97.8 fL    MCH 31.3 27.0 - 33.0 pg    MCHC 32.6 (L) 33.7 - 35.3 g/dL    RDW 46.5 35.9 - 50.0 fL    Platelet Count 405 164 - 446 K/uL    MPV 11.3 9.0 - 12.9 fL    Neutrophils-Polys 68.40 44.00 - 72.00 " %    Lymphocytes 18.20 (L) 22.00 - 41.00 %    Monocytes 9.40 0.00 - 13.40 %    Eosinophils 2.40 0.00 - 6.90 %    Basophils 0.70 0.00 - 1.80 %    Immature Granulocytes 0.90 0.00 - 0.90 %    Nucleated RBC 0.00 /100 WBC    Neutrophils (Absolute) 7.24 1.82 - 7.42 K/uL    Lymphs (Absolute) 1.93 1.00 - 4.80 K/uL    Monos (Absolute) 0.99 (H) 0.00 - 0.85 K/uL    Eos (Absolute) 0.25 0.00 - 0.51 K/uL    Baso (Absolute) 0.07 0.00 - 0.12 K/uL    Immature Granulocytes (abs) 0.10 0.00 - 0.11 K/uL    NRBC (Absolute) 0.00 K/uL   ESTIMATED GFR    Collection Time: 02/12/20  5:45 AM   Result Value Ref Range    GFR If African American >60 >60 mL/min/1.73 m 2    GFR If Non African American >60 >60 mL/min/1.73 m 2       Current Facility-Administered Medications   Medication Frequency   • senna-docusate (PERICOLACE or SENOKOT S) 8.6-50 MG per tablet 2 Tab BID PRN    And   • polyethylene glycol/lytes (MIRALAX) PACKET 1 Packet QDAY PRN    And   • magnesium hydroxide (MILK OF MAGNESIA) suspension 30 mL QDAY PRN    And   • bisacodyl (DULCOLAX) suppository 10 mg QDAY PRN   • doxycycline monohydrate (ADOXA) tablet 100 mg Q12HRS   • lisinopril (PRINIVIL) tablet 5 mg Q DAY   • levothyroxine (SYNTHROID) tablet 100 mcg AM ES   • phosphorus (K-PHOS-NEUTRAL) per tablet 2 Tab TID   • budesonide-formoterol (SYMBICORT) 160-4.5 MCG/ACT inhaler 2 Puff BID   • vitamin D (cholecalciferol) tablet 2,000 Units DAILY   • ipratropium-albuterol (DUONEB) nebulizer solution Q4HRS PRN   • Respiratory Care per Protocol Continuous RT   • Pharmacy Consult Request ...Pain Management Review 1 Each PHARMACY TO DOSE   • acetaminophen (TYLENOL) tablet 650 mg Q4HRS PRN   • hydrALAZINE (APRESOLINE) tablet 25 mg Q8HRS PRN   • docusate sodium (COLACE) capsule 100 mg DAILY    And   • lactulose 20 GM/30ML solution 30 mL Q24HRS PRN    And   • bisacodyl (DULCOLAX) suppository 10 mg QDAY PRN   • artificial tears ophthalmic solution 1 Drop PRN   • benzocaine-menthol (CEPACOL) lozenge  1 Lozenge Q2HRS PRN   • mag hydrox-al hydrox-simeth (MAALOX PLUS ES or MYLANTA DS) suspension 20 mL Q2HRS PRN   • ondansetron (ZOFRAN ODT) dispertab 4 mg 4X/DAY PRN    Or   • ondansetron (ZOFRAN) syringe/vial injection 4 mg 4X/DAY PRN   • sodium chloride (OCEAN) 0.65 % nasal spray 2 Spray PRN   • atorvastatin (LIPITOR) tablet 80 mg Q EVENING   • metoprolol (LOPRESSOR) tablet 50 mg BID   • traZODone (DESYREL) tablet 50 mg QHS   • melatonin tablet 3 mg Nightly   • aspirin (ASA) chewable tab 81 mg DAILY   • hydrocortisone 1 % cream BID       Orders Placed This Encounter   Procedures   • Diet Order Diabetic     Standing Status:   Standing     Number of Occurrences:   1     Order Specific Question:   Diet:     Answer:   Diabetic [3]     Order Specific Question:   Consistency/Fluid modifications:     Answer:   Thin Liquids [3]       Assessment:  Active Hospital Problems    Diagnosis   • *Cerebrovascular accident (CVA) due to thrombosis of right middle cerebral artery (HCC)   • Acute respiratory failure with hypoxia ([facility])   • Type 2 diabetes mellitus without complication, without long-term current use of insulin (HCC)   • Dysphagia   • Vitamin B12 deficiency   • Essential hypertension   • Moderate persistent asthma without complication   • Dyslipidemia   • Hypothyroidism   • VERENA (obstructive sleep apnea)   • Vitamin D deficiency   • Azotemia   • Obesity (BMI 35.0-39.9 without comorbidity)   • Rash   • S/P percutaneous endoscopic gastrostomy (PEG) tube placement ([facility])   • Hypertriglyceridemia   • Oral thrush   • Anemia   • Impaired mobility and ADLs     This patient is a 74 y.o. male admitted for acute inpatient rehabilitation with Cerebrovascular accident (CVA) due to thrombosis of right middle cerebral artery (HCC).    I led and attended the weekly conference, and agree with the IDT conference documentation and plan of care as noted below.    Date of conference: 2/10/2020    Goals and barriers: See IDT note.    Biggest  barriers: mild left sided incoordination, respiratory insufficiency, dysphagia, mild cognitive impairments    CM/social support: brother supportive    Anticipated DC date: 2/15/2020    Home health: PT/OT/SLP/RN    Equip: TBD    Follow up: PCP, rehab clinic optional      Medical Decision Making and Plan:    Right MCA stroke  S/p tPA  Hemorraghic transformation  Dysphagia, s/p PEG 2/1, improved  Cognitive deficits  Left pronator drift  Left arm/hand incoordination  Non-dominant  Continue full rehab program  PT/OT/SLP, 1 hr each discipline, 5 days per week  Speech therapy to advance diet  PEG can be removed 6 weeks after placement  Continue aspirin and statin for secondary stroke prophylaxis    Bowel  Meds as needed  Last BM 2/12    Bladder  Checked PVRs - 67, 54  Not retaining  ICP for over 400 cc  Scheduled toileting    Insomnia, improved  Schedule melatonin and trazodone    DVT prophylaxis  Lovenox    Appreciate assistance of hospitalist with his medical comorbidities:    Asthma, Symbicort  Hypertension, lisinopril, metoprolol  Hypothyroidism, levythyroxine  Sleep apnea, CPAP  Diabetes with hyperglycemia, not on meds, diabetic diet  Anemia, stable  Hypertriglyceridemia, statin  Obesity, did nutritional counseling  Oral thrush, resolved  Rash, improved, hydrocortisone cream  Vit D deficiency, on supplementation  Leukocytosis, CXR and UA negative  PEG tube site cellulitis, doxycycline  Hypophosphatemia, on supplementation  Hypokalemia, on supplementation    Total time:  16 minutes.  I spent greater than 50% of the time for patient care, counseling, and coordination on this date, including patient face-to face time, unit/floor time with review of records/pertinent lab data and studies, as well as discussing diagnostic evaluation/work up, planned therapeutic interventions, and future disposition of care, as per the interval events/subjective and the assessment and plan as noted above.    I have performed a physical  exam, reviewed and updated ROS, as well as the assessment and plan today 2/13/2020. In review of note from 2/12/2020 there are no new changes except as documented above.            Alla Ewing M.D.   Physical Medicine and Rehabilitation

## 2020-02-13 NOTE — DISCHARGE PLANNING
Case Management Discharge Instructions  Discharge Date Feb. 15, 2020        Discharge Location: Home with Home Health     Agency Name / Phone: [facility] [phone]     Home Health: Registered Nurse, Occupational Therapist, Physical Therapist, Speech Therapist     NOTE: This information can be found in your final discharge packet that your nurse will give you.         Follow-up Information:     Kvng Pickett M.D.  [address] [place] [address]  [phone]  Primary care - Thursday, Feb. 27, 2020 @ 3:45PM

## 2020-02-13 NOTE — THERAPY
Speech Language Pathology  Daily Treatment     Patient Name: Familia Lundy  Age:  74 y.o., Sex:  male  Medical Record #: [id number]  Today's Date: 2/13/2020     Subjective    Pt pleasant and cooperative, brother present.     Objective       02/13/20 1403   SLP Total Time Spent   SLP Individual Total Time Spent (Mins) 30   Charge Group   SLP Cognitive Skill Development First 15 Minutes 1   SLP Cognitive Skill Development Additional 15 Minutes 1       Assessment    Functional medication sort completed at this time with pill box. Pt demonstrated one single error resulting in overdose, otherwise completed with 100% accuracy indep. Pt reluctant to using once d/c home, pt consumed via bottle prior. SLP discussed concern for forgetting or missing a medication and concern re: importance of medications to prevent future stroke. Pt receptive however ongoing education and reinforcement neccessary.     Plan    Outcome assessment

## 2020-02-13 NOTE — THERAPY
Speech Language Pathology  Daily Treatment     Patient Name: Familia Lundy  Age:  74 y.o., Sex:  male  Medical Record #: [id number]  Today's Date: 2/13/2020     Subjective    Pt was seen in t-dine during breakfast, pt willing to participate in therapy session      Objective       02/13/20 0801   Dysphagia    Diet / Liquid Recommendation Regular (7);Thin (0)   Nutritional Liquid Intake Rating Scale Non thickened beverages   Nutritional Food Intake Rating Scale Total oral diet with no restrictions   SLP Total Time Spent   SLP Individual Total Time Spent (Mins) 30   Charge Group   SLP Swallowing Dysfunction Treatment Swallowing Dysfunction Treatment       Assessment    Pt tolerate regular textures (Regular-7) and Thin liquids (Thin-0) during breakfast without any overt s/sx of aspiration/penetration noted.  Pt was able to verbalize swallowing strategies independently (small bites/double swallow) without cues.  Pt ate at a slow rate without cues required.  He did not consistently complete double swallowing during this meal; however was able to tolerate all textures without difficulty even when not implementing this strategy.      Plan    D/C t-dine

## 2020-02-13 NOTE — THERAPY
Speech Language Pathology  Daily Treatment     Patient Name: Familia Lundy  Age:  74 y.o., Sex:  male  Medical Record #: [id number]  Today's Date: 2/13/2020     Subjective    Pt pleasant and cooperative, brother present and supportive.     Objective       02/13/20 1133   SLP Total Time Spent   SLP Individual Total Time Spent (Mins) 30   Charge Group   SLP Swallowing Dysfunction Treatment Swallowing Dysfunction Treatment       Assessment    Pt assessed with regular textures and thin liquids in the regular dining navarro. Tolerated well with  No overt s/sx of asp/pen noted. Ongoing education re: stroke and swallow provided. Pt and brother demonstrating good understanding at this time. No further swallow intervention required nor recommended.    Plan    Complete outcome assessment

## 2020-02-13 NOTE — THERAPY
Occupational Therapy  Daily Treatment     Patient Name: Familia Lundy  Age:  74 y.o., Sex:  male  Medical Record #: [id number]  Today's Date: 2020     Precautions  Precautions: (P) Fall Risk, PEG Tube  Comments: (P) impaired L UE sensation, wounds on feet    Safety   ADL Safety : (P) Modified Independent  Bathroom Safety: (P) Modified Independent, Requires Supervision for Safety  Comments: (P) see FIMs for ADL performance details.    Subjective    Pt agreeable to shower for OT session.     Objective       20 0931   Precautions   Precautions Fall Risk;PEG Tube   Comments impaired L UE sensation, wounds on feet   Safety    ADL Safety  Modified Independent   Bathroom Safety Modified Independent;Requires Supervision for Safety   Comments see FIMs for ADL performance details.   Cognition    Level of Consciousness Alert   Interdisciplinary Plan of Care Collaboration   IDT Collaboration with  Family / Caregiver   Patient Position at End of Therapy Seated;Self Releasing Lap Belt Applied;Family / Friend in Room   Collaboration Comments pt's brother present for session.   OT Total Time Spent   OT Individual Total Time Spent (Mins) 60   OT Charge Group   OT Self Care / ADL 4         FIM Grooming Score:  7 - Independent  Grooming Description:  (seated at sink for oral care, shaving and combing hair.)    FIM Bathing Score:  6 - Modified Independent  Bathing Description:       FIM Upper Body Dressin - Independent  Upper Body Dressing Description:       FIM Lower Body Dressing Score:  6 - Modified Independent  Lower Body Dressing Description:  Increased time(increased time to don/doff underwear, pants and slip on shoes.)    FIM Toiletin - Modified Independent  Toileting Description:  Grab bar, Increased time    FIM Toilet Transfer Score:  5 - Standby Prompting/Supervision or Set-up  Toilet Transfer Description:  Grab bar, Increased time, Supervision for safety(SBA with FWW and GB.)    FIM Tub/Shower  Transfers Score:  5 - Standby Prompting/Supervision or Set-up  Tub/Shower Transfers Description:  Grab bar, Increased time, Supervision for safety, Set-up of equipment(SBA with FWW, GB and shower bench)      Assessment    Pt tolerated session well and demonstrates increased safety and independence with ADLs. He con't to require supervision/set up for safety with bathroom transfers with FWW d/t impaired balance and decreased endurance.     Plan    Community outing tomorrow, prep for d/c on 2/5.

## 2020-02-13 NOTE — THERAPY
Physical Therapy   Daily Treatment     Patient Name: Familia Lundy  Age:  74 y.o., Sex:  male  Medical Record #: [id number]  Today's Date: 2/13/2020     Precautions  Precautions: Fall Risk, PEG Tube  Comments: impaired L UE sensation, wounds on feet    Subjective    Pt reports he is agreeable to redoing outcome measures      Objective       02/13/20 1031   10 Meter Walk Test   Normal - Trial 1 7.87 seconds   Normal - Trial 2 8.44 seconds   Normal - Trial 3 7.53 seconds   Fast - Trial 1 6.11 seconds   Fast - Trial 2 5.87 seconds   Fast - Trial 3 6.12 seconds   Normal Average Time 7.95 seconds   Normal Average Velocity (m/s) 0.75   Fast Average Time 6.03 seconds   Fast Average Velocity (m/s) 1   Reed Balance Scale   Sitting Unsupported (Score 0-4) 4   Change Of Positon: Sitting To Standing (Score 0-4) 3   Change Of Positon: Standing To Sitting (Score 0-4) 3   Transfers (Score 0-4) 4   Standing Unsupported (Score 0-4) 4   Standing With Eyes Closed (Score 0-4) 4   Standing With Feet Together (Score 0-4) 3   Tandem Standing (Score 0-4) 4   Standing On One Leg (Score 0-4) 4   Turning Trunk (Feet Fixed) (Score 0-4) 4   Retrieving Objects From Floor (Score 0-4) 3   Turning 360 Degrees (Score 0-4) 2   Stool Stepping (Score 0-4) 4   Reaching Forward While Standing (Score 0-4) 4   Reed Balance Total Score (0-56) 50   Interdisciplinary Plan of Care Collaboration   Patient Position at End of Therapy Seated  (cafeteria)   PT Total Time Spent   PT Individual Total Time Spent (Mins) 60   PT Charge Group   PT Gait Training 2   PT Neuromuscular Re-Education / Balance 2       FIM Walking Score:  6 - Modified Independent  Walking Description:  Safety concerns(300+ ft no AD, no LOB)    FGA style walking for dynamic balance - marching, head turns, varied pacing, quick stops, backwards walking, eyes closed, tandem walking - CGA throughout min A occasionally during tandem walking.     Assessment    Pt demos low fall risk via gait speed  and Brianna. No AD recommended at DC. Continues to demo improving dynamic balance this session.    Plan    DC, fall recovery practice and edu, walking outside

## 2020-02-14 PROBLEM — R13.10 DYSPHAGIA: Status: RESOLVED | Noted: 2020-02-04 | Resolved: 2020-02-14

## 2020-02-14 PROBLEM — L08.9 SKIN INFECTION AT GASTROSTOMY TUBE SITE (HCC): Status: ACTIVE | Noted: 2020-02-07

## 2020-02-14 PROBLEM — B37.0 ORAL THRUSH: Status: RESOLVED | Noted: 2020-02-05 | Resolved: 2020-02-14

## 2020-02-14 PROBLEM — K94.22 SKIN INFECTION AT GASTROSTOMY TUBE SITE (HCC): Status: ACTIVE | Noted: 2020-02-07

## 2020-02-14 PROCEDURE — 700112 HCHG RX REV CODE 229: Performed by: PHYSICAL MEDICINE & REHABILITATION

## 2020-02-14 PROCEDURE — 97116 GAIT TRAINING THERAPY: CPT

## 2020-02-14 PROCEDURE — 700102 HCHG RX REV CODE 250 W/ 637 OVERRIDE(OP): Performed by: HOSPITALIST

## 2020-02-14 PROCEDURE — 700102 HCHG RX REV CODE 250 W/ 637 OVERRIDE(OP): Performed by: PHYSICAL MEDICINE & REHABILITATION

## 2020-02-14 PROCEDURE — 97537 COMMUNITY/WORK REINTEGRATION: CPT

## 2020-02-14 PROCEDURE — 99231 SBSQ HOSP IP/OBS SF/LOW 25: CPT | Performed by: PHYSICAL MEDICINE & REHABILITATION

## 2020-02-14 PROCEDURE — 94760 N-INVAS EAR/PLS OXIMETRY 1: CPT

## 2020-02-14 PROCEDURE — 97130 THER IVNTJ EA ADDL 15 MIN: CPT

## 2020-02-14 PROCEDURE — A9270 NON-COVERED ITEM OR SERVICE: HCPCS | Performed by: HOSPITALIST

## 2020-02-14 PROCEDURE — 97129 THER IVNTJ 1ST 15 MIN: CPT

## 2020-02-14 PROCEDURE — 770010 HCHG ROOM/CARE - REHAB SEMI PRIVAT*

## 2020-02-14 PROCEDURE — A9270 NON-COVERED ITEM OR SERVICE: HCPCS | Performed by: PHYSICAL MEDICINE & REHABILITATION

## 2020-02-14 PROCEDURE — 99232 SBSQ HOSP IP/OBS MODERATE 35: CPT | Performed by: HOSPITALIST

## 2020-02-14 PROCEDURE — 97530 THERAPEUTIC ACTIVITIES: CPT

## 2020-02-14 RX ORDER — LISINOPRIL 5 MG/1
5 TABLET ORAL DAILY
Qty: 30 TAB | Refills: 0 | Status: SHIPPED | OUTPATIENT
Start: 2020-02-15

## 2020-02-14 RX ORDER — DOXYCYCLINE 100 MG/1
100 TABLET ORAL EVERY 12 HOURS
Qty: 4 TAB | Refills: 0 | Status: SHIPPED | OUTPATIENT
Start: 2020-02-14 | End: 2020-02-16

## 2020-02-14 RX ORDER — PSEUDOEPHEDRINE HCL 30 MG
100 TABLET ORAL DAILY
Qty: 60 CAP | COMMUNITY
Start: 2020-02-15

## 2020-02-14 RX ORDER — LANOLIN ALCOHOL/MO/W.PET/CERES
3 CREAM (GRAM) TOPICAL
Qty: 30 TAB | COMMUNITY
Start: 2020-02-14 | End: 2020-03-16

## 2020-02-14 RX ORDER — BUDESONIDE AND FORMOTEROL FUMARATE DIHYDRATE 160; 4.5 UG/1; UG/1
2 AEROSOL RESPIRATORY (INHALATION) 2 TIMES DAILY
Qty: 1 INHALER | Refills: 0 | Status: SHIPPED | OUTPATIENT
Start: 2020-02-14

## 2020-02-14 RX ORDER — LEVOTHYROXINE SODIUM 0.1 MG/1
100 TABLET ORAL
Qty: 30 TAB | Refills: 0 | Status: SHIPPED | OUTPATIENT
Start: 2020-02-15

## 2020-02-14 RX ORDER — METOPROLOL TARTRATE 50 MG/1
50 TABLET, FILM COATED ORAL 2 TIMES DAILY
Qty: 60 TAB | Refills: 0 | Status: SHIPPED | OUTPATIENT
Start: 2020-02-14

## 2020-02-14 RX ORDER — BENZOCAINE/MENTHOL 6 MG-10 MG
LOZENGE MUCOUS MEMBRANE
Qty: 1 TUBE | Refills: 0 | Status: SHIPPED | OUTPATIENT
Start: 2020-02-14

## 2020-02-14 RX ORDER — ATORVASTATIN CALCIUM 80 MG/1
80 TABLET, FILM COATED ORAL EVERY EVENING
Qty: 30 TAB | Refills: 0 | Status: SHIPPED | OUTPATIENT
Start: 2020-02-14

## 2020-02-14 RX ORDER — TRAZODONE HYDROCHLORIDE 50 MG/1
50 TABLET ORAL
Status: DISCONTINUED | OUTPATIENT
Start: 2020-02-14 | End: 2020-02-15 | Stop reason: HOSPADM

## 2020-02-14 RX ORDER — ASPIRIN 81 MG/1
81 TABLET, CHEWABLE ORAL DAILY
Qty: 100 TAB | COMMUNITY
Start: 2020-02-15

## 2020-02-14 RX ADMIN — DOXYCYCLINE 100 MG: 100 TABLET, FILM COATED ORAL at 08:19

## 2020-02-14 RX ADMIN — DOXYCYCLINE 100 MG: 100 TABLET, FILM COATED ORAL at 20:03

## 2020-02-14 RX ADMIN — DIBASIC SODIUM PHOSPHATE, MONOBASIC POTASSIUM PHOSPHATE AND MONOBASIC SODIUM PHOSPHATE 2 TABLET: 852; 155; 130 TABLET ORAL at 15:44

## 2020-02-14 RX ADMIN — HYDROCORTISONE: 1 CREAM TOPICAL at 20:02

## 2020-02-14 RX ADMIN — MELATONIN 3 MG: at 20:03

## 2020-02-14 RX ADMIN — LISINOPRIL 5 MG: 5 TABLET ORAL at 06:40

## 2020-02-14 RX ADMIN — METOPROLOL TARTRATE 50 MG: 25 TABLET ORAL at 20:03

## 2020-02-14 RX ADMIN — BUDESONIDE AND FORMOTEROL FUMARATE DIHYDRATE 2 PUFF: 160; 4.5 AEROSOL RESPIRATORY (INHALATION) at 20:02

## 2020-02-14 RX ADMIN — DIBASIC SODIUM PHOSPHATE, MONOBASIC POTASSIUM PHOSPHATE AND MONOBASIC SODIUM PHOSPHATE 2 TABLET: 852; 155; 130 TABLET ORAL at 20:03

## 2020-02-14 RX ADMIN — LEVOTHYROXINE SODIUM 100 MCG: 50 TABLET ORAL at 06:40

## 2020-02-14 RX ADMIN — BUDESONIDE AND FORMOTEROL FUMARATE DIHYDRATE 2 PUFF: 160; 4.5 AEROSOL RESPIRATORY (INHALATION) at 08:56

## 2020-02-14 RX ADMIN — DOCUSATE SODIUM 100 MG: 100 CAPSULE, LIQUID FILLED ORAL at 08:19

## 2020-02-14 RX ADMIN — METOPROLOL TARTRATE 50 MG: 25 TABLET ORAL at 08:19

## 2020-02-14 RX ADMIN — DIBASIC SODIUM PHOSPHATE, MONOBASIC POTASSIUM PHOSPHATE AND MONOBASIC SODIUM PHOSPHATE 2 TABLET: 852; 155; 130 TABLET ORAL at 08:18

## 2020-02-14 RX ADMIN — ATORVASTATIN CALCIUM 80 MG: 40 TABLET, FILM COATED ORAL at 20:03

## 2020-02-14 RX ADMIN — ASPIRIN 81 MG 81 MG: 81 TABLET ORAL at 08:20

## 2020-02-14 RX ADMIN — CHOLECALCIFEROL TAB 25 MCG (1000 UNIT) 2000 UNITS: 25 TAB at 08:19

## 2020-02-14 RX ADMIN — HYDROCORTISONE: 1 CREAM TOPICAL at 08:56

## 2020-02-14 ASSESSMENT — ENCOUNTER SYMPTOMS
NAUSEA: 0
FEVER: 0
CHILLS: 0
VOMITING: 0
DIARRHEA: 0
SHORTNESS OF BREATH: 0
ABDOMINAL PAIN: 0
NERVOUS/ANXIOUS: 0

## 2020-02-14 ASSESSMENT — ACTIVITIES OF DAILY LIVING (ADL)
TOILETING_LEVEL_OF_ASSIST: ABLE TO COMPLETE TOILETING WITHOUT ASSIST
SHOWER_TRANSFER_LEVEL_OF_ASSIST: ABLE TO COMPLETE SHOWER TRANSFER WITHOUT ASSIST
TOILET_TRANSFER_LEVEL_OF_ASSIST: ABLE TO COMPLETE TOILET TRANSFER WITHOUT ASSIST

## 2020-02-14 ASSESSMENT — PATIENT HEALTH QUESTIONNAIRE - PHQ9
1. LITTLE INTEREST OR PLEASURE IN DOING THINGS: NOT AT ALL
SUM OF ALL RESPONSES TO PHQ9 QUESTIONS 1 AND 2: 0
2. FEELING DOWN, DEPRESSED, IRRITABLE, OR HOPELESS: NOT AT ALL

## 2020-02-14 NOTE — DISCHARGE PLANNING
Case management Summary:   Met with patient, his brother - Chato and daughter - Elizabeth, prior to discharge.   Reviewed follow up appointment. PCP to address return to driving.  Referral made to [facility]. They have accepted referral and are ready to follow.    No assistive device was recommended or ordered for patient.  During hospitalization, I have provided support and education and have been available for questions and information during hours of operation, communicated with therapy team and MD along with providing links/resources  to outside services.    Patient verbalizes agreement with all plans and has an understanding of the next steps within the post acute services.     Individualized Goals:   1. Address driving/transportation  2. Address patient's supervision needed as brother will stay with patient 2 weeks after DC  3. Be able to eat.  4. Improve functional status to discharge home with intermittent support.    Outcome:   1. Met - Patient is not to drive at this time. PCP to address return to driving.  2. Met - patient doing well and okay alone when brother is ready to leave the area. Daughter lives in [place], teaches in [place] and is off on Fridays, Sat and Sun. She will assist patient to get to appointments, groceries, address household needs after patient's brother returns to [facility].    3. Met

## 2020-02-14 NOTE — CARE PLAN
Problem: OT Long Term Goals  Goal: LTG-By discharge, patient will complete basic self care tasks  Description: 1) Individualized Goal:  Mod I with AE/DME prn.  2) Interventions:  OT E Stim Attended, OT Group Therapy, OT Self Care/ADL, OT Community Reintegration, OT Neuro Re-Ed/Balance, OT Sensory Int Techniques, OT Therapeutic Activity, OT Evaluation and OT Therapeutic Exercise     Outcome: MET  Goal: LTG-By discharge, patient will perform bathroom transfers  Description: 1) Individualized Goal:  Mod I with AD/DME prn.  2) Interventions:  OT E Stim Attended, OT Group Therapy, OT Self Care/ADL, OT Community Reintegration, OT Neuro Re-Ed/Balance, OT Sensory Int Techniques, OT Therapeutic Activity, OT Evaluation and OT Therapeutic Exercise   Outcome: MET  Goal: LTG-By discharge, patient will complete basic home management  Description: 1) Individualized Goal: mod I with AE/AD/DME prn  2) Interventions: OT E Stim Attended, OT Group Therapy, OT Self Care/ADL, OT Community Reintegration, OT Neuro Re-Ed/Balance, OT Sensory Int Techniques, OT Therapeutic Activity, OT Evaluation and OT Therapeutic Exercise      Outcome: MET     Problem: Functional Transfers  Goal: STG-Within one week, patient will transfer to step in shower  Description: 1) Individualized Goal: with threshold to simulate home set up at supervision to mod I with FWW.  2) Interventions: OT E Stim Attended, OT Group Therapy, OT Self Care/ADL, OT Community Reintegration, OT Neuro Re-Ed/Balance, OT Sensory Int Techniques, OT Therapeutic Activity, OT Evaluation and OT Therapeutic Exercise      Outcome: MET     Problem: IADL's  Goal: STG-Within one week, patient will access kitchen area  Description: 1) Individualized Goal: at supervision to mod I with FWW.  2) Interventions: OT E Stim Attended, OT Group Therapy, OT Self Care/ADL, OT Community Reintegration, OT Neuro Re-Ed/Balance, OT Sensory Int Techniques, OT Therapeutic Activity, OT Evaluation and OT  Therapeutic Exercise      Outcome: MET

## 2020-02-14 NOTE — FLOWSHEET NOTE
02/14/20 1513   Events/Summary/Plan   Events/Summary/Plan SpO2 check,   (8:54 hrs CPAP last night)   Vital Signs   Pulse 96   Respiration 18   Pulse Oximetry 92 %   $ Pulse Oximetry (Spot Check) Yes   Oxygen   O2 Delivery Device None - Room Air

## 2020-02-14 NOTE — THERAPY
Occupational Therapy  Daily Treatment     Patient Name: Familia Lundy  Age:  74 y.o., Sex:  male  Medical Record #: [id number]  Today's Date: 2/14/2020     Precautions  Precautions: (P) Fall Risk, PEG Tube  Comments: (P) impaired L UE sensation, wounds on feet    Subjective    Pt motivated for community outing.       Objective       02/14/20 1331   Precautions   Precautions Fall Risk;PEG Tube   Comments impaired L UE sensation, wounds on feet   Interdisciplinary Plan of Care Collaboration   Patient Position at End of Therapy Seated;Call Light within Reach;Tray Table within Reach;Phone within Reach   OT Total Time Spent   OT Individual Total Time Spent (Mins) 90   OT Charge Group   OT Community Reintegration 6     Pt participated in community outing to [employer], r/t pt's hobby of participating in weekly revoPT league. Goals for outing included standing balance, endurance and coordination. Pt bowled 10 frames with SBA and no LOB. Pt appropriately took seated rest breaks and was able to demonstrate good safety and pacing. Pt mod I for mobility around [employer] without an assistive device, and getting in and out of therapy van.     Assessment    Pt tolerated session well and demonstrated good safety and pacing during community outing. Pt with no LOB during bowling activity. Pt verbalizes no concerns with d/c home tomorrow.     Plan    Pt to d/c home tomorrow with family support.

## 2020-02-14 NOTE — PROGRESS NOTES
Hospital Medicine Daily Progress Note    Chief Complaint:  Hypertension  Diabetes  G-tube site infection    Interval History:  No significant events or changes since last visit    Review of Systems  Review of Systems   Constitutional: Negative for chills and fever.   Respiratory: Negative for shortness of breath.    Cardiovascular: Negative for chest pain.   Gastrointestinal: Negative for abdominal pain, diarrhea, nausea and vomiting.   Psychiatric/Behavioral: The patient is not nervous/anxious.         Physical Exam  Temp:  [36.1 °C (97 °F)-36.8 °C (98.3 °F)] 36.6 °C (97.8 °F)  Pulse:  [81-84] 81  Resp:  [17-18] 17  BP: (100-126)/(52-63) 117/63  SpO2:  [92 %] 92 %    Physical Exam  Vitals signs and nursing note reviewed.   Constitutional:       Appearance: Normal appearance.   HENT:      Head: Atraumatic.   Eyes:      Conjunctiva/sclera: Conjunctivae normal.      Pupils: Pupils are equal, round, and reactive to light.   Neck:      Musculoskeletal: Normal range of motion and neck supple.   Cardiovascular:      Rate and Rhythm: Normal rate and regular rhythm.   Pulmonary:      Effort: Pulmonary effort is normal.      Breath sounds: Normal breath sounds.   Abdominal:      General: Bowel sounds are normal.      Palpations: Abdomen is soft.      Comments: G-Tube site has some surrounding induration and purulent appearing discharge   Musculoskeletal:      Right lower leg: No edema.      Left lower leg: No edema.   Skin:     General: Skin is warm and dry.   Neurological:      Mental Status: He is alert and oriented to person, place, and time.   Psychiatric:         Mood and Affect: Mood normal.         Behavior: Behavior normal.         Fluids    Intake/Output Summary (Last 24 hours) at 2/14/2020 0844  Last data filed at 2/13/2020 1900  Gross per 24 hour   Intake 440 ml   Output --   Net 440 ml       Laboratory  Recent Labs     02/12/20  0545   WBC 10.6   RBC 3.55*   HEMOGLOBIN 11.1*   HEMATOCRIT 34.1*   MCV 96.1   MCH  31.3   MCHC 32.6*   RDW 46.5   PLATELETCT 405   MPV 11.3     Recent Labs     02/12/20  0545   SODIUM 138   POTASSIUM 3.0*   CHLORIDE 99   CO2 29   GLUCOSE 103*   BUN 18   CREATININE 1.01   CALCIUM 8.5                 Assessment/Plan  * Cerebrovascular accident (CVA) due to thrombosis of right middle cerebral artery (HCC)- (present on admission)  Assessment & Plan  S/P TPA  On ASA  On Lipitor    Chronic respiratory failure (HCC)- (present on admission)  Assessment & Plan  Hx of asthma  Hx extensive smoking history  On Symbicort    Dysphagia- (present on admission)  Assessment & Plan  S/P PEG on 2/1/20 by Dr. Justice    Type 2 diabetes mellitus without complication, without long-term current use of insulin ([facility])- (present on admission)  Assessment & Plan  Hba1c: 7.2  Off diabetic meds  Off accuchecks    Essential hypertension- (present on admission)  Assessment & Plan  BP ok  On Lisinopril: 5 mg daily  On Lopressor: 50 mg bid  Cont to monitor    Hypokalemia  Assessment & Plan  K+: 3.0 (2/12)  S/P KCL 40 meq x 2 (2/12)  Monitor    Skin infection at gastrostomy tube site ([facility])  Assessment & Plan  WBC's: 12.7 (2/7) --> 12.2 (2/8) --> 10.6 (2/12)  Has been afebrile  UA: neg  CXR: unremarkable  Had some mild erythema around site -- much improved  On Doxycycline (thru 2/16)  Monitor    Hypophosphatemia  Assessment & Plan  Currently resolved  PO4: 2.4 (2/7) --> 2.9 (2/8) --> 3.6 (2/12)  On supplements  Monitor    Azotemia- (present on admission)  Assessment & Plan  Currently resolved  Bun: 35 (2/7) --> 27 (2/8) --> 18 (2/12)  S/P recent IVF's  Encouraging oral fluids  Monitor    Vitamin D deficiency- (present on admission)  Assessment & Plan  Vit D: 13  On supplements    Hypothyroidism- (present on admission)  Assessment & Plan  TSH 5.63  FT4 0.86  Synthroid dose increased by Dr. Harris    VERENA (obstructive sleep apnea)- (present on admission)  Assessment & Plan  On nocturnal CPAP

## 2020-02-14 NOTE — PROGRESS NOTES
"Rehab Progress Note     Date of Service: 2/14/2020  Chief Complaint: follow up stroke    Interval Events (Subjective)    Patient seen and examined today in his room.  He is doing a therapeutic outing with recreational therapy this afternoon to go bowling.  He denies any pain.  He reports he is sleeping okay.  He understands plan of care for discharge home tomorrow.  He is moving his bowels and denies any issues with urination.  He wants to discontinue the trazodone tonight to see if he can sleep without it.  We went over his discharge medications.    Objective:  VITAL SIGNS: /63   Pulse 81   Temp 36.6 °C (97.8 °F) (Oral)   Resp 17   Ht 1.727 m (5' 8\")   Wt 114.1 kg (251 lb 9.6 oz)   SpO2 92%   BMI 38.26 kg/m²   Gen: alert, no apparent distress  CV: regular rate and rhythm, no murmurs, no peripheral edema  Resp: clear to ascultation bilaterally, normal respiratory effort  GI: soft, non-tender abdomen, bowel sounds present, dried blood around PEG tube site  Neuro: notable for non focal exam      Recent Results (from the past 72 hour(s))   Basic Metabolic Panel    Collection Time: 02/12/20  5:45 AM   Result Value Ref Range    Sodium 138 135 - 145 mmol/L    Potassium 3.0 (L) 3.6 - 5.5 mmol/L    Chloride 99 96 - 112 mmol/L    Co2 29 20 - 33 mmol/L    Glucose 103 (H) 65 - 99 mg/dL    Bun 18 8 - 22 mg/dL    Creatinine 1.01 0.50 - 1.40 mg/dL    Calcium 8.5 8.5 - 10.5 mg/dL    Anion Gap 10.0 0.0 - 11.9   MAGNESIUM    Collection Time: 02/12/20  5:45 AM   Result Value Ref Range    Magnesium 2.1 1.5 - 2.5 mg/dL   PHOSPHORUS    Collection Time: 02/12/20  5:45 AM   Result Value Ref Range    Phosphorus 3.6 2.5 - 4.5 mg/dL   CBC WITH DIFFERENTIAL    Collection Time: 02/12/20  5:45 AM   Result Value Ref Range    WBC 10.6 4.8 - 10.8 K/uL    RBC 3.55 (L) 4.70 - 6.10 M/uL    Hemoglobin 11.1 (L) 14.0 - 18.0 g/dL    Hematocrit 34.1 (L) 42.0 - 52.0 %    MCV 96.1 81.4 - 97.8 fL    MCH 31.3 27.0 - 33.0 pg    MCHC 32.6 (L) 33.7 " - 35.3 g/dL    RDW 46.5 35.9 - 50.0 fL    Platelet Count 405 164 - 446 K/uL    MPV 11.3 9.0 - 12.9 fL    Neutrophils-Polys 68.40 44.00 - 72.00 %    Lymphocytes 18.20 (L) 22.00 - 41.00 %    Monocytes 9.40 0.00 - 13.40 %    Eosinophils 2.40 0.00 - 6.90 %    Basophils 0.70 0.00 - 1.80 %    Immature Granulocytes 0.90 0.00 - 0.90 %    Nucleated RBC 0.00 /100 WBC    Neutrophils (Absolute) 7.24 1.82 - 7.42 K/uL    Lymphs (Absolute) 1.93 1.00 - 4.80 K/uL    Monos (Absolute) 0.99 (H) 0.00 - 0.85 K/uL    Eos (Absolute) 0.25 0.00 - 0.51 K/uL    Baso (Absolute) 0.07 0.00 - 0.12 K/uL    Immature Granulocytes (abs) 0.10 0.00 - 0.11 K/uL    NRBC (Absolute) 0.00 K/uL   ESTIMATED GFR    Collection Time: 02/12/20  5:45 AM   Result Value Ref Range    GFR If African American >60 >60 mL/min/1.73 m 2    GFR If Non African American >60 >60 mL/min/1.73 m 2       Current Facility-Administered Medications   Medication Frequency   • traZODone (DESYREL) tablet 50 mg QHS PRN   • senna-docusate (PERICOLACE or SENOKOT S) 8.6-50 MG per tablet 2 Tab BID PRN    And   • polyethylene glycol/lytes (MIRALAX) PACKET 1 Packet QDAY PRN    And   • magnesium hydroxide (MILK OF MAGNESIA) suspension 30 mL QDAY PRN    And   • bisacodyl (DULCOLAX) suppository 10 mg QDAY PRN   • doxycycline monohydrate (ADOXA) tablet 100 mg Q12HRS   • lisinopril (PRINIVIL) tablet 5 mg Q DAY   • levothyroxine (SYNTHROID) tablet 100 mcg AM ES   • phosphorus (K-PHOS-NEUTRAL) per tablet 2 Tab TID   • budesonide-formoterol (SYMBICORT) 160-4.5 MCG/ACT inhaler 2 Puff BID   • vitamin D (cholecalciferol) tablet 2,000 Units DAILY   • ipratropium-albuterol (DUONEB) nebulizer solution Q4HRS PRN   • Respiratory Care per Protocol Continuous RT   • Pharmacy Consult Request ...Pain Management Review 1 Each PHARMACY TO DOSE   • acetaminophen (TYLENOL) tablet 650 mg Q4HRS PRN   • hydrALAZINE (APRESOLINE) tablet 25 mg Q8HRS PRN   • docusate sodium (COLACE) capsule 100 mg DAILY    And   • lactulose  20 GM/30ML solution 30 mL Q24HRS PRN    And   • bisacodyl (DULCOLAX) suppository 10 mg QDAY PRN   • artificial tears ophthalmic solution 1 Drop PRN   • benzocaine-menthol (CEPACOL) lozenge 1 Lozenge Q2HRS PRN   • mag hydrox-al hydrox-simeth (MAALOX PLUS ES or MYLANTA DS) suspension 20 mL Q2HRS PRN   • ondansetron (ZOFRAN ODT) dispertab 4 mg 4X/DAY PRN    Or   • ondansetron (ZOFRAN) syringe/vial injection 4 mg 4X/DAY PRN   • sodium chloride (OCEAN) 0.65 % nasal spray 2 Spray PRN   • atorvastatin (LIPITOR) tablet 80 mg Q EVENING   • metoprolol (LOPRESSOR) tablet 50 mg BID   • melatonin tablet 3 mg Nightly   • aspirin (ASA) chewable tab 81 mg DAILY   • hydrocortisone 1 % cream BID       Orders Placed This Encounter   Procedures   • Diet Order Diabetic     Standing Status:   Standing     Number of Occurrences:   1     Order Specific Question:   Diet:     Answer:   Diabetic [3]     Order Specific Question:   Consistency/Fluid modifications:     Answer:   Thin Liquids [3]       Assessment:  Active Hospital Problems    Diagnosis   • *Cerebrovascular accident (CVA) due to thrombosis of right middle cerebral artery (HCC)   • Acute respiratory failure with hypoxia ([facility])   • Type 2 diabetes mellitus without complication, without long-term current use of insulin ([facility])   • Dysphagia   • Vitamin B12 deficiency   • Essential hypertension   • Moderate persistent asthma without complication   • Dyslipidemia   • Hypothyroidism   • VERENA (obstructive sleep apnea)   • Vitamin D deficiency   • Azotemia   • Obesity (BMI 35.0-39.9 without comorbidity)   • Rash   • S/P percutaneous endoscopic gastrostomy (PEG) tube placement ([facility])   • Hypertriglyceridemia   • Oral thrush   • Anemia   • Impaired mobility and ADLs     This patient is a 74 y.o. male admitted for acute inpatient rehabilitation with Cerebrovascular accident (CVA) due to thrombosis of right middle cerebral artery (HCC).    I led and attended the weekly conference, and agree with  the IDT conference documentation and plan of care as noted below.    Date of conference: 2/10/2020    Goals and barriers: See IDT note.    Biggest barriers: mild left sided incoordination, respiratory insufficiency, dysphagia, mild cognitive impairments    CM/social support: brother supportive    Anticipated DC date: 2/15/2020    Home health: PT/OT/SLP/RN    Equip: TBD    Follow up: PCP, rehab clinic optional      Medical Decision Making and Plan:    Right MCA stroke  S/p tPA  Hemorraghic transformation  Dysphagia, s/p PEG 2/1, resolved  Cognitive deficits  Left pronator drift  Left arm/hand incoordination  Non-dominant  Continue full rehab program  PT/OT/SLP, 1 hr each discipline, 5 days per week  PEG can be removed 6 weeks after placement  Continue aspirin and statin for secondary stroke prophylaxis    Bowel  Meds as needed  Last BM 2/14    Bladder  Checked PVRs - 67, 54  Not retaining  ICP for over 400 cc  Scheduled toileting    Insomnia, resolved  Schedule melatonin  PRN trazodone    DVT prophylaxis  Lovenox    Appreciate assistance of hospitalist with his medical comorbidities:    Asthma, Symbicort  Hypertension, lisinopril, metoprolol  Hypothyroidism, levothyroxine  Sleep apnea, CPAP  Diabetes with hyperglycemia, not on meds, diabetic diet  Anemia, stable  Hypertriglyceridemia, statin  Obesity, did nutritional counseling  Oral thrush, resolved  Rash, improved, hydrocortisone cream  Vit D deficiency, on supplementation  Leukocytosis, CXR and UA negative  PEG tube site cellulitis, doxycycline  Hypophosphatemia, on supplementation  Hypokalemia, on supplementation    Total time:  15 minutes.  I spent greater than 50% of the time for patient care, counseling, and coordination on this date, including patient face-to face time, unit/floor time with review of records/pertinent lab data and studies, as well as discussing diagnostic evaluation/work up, planned therapeutic interventions, and future disposition of care,  as per the interval events/subjective and the assessment and plan as noted above.    I have performed a physical exam, reviewed and updated ROS, as well as the assessment and plan today 2/14/2020. In review of note from 2/13/2020 there are no new changes except as documented above.            Alla Ewing M.D.   Physical Medicine and Rehabilitation

## 2020-02-14 NOTE — THERAPY
Speech Language Pathology  Daily Treatment     Patient Name: Familia Lundy  Age:  74 y.o., Sex:  male  Medical Record #: [id number]  Today's Date: 2/14/2020     Subjective    Pt pleasant and cooperative, brother present and supportive.     Objective       02/14/20 1033   SCCAN (Scales of Cognitive and Communicative Ability for Neurorehabilitation)   Oral Expression - Raw Score 19   Oral Expression - Scale Performance Score 100   Orientation - Raw Score 12   Orientation - Scale Performance Score 100   Memory - Raw Score 18   Memory - Scale Performance Score 95   Speech Comprehension - Raw Score 13   Speech Comprehension - Scale Performance Score 100   Reading Comprehension - Raw Score 11   Reading Comprehension - Scale Performance Score 92   Writing - Raw Score 7   Writing - Scale Performance Score 100   Attention - Raw Score 14   Attention - Scale Performance Score 88   Problem Solving - Raw Score 23   Problem Solving - Scale Performance Score 100   SCCAN Total Raw Score 92   SCCAN Degree of Severity Typical Functioning   SLP Total Time Spent   SLP Individual Total Time Spent (Mins) 60   Charge Group   SLP Cognitive Skill Development First 15 Minutes 1   SLP Cognitive Skill Development Additional 15 Minutes 3       Assessment    Outcome assessment completed at this time with use of SCCAN pt increased to raw score of 92 which indicates cognitive function WFL. Ongoing education provided re: stroke, risk factors and d/c planning. Both pt and brother demonstrated good understanding at this time.    Plan    D/c tomorrow

## 2020-02-14 NOTE — THERAPY
Physical Therapy   Daily Treatment     Patient Name: Familia Lundy  Age:  74 y.o., Sex:  male  Medical Record #: [id number]  Today's Date: 2/14/2020     Precautions  Precautions: Fall Risk, PEG Tube  Comments: impaired L UE sensation, wounds on feet    Subjective    Pt reports he has no concerns about DC      Objective       02/14/20 0831   Interdisciplinary Plan of Care Collaboration   Patient Position at End of Therapy Seated;Self Releasing Lap Belt Applied  (self propel back to room)   PT Total Time Spent   PT Individual Total Time Spent (Mins) 60   PT Charge Group   PT Gait Training 2   PT Therapeutic Activities 2       FIM Bed/Chair/Wheelchair Transfers Score: 7 - Independent  Bed/Chair/Wheelchair Transfers Description:       FIM Walking Score:  7 - Independent  Walking Description:       FIM Wheelchair Score:  5 - Standby Prompting/Supervision or Set-up  Wheelchair Description:       FIM Stairs Score:  6 - Modified Independent  Stairs Description:  Hand rails    See IRFPAI for DC results    Pt education on avoiding falls in the home with recommendations of home modifications and safety considerations to reduce risk for falls at home and in the community. Also education on fall recovery process with suggestions on when and how to get up from floor in case of a fall. Written information provided for remembering details at home.       Assessment    Pt demos independent level mobility for DC assessment. Pt safe to DC to home with no AD and no need for SPV . Ongoing skilled PT in home [facility] setting recommended for continue improvement of strength, balance, activity tolerance and decreasing risk for falls and rehospitilization at home and in the community.      Plan    DC

## 2020-02-15 VITALS
HEART RATE: 73 BPM | OXYGEN SATURATION: 92 % | RESPIRATION RATE: 19 BRPM | WEIGHT: 251.6 LBS | TEMPERATURE: 98.1 F | HEIGHT: 68 IN | SYSTOLIC BLOOD PRESSURE: 112 MMHG | BODY MASS INDEX: 38.13 KG/M2 | DIASTOLIC BLOOD PRESSURE: 55 MMHG

## 2020-02-15 LAB
ANION GAP SERPL CALC-SCNC: 8 MMOL/L (ref 0–11.9)
BUN SERPL-MCNC: 17 MG/DL (ref 8–22)
CALCIUM SERPL-MCNC: 8.5 MG/DL (ref 8.5–10.5)
CHLORIDE SERPL-SCNC: 103 MMOL/L (ref 96–112)
CO2 SERPL-SCNC: 26 MMOL/L (ref 20–33)
CREAT SERPL-MCNC: 0.96 MG/DL (ref 0.5–1.4)
GLUCOSE SERPL-MCNC: 94 MG/DL (ref 65–99)
MAGNESIUM SERPL-MCNC: 2 MG/DL (ref 1.5–2.5)
PHOSPHATE SERPL-MCNC: 4 MG/DL (ref 2.5–4.5)
POTASSIUM SERPL-SCNC: 3.5 MMOL/L (ref 3.6–5.5)
SODIUM SERPL-SCNC: 137 MMOL/L (ref 135–145)

## 2020-02-15 PROCEDURE — 84100 ASSAY OF PHOSPHORUS: CPT

## 2020-02-15 PROCEDURE — A9270 NON-COVERED ITEM OR SERVICE: HCPCS | Performed by: HOSPITALIST

## 2020-02-15 PROCEDURE — A9270 NON-COVERED ITEM OR SERVICE: HCPCS | Performed by: PHYSICAL MEDICINE & REHABILITATION

## 2020-02-15 PROCEDURE — 700102 HCHG RX REV CODE 250 W/ 637 OVERRIDE(OP): Performed by: HOSPITALIST

## 2020-02-15 PROCEDURE — 700112 HCHG RX REV CODE 229: Performed by: PHYSICAL MEDICINE & REHABILITATION

## 2020-02-15 PROCEDURE — 99239 HOSP IP/OBS DSCHRG MGMT >30: CPT | Performed by: PHYSICAL MEDICINE & REHABILITATION

## 2020-02-15 PROCEDURE — 80048 BASIC METABOLIC PNL TOTAL CA: CPT

## 2020-02-15 PROCEDURE — 83735 ASSAY OF MAGNESIUM: CPT

## 2020-02-15 PROCEDURE — 99232 SBSQ HOSP IP/OBS MODERATE 35: CPT | Performed by: HOSPITALIST

## 2020-02-15 PROCEDURE — 36415 COLL VENOUS BLD VENIPUNCTURE: CPT

## 2020-02-15 PROCEDURE — 700102 HCHG RX REV CODE 250 W/ 637 OVERRIDE(OP): Performed by: PHYSICAL MEDICINE & REHABILITATION

## 2020-02-15 RX ORDER — POTASSIUM CHLORIDE 20 MEQ/1
40 TABLET, EXTENDED RELEASE ORAL ONCE
Status: COMPLETED | OUTPATIENT
Start: 2020-02-15 | End: 2020-02-15

## 2020-02-15 RX ADMIN — ASPIRIN 81 MG 81 MG: 81 TABLET ORAL at 08:34

## 2020-02-15 RX ADMIN — DOXYCYCLINE 100 MG: 100 TABLET, FILM COATED ORAL at 08:28

## 2020-02-15 RX ADMIN — DOCUSATE SODIUM 100 MG: 100 CAPSULE, LIQUID FILLED ORAL at 08:28

## 2020-02-15 RX ADMIN — CHOLECALCIFEROL TAB 25 MCG (1000 UNIT) 2000 UNITS: 25 TAB at 08:27

## 2020-02-15 RX ADMIN — LISINOPRIL 5 MG: 5 TABLET ORAL at 05:49

## 2020-02-15 RX ADMIN — METOPROLOL TARTRATE 50 MG: 25 TABLET ORAL at 08:29

## 2020-02-15 RX ADMIN — POTASSIUM CHLORIDE 40 MEQ: 1500 TABLET, EXTENDED RELEASE ORAL at 08:34

## 2020-02-15 RX ADMIN — BUDESONIDE AND FORMOTEROL FUMARATE DIHYDRATE 2 PUFF: 160; 4.5 AEROSOL RESPIRATORY (INHALATION) at 08:30

## 2020-02-15 RX ADMIN — LEVOTHYROXINE SODIUM 100 MCG: 50 TABLET ORAL at 05:49

## 2020-02-15 ASSESSMENT — ENCOUNTER SYMPTOMS
HEADACHES: 0
DIZZINESS: 0
NAUSEA: 0
BLURRED VISION: 0
VOMITING: 0
PALPITATIONS: 0
SHORTNESS OF BREATH: 0
FEVER: 0
HALLUCINATIONS: 0

## 2020-02-15 NOTE — PROGRESS NOTES
Hospital Medicine Daily Progress Note    Chief Complaint:  Hypertension  Diabetes  G-tube site infection    Interval History:  No significant events or changes since last visit    Review of Systems  Review of Systems   Constitutional: Negative for fever.   Eyes: Negative for blurred vision.   Respiratory: Negative for shortness of breath.    Cardiovascular: Negative for palpitations.   Gastrointestinal: Negative for nausea and vomiting.   Neurological: Negative for dizziness and headaches.   Psychiatric/Behavioral: Negative for hallucinations.        Physical Exam  Temp:  [36.2 °C (97.1 °F)-36.7 °C (98.1 °F)] 36.7 °C (98.1 °F)  Pulse:  [77-96] 88  Resp:  [18-20] 18  BP: (104-117)/(61-80) 117/61  SpO2:  [92 %] 92 %    Physical Exam  Vitals signs and nursing note reviewed.   Constitutional:       General: He is not in acute distress.  HENT:      Mouth/Throat:      Mouth: Mucous membranes are moist.      Pharynx: Oropharynx is clear.   Eyes:      General: No scleral icterus.  Neck:      Musculoskeletal: No neck rigidity.   Cardiovascular:      Rate and Rhythm: Normal rate and regular rhythm.   Pulmonary:      Effort: Pulmonary effort is normal.      Breath sounds: No wheezing or rales.   Abdominal:      General: Bowel sounds are normal.      Palpations: Abdomen is soft.      Comments: G-Tube site has some surrounding induration and purulent appearing discharge   Musculoskeletal:      Right lower leg: No edema.      Left lower leg: No edema.   Skin:     General: Skin is warm and dry.   Neurological:      Mental Status: He is alert and oriented to person, place, and time.   Psychiatric:         Mood and Affect: Mood normal.         Behavior: Behavior normal.         Fluids    Intake/Output Summary (Last 24 hours) at 2/15/2020 0803  Last data filed at 2/14/2020 1905  Gross per 24 hour   Intake 480 ml   Output --   Net 480 ml       Laboratory      Recent Labs     02/15/20  0550   SODIUM 137   POTASSIUM 3.5*   CHLORIDE 103    CO2 26   GLUCOSE 94   BUN 17   CREATININE 0.96   CALCIUM 8.5                 Assessment/Plan  * Cerebrovascular accident (CVA) due to thrombosis of right middle cerebral artery (HCC)- (present on admission)  Assessment & Plan  S/P TPA  On ASA  On Lipitor    Chronic respiratory failure (HCC)- (present on admission)  Assessment & Plan  Hx of asthma  Hx extensive smoking history  On Symbicort    Type 2 diabetes mellitus without complication, without long-term current use of insulin ([facility])- (present on admission)  Assessment & Plan  Hba1c: 7.2  Off diabetic meds  Off accuchecks    Essential hypertension- (present on admission)  Assessment & Plan  BP ok  On Lisinopril: 5 mg daily  On Lopressor: 50 mg bid  Cont to monitor    Hypokalemia- (present on admission)  Assessment & Plan  K+: 3.0 (2/12) --> 3.5 (2/15)  S/P KCL 40 meq x 2 (2/12)  Will give KCL 40 meq x 1 (2/15)  Monitor    Skin infection at gastrostomy tube site ([facility])- (present on admission)  Assessment & Plan  WBC's: 12.7 (2/7) --> 12.2 (2/8) --> 10.6 (2/12)  Has been afebrile  UA: neg  CXR: unremarkable  Had some mild erythema around site -- much improved  On Doxycycline (thru 2/16)  Monitor    Hypophosphatemia- (present on admission)  Assessment & Plan  Currently resolved  PO4: 2.4 (2/7) --> 2.9 (2/8) --> 3.6 (2/12) --> 4.0m(2/15)  On supplements --> will d/c   Monitor    Vitamin D deficiency- (present on admission)  Assessment & Plan  Vit D: 13  On supplements    Hypothyroidism- (present on admission)  Assessment & Plan  TSH 5.63  FT4 0.86  Synthroid dose increased by Dr. Harris    VERENA (obstructive sleep apnea)- (present on admission)  Assessment & Plan  On nocturnal CPAP

## 2020-02-15 NOTE — DISCHARGE INSTRUCTIONS
[facility] NURSING DISCHARGE INSTRUCTIONS    Blood Pressure : 104/80  Weight: 114.1 kg (251 lb 9.6 oz)  Nursing recommendations for Reinier Lundy at time of discharge are as follows:  Client verbalized understanding of all discharge instructions and prescriptions.     Review all your home medications and newly ordered medications with your doctor and/or pharmacist. Follow medication instructions as directed by your doctor and/or pharmacist.    Pain Management:   Discharge Pain Medication Instructions:  Comfort Goal: Comfort at Rest  Notify your primary care provider if pain is unrelieved with these measures, if the pain is new, or increased in intensity.    Discharge Skin Characteristics:    Discharge Skin Exam:       Skin / Wound Care Instructions: Please contact your primary care physician for any change in skin integrity.     If You Have Surgical Incisions / Wounds:  Monitor surgical site(s) for signs of increased swelling, redness or symptoms of drainage from the site or fever as this could indicate signs and symptoms of infection. If these symptoms are noted, notifiy your primary care provider.      Discharge Safety Instructions: Should Not Be Left Alone In The House     Discharge Safety Concerns: Weakness, History Of Falls  The interdisciplinary team has made recommendation that you should not be left alone  in the house due to unsteady gait  Anti-embolic stockings are not required to increase circulation to the lower extremities.    Discharge Diet: Diabetic diet     Discharge Liquids: Thin liquids  Discharge Bowel Function:    Please contact your primary care physician for any changes in bowel habits.  Discharge Bowel Program:    Discharge Bladder Function: Continent  Discharge Urinary Devices:        Nursing Discharge Plan:   Influenza Vaccine Indication: Not indicated: Previously immunized this influenza season and > 8 years of age    Case Management Discharge Instructions:    Discharge Location: Home with Home Health  Agency Name/Address/Phone: [facility] [phone]  Home Health: Registered Nurse, Occupational Therapist, Physical Therapist, Speech Therapist  Outpatient Services:    DME Provider/Phone:    Medical Equipment Ordered:    Prescription Faxed to:        Discharge Medication Instructions:  Below are the medications your physician expects you to take upon discharge:      Diabetes Mellitus and Food  It is important for you to manage your blood sugar (glucose) level. Your blood glucose level can be greatly affected by what you eat. Eating healthier foods in the appropriate amounts throughout the day at about the same time each day will help you control your blood glucose level. It can also help slow or prevent worsening of your diabetes mellitus. Healthy eating may even help you improve the level of your blood pressure and reach or maintain a healthy weight.  General recommendations for healthful eating and cooking habits include:  · Eating meals and snacks regularly. Avoid going long periods of time without eating to lose weight.  · Eating a diet that consists mainly of plant-based foods, such as fruits, vegetables, nuts, legumes, and whole grains.  · Using low-heat cooking methods, such as baking, instead of high-heat cooking methods, such as deep frying.  Work with your dietitian to make sure you understand how to use the Nutrition Facts information on food labels.  How can food affect me?  Carbohydrates   Carbohydrates affect your blood glucose level more than any other type of food. Your dietitian will help you determine how many carbohydrates to eat at each meal and teach you how to count carbohydrates. Counting carbohydrates is important to keep your blood glucose at a healthy level, especially if you are using insulin or taking certain medicines for diabetes mellitus.  Alcohol   Alcohol can cause sudden decreases in blood glucose (hypoglycemia), especially if you  use insulin or take certain medicines for diabetes mellitus. Hypoglycemia can be a life-threatening condition. Symptoms of hypoglycemia (sleepiness, dizziness, and disorientation) are similar to symptoms of having too much alcohol.  If your health care provider has given you approval to drink alcohol, do so in moderation and use the following guidelines:  · Women should not have more than one drink per day, and men should not have more than two drinks per day. One drink is equal to:  ¨ 12 oz of beer.  ¨ 5 oz of wine.  ¨ 1½ oz of hard liquor.  · Do not drink on an empty stomach.  · Keep yourself hydrated. Have water, diet soda, or unsweetened iced tea.  · Regular soda, juice, and other mixers might contain a lot of carbohydrates and should be counted.  What foods are not recommended?  As you make food choices, it is important to remember that all foods are not the same. Some foods have fewer nutrients per serving than other foods, even though they might have the same number of calories or carbohydrates. It is difficult to get your body what it needs when you eat foods with fewer nutrients. Examples of foods that you should avoid that are high in calories and carbohydrates but low in nutrients include:  · Trans fats (most processed foods list trans fats on the Nutrition Facts label).  · Regular soda.  · Juice.  · Candy.  · Sweets, such as cake, pie, doughnuts, and cookies.  · Fried foods.  What foods can I eat?  Eat nutrient-rich foods, which will nourish your body and keep you healthy. The food you should eat also will depend on several factors, including:  · The calories you need.  · The medicines you take.  · Your weight.  · Your blood glucose level.  · Your blood pressure level.  · Your cholesterol level.  You should eat a variety of foods, including:  · Protein.  ¨ Lean cuts of meat.  ¨ Proteins low in saturated fats, such as fish, egg whites, and beans. Avoid processed meats.  · Fruits and vegetables.  ¨ Fruits  and vegetables that may help control blood glucose levels, such as apples, mangoes, and yams.  · Dairy products.  ¨ Choose fat-free or low-fat dairy products, such as milk, yogurt, and cheese.  · Grains, bread, pasta, and rice.  ¨ Choose whole grain products, such as multigrain bread, whole oats, and brown rice. These foods may help control blood pressure.  · Fats.  ¨ Foods containing healthful fats, such as nuts, avocado, olive oil, canola oil, and fish.  Does everyone with diabetes mellitus have the same meal plan?  Because every person with diabetes mellitus is different, there is not one meal plan that works for everyone. It is very important that you meet with a dietitian who will help you create a meal plan that is just right for you.  This information is not intended to replace advice given to you by your health care provider. Make sure you discuss any questions you have with your health care provider.  Document Released: 09/14/2006 Document Revised: 05/25/2017 Document Reviewed: 11/14/2014  [employer] Interactive Patient Education © 2017 [employer]      Fall Prevention in the Home  Falls can cause injuries and can affect people from all age groups. There are many simple things that you can do to make your home safe and to help prevent falls.  What can I do on the outside of my home?  · Regularly repair the edges of walkways and driveways and fix any cracks.  · Remove high doorway thresholds.  · Trim any shrubbery on the main path into your home.  · Use bright outdoor lighting.  · Clear walkways of debris and clutter, including tools and rocks.  · Regularly check that handrails are securely fastened and in good repair. Both sides of any steps should have handrails.  · Install guardrails along the edges of any raised decks or porches.  · Have leaves, snow, and ice cleared regularly.  · Use sand or salt on walkways during winter months.  · In the garage, clean up any spills right away, including grease or oil  spills.  What can I do in the bathroom?  · Use night lights.  · Install grab bars by the toilet and in the tub and shower. Do not use towel bars as grab bars.  · Use non-skid mats or decals on the floor of the tub or shower.  · If you need to sit down while you are in the shower, use a plastic, non-slip stool.  · Keep the floor dry. Immediately clean up any water that spills on the floor.  · Remove soap buildup in the tub or shower on a regular basis.  · Attach bath mats securely with double-sided non-slip rug tape.  · Remove throw rugs and other tripping hazards from the floor.  What can I do in the bedroom?  · Use night lights.  · Make sure that a bedside light is easy to reach.  · Do not use oversized bedding that drapes onto the floor.  · Have a firm chair that has side arms to use for getting dressed.  · Remove throw rugs and other tripping hazards from the floor.  What can I do in the kitchen?  · Clean up any spills right away.  · Avoid walking on wet floors.  · Place frequently used items in easy-to-reach places.  · If you need to reach for something above you, use a sturdy step stool that has a grab bar.  · Keep electrical cables out of the way.  · Do not use floor polish or wax that makes floors slippery. If you have to use wax, make sure that it is non-skid floor wax.  · Remove throw rugs and other tripping hazards from the floor.  What can I do in the stairways?  · Do not leave any items on the stairs.  · Make sure that there are handrails on both sides of the stairs. Fix handrails that are broken or loose. Make sure that handrails are as long as the stairways.  · Check any carpeting to make sure that it is firmly attached to the stairs. Fix any carpet that is loose or worn.  · Avoid having throw rugs at the top or bottom of stairways, or secure the rugs with carpet tape to prevent them from moving.  · Make sure that you have a light switch at the top of the stairs and the bottom of the stairs. If you do  not have them, have them installed.  What are some other fall prevention tips?  · Wear closed-toe shoes that fit well and support your feet. Wear shoes that have rubber soles or low heels.  · When you use a stepladder, make sure that it is completely opened and that the sides are firmly locked. Have someone hold the ladder while you are using it. Do not climb a closed stepladder.  · Add color or contrast paint or tape to grab bars and handrails in your home. Place contrasting color strips on the first and last steps.  · Use mobility aids as needed, such as canes, walkers, scooters, and crutches.  · Turn on lights if it is dark. Replace any light bulbs that burn out.  · Set up furniture so that there are clear paths. Keep the furniture in the same spot.  · Fix any uneven floor surfaces.  · Choose a carpet design that does not hide the edge of steps of a stairway.  · Be aware of any and all pets.  · Review your medicines with your healthcare provider. Some medicines can cause dizziness or changes in blood pressure, which increase your risk of falling.  Talk with your health care provider about other ways that you can decrease your risk of falls. This may include working with a physical therapist or  to improve your strength, balance, and endurance.  This information is not intended to replace advice given to you by your health care provider. Make sure you discuss any questions you have with your health care provider.  Document Released: 12/08/2003 Document Revised: 05/16/2017 Document Reviewed: 01/22/2016  [employer] Interactive Patient Education © 2017 [employer]      Depression / Suicide Risk    As you are discharged from this [facility] facility, it is important to learn how to keep safe from harming yourself.    Recognize the warning signs:  · Abrupt changes in personality, positive or negative- including increase in energy   · Giving away possessions  · Change in eating patterns- significant weight  changes-  positive or negative  · Change in sleeping patterns- unable to sleep or sleeping all the time   · Unwillingness or inability to communicate  · Depression  · Unusual sadness, discouragement and loneliness  · Talk of wanting to die  · Neglect of personal appearance   · Rebelliousness- reckless behavior  · Withdrawal from people/activities they love  · Confusion- inability to concentrate     If you or a loved one observes any of these behaviors or has concerns about self-harm, here's what you can do:  · Talk about it- your feelings and reasons for harming yourself  · Remove any means that you might use to hurt yourself (examples: pills, rope, extension cords, firearm)  · Get professional help from the community (Mental Health, Substance Abuse, psychological counseling)  · Do not be alone:Call your Safe Contact- someone whom you trust who will be there for you.  · Call your local CRISIS HOTLINE 412-5419 or 038-623-5748  · Call your local Children's Mobile Crisis Response Team [place] (255) 844-3801 or www.[employer]  · Call the toll free National Suicide Prevention Hotlines   · National Suicide Prevention Lifeline 247-551-VTAG (8444)  · National Hope Line Network 800-SUICIDE (561-5346)        Physical Therapy Discharge Instructions for Reinier Hackett Kamron    2/14/2020    Level of Assist Required for Ambulation: No Assist on Flat Surfaces, No Assist on Stairs, No Assist on Curbs  Distance Patient May Ambulate: 600 ft(continue to progress)  Device Recommended for Ambulation: None  Level of Assist Required for Transfers: Requires No Assist  Device Recommended for Transfers: None  Home Exercise Program: Refer to Home Exercise Program Handout for Details    Thanks for working hard in PT, it was a pleasure working with you!  Maximus Rodriguez, DPT    Occupational Therapy Discharge Instructions for Familia Lundy    2/14/2020    Level of Assist Required for Eating: Able to Complete Eating without  Assist  Level of Assist Required for Grooming: Able to Complete Grooming without Assist  Level of Assist Required for Dressing: Able to Complete Dressing without Assist  Level of Assist Required for Toileting: Able to Complete Toileting without Assist  Level of Assist Required for Toilet Transfer: Able to Complete Toilet Transfer without Assist  Equipment for Toilet Transfer: Grab Bars by Toilet  Level of Assist Required for Bathing: Able to Complete Bathing without Assist  Equipment for Bathing: Shower Chair, Grab Bars in Tub / Shower, Hand Held Shower Head  Level of Assist Required for Shower Transfer: Able to Complete Shower Transfer without Assist  Equipment for Shower Transfer: Shower Chair, Grab Bars in Tub / Shower  Level of Assist Required for Home Mgmt: Requires Supervision with Home Management  Level of Assist Required for Meal Prep: Requires Supervision with Meal Preparation  Driving: Please Contact Physician Prior to Driving    It has been a pleasure working with you, Reinier! We will miss you and wish you all the best!  -Rosaura Horner, OT

## 2020-02-15 NOTE — CARE PLAN
Problem: Safety  Goal: Will remain free from injury  Outcome: PROGRESSING AS EXPECTED  Patient with left sided weakness, assisted with transfers to prevent falls.     Problem: Respiratory:  Goal: Respiratory status will improve  Outcome: PROGRESSING AS EXPECTED  Patient requires continuous O2 4L per n/c to ensure adequate O2 sats.

## 2020-02-15 NOTE — PROGRESS NOTES
Patient discharged to home per order.  Discharge instructions reviewed with patient and broth; they verbalize understanding and signed copies placed in chart.  Patient has all belongings; signed copy of form in chart. PEG tube site cleaned, supplies sent home with patient for cleaning maintainense.  Patient left facility at 1030 via wheelchair accompanied by rehab staff and family.  Have enjoyed working with this pleasant patient.

## 2020-02-15 NOTE — DISCHARGE SUMMARY
Rehab Discharge Note    Admission: 2/5/2020    Discharge: 2/15/2020    Admission Diagnosis:   Active Hospital Problems    Diagnosis   • *Cerebrovascular accident (CVA) due to thrombosis of right middle cerebral artery (HCC)   • Chronic respiratory failure (HCC)   • Type 2 diabetes mellitus without complication, without long-term current use of insulin (HCC)   • Vitamin B12 deficiency   • Essential hypertension   • Moderate persistent asthma without complication   • Dyslipidemia   • Hypothyroidism   • VERENA (obstructive sleep apnea)   • Hypokalemia   • Hypophosphatemia   • Skin infection at gastrostomy tube site (HCC)   • Vitamin D deficiency   • Azotemia   • Obesity (BMI 35.0-39.9 without comorbidity)   • Rash   • S/P percutaneous endoscopic gastrostomy (PEG) tube placement (HCC)   • Hypertriglyceridemia   • Anemia   • Impaired mobility and ADLs       Discharge Diagnosis:  Active Hospital Problems    Diagnosis   • *Cerebrovascular accident (CVA) due to thrombosis of right middle cerebral artery (HCC)   • Chronic respiratory failure (HCC)   • Type 2 diabetes mellitus without complication, without long-term current use of insulin (HCC)   • Vitamin B12 deficiency   • Essential hypertension   • Moderate persistent asthma without complication   • Dyslipidemia   • Hypothyroidism   • VERENA (obstructive sleep apnea)   • Hypokalemia   • Hypophosphatemia   • Skin infection at gastrostomy tube site (HCC)   • Vitamin D deficiency   • Azotemia   • Obesity (BMI 35.0-39.9 without comorbidity)   • Rash   • S/P percutaneous endoscopic gastrostomy (PEG) tube placement (HCC)   • Hypertriglyceridemia   • Anemia   • Impaired mobility and ADLs       HPI prior to admission  The patient is a 74 y.o. male with a past medical history of asthma, hypertension, hypothyroidism, sleep apnea, dysplipidemia; now admitted for acute inpatient rehabilitation with severe functional debility after an acute stroke.       On admission the patient and medical  record report he presented to the hospital on 1/27 with left sided arm weakness and decreased sensation. NIHSS 10. Head CT negative, s/p tPA. NIHSS improved to 7. He was seen and evaluated by neurology, cleared to start ASA on 2/4 if repeat Head CT stable - which it was. Recommendation for outpatient cardiac monitor.     MRI with right MCA stroke with hemorrhagic transformation. Had PEG tube placed on 2/1 with Dr. Justice. He did require oxygen supplementation for hypoxia. New diagnosis of diabetes. HgbA1c 7.2, LDL 73, TGs, 196, ECHO EF 55 %, ascending aortic aneurysm.     Patient current reports he is right handed. Still has decreased sensation on the left arm, no complaints in the leg. Symptoms have improved. He denies any pain. No visual changes, does wear glasses. Has moved bowels, no issue with urination. Reports he has slept in a recliner for 10 years, and wants to continue to do so here. No orthopnea, just is comfortable with the recliner. His brother is at bedside and confirms he will assist at discharge.      Patient was evaluated by Rehab Medicine physician and Physical Therapy, Occupational Therapy and Speech Therapy and determined to be appropriate for acute inpatient rehab and was transferred to [facility] on 2/5/2020 11:04 AM.    Rehab Hospital Course    Right MCA stroke  S/p tPA  Hemorraghic transformation  Dysphagia, s/p PEG 2/1, resolved  Cognitive deficits  Left pronator drift  Left arm/hand incoordination  Non-dominant  PEG can be removed 6 weeks after placement - about 3/14  Continue aspirin and statin for secondary stroke prophylaxis     Bowel  Meds as needed  Last BM 2/15     Bladder  Checked PVRs - 67, 54  Not retaining  ICP for over 400 cc  Scheduled toileting     Insomnia, resolved  Schedule melatonin  PRN trazodone     DVT prophylaxis  Lovenox     Appreciated the assistance of hospitalist with his medical comorbidities:     Asthma, Symbicort, patient was using his rescue  inhaler albuterol at home 3-4 times a day, he was advised to stay on the Symbicort and just use albuterol as needed  Hypertension, stable on lisinopril, metoprolol  Hypothyroidism, levothyroxine  Sleep apnea, CPAP  Diabetes with hyperglycemia, new diagnosis, hemoglobin A1c of 7.2, not on meds, diabetic diet, patient advised to control with diet and exercise  Anemia, stable  Hypertriglyceridemia, statin  Obesity, did nutritional counseling  Oral thrush, resolved  Rash on bilateral feet, improved, hydrocortisone cream  Vit D deficiency, on supplementation  Leukocytosis, resolved, CXR and UA negative  PEG tube site cellulitis, improved on doxycycline  Hypophosphatemia, resolved s/p supplementation  Hypokalemia, resolved s/p supplementation    Functional Status at Discharge  Eatin - Modified Independent  Eating Description:  Increased time  Groomin - Independent  Grooming Description:  (seated at sink for oral care, shaving and combing hair.)  Bathin - Modified Independent  Bathing Description:  Grab bar, Tub bench, Hand held shower, Long handled bath tool, Increased time  Upper Body Dressin - Independent  Upper Body Dressing Description:  Increased time(increased time to don/doff pullover shirt.)  Lower Body Dressin - Modified Independent  Lower Body Dressing Description:  6 - Modified Independent  Discharge Location : Home  Patient Discharging with Assist of: Family   Level of Supervision Required: Intermittent Supervision  Recommended Equipment for Discharge: Front-Wheeled Walker;Grab Bars in Tub / Shower;Hand Held Shower Head;Grab Bars by Toilet;Shower Chair  Recommended Services Upon Discharge: Home Health Occupational Therapy  Long Term Goals Met: 3  Long Term Goals Not Met: 0  Reason(s) for Goals Not Met: n/a  Criteria for Termination of Services: Maximum Function Achieved for Inpatient Rehabilitation  Walk:  7 - Independent  Distance Walked:  Walks a minimum of 150 feet  Walk  Description:  Safety concerns(300+ ft no AD, no LOB)  Wheelchair:  6 - Modified Independent  Distance Propelled:  Propels a minimum of 150 feet   Wheelchair Description:  Extra time, Safety concerns, Supervision for safety, Verbal cueing  Stairs 6 - Modified Independent  Stairs DescriptionHand rails  Discharge Location: Home  Patient Discharging with Assist of: Family  Level of Supervision Required Upon Discharge: No Supervision  Recommended Equipment for Discharge: None  Recommeded Services Upon Discharge: Home Health Physical Therapy  Long Term Goals Met: 4  Long Term Goals Not Met: 0  Criteria for Termination of Services: Maximum Function Achieved for Inpatient Rehabilitation  Comprehension Mode:  Auditory  Comprehension:  6 - Modified Independent  Comprehension Description:     Expression Mode:  Vocal  Expression:  6 - Modified Independent  Expression Description:     Social Interaction:  7 - Independent  Social Interaction Description:     Problem Solvin - Standby Prompting/Supervision or Set-up  Problem Solving Description:     Memory:  4 - Minimal Assistance  Memory Description:          Discharge Medication:     Medication List      START taking these medications      Instructions   aspirin 81 MG Chew chewable tablet  Commonly known as:  ASA   Take 1 Tab by mouth every day.  Dose:  81 mg     budesonide-formoterol 160-4.5 MCG/ACT Aero  Commonly known as:  SYMBICORT   Inhale 2 Puffs by mouth 2 Times a Day.  Dose:  2 Puff     Cholecalciferol 1000 UNIT Tabs  Commonly known as:  VITAMIND D3   Take 2 Tabs by mouth every day.  Dose:  2,000 Units     docusate sodium 100 MG Caps   Take 100 mg by mouth every day.  Dose:  100 mg     doxycycline monohydrate 100 MG tablet  Commonly known as:  ADOXA   Take 1 Tab by mouth every 12 hours for 2 days.  Dose:  100 mg     hydrocortisone 1 % Crea   Apply to red rash on tops of bilateral feet . Apply Sparingly. Avoid Face     lisinopril 5 MG Tabs  Commonly known as:   PRINIVIL   Take 1 Tab by mouth every day.  Dose:  5 mg        CHANGE how you take these medications      Instructions   atorvastatin 80 MG tablet  What changed:  how to take this  Commonly known as:  LIPITOR   Take 1 Tab by mouth every evening.  Dose:  80 mg     levothyroxine 100 MCG Tabs  What changed:    · medication strength  · how much to take  Commonly known as:  SYNTHROID   Take 1 Tab by mouth Every morning on an empty stomach.  Dose:  100 mcg     melatonin 3 MG Tabs  What changed:    · medication strength  · how to take this  · when to take this  · reasons to take this   Take 1 Tab by mouth every bedtime.  Dose:  3 mg     metoprolol 50 MG Tabs  What changed:  how to take this  Commonly known as:  LOPRESSOR   Take 1 Tab by mouth 2 Times a Day.  Dose:  50 mg        CONTINUE taking these medications      Instructions   albuterol 108 (90 Base) MCG/ACT Aers inhalation aerosol   Inhale 2 Puffs by mouth every 6 hours as needed for Shortness of Breath.  Dose:  2 Puff        STOP taking these medications    amLODIPine 5 MG Tabs  Commonly known as:  NORVASC     insulin regular 100 Unit/mL Soln  Commonly known as:  HUMULIN R     ipratropium-albuterol 0.5-2.5 (3) MG/3ML nebulizer solution  Commonly known as:  DUONEB     traZODone 50 MG Tabs  Commonly known as:  DESYREL          Discharge Location: Home with Home Health     Agency Name / Phone: [facility] [phone]     Home Health: Registered Nurse, Occupational Therapist, Physical Therapist, Speech Therapist     NOTE: This information can be found in your final discharge packet that your nurse will give you.         Follow-up Information:     Kvng Pickett M.D.  [facility] [address]  [phone]  Primary care - Thursday, Feb. 27, 2020 @ 3:45PM    Condition on Discharge:  Good.    More than 32 minutes was spent on discharging this patient, including face-to-face time, prescription management, and the dictation of this note.

## 2020-02-16 NOTE — PROGRESS NOTES
DATE OF SERVICE:  02/14/2020    The patient said he is doing well at followup.  He said he is meeting all of   his goals.  He is pacing himself and not getting too tired in the afternoon   like he was.  Patient said he is pleased and is fully confident he will be   able to carry out his home program upon his discharge from the hospital.       ____________________________________     JOSELYN POSADAS, PHD    [facility]    DD:  02/15/2020 16:18:09  DT:  02/15/2020 23:00:43    D#:  [id number]  Job#:  [id number]

## 2020-02-16 NOTE — CONSULTS
DATE OF SERVICE:  02/12/2020    BEHAVIORAL MEDICINE EVALUATION    BRIEF HISTORY OF PRESENTING COMPLAINTS:  The patient is a 74-year-old white    male who is referred for a behavioral medicine evaluation by Dr. Ewing.  The patient was transferred to rehab from acute where he presented to   the hospital on 01/27/2020 with left arm weakness and decreased sensation.    The patient was determined to have a CVA due to thrombosis of the right middle   cerebral artery.  He was stabilized acutely and then sent to rehab to address   his general debility.    PAST MEDICAL HISTORY:  Significant for:  1. Chickenpox.  2. [place] measles.  3. Hyperlipidemia.  4. Hypertension.  5. Hyperthyroidism.  6. Influenza.  7. Mumps.  8. Sleep apnea.  9. Tonsillitis.    PSYCHOLOGICAL STATUS:  MENTAL STATUS EXAMINATION:  The patient is a well-nourished, obese male of   short stature who appeared his stated age of 74.  At presentation, the patient   was alert.  He was sitting in a wheelchair in his room when approached.  The   patient oriented well to my presence.  The patient was kempt in appearance.    He was dressed casually in street attire that was appropriate to his age and   setting.  The patient's manner of presentation was cooperative and friendly.    The patient was well oriented to time, place, and person.  His language was   logical and goal oriented.  His speech was slightly dysarthric, but normal for   rate.  Patient's concentration and memory functioning seemed generally   intact.    The patient's affect was well modulated, stable, and mildly intense.  He   related well.  His mood appeared euthymic  and appropriate to the context.    There was no evidence of delusional or perceptual disturbance.  Also, the   patient showed no unusual pain or motor behavior during the interview.    SPECIFIC BEHAVIORAL COMPLAINTS:  Patient denied any clinically significant   symptoms of a negative mood.  He reported no strong feelings of  depression,   anxiety, or anger.  He said his energy level is good and his appetite is only   slightly diminished.  The patient reported no pain, but he did admit to some   left-sided weakness.  The patient said his sleep is good.  Patient also   reported no interpersonal discord or discomfort, family disharmony, or   problems managing his day-to-day stressors.    The patient denied any ETOH or other drug abuse.  He said he drinks 2 drinks   daily.  He is a nonsmoker.    PSYCHIATRIC HISTORY:  The patient denied any history significant for   psychiatric disturbance or treatment including in or outpatient care.    PSYCHOMETRIC TESTING:  The patient was administered 2 psychometric tests and 2   screening instruments.  The PS/PC-R revealed no clinically significant   symptoms of a negative mood.  Her CDR survey showed no problems with level of   consciousness, attention, thinking, or perception.  Speech was somewhat   dysarthric.  He also showed some slight deficits of memory functioning.    Moreover, the patient revealed problems with behavioral activation and basic   self-care, loss of vigor, and diminished appetite.    The patient was screened for any elder abuse or risk of suicide.  There is no   strong evidence for either problem.    SOCIAL HISTORY:  The patient is a retired nuclear .  He is .  He   lives alone in [place], [place].    IMPRESSION:  Minor adjustment difficulties.    RECOMMENDATIONS:  Patient will be followed for status and supportive care.             ____________________________________     JOSELYN POSADAS, PHD    [facility]    DD:  02/16/2020 12:34:54  DT:  02/16/2020 14:02:40    D#:  [id number]  Job#:  [id number]

## 2020-03-02 ENCOUNTER — TELEPHONE (OUTPATIENT)
Dept: PHYSICAL MEDICINE AND REHAB | Facility: REHABILITATION | Age: 75
End: 2020-03-02

## 2020-03-03 NOTE — TELEPHONE ENCOUNTER
I spoke with Reinier and asked him if he wanted to make an appt with Dr. Bingham to remove feeding tube. He said his physical therapist told him there may be someone at [facility] to remove tube.     I let him know that we don't know of anyone at [facility], but if he wanted to check with therapist who that may be or check with PCP to see if he/she knows of someone.     He said he was going to check and get back to me if he could not find anyone to remove and make appt with Dr. Bingham. I gave him my direct number to call me back.

## 2020-03-03 NOTE — TELEPHONE ENCOUNTER
Patient called back and scheduled for 3/16/2020 at 12:30 pm and I let him know not to eat before he comes in at least a couple hours before.

## 2020-03-16 ENCOUNTER — OFFICE VISIT (OUTPATIENT)
Dept: PHYSICAL MEDICINE AND REHAB | Facility: REHABILITATION | Age: 75
End: 2020-03-16
Payer: MEDICARE

## 2020-03-16 VITALS
BODY MASS INDEX: 38.65 KG/M2 | HEART RATE: 85 BPM | TEMPERATURE: 99.3 F | OXYGEN SATURATION: 92 % | DIASTOLIC BLOOD PRESSURE: 64 MMHG | WEIGHT: 255 LBS | HEIGHT: 68 IN | SYSTOLIC BLOOD PRESSURE: 124 MMHG

## 2020-03-16 DIAGNOSIS — I63.311 CEREBROVASCULAR ACCIDENT (CVA) DUE TO THROMBOSIS OF RIGHT MIDDLE CEREBRAL ARTERY (HCC): Primary | ICD-10-CM

## 2020-03-16 DIAGNOSIS — N31.9 NEUROGENIC BLADDER: ICD-10-CM

## 2020-03-16 DIAGNOSIS — G81.94 LEFT HEMIPARESIS (HCC): ICD-10-CM

## 2020-03-16 DIAGNOSIS — K59.2 NEUROGENIC BOWEL: ICD-10-CM

## 2020-03-16 DIAGNOSIS — G47.9 SLEEPING DIFFICULTY: ICD-10-CM

## 2020-03-16 DIAGNOSIS — Z93.1 GASTROINTESTINAL TUBE PRESENT (HCC): ICD-10-CM

## 2020-03-16 DIAGNOSIS — K94.29 GASTRIC TUBE GRANULATION TISSUE (HCC): ICD-10-CM

## 2020-03-16 DIAGNOSIS — E55.9 VITAMIN D DEFICIENCY: ICD-10-CM

## 2020-03-16 PROCEDURE — 99214 OFFICE O/P EST MOD 30 MIN: CPT | Performed by: PHYSICAL MEDICINE & REHABILITATION

## 2020-03-16 RX ORDER — DOXYCYCLINE HYCLATE 100 MG
TABLET ORAL
COMMUNITY
Start: 2020-02-14

## 2020-03-16 ASSESSMENT — ENCOUNTER SYMPTOMS
FALLS: 0
FEVER: 0
CONSTIPATION: 0
SHORTNESS OF BREATH: 0
FOCAL WEAKNESS: 0
COUGH: 0
DOUBLE VISION: 0
MEMORY LOSS: 0
CHILLS: 0
SPEECH CHANGE: 0
BLURRED VISION: 0
SENSORY CHANGE: 0

## 2020-03-16 ASSESSMENT — FIBROSIS 4 INDEX: FIB4 SCORE: 1

## 2020-03-16 ASSESSMENT — PATIENT HEALTH QUESTIONNAIRE - PHQ9: CLINICAL INTERPRETATION OF PHQ2 SCORE: 0

## 2020-03-16 NOTE — PROGRESS NOTES
[facility]  [facility]  [address] [place] [address]  Ph: [phone]    NEW PATIENT EVALUATION    *Patient established in PM&R practice, however, patient new to writer as patient is transferring care. Therefore, patient billed as established.     Patient Name: Familia Lundy   Patient : 1945  Patient Age: 74 y.o.   PCP: Kvng Pickett M.D.    Referring Physician: Alla Ewing  Reason for Referral: ARU discharge follow-up  Examining Physician: Dr. Karolyn Bingham DO  Date of Service: 3/16/2020      SUBJECTIVE:   Patient Identification: Familia Lundy is a 74 y.o. male with PMH significant for asthma, hypertension, hypothyroidism, VERENA, hyperlipidemia and rehabilitation history significant for right MCA stroke 2020 s/p TPA and is presenting to PM&R clinic for a NEW OUTPATIENT evaluation with the following chief complaint/s:    Chief Complaint: PEG tube removal    PM&R History to Date - Background Information:  Original Date of Injury: 2020  Pertinent Procedure History: PEG tube placement 2020  Dates of Admission/Discharge to ARU: 2020 to 2/15/2020    Accompanied by Today: Self  History of Present Illness:   Patient reports that he has been doing well since discharge.  He is ambulatory without DME.  Dysphagia was resolved, he is tolerating regular diet and thin liquids.  Still has gastrostomy tube.  Would like it removed today.  States that it has been bothering him.  Has not had fevers chills or signs of infection.  Has not noticed any drainage from his G-tube site.  He has been working with outpatient therapy, but states that he was recently discharged.  He states that the therapist told him he is very high functioning and does not need physical therapy any longer.  Patient is to continue with his home exercise program.  Patient was in [place].  Volitional bowel function and bladder function.  Denies any incontinence of bowel or bladder.    Review of  Systems:  Review of Systems   Constitutional: Negative for chills and fever.   HENT: Negative for hearing loss and tinnitus.    Eyes: Negative for blurred vision and double vision.   Respiratory: Negative for cough and shortness of breath.    Cardiovascular: Negative for chest pain and leg swelling.   Gastrointestinal: Negative for constipation.   Genitourinary: Negative for dysuria, frequency and urgency.   Musculoskeletal: Negative for falls and joint pain.   Skin: Negative for itching and rash.   Neurological: Negative for sensory change, speech change and focal weakness.   Psychiatric/Behavioral: Negative for memory loss.      All other pertinent positive review of systems are noted above in HPI.   All other systems reviewed and are negative.    Past Medical History:  Past Medical History:   Diagnosis Date   • Asthma    • Chickenpox    • Maldivian measles    • Hyperlipidemia    • Hypertension    • Hypothyroidism    • Influenza    • Mumps    • Sleep apnea    • Tonsillitis       Past Surgical History:   Procedure Laterality Date   • PB PLACE PERCUT GASTROSTOMY TUBE Left 2/1/2020    Procedure: INSERTION, PEG TUBE;  Surgeon: Compa Justice M.D.;  Location: SURGERY [facility];  Service: Gastroenterology   • GASTROSCOPY N/A 2/1/2020    Procedure: GASTROSCOPY;  Surgeon: Compa Justice M.D.;  Location: SURGERY [facility];  Service: Gastroenterology   • HIP REPLACEMENT, TOTAL          Current Outpatient Medications:   •  atorvastatin (LIPITOR) 80 MG tablet, Take 1 Tab by mouth every evening., Disp: 30 Tab, Rfl: 0  •  levothyroxine (SYNTHROID) 100 MCG Tab, Take 1 Tab by mouth Every morning on an empty stomach., Disp: 30 Tab, Rfl: 0  •  metoprolol (LOPRESSOR) 50 MG Tab, Take 1 Tab by mouth 2 Times a Day., Disp: 60 Tab, Rfl: 0  •  aspirin (ASA) 81 MG Chew Tab chewable tablet, Take 1 Tab by mouth every day., Disp: 100 Tab, Rfl:   •  budesonide-formoterol (SYMBICORT) 160-4.5 MCG/ACT Aerosol, Inhale 2 Puffs by mouth 2  "Times a Day., Disp: 1 Inhaler, Rfl: 0  •  hydrocortisone 1 % Cream, Apply to red rash on tops of bilateral feet . Apply Sparingly. Avoid Face, Disp: 1 Tube, Rfl: 0  •  lisinopril (PRINIVIL) 5 MG Tab, Take 1 Tab by mouth every day., Disp: 30 Tab, Rfl: 0  •  albuterol (VENTOLIN OR PROVENTIL) 108 (90 BASE) MCG/ACT Aero Soln inhalation aerosol, Inhale 2 Puffs by mouth every 6 hours as needed for Shortness of Breath., Disp: , Rfl:   •  doxycycline (VIBRAMYCIN) 100 MG Tab, , Disp: , Rfl:   •  docusate sodium 100 MG Cap, Take 100 mg by mouth every day., Disp: 60 Cap, Rfl:   •  vitamin D (VITAMIND D3) 1000 UNIT Tab, Take 2 Tabs by mouth every day., Disp: , Rfl:   Allergies   Allergen Reactions   • Amoxicillin Hives     Pt states \"I get hives\".   • Ampicillin Hives     Pt states \"I get hives\".        Past Social History:  Social History     Socioeconomic History   • Marital status:      Spouse name: Not on file   • Number of children: Not on file   • Years of education: Not on file   • Highest education level: Not on file   Occupational History   • Not on file   Social Needs   • Financial resource strain: Not on file   • Food insecurity     Worry: Never true     Inability: Never true   • Transportation needs     Medical: No     Non-medical: No   Tobacco Use   • Smoking status: Former Smoker     Packs/day: 0.50     Years: 46.00     Pack years: 23.00     Types: Cigarettes     Last attempt to quit: 2015     Years since quittin.2   • Smokeless tobacco: Never Used   Substance and Sexual Activity   • Alcohol use: Yes     Alcohol/week: 1.2 oz     Types: 2 Standard drinks or equivalent per week     Frequency: 2-3 times a week     Drinks per session: 1 or 2     Binge frequency: Never     Comment: 2-3 drinks a week   • Drug use: No   • Sexual activity: Not on file   Lifestyle   • Physical activity     Days per week: Not on file     Minutes per session: Not on file   • Stress: Not on file   Relationships   • Social " connections     Talks on phone: Not on file     Gets together: Not on file     Attends Shinto service: Not on file     Active member of club or organization: Not on file     Attends meetings of clubs or organizations: Not on file     Relationship status: Not on file   • Intimate partner violence     Fear of current or ex partner: Not on file     Emotionally abused: Not on file     Physically abused: Not on file     Forced sexual activity: Not on file   Other Topics Concern   • Not on file   Social History Narrative   • Not on file        Family History:  Family History   Problem Relation Age of Onset   • Cancer Mother    • Diabetes Mother    • Cancer Father        Depression and Opioid Screening  PHQ-9:  Depression Screen (PHQ-2/PHQ-9) 2/13/2020 2/14/2020 3/16/2020   PHQ-2 Total Score 0 0 -   PHQ-2 Total Score - - 0     Interpretation of PHQ-9 Total Score   Score Severity   1-4 No Depression   5-9 Mild Depression   10-14 Moderate Depression   15-19 Moderately Severe Depression   20-27 Severe Depression     Opioid Risk Score: No value filed.  Interpretation of Opioid Risk Score   Score 0-3 = Low risk of abuse. Do UDS at least once per year.  Score 4-7 = Moderate risk of abuse. Do UDS 1-4 times per year.  Score 8+ = High risk of abuse. Refer to specialist.      OBJECTIVE:   Vital Signs:  Vitals:    03/16/20 0933   BP: 124/64   Pulse: 85   Temp: 37.4 °C (99.3 °F)   SpO2: 92%        Pertinent Labs:  Lab Results   Component Value Date/Time    SODIUM 137 02/15/2020 05:50 AM    POTASSIUM 3.5 (L) 02/15/2020 05:50 AM    CHLORIDE 103 02/15/2020 05:50 AM    CO2 26 02/15/2020 05:50 AM    GLUCOSE 94 02/15/2020 05:50 AM    BUN 17 02/15/2020 05:50 AM    CREATININE 0.96 02/15/2020 05:50 AM       Lab Results   Component Value Date/Time    HBA1C 7.2 (H) 01/28/2020 01:28 AM       Lab Results   Component Value Date/Time    WBC 10.6 02/12/2020 05:45 AM    RBC 3.55 (L) 02/12/2020 05:45 AM    HEMOGLOBIN 11.1 (L) 02/12/2020 05:45 AM     HEMATOCRIT 34.1 (L) 02/12/2020 05:45 AM    MCV 96.1 02/12/2020 05:45 AM    MCH 31.3 02/12/2020 05:45 AM    MCHC 32.6 (L) 02/12/2020 05:45 AM    MPV 11.3 02/12/2020 05:45 AM    NEUTSPOLYS 68.40 02/12/2020 05:45 AM    LYMPHOCYTES 18.20 (L) 02/12/2020 05:45 AM    MONOCYTES 9.40 02/12/2020 05:45 AM    EOSINOPHILS 2.40 02/12/2020 05:45 AM    BASOPHILS 0.70 02/12/2020 05:45 AM       Lab Results   Component Value Date/Time    ASTSGOT 28 02/06/2020 05:44 AM    ALTSGPT 25 02/06/2020 05:44 AM        Physical Exam:   Physical Exam   Constitutional: He is oriented to person, place, and time and well-developed, well-nourished, and in no distress.   HENT:   Head: Normocephalic and atraumatic.   Eyes: Conjunctivae are normal. No scleral icterus. Right eye exhibits no nystagmus. Left eye exhibits no nystagmus.   Neck:   Range of motion appears normal in all planes   Cardiovascular:   Lower extremities without peripheral edema.  Extremities warm and well-perfused.   Pulmonary/Chest: No accessory muscle usage. No respiratory distress.   Abdominal: Soft. Normal appearance. He exhibits no distension.   G-tube present.  Area of mild granulation tissue surrounding orifice.  No purulence appreciated.  No underlying fluctuance.  Mild serous sanguinous drainage from orifice.   Musculoskeletal: Normal range of motion.         General: No edema.   Neurological: He is alert and oriented to person, place, and time. No cranial nerve deficit. He exhibits normal muscle tone. Coordination normal.   Skin: Skin is warm, dry and intact.   Psychiatric: Mood and affect normal.   Nursing note and vitals reviewed.     Neuro Cont'd:  Alert, awake. Conversational. Logical thought content.     Motor Exam Upper Extremities   ? Myotome R L   Shoulder Abduction C5 5 5   Elbow flexion C5 5 5   Wrist extension C6 5 5   Elbow extension C7 5 5   Finger flexion C8 5 5   Finger abduction T1 5 5     Motor Exam Lower Extremities  ? Myotome R L   Hip flexion L2 5 5  "  Knee extension L3 5 5   Ankle dorsiflexion L4 5 5   Toe extension L5 5 5   Ankle plantarflexion S1 5 5     Imaging:  MRI Brain 1/29/20  IMPRESSION:     1.  Moderate acute/subacute right MCA infarct with hemorrhagic transformation.  2.  Right frontal lobe encephalomalacia.  3.  Mild to moderate cerebral atrophy.    ASSESSMENT/PLAN: Familia Lundy is a 74 y.o. male with PMH significant for asthma, hypertension, hypothyroidism, VERENA, hyperlipidemia and rehabilitation history significant for right MCA stroke 1/2020 s/p TPA, here 3/16/2020 for very discharge follow-up as well as G-tube removal. The following plan was discussed with the patient who is in agreement.     Visit Diagnoses     ICD-10-CM   1. Cerebrovascular accident (CVA) due to thrombosis of right middle cerebral artery (HCC) I63.311   2. Neurogenic bowel K59.2   3. Neurogenic bladder N31.9   4. Vitamin D deficiency E55.9   5. Sleeping difficulty G47.9   6. Left hemiparesis ([facility]) G81.94   7. Gastrointestinal tube present ([facility]) Z93.1   8. Gastric tube granulation tissue ([facility]) K94.29        Rehab/Neuro:   1. right MCA stroke 1/2020 s/p TPA c/b hemorrhagic transformation: Reviewed all pertinent previous medical records leading up to today's clinic visit.  Initial imaging reports reviewed.  -Patient has referral to neurology, encouraged patient to make this appointment to have stroke work-up completed  -Continue aspirin and statin.  -Continue home exercise program    Assessment of Current Functional Status: Patient reports he has returned to his baseline, independent functional status, not requiring any DME.    Neurogenic Bladder: Continent, volitional.    Neurogenic Bowel: Continent, volitional.    Endo:   1. Vitamin D Deficiency secondary to #1 in \"rehab/neuro\" section: Most recent Vitamin D lab reviewed from 2/2020 and was 13.   -Encouraged continued vitamin D supplementation  -PCP to follow-up    Mood/Sleep:   1. Secondary to adjustment disorder " "resulting from #1 in \"neuro/rehab section\": Patient requiring melatonin and trazodone patient no longer taking.   -Discontinue melatonin and trazodone.    GI/Diet:   1. Dysphagia secondary to #1 in \"rehab/neuro\" section: G tube present, traction removable.  Placed 2/1/2020  - REMOVED G tube today in clinic as > 6 weeks since placement and patient tolerating diet with resolution of dysphagia.   -After removal there was mild bleeding from the orifice which is completely normal.  Covered ostomy with gauze secured with tape.  Patient provided extra gauze to change dressing.  Counseled not to bathe in the bathtub or soak for 2 weeks.  Ostomy site should heal quickly.  Counseled should he notice increasing redness, pain/tenderness, drainage he should visit his PCP immediately.    Follow up: PRN    Total time spent face to face with patient was 30 minutes. Greater then 50% of my visit was spent on counseling and coordination of care regarding the primary medical diagnosis, secondary medical complications, patient on their condition, best management practices, and risks and benefits of treatment as aforementioned in the assessment and plan. Extensive discussion involved the patient.    Please note that this dictation was created using voice recognition software. I have made every reasonable attempt to correct obvious errors but there may be errors of grammar and content that I may have overlooked prior to finalization of this note.    Dr. Karolyn Bingham DO, MS  Department of Physical Medicine & Rehabilitation  Neuro Rehabilitation Clinic  [facility]  3/16/2020 9:58 AM    "

## 2020-05-12 ENCOUNTER — TELEPHONE (OUTPATIENT)
Dept: PHYSICAL THERAPY | Facility: MEDICAL CENTER | Age: 75
End: 2020-05-12

## 2020-05-12 NOTE — THERAPY
Called patient between 75 and 120 days after discharge. Assessed Modified [place] Scale to be a 0

## 2021-05-08 NOTE — ED PROVIDER NOTES
"ED Provider Note    ED Provider Note    Primary care provider: Kvng Pickett M.D.  Means of arrival: EMS  History obtained from: Patient    CHIEF COMPLAINT  Chief Complaint   Patient presents with   • Possible Stroke     Seen at 11:02 PM.   HPI  Familia Lundy is a 74 y.o. male who presents to the Emergency Department sudden neurological deficits beginning at 10 PM this evening.  The patient was in his usual state of health when he developed some decreasing strength and sensation of the left side.  He denies any headache, neck pain, chest pain or shortness of breath.  He denies any prior history of CVA.  He is on Plavix and aspirin, he is not on any DOACs.       Patient seen at the triage desk in conjunction with neurology.  He was directly sent from there to the CT scanner.    REVIEW OF SYSTEMS  See HPI,   Remainder of ROS negative.     PAST MEDICAL HISTORY   has a past medical history of Asthma, Chickenpox, Amharic measles, Influenza, Mumps, and Tonsillitis.    SURGICAL HISTORY   has a past surgical history that includes hip replacement, total.    SOCIAL HISTORY  Social History     Tobacco Use   • Smoking status: Former Smoker     Packs/day: 0.50     Years: 46.00     Pack years: 23.00     Types: Cigarettes     Last attempt to quit: 2015     Years since quittin.1   • Smokeless tobacco: Never Used   Substance Use Topics   • Alcohol use: Yes     Comment: 2-3 drinks a week   • Drug use: No      Social History     Substance and Sexual Activity   Drug Use No       FAMILY HISTORY  Family History   Problem Relation Age of Onset   • Cancer Mother    • Cancer Father        CURRENT MEDICATIONS  Reviewed.  See Encounter Summary.     ALLERGIES  Allergies   Allergen Reactions   • Amoxicillin Hives     Pt states \"I get hives\".   • Ampicillin Hives     Pt states \"I get hives\".       PHYSICAL EXAM  VITAL SIGNS: /73   Pulse 76   Temp 36.7 °C (98.1 °F) (Temporal)   Resp (!) 24   Ht 1.702 m (5' 7\")   Wt 119.9 kg " (264 lb 5.3 oz)   SpO2 94%   BMI 41.40 kg/m²   Constitutional: Awake, alert in no apparent distress.  HENT: Normocephalic, atraumatic.  Bilateral external ears normal. Nose normal.   Eyes: Conjunctiva normal, non-icteric, EOMI.    Thorax & Lungs: Easy unlabored respirations, Clear to ascultation bilaterally.  Cardiovascular: Regular rate, Regular rhythm, No murmurs, rubs or gallops.  Abdomen:  Soft, nontender, nondistended, normal active bowel sounds.   :    Skin: Visualized skin is  Dry, No erythema, No rash.   Musculoskeletal:   No cyanosis, clubbing or edema.  Neurologic: Alert, moderate dysarthria, visual field cut on the left side.  Unable to hold the left upper extremity or left lower extremity up against gravity.  Decreased sensation on the left upper and left lower extremity.  Good strength right upper and right lower, normal sensation on the right side.  Extraocular movements are intact.  Psychiatric: Normal affect, Normal mood  Lymphatic:  No cervical LAD    EKG   12 lead Interpreted by me  Rhythm:  Normal sinus rhythm   Rate: 76  Axis: normal  Ectopy: none  Conduction: normal  ST Segments: no acute change  T Waves: no acute change  Clinical Impression: Low voltage, otherwise normal EKG without acute changes     RADIOLOGY  DX-CHEST-PORTABLE (1 VIEW)   Final Result      Cardiomegaly with interstitial prominence.      CT-CTA NECK WITH & W/O-POST PROCESSING   Final Result      1.  Atherosclerotic plaque at the carotid bifurcations bilaterally which results in less than 50% diameter narrowing.      2.  Diminutive right vertebral artery.      3.  Tortuous left internal carotid artery which is retropharyngeal in location.      CT-CTA HEAD WITH & W/O-POST PROCESS   Final Result      1.  Some asymmetric attenuation of the right middle cerebral arterial peripheral vessels without evidence of acute occlusion possibly representing vasospasm or atherosclerotic attenuation.      2.  Diminutive right vertebral  artery.      CT-HEAD W/O   Final Result      1.  No evidence of acute intracranial process.      2.  Cerebral atrophy as well as periventricular chronic small vessel ischemic change.      MR-BRAIN-W/O    (Results Pending)         COURSE & MEDICAL DECISION MAKING  Pertinent Labs & Imaging studies reviewed. (See chart for details)    Differential diagnoses include but are not limited to: Ischemic versus hemorrhagic CVA    11:02 PM - Medical record reviewed, patient seen and examined at bedside.  Patient with what appears to be a right MCA occlusion.  Anticipate administration of alteplase assuming the patient does not have an acute head bleed.    12 AM discussed with Dr. Garcia who will evaluate the patient for admission.    Decision Making:  This is a 74 y.o. year old male who presents with signs concerning for a right MCA occlusion.  The patient was taken immediately from the EMS gurney to the CT scanner, CT head did not have any sign of a bleed, therefore the patient was given alteplase expeditiously.  CT with contrast shows a diminutive right vertebral artery and some atherosclerosis.  No sign of a large vessel occlusion.  No indication for interventional radiology at this time.    The patient will be admitted to the ICU for close evaluation and rehabilitation.  The case was discussed with neurology and the hospitalist.    CRITICAL CARE  The very real possibilty of a deterioration of this patient's condition required the highest level of my preparedness for sudden, emergent intervention.  I provided critical care services, which included medication orders, frequent reevaluations of the patient's condition and response to treatment, ordering and reviewing test results, and discussing the case with various consultants.  The critical care time associated with the care of the patient was 35 minutes. Review chart for interventions. This time is exclusive of any other billable procedures.       FINAL IMPRESSION  1. Acute  CVA (cerebrovascular accident) (HCC)          No

## 2021-06-07 NOTE — DISCHARGE PLANNING
1544- Referral sent to [facility] [place]).   systolic

## 2023-06-29 NOTE — THERAPY
"Occupational Therapy Evaluation completed.   Functional Status:  Pt presents to skilled OT services following R posterior frontal ICH, s/p TPA. Pt performed bed mobility with max a, LB dressing max a, F-<>P+ seated eob balance, full ROM LUE but noted mild drift, decreased sensation to touch, c/o feeling \"numb\" but unable to describe in any other way, thumb to finger opposition impaired, L shoulder 3+, elbow 4 and poor hand , mod to max a for UB ADLs, STS eob with mod a, poor initiation and motor planning when prompted to side step with verbal cues and mod a for balance able to take side step. Pt will benefit from acute skilled OT services while in house and post acute recommended. Pt is very motivated to participate in therapy.   Plan of Care: Will benefit from Occupational Therapy 4 times per week  Discharge Recommendations:  Equipment: Will Continue to Assess for Equipment Needs. Post-acute therapy Recommend post-acute placement for additional occupational therapy services prior to discharge home. Patient can tolerate post-acute therapies at a 5x/week frequency. Consider PMR consult       See \"Rehab Therapy-Acute\" Patient Summary Report for complete documentation.    " (V5) oriented

## 2024-05-24 NOTE — PROGRESS NOTES
Pt stated get anxious for MRI. [facility], Dr aguilera ordered Ativan 1 mg IV x1 dos and okay off tele for MRI via telephone with readback.   Pt will find out the name and let us know

## (undated) DEVICE — SET EXTENSION WITH 2 PORTS (48EA/CA) ***PART #2C8610 IS A SUBSTITUTE*****

## (undated) DEVICE — FILM CASSETTE ENDO

## (undated) DEVICE — ELECTRODE 850 FOAM ADHESIVE - HYDROGEL RADIOTRNSPRNT (50/PK)

## (undated) DEVICE — TUBE CONNECTING SUCTION - CLEAR PLASTIC STERILE 72 IN (50EA/CA)

## (undated) DEVICE — SENSOR SPO2 NEO LNCS ADHESIVE (20/BX) SEE USER NOTES

## (undated) DEVICE — BITE BLOCK ADULT 60FR (100EA/CA)

## (undated) DEVICE — KIT CUSTOM PROCEDURE SINGLE FOR ENDO  (15/CA)